# Patient Record
Sex: FEMALE | Race: WHITE | Employment: OTHER | URBAN - METROPOLITAN AREA
[De-identification: names, ages, dates, MRNs, and addresses within clinical notes are randomized per-mention and may not be internally consistent; named-entity substitution may affect disease eponyms.]

---

## 2017-03-10 ENCOUNTER — HOSPITAL ENCOUNTER (EMERGENCY)
Age: 82
Discharge: HOME | End: 2017-03-10
Attending: EMERGENCY MEDICINE | Admitting: EMERGENCY MEDICINE
Payer: MEDICARE

## 2017-03-10 VITALS
DIASTOLIC BLOOD PRESSURE: 77 MMHG | SYSTOLIC BLOOD PRESSURE: 165 MMHG | BODY MASS INDEX: 23.74 KG/M2 | HEART RATE: 85 BPM | RESPIRATION RATE: 16 BRPM | HEIGHT: 62 IN | TEMPERATURE: 97.9 F | OXYGEN SATURATION: 94 % | WEIGHT: 129 LBS

## 2017-03-10 DIAGNOSIS — N39.0 URINARY TRACT INFECTION WITHOUT HEMATURIA, SITE UNSPECIFIED: Primary | ICD-10-CM

## 2017-03-10 LAB
ANION GAP BLD CALC-SCNC: 15 MMOL/L (ref 5–15)
BASOPHILS # BLD AUTO: 0 K/UL (ref 0–0.1)
BASOPHILS # BLD: 0 % (ref 0–1)
BUN BLD-MCNC: 20 MG/DL (ref 9–20)
CA-I BLD-MCNC: 1.07 MMOL/L (ref 1.12–1.32)
CHLORIDE BLD-SCNC: 104 MMOL/L (ref 98–107)
CO2 BLD-SCNC: 28 MMOL/L (ref 21–32)
CREAT BLD-MCNC: 0.7 MG/DL (ref 0.6–1.3)
DIFFERENTIAL METHOD BLD: ABNORMAL
EOSINOPHIL # BLD: 0.4 K/UL (ref 0–0.4)
EOSINOPHIL NFR BLD: 6 % (ref 0–7)
ERYTHROCYTE [DISTWIDTH] IN BLOOD BY AUTOMATED COUNT: 12.9 % (ref 11.5–14.5)
GLUCOSE BLD-MCNC: 117 MG/DL (ref 65–105)
HCT VFR BLD AUTO: 39.4 % (ref 35–47)
HCT VFR BLD CALC: 40 % (ref 35–47)
HGB BLD-MCNC: 12.5 G/DL (ref 11.5–16)
HGB BLD-MCNC: 13.6 GM/DL (ref 11.5–16)
LYMPHOCYTES # BLD AUTO: 23 % (ref 12–49)
LYMPHOCYTES # BLD: 1.5 K/UL (ref 0.8–3.5)
MCH RBC QN AUTO: 32.7 PG (ref 26–34)
MCHC RBC AUTO-ENTMCNC: 31.7 G/DL (ref 30–36.5)
MCV RBC AUTO: 103.1 FL (ref 80–99)
MONOCYTES # BLD: 0.6 K/UL (ref 0–1)
MONOCYTES NFR BLD AUTO: 9 % (ref 5–13)
NEUTS SEG # BLD: 4 K/UL (ref 1.8–8)
NEUTS SEG NFR BLD AUTO: 62 % (ref 32–75)
PLATELET # BLD AUTO: 267 K/UL (ref 150–400)
POTASSIUM BLD-SCNC: 4.4 MMOL/L (ref 3.5–5.1)
RBC # BLD AUTO: 3.82 M/UL (ref 3.8–5.2)
SERVICE CMNT-IMP: ABNORMAL
SODIUM BLD-SCNC: 141 MMOL/L (ref 136–145)
WBC # BLD AUTO: 6.4 K/UL (ref 3.6–11)

## 2017-03-10 PROCEDURE — 36415 COLL VENOUS BLD VENIPUNCTURE: CPT | Performed by: PHYSICIAN ASSISTANT

## 2017-03-10 PROCEDURE — 85025 COMPLETE CBC W/AUTO DIFF WBC: CPT | Performed by: PHYSICIAN ASSISTANT

## 2017-03-10 PROCEDURE — 74011250637 HC RX REV CODE- 250/637: Performed by: PHYSICIAN ASSISTANT

## 2017-03-10 PROCEDURE — 80047 BASIC METABLC PNL IONIZED CA: CPT

## 2017-03-10 PROCEDURE — 99283 EMERGENCY DEPT VISIT LOW MDM: CPT

## 2017-03-10 RX ORDER — ALBUTEROL SULFATE 90 UG/1
AEROSOL, METERED RESPIRATORY (INHALATION)
COMMUNITY
End: 2017-03-28

## 2017-03-10 RX ORDER — CARBAMAZEPINE 200 MG/1
200 TABLET, EXTENDED RELEASE ORAL 2 TIMES DAILY
COMMUNITY
End: 2017-03-28

## 2017-03-10 RX ORDER — LEVOTHYROXINE SODIUM 112 UG/1
TABLET ORAL
COMMUNITY
End: 2017-03-28

## 2017-03-10 RX ORDER — GLUCOSAMINE SULFATE 1500 MG
POWDER IN PACKET (EA) ORAL DAILY
COMMUNITY
End: 2017-03-28

## 2017-03-10 RX ORDER — CEPHALEXIN 500 MG/1
500 CAPSULE ORAL 3 TIMES DAILY
Qty: 21 CAP | Refills: 0 | Status: SHIPPED | OUTPATIENT
Start: 2017-03-10 | End: 2017-03-17

## 2017-03-10 RX ORDER — CEPHALEXIN 250 MG/1
500 CAPSULE ORAL
Status: COMPLETED | OUTPATIENT
Start: 2017-03-10 | End: 2017-03-10

## 2017-03-10 RX ADMIN — CEPHALEXIN 500 MG: 250 CAPSULE ORAL at 19:50

## 2017-03-10 NOTE — ED TRIAGE NOTES
Patient ambulatory to ED treatment area with steady gait using personal walker for complaint of Alessia Ospina has a UTI again. She had one a few weeks ago and was on an antibiotic that ended on February 25th but she has one again. \" Family reports that she is confused. Her urine was tested on Tuesday and the results came back today. Patient's family reports that the physician who ordered the test is out of town and that the on call physician wanted to wait until the ordering physician came back on Monday. Family reports that they do not want to wait until Monday due to increased confusion. Family reports that she has a history of frequent UTI's.

## 2017-03-11 NOTE — ED NOTES
Patient requesting to use the restroom. Patient ambulatory to restroom with a shuffling gait using walker. Patient to obtain urine sample as ordered.

## 2017-03-11 NOTE — ED NOTES
The patient was discharged home by ANASTASIIA Jasso in stable condition. The patient is alert and oriented, in no respiratory distress and discharge vital signs obtained. The patient's diagnosis, condition and treatment were explained. The patient expressed understanding. One prescription given. No work/school note given. A discharge plan has been developed. A  was not involved in the process. Aftercare instructions were given. Pt ambulatory out of the ED.

## 2017-03-11 NOTE — ED PROVIDER NOTES
HPI Comments: 79 y/o F hx COPD, frequent uti presents with urinary frequency, patient daughters bring a copy of UA from pcp from 3/8 - >100k ecoli, noting having difficulty reaching pcp to initiate abx. No abd pain/fever/n/v/flank pain. Patient at baseline, without complaint aside from concern for untreated uti. No hx  surgery. Unclear last antibiotic for uti. Recently relocated from [facility], has yet to establish care with urology. Patient is a 80 y.o. female presenting with frequency. Urinary Frequency    Associated symptoms include frequency. Past Medical History:   Diagnosis Date    [facility]ch[facility] obstructive [facility]) ([facility] [address] [facility]     Hypothyroid     Seizure ([facility] [address]        Past Surgical History:   Procedure Laterality Date    HX HIP REPLACEMENT           History reviewed. No pertinent family history. Social History     Social History    Marital status:      Spouse name: N/A    Number of children: N/A    Years of education: N/A     Occupational History    Not on file. Social History Main Topics    Smoking status: Never Smoker    Smokeless tobacco: Not on file    Alcohol use No    Drug use: Not on file    Sexual activity: Not on file     Other Topics Concern    Not on file     Social History Narrative    No narrative on file         ALLERGIES: Pcn [penicillins] and Sulfa (sulfonamide antibiotics)    Review of Systems   Genitourinary: Positive for frequency. All other systems reviewed and are negative. Vitals:    03/10/17 1847   BP: 165/77   Pulse: 85   Resp: 16   Temp: 97.9 °F (36.6 °C)   SpO2: 94%   Weight: 58.5 kg (129 lb)   Height: 5' 2\" (1.575 m)            Physical Exam   Constitutional: She is oriented to person, place, and time. She appears well-developed and well-nourished. HENT:   Head: Normocephalic and atraumatic. Eyes: Conjunctivae and EOM are normal. Pupils are equal, round, and reactive to light.    Cardiovascular: Normal rate, regular rhythm and normal heart sounds. Pulmonary/Chest: Effort normal and breath sounds normal.   Abdominal: Soft. There is no tenderness. Musculoskeletal: Normal range of motion. Neurological: She is alert and oriented to person, place, and time. Skin: Skin is warm and dry. No erythema. Nursing note and vitals reviewed. MDM  Number of Diagnoses or Management Options  Urinary tract infection without hematuria, site unspecified:   Diagnosis management comments: Patient well appearing, aside from urinary complaints she is doing well. Attempted to obtain urine twice here however patient missed the collecting hat twice. Daughters bring a copy of UA from pcp including cx/sensitivity, scanned into chart. Will tx based on this.           ED Course       Procedures

## 2017-03-11 NOTE — DISCHARGE INSTRUCTIONS
We hope that we have addressed all of your medical concerns. The examination and treatment you received in the Emergency Department were for an emergent problem and were not intended as complete care. It is important that you follow up with your healthcare provider(s) for ongoing care. If your symptoms worsen or do not improve as expected, and you are unable to reach your usual health care provider(s), you should return to the Emergency Department. Today's healthcare is undergoing tremendous change, and patient satisfaction surveys are one of the many tools to assess the quality of medical care. You may receive a survey from the [employer] regarding your experience in the Emergency Department. I hope that your experience has been completely positive, particularly the medical care that I provided. As such, please participate in the survey; anything less than excellent does not meet my expectations or intentions. Formerly [facility], [facility] and [address] [place] [address] participate in nationally recognized quality of care measures. If your blood pressure is greater than 120/80, as reported below, we urge that you seek medical care to address the potential of high blood pressure, commonly known as hypertension. Hypertension can be hereditary or can be caused by certain medical conditions, pain, stress, or \"white coat syndrome. \"       Please make an appointment with your health care provider(s) for follow up of your Emergency Department visit. VITALS:   Patient Vitals for the past 8 hrs:   Temp Pulse Resp BP SpO2   03/10/17 1847 97.9 °F (36.6 °C) 85 16 165/77 94 %          Thank you for allowing us to provide you with medical care today. We realize that you have many choices for your emergency care needs. Please choose us in the future for any continued health care needs. Franny Blount  [address] [place] [address], [address] [place] [address]. Office: [phone]            Recent Results (from the past 24 hour(s))   CBC WITH AUTOMATED DIFF    Collection Time: 03/10/17  7:34 PM   Result Value Ref Range    WBC 6.4 3.6 - 11.0 K/uL    RBC 3.82 3.80 - 5.20 M/uL    HGB 12.5 11.5 - 16.0 g/dL    HCT 39.4 35.0 - 47.0 %    .1 (H) 80.0 - 99.0 FL    MCH 32.7 26.0 - 34.0 PG    MCHC 31.7 30.0 - 36.5 g/dL    RDW 12.9 11.5 - 14.5 %    PLATELET 104 364 - 695 K/uL    NEUTROPHILS 62 32 - 75 %    LYMPHOCYTES 23 12 - 49 %    MONOCYTES 9 5 - 13 %    EOSINOPHILS 6 0 - 7 %    BASOPHILS 0 0 - 1 %    ABS. NEUTROPHILS 4.0 1.8 - 8.0 K/UL    ABS. LYMPHOCYTES 1.5 0.8 - 3.5 K/UL    ABS. MONOCYTES 0.6 0.0 - 1.0 K/UL    ABS. EOSINOPHILS 0.4 0.0 - 0.4 K/UL    ABS. BASOPHILS 0.0 0.0 - 0.1 K/UL    DF AUTOMATED     POC CHEM8    Collection Time: 03/10/17  7:36 PM   Result Value Ref Range    Calcium, ionized (POC) 1.07 (L) 1.12 - 1.32 MMOL/L    Sodium (POC) 141 136 - 145 MMOL/L    Potassium (POC) 4.4 3.5 - 5.1 MMOL/L    Chloride (POC) 104 98 - 107 MMOL/L    CO2 (POC) 28 21 - 32 MMOL/L    Anion gap (POC) 15 5 - 15 mmol/L    Glucose (POC) 117 (H) 65 - 105 MG/DL    BUN (POC) 20 9 - 20 MG/DL    Creatinine (POC) 0.7 0.6 - 1.3 MG/DL    GFR-AA (POC) >60 >60 ml/min/1.73m2    GFR, non-AA (POC) >60 >60 ml/min/1.73m2    Hemoglobin (POC) 13.6 11.5 - 16.0 GM/DL    Hematocrit (POC) 40 35.0 - 47.0 %    Comment Notified RN or MD immediately by          No results found. Urinary Tract Infection in Women: Care Instructions  Your Care Instructions    A urinary tract infection, or UTI, is a general term for an infection anywhere between the kidneys and the urethra (where urine comes out). Most UTIs are bladder infections. They often cause pain or burning when you urinate. UTIs are caused by bacteria and can be cured with antibiotics. Be sure to complete your treatment so that the infection goes away. Follow-up care is a key part of your treatment and safety.  Be sure to make and go to all appointments, and call your doctor if you are having problems. It's also a good idea to know your test results and keep a list of the medicines you take. How can you care for yourself at home? · Take your antibiotics as directed. Do not stop taking them just because you feel better. You need to take the full course of antibiotics. · Drink extra water and other fluids for the next day or two. This may help wash out the bacteria that are causing the infection. (If you have kidney, heart, or liver disease and have to limit fluids, talk with your doctor before you increase your fluid intake.)  · Avoid drinks that are carbonated or have caffeine. They can irritate the bladder. · Urinate often. Try to empty your bladder each time. · To relieve pain, take a hot bath or lay a heating pad set on low over your lower belly or genital area. Never go to sleep with a heating pad in place. To prevent UTIs  · Drink plenty of water each day. This helps you urinate often, which clears bacteria from your system. (If you have kidney, heart, or liver disease and have to limit fluids, talk with your doctor before you increase your fluid intake.)  · Consider adding cranberry juice to your diet. · Urinate when you need to. · Urinate right after you have sex. · Change sanitary pads often. · Avoid douches, bubble baths, feminine hygiene sprays, and other feminine hygiene products that have deodorants. · After going to the bathroom, wipe from front to back. When should you call for help? Call your doctor now or seek immediate medical care if:  · Symptoms such as fever, chills, nausea, or vomiting get worse or appear for the first time. · You have new pain in your back just below your rib cage. This is called flank pain. · There is new blood or pus in your urine. · You have any problems with your antibiotic medicine.   Watch closely for changes in your health, and be sure to contact your doctor if:  · You are not getting better after taking an antibiotic for 2 days. · Your symptoms go away but then come back. Where can you learn more? Go to http://papa-juwan.info/. Enter F321 in the search box to learn more about \"Urinary Tract Infection in Women: Care Instructions. \"  Current as of: August 12, 2016  Content Version: 11.1  © 3831-6441 [employer]. Care instructions adapted under license by [employer] (which disclaims liability or warranty for this information). If you have questions about a medical condition or this instruction, always ask your healthcare professional. [place] [address] any warranty or liability for your use of this information.

## 2017-03-28 ENCOUNTER — HOSPITAL ENCOUNTER (INPATIENT)
Age: 82
LOS: 3 days | Discharge: NURSING, REHAB OR LONG-TERM CARE | DRG: 101 | End: 2017-03-31
Attending: EMERGENCY MEDICINE | Admitting: INTERNAL MEDICINE
Payer: MEDICARE

## 2017-03-28 ENCOUNTER — APPOINTMENT (OUTPATIENT)
Dept: CT IMAGING | Age: 82
DRG: 101 | End: 2017-03-28
Attending: EMERGENCY MEDICINE
Payer: MEDICARE

## 2017-03-28 DIAGNOSIS — R56.9 SEIZURES (HCC): Primary | ICD-10-CM

## 2017-03-28 PROBLEM — J44.9 COPD (CHRONIC OBSTRUCTIVE PULMONARY DISEASE) (HCC): Status: ACTIVE | Noted: 2017-03-28

## 2017-03-28 PROBLEM — E03.9 ACQUIRED HYPOTHYROIDISM: Status: ACTIVE | Noted: 2017-03-28

## 2017-03-28 LAB
ALBUMIN SERPL BCP-MCNC: 3.6 G/DL (ref 3.5–5)
ALBUMIN/GLOB SERPL: 1.1 {RATIO} (ref 1.1–2.2)
ALP SERPL-CCNC: 113 U/L (ref 45–117)
ALT SERPL-CCNC: 20 U/L (ref 12–78)
ANION GAP BLD CALC-SCNC: 19 MMOL/L (ref 5–15)
APPEARANCE UR: ABNORMAL
AST SERPL W P-5'-P-CCNC: 12 U/L (ref 15–37)
BACTERIA URNS QL MICRO: ABNORMAL /HPF
BASOPHILS # BLD AUTO: 0 K/UL (ref 0–0.1)
BASOPHILS # BLD: 0 % (ref 0–1)
BILIRUB SERPL-MCNC: 0.2 MG/DL (ref 0.2–1)
BILIRUB UR QL: NEGATIVE
BUN SERPL-MCNC: 14 MG/DL (ref 6–20)
BUN/CREAT SERPL: 12 (ref 12–20)
CALCIUM SERPL-MCNC: 8.9 MG/DL (ref 8.5–10.1)
CARBAMAZEPINE SERPL-MCNC: 5.1 UG/ML (ref 4–12)
CHLORIDE SERPL-SCNC: 106 MMOL/L (ref 97–108)
CK SERPL-CCNC: 30 U/L (ref 26–192)
CO2 SERPL-SCNC: 18 MMOL/L (ref 21–32)
COLOR UR: ABNORMAL
CREAT SERPL-MCNC: 1.15 MG/DL (ref 0.55–1.02)
EOSINOPHIL # BLD: 0.1 K/UL (ref 0–0.4)
EOSINOPHIL NFR BLD: 1 % (ref 0–7)
EPITH CASTS URNS QL MICRO: ABNORMAL /LPF
ERYTHROCYTE [DISTWIDTH] IN BLOOD BY AUTOMATED COUNT: 13.2 % (ref 11.5–14.5)
GLOBULIN SER CALC-MCNC: 3.2 G/DL (ref 2–4)
GLUCOSE SERPL-MCNC: 171 MG/DL (ref 65–100)
GLUCOSE UR STRIP.AUTO-MCNC: NEGATIVE MG/DL
HCT VFR BLD AUTO: 42.4 % (ref 35–47)
HGB BLD-MCNC: 13.3 G/DL (ref 11.5–16)
HGB UR QL STRIP: NEGATIVE
HYALINE CASTS URNS QL MICRO: ABNORMAL /LPF (ref 0–5)
KETONES UR QL STRIP.AUTO: 40 MG/DL
LEUKOCYTE ESTERASE UR QL STRIP.AUTO: ABNORMAL
LYMPHOCYTES # BLD AUTO: 16 % (ref 12–49)
LYMPHOCYTES # BLD: 1.1 K/UL (ref 0.8–3.5)
MAGNESIUM SERPL-MCNC: 2.2 MG/DL (ref 1.6–2.4)
MCH RBC QN AUTO: 32.6 PG (ref 26–34)
MCHC RBC AUTO-ENTMCNC: 31.4 G/DL (ref 30–36.5)
MCV RBC AUTO: 103.9 FL (ref 80–99)
MONOCYTES # BLD: 0.3 K/UL (ref 0–1)
MONOCYTES NFR BLD AUTO: 4 % (ref 5–13)
NEUTS SEG # BLD: 5.4 K/UL (ref 1.8–8)
NEUTS SEG NFR BLD AUTO: 79 % (ref 32–75)
NITRITE UR QL STRIP.AUTO: NEGATIVE
PH UR STRIP: 6 [PH] (ref 5–8)
PLATELET # BLD AUTO: 212 K/UL (ref 150–400)
POTASSIUM SERPL-SCNC: 3.7 MMOL/L (ref 3.5–5.1)
PROT SERPL-MCNC: 6.8 G/DL (ref 6.4–8.2)
PROT UR STRIP-MCNC: ABNORMAL MG/DL
RBC # BLD AUTO: 4.08 M/UL (ref 3.8–5.2)
RBC #/AREA URNS HPF: ABNORMAL /HPF (ref 0–5)
SODIUM SERPL-SCNC: 143 MMOL/L (ref 136–145)
SP GR UR REFRACTOMETRY: 1.02 (ref 1–1.03)
TROPONIN I SERPL-MCNC: <0.04 NG/ML
UROBILINOGEN UR QL STRIP.AUTO: 0.2 EU/DL (ref 0.2–1)
WBC # BLD AUTO: 6.9 K/UL (ref 3.6–11)
WBC URNS QL MICRO: ABNORMAL /HPF (ref 0–4)

## 2017-03-28 PROCEDURE — 70450 CT HEAD/BRAIN W/O DYE: CPT

## 2017-03-28 PROCEDURE — 96375 TX/PRO/DX INJ NEW DRUG ADDON: CPT

## 2017-03-28 PROCEDURE — 74011250636 HC RX REV CODE- 250/636: Performed by: INTERNAL MEDICINE

## 2017-03-28 PROCEDURE — 87077 CULTURE AEROBIC IDENTIFY: CPT | Performed by: INTERNAL MEDICINE

## 2017-03-28 PROCEDURE — 81001 URINALYSIS AUTO W/SCOPE: CPT | Performed by: EMERGENCY MEDICINE

## 2017-03-28 PROCEDURE — 65270000029 HC RM PRIVATE

## 2017-03-28 PROCEDURE — 93005 ELECTROCARDIOGRAM TRACING: CPT

## 2017-03-28 PROCEDURE — 82550 ASSAY OF CK (CPK): CPT | Performed by: EMERGENCY MEDICINE

## 2017-03-28 PROCEDURE — 85025 COMPLETE CBC W/AUTO DIFF WBC: CPT | Performed by: EMERGENCY MEDICINE

## 2017-03-28 PROCEDURE — 96361 HYDRATE IV INFUSION ADD-ON: CPT

## 2017-03-28 PROCEDURE — 80053 COMPREHEN METABOLIC PANEL: CPT | Performed by: EMERGENCY MEDICINE

## 2017-03-28 PROCEDURE — 87186 SC STD MICRODIL/AGAR DIL: CPT | Performed by: INTERNAL MEDICINE

## 2017-03-28 PROCEDURE — 36415 COLL VENOUS BLD VENIPUNCTURE: CPT | Performed by: EMERGENCY MEDICINE

## 2017-03-28 PROCEDURE — 99285 EMERGENCY DEPT VISIT HI MDM: CPT

## 2017-03-28 PROCEDURE — 80156 ASSAY CARBAMAZEPINE TOTAL: CPT | Performed by: EMERGENCY MEDICINE

## 2017-03-28 PROCEDURE — 83735 ASSAY OF MAGNESIUM: CPT | Performed by: EMERGENCY MEDICINE

## 2017-03-28 PROCEDURE — 74011000258 HC RX REV CODE- 258: Performed by: EMERGENCY MEDICINE

## 2017-03-28 PROCEDURE — 77030011943

## 2017-03-28 PROCEDURE — 84484 ASSAY OF TROPONIN QUANT: CPT | Performed by: EMERGENCY MEDICINE

## 2017-03-28 PROCEDURE — 74011250636 HC RX REV CODE- 250/636: Performed by: EMERGENCY MEDICINE

## 2017-03-28 PROCEDURE — 87086 URINE CULTURE/COLONY COUNT: CPT | Performed by: INTERNAL MEDICINE

## 2017-03-28 PROCEDURE — 96374 THER/PROPH/DIAG INJ IV PUSH: CPT

## 2017-03-28 PROCEDURE — 94762 N-INVAS EAR/PLS OXIMTRY CONT: CPT

## 2017-03-28 RX ORDER — LORAZEPAM 2 MG/ML
1 INJECTION INTRAMUSCULAR
Status: COMPLETED | OUTPATIENT
Start: 2017-03-28 | End: 2017-03-28

## 2017-03-28 RX ORDER — LEVOTHYROXINE SODIUM 100 UG/1
100 TABLET ORAL
COMMUNITY
End: 2018-01-03

## 2017-03-28 RX ORDER — CARBAMAZEPINE 200 MG/1
200 TABLET ORAL EVERY EVENING
COMMUNITY
End: 2017-03-31

## 2017-03-28 RX ORDER — ACETAMINOPHEN 500 MG/1
1 CAPSULE, LIQUID FILLED ORAL DAILY
COMMUNITY
End: 2018-01-03

## 2017-03-28 RX ORDER — LORAZEPAM 2 MG/ML
1 INJECTION INTRAMUSCULAR
Status: DISCONTINUED | OUTPATIENT
Start: 2017-03-28 | End: 2017-03-30

## 2017-03-28 RX ORDER — ACETAMINOPHEN 325 MG/1
650 TABLET ORAL
Status: DISCONTINUED | OUTPATIENT
Start: 2017-03-28 | End: 2017-03-31 | Stop reason: HOSPADM

## 2017-03-28 RX ORDER — ONDANSETRON 2 MG/ML
4 INJECTION INTRAMUSCULAR; INTRAVENOUS
Status: COMPLETED | OUTPATIENT
Start: 2017-03-28 | End: 2017-03-28

## 2017-03-28 RX ORDER — CARBAMAZEPINE 200 MG/1
400 TABLET ORAL DAILY
COMMUNITY
End: 2017-03-31

## 2017-03-28 RX ORDER — ALBUTEROL SULFATE 0.83 MG/ML
2.5 SOLUTION RESPIRATORY (INHALATION)
COMMUNITY
End: 2018-01-03

## 2017-03-28 RX ORDER — SODIUM CHLORIDE 0.9 % (FLUSH) 0.9 %
5-10 SYRINGE (ML) INJECTION AS NEEDED
Status: DISCONTINUED | OUTPATIENT
Start: 2017-03-28 | End: 2017-03-31 | Stop reason: HOSPADM

## 2017-03-28 RX ORDER — SODIUM CHLORIDE 0.9 % (FLUSH) 0.9 %
5-10 SYRINGE (ML) INJECTION EVERY 8 HOURS
Status: DISCONTINUED | OUTPATIENT
Start: 2017-03-28 | End: 2017-03-31 | Stop reason: HOSPADM

## 2017-03-28 RX ORDER — BUDESONIDE 0.5 MG/2ML
500 INHALANT ORAL 2 TIMES DAILY
COMMUNITY
End: 2018-01-03

## 2017-03-28 RX ORDER — ENOXAPARIN SODIUM 100 MG/ML
40 INJECTION SUBCUTANEOUS EVERY 24 HOURS
Status: DISCONTINUED | OUTPATIENT
Start: 2017-03-28 | End: 2017-03-31 | Stop reason: HOSPADM

## 2017-03-28 RX ADMIN — SODIUM CHLORIDE 1000 ML: 900 INJECTION, SOLUTION INTRAVENOUS at 21:05

## 2017-03-28 RX ADMIN — ONDANSETRON 4 MG: 2 INJECTION INTRAMUSCULAR; INTRAVENOUS at 18:54

## 2017-03-28 RX ADMIN — SODIUM CHLORIDE 1000 ML: 900 INJECTION, SOLUTION INTRAVENOUS at 19:27

## 2017-03-28 RX ADMIN — SODIUM CHLORIDE 1500 MG: 900 INJECTION, SOLUTION INTRAVENOUS at 19:27

## 2017-03-28 RX ADMIN — LORAZEPAM 1 MG: 2 INJECTION INTRAMUSCULAR; INTRAVENOUS at 18:53

## 2017-03-28 RX ADMIN — ENOXAPARIN SODIUM 40 MG: 40 INJECTION SUBCUTANEOUS at 21:05

## 2017-03-28 NOTE — ED PROVIDER NOTES
HPI Comments: 80 y.o. female with past medical history significant for hypothyroidism, seizures,dementia, COPD, and dementia who presents from home via EMS with chief complaint of seizure. Pt is in the ED with her daughters. The daughters report that the pt was seen at [place] a few weeks age where she was dx with a urinary infection. Today around noon, the daughter witnessed a seizure like episode around noon. Pt was staring off into space for about a minute and then came back to normal. Around 1600, pt was finishing her physical therapy when she had a shaking episode and was not responsive for about a minute. Pt had another seizure like  episode 1 hour PTA. Pt lives at an assisted living facility. Pt takes tegretol for her remote seizures. There are no other acute medical concerns at this time. Social hx: nonsmoker, no EtOH use  PCP: Lauren Scott MD    Full history, physical exam, and ROS unable to be obtained due to:  Acuity of condition     Note written by Braulio Flores, as dictated by Key Jules MD 7:00 PM      The history is provided by the EMS personnel, a caregiver and the patient. Past Medical History:   Diagnosis Date    [facility]) ([facility] [address]     Hypothyroid     Seizure ([facility] [address]        Past Surgical History:   Procedure Laterality Date    HX HIP REPLACEMENT           History reviewed. No pertinent family history. Social History     Social History    Marital status:      Spouse name: N/A    Number of children: N/A    Years of education: N/A     Occupational History    Not on file.      Social History Main Topics    Smoking status: Never Smoker    Smokeless tobacco: Not on file    Alcohol use No    Drug use: Not on file    Sexual activity: Not on file     Other Topics Concern    Not on file     Social History Narrative         ALLERGIES: Pcn [penicillins] and Sulfa (sulfonamide antibiotics)    Review of Systems   Unable to perform ROS: Acuity of condition       Vitals:    03/28/17 1835   BP: 183/80   Pulse: (!) 105   Resp: 29   Temp: 98 °F (36.7 °C)   SpO2: 95%   Weight: 58.5 kg (129 lb)   Height: 5' 2\" (1.575 m)            Physical Exam   Constitutional: She appears well-developed and well-nourished. Elderly and frail;     HENT:   Head: Normocephalic and atraumatic. Right Ear: External ear normal.   Left Ear: External ear normal.   Nose: Nose normal.   Mouth/Throat: Oropharynx is clear and moist.   Eyes: EOM are normal. Pupils are equal, round, and reactive to light. No scleral icterus. 4 mm pupils; Neck: Normal range of motion. Neck supple. No JVD present. No tracheal deviation present. No thyromegaly present. Cardiovascular: Normal rate, regular rhythm, normal heart sounds and intact distal pulses. Exam reveals no friction rub. No murmur heard. Pulmonary/Chest: Effort normal and breath sounds normal. No stridor. No respiratory distress. She has no wheezes. She has no rales. She exhibits no tenderness. Abdominal: Soft. Bowel sounds are normal. She exhibits no distension. There is no tenderness. There is no rebound and no guarding. Musculoskeletal: Normal range of motion. She exhibits no edema or tenderness. Lymphadenopathy:     She has no cervical adenopathy. Neurological: She is alert. She has normal reflexes. No cranial nerve deficit. Coordination normal.   Ospeaking in one or two word sentences; Pt has a hx of dementia;  5/5 hand  and plantarflexion strength;  Normal sensation in all extremities;     Skin: Skin is warm and dry. No rash noted. No erythema. Nursing note and vitals reviewed. Note written by Braulio Camacho, as dictated by Sybil Bernal MD 7:12 PM    MDM  Number of Diagnoses or Management Options  Diagnosis management comments: 68-year-old white female with history of seizures presents to the emergency department with a seizure. Patient has several seizures today. Patient had a witnessed seizure in front of me. We'll load her with Keppra. Tegretol level is pending. We'll check CT scan of the head. We'll also check urinalysis. We'll give IV fluids and reassess shortly. Patient's family agrees with this plan. Amount and/or Complexity of Data Reviewed  Clinical lab tests: ordered and reviewed  Tests in the radiology section of CPT®: ordered and reviewed  Tests in the medicine section of CPT®: ordered and reviewed  Discussion of test results with the performing providers: yes  Decide to obtain previous medical records or to obtain history from someone other than the patient: yes  Obtain history from someone other than the patient: yes  Review and summarize past medical records: yes  Discuss the patient with other providers: yes  Independent visualization of images, tracings, or specimens: yes    Risk of Complications, Morbidity, and/or Mortality  Presenting problems: high  Diagnostic procedures: high  Management options: high    Critical Care  Total time providing critical care: 30-74 minutes (Total critical care time spend exclusive of procedures:  40 min)    ED Course       Procedures    Pt was awake on my first interaction with her in the ED     6:50 PM  Witnessed a tonic clonic seizure of duration of a few minutes The seizure was stopped with 1 mg  Ativan. CONSULT NOTE:  7:03 PM Romelia Merchant MD spoke with Dr. Sarah Edmondson MD, Consult for [facility] neurology  . Discussed available diagnostic tests and clinical findings. He is in agreement with care plans as outlined. He recommends keppra 1.5g IV. If tegretol levels come back low, then we can also give tegretol. ED EKG interpretation:  Rhythm: sinus tachycardia; and regular . Rate (approx.): 107; Axis: normal; ST/T wave: no ST elevations and depressions; normal intervals; Note written by Braulio Rouse, as dictated by Romelia Merchant MD 7:29 PM    8:08 PM  Head CT showed no acute process.  Labs are within normal limits except for low CO2 levels consistent with seizures. IV fluids are running. Will admit for further tx of her seizures. CONSULT NOTE:  Blanca Gil MD spoke with Dr. Ady Molina MD, Consult for Hospitalist.  Discussed available diagnostic tests and clinical findings. She is in agreement with care plans as outlined.   She will admit the pt.       8:29 PM  Pt is resting in no distress w/ family now

## 2017-03-28 NOTE — ED NOTES
Seizure lasting 2 mins, Dr. Key Singer at bedside, 2L O2 applied, 1 mg ativan given. Pt now resting.

## 2017-03-28 NOTE — ED TRIAGE NOTES
3 seizures witnessed by family today. Hx of same, family reports meds for same. Focal seizure witnessed by 2 RNs during triage, head and arm movements, staring into space, not responding to verbal stimuli. Lasting over 4 minutes.

## 2017-03-29 PROBLEM — E03.9 ACQUIRED HYPOTHYROIDISM: Chronic | Status: ACTIVE | Noted: 2017-03-28

## 2017-03-29 PROBLEM — N39.0 UTI (URINARY TRACT INFECTION): Status: ACTIVE | Noted: 2017-03-29

## 2017-03-29 PROBLEM — J44.9 COPD (CHRONIC OBSTRUCTIVE PULMONARY DISEASE) (HCC): Chronic | Status: ACTIVE | Noted: 2017-03-28

## 2017-03-29 PROBLEM — F03.90 DEMENTIA (HCC): Status: ACTIVE | Noted: 2017-03-29

## 2017-03-29 PROBLEM — F03.90 DEMENTIA (HCC): Chronic | Status: ACTIVE | Noted: 2017-03-29

## 2017-03-29 LAB
ANION GAP BLD CALC-SCNC: 7 MMOL/L (ref 5–15)
ATRIAL RATE: 107 BPM
BUN SERPL-MCNC: 12 MG/DL (ref 6–20)
BUN/CREAT SERPL: 19 (ref 12–20)
CALCIUM SERPL-MCNC: 7.4 MG/DL (ref 8.5–10.1)
CALCULATED P AXIS, ECG09: 69 DEGREES
CALCULATED R AXIS, ECG10: 65 DEGREES
CALCULATED T AXIS, ECG11: 33 DEGREES
CHLORIDE SERPL-SCNC: 115 MMOL/L (ref 97–108)
CO2 SERPL-SCNC: 25 MMOL/L (ref 21–32)
CREAT SERPL-MCNC: 0.63 MG/DL (ref 0.55–1.02)
DIAGNOSIS, 93000: NORMAL
ERYTHROCYTE [DISTWIDTH] IN BLOOD BY AUTOMATED COUNT: 13.3 % (ref 11.5–14.5)
GLUCOSE SERPL-MCNC: 87 MG/DL (ref 65–100)
HCT VFR BLD AUTO: 35.9 % (ref 35–47)
HGB BLD-MCNC: 11.5 G/DL (ref 11.5–16)
MCH RBC QN AUTO: 32.8 PG (ref 26–34)
MCHC RBC AUTO-ENTMCNC: 32 G/DL (ref 30–36.5)
MCV RBC AUTO: 102.3 FL (ref 80–99)
P-R INTERVAL, ECG05: 182 MS
PLATELET # BLD AUTO: 181 K/UL (ref 150–400)
POTASSIUM SERPL-SCNC: 3.7 MMOL/L (ref 3.5–5.1)
Q-T INTERVAL, ECG07: 360 MS
QRS DURATION, ECG06: 92 MS
QTC CALCULATION (BEZET), ECG08: 480 MS
RBC # BLD AUTO: 3.51 M/UL (ref 3.8–5.2)
SODIUM SERPL-SCNC: 147 MMOL/L (ref 136–145)
VENTRICULAR RATE, ECG03: 107 BPM
WBC # BLD AUTO: 7.5 K/UL (ref 3.6–11)

## 2017-03-29 PROCEDURE — 85027 COMPLETE CBC AUTOMATED: CPT | Performed by: INTERNAL MEDICINE

## 2017-03-29 PROCEDURE — 36415 COLL VENOUS BLD VENIPUNCTURE: CPT | Performed by: INTERNAL MEDICINE

## 2017-03-29 PROCEDURE — 95816 EEG AWAKE AND DROWSY: CPT | Performed by: NURSE PRACTITIONER

## 2017-03-29 PROCEDURE — 74011000258 HC RX REV CODE- 258: Performed by: INTERNAL MEDICINE

## 2017-03-29 PROCEDURE — 65270000029 HC RM PRIVATE

## 2017-03-29 PROCEDURE — 74011250637 HC RX REV CODE- 250/637: Performed by: NURSE PRACTITIONER

## 2017-03-29 PROCEDURE — 74011250636 HC RX REV CODE- 250/636: Performed by: INTERNAL MEDICINE

## 2017-03-29 PROCEDURE — 80048 BASIC METABOLIC PNL TOTAL CA: CPT | Performed by: INTERNAL MEDICINE

## 2017-03-29 RX ORDER — CARBAMAZEPINE 100 MG/1
400 TABLET, CHEWABLE ORAL 2 TIMES DAILY
Status: DISCONTINUED | OUTPATIENT
Start: 2017-03-29 | End: 2017-03-31 | Stop reason: HOSPADM

## 2017-03-29 RX ORDER — ALBUTEROL SULFATE 0.83 MG/ML
2.5 SOLUTION RESPIRATORY (INHALATION)
Status: DISCONTINUED | OUTPATIENT
Start: 2017-03-29 | End: 2017-03-31

## 2017-03-29 RX ORDER — LEVOTHYROXINE SODIUM 100 UG/1
100 TABLET ORAL
Status: DISCONTINUED | OUTPATIENT
Start: 2017-03-30 | End: 2017-03-31 | Stop reason: HOSPADM

## 2017-03-29 RX ORDER — ALBUTEROL SULFATE 0.83 MG/ML
2.5 SOLUTION RESPIRATORY (INHALATION) 2 TIMES DAILY
Status: DISCONTINUED | OUTPATIENT
Start: 2017-03-29 | End: 2017-03-29

## 2017-03-29 RX ADMIN — Medication 10 ML: at 14:00

## 2017-03-29 RX ADMIN — LORAZEPAM 1 MG: 2 INJECTION, SOLUTION INTRAMUSCULAR; INTRAVENOUS at 22:20

## 2017-03-29 RX ADMIN — LORAZEPAM 1 MG: 2 INJECTION, SOLUTION INTRAMUSCULAR; INTRAVENOUS at 18:07

## 2017-03-29 RX ADMIN — CARBAMAZEPINE 400 MG: 100 TABLET, CHEWABLE ORAL at 22:15

## 2017-03-29 RX ADMIN — Medication 10 ML: at 22:18

## 2017-03-29 RX ADMIN — ENOXAPARIN SODIUM 40 MG: 40 INJECTION SUBCUTANEOUS at 22:17

## 2017-03-29 RX ADMIN — SODIUM CHLORIDE 500 MG: 900 INJECTION, SOLUTION INTRAVENOUS at 19:11

## 2017-03-29 RX ADMIN — CEFTRIAXONE SODIUM 1 G: 1 INJECTION, POWDER, FOR SOLUTION INTRAMUSCULAR; INTRAVENOUS at 16:08

## 2017-03-29 RX ADMIN — LORAZEPAM 1 MG: 2 INJECTION, SOLUTION INTRAMUSCULAR; INTRAVENOUS at 20:14

## 2017-03-29 RX ADMIN — LORAZEPAM 1 MG: 2 INJECTION, SOLUTION INTRAMUSCULAR; INTRAVENOUS at 16:06

## 2017-03-29 RX ADMIN — SODIUM CHLORIDE 500 MG: 900 INJECTION, SOLUTION INTRAVENOUS at 05:35

## 2017-03-29 RX ADMIN — Medication 10 ML: at 05:35

## 2017-03-29 RX ADMIN — CARBAMAZEPINE 400 MG: 100 TABLET, CHEWABLE ORAL at 16:21

## 2017-03-29 NOTE — PROGRESS NOTES
TRANSFER - IN REPORT:    Verbal report received from [facility] (name) on Irwin Edwards  being received from ED (unit) for routine progression of care      Report consisted of patients Situation, Background, Assessment and   Recommendations(SBAR). Information from the following report(s) SBAR, Kardex, ED Summary, Intake/Output, MAR and Accordion was reviewed with the receiving nurse. Opportunity for questions and clarification was provided. Assessment completed upon patients arrival to unit and care assumed. Patient refused skin assessment     Bedside and Verbal shift change report given to [employer] RN (oncoming nurse) by Chey France RN (offgoing nurse). Report included the following information SBAR, Kardex, Procedure Summary, Intake/Output, MAR and Recent Results.

## 2017-03-29 NOTE — PROGRESS NOTES
Occupational Therapy Note:  Orders acknowledged and chart reviewed. Patient was received standing in room, incontinent, with ER staff assisting patient;  Assistance offered and nursing staff attempting to locate primary RN. Patient is agitated and uncooperative at this time. Patient not appropriate for OT evaluation at this time. Will continue to follow.   Nicki Choudhury, OTR/L

## 2017-03-29 NOTE — PROGRESS NOTES
Physical Therapy:  Orders received and chart reviewed. Attempted to see patient, patient currently agitated attempting to leave. Patient received standing with urine on the floor, she was attempting to put her clothes on. She continues to be confused, stating her name is \"Brianna\". And perseverating on multiple things. Nursing present. Patient able to sit in chair in room, patient remained in care of nurse. We will continue to follow and re-attempt when patient is stable.   Thank you  Arvin Vazquez PT,DPT

## 2017-03-29 NOTE — PROGRESS NOTES
Zenon [address]  [address] [facility], [address] [place] [address]  [phone]      Medical Progress Note      NAME: Flash Zapata   :  1935  MRM:  [id number]    Date/Time: 3/29/2017  11:31 AM         Subjective:     Chief Complaint:  Seizure: yesterday, several, none since presentation, has history of seizures but none recently    ROS:  (bold if positive, if negative)                        Tolerating Diet          Objective:       Vitals:          Last 24hrs VS reviewed since prior progress note.  Most recent are:    Visit Vitals    /53 (BP 1 Location: Right arm, BP Patient Position: At rest)    Pulse 76    Temp 97.9 °F (36.6 °C)    Resp 23    Ht 5' 2\" (1.575 m)    Wt 58.5 kg (129 lb)    SpO2 98%    BMI 23.59 kg/m2     SpO2 Readings from Last 6 Encounters:   17 98%   03/10/17 94%            Intake/Output Summary (Last 24 hours) at 17 1137  Last data filed at 17 0612   Gross per 24 hour   Intake              100 ml   Output              400 ml   Net             -300 ml          Exam:     Physical Exam:    Gen:  Well-developed, thin, frail, elderly  HEENT:  Pink conjunctivae, PERRL, hearing intact to voice, moist mucous membranes  Neck:  Supple, without masses, thyroid non-tender  Resp:  No accessory muscle use, clear breath sounds without wheezes rales or rhonchi  Card:  No murmurs, normal S1, S2 without thrills, bruits or peripheral edema  Abd:  Soft, non-tender, non-distended, normoactive bowel sounds are present, no palpable organomegaly and no detectable hernias  Lymph:  No cervical or inguinal adenopathy  Musc:  No cyanosis or clubbing  Skin:  No rashes or ulcers, skin turgor is good  Neuro:  Cranial nerves are grossly intact, no focal motor weakness, follows commands appropriately  Psych:  Poor insight, oriented to person, but not place and time, alert       Telemetry reviewed:   normal sinus rhythm    Medications Reviewed: (see below)    Lab Data Reviewed: (see below)    ______________________________________________________________________    Medications:     Current Facility-Administered Medications   Medication Dose Route Frequency    carBAMazepine (TEGretol) chewable tablet 400 mg  400 mg Oral BID    albuterol (PROVENTIL VENTOLIN) nebulizer solution 2.5 mg  2.5 mg Nebulization BID    cefTRIAXone (ROCEPHIN) 1 g in 0.9% sodium chloride (MBP/ADV) 50 mL  1 g IntraVENous Q24H    sodium chloride (NS) flush 5-10 mL  5-10 mL IntraVENous Q8H    sodium chloride (NS) flush 5-10 mL  5-10 mL IntraVENous PRN    acetaminophen (TYLENOL) tablet 650 mg  650 mg Oral Q4H PRN    enoxaparin (LOVENOX) injection 40 mg  40 mg SubCUTAneous Q24H    LORazepam (ATIVAN) injection 1 mg  1 mg IntraVENous Q2H PRN    levETIRAcetam (KEPPRA) 500 mg in 0.9% sodium chloride IVPB  500 mg IntraVENous Q12H     Current Outpatient Prescriptions   Medication Sig    levothyroxine (SYNTHROID) 100 mcg tablet Take 100 mcg by mouth Daily (before breakfast).  acetaminophen (MAPAP) 500 mg cap Take 1 Tab by mouth daily as needed.  carBAMazepine (TEGRETOL) 200 mg tablet Take 400 mg by mouth daily.  carBAMazepine (TEGRETOL) 200 mg tablet Take 200 mg by mouth every evening.  albuterol (PROVENTIL VENTOLIN) 2.5 mg /3 mL (0.083 %) nebulizer solution 2.5 mg by Nebulization route two (2) times a day.  budesonide (PULMICORT) 0.5 mg/2 mL nbsp 500 mcg by Nebulization route two (2) times a day.             Lab Review:     Recent Labs      03/29/17   0256  03/28/17 1924   WBC  7.5  6.9   HGB  11.5  13.3   HCT  35.9  42.4   PLT  181  212     Recent Labs      03/29/17   0256  03/28/17 1924   NA  147*  143   K  3.7  3.7   CL  115*  106   CO2  25  18*   GLU  87  171*   BUN  12  14   CREA  0.63  1.15*   CA  7.4*  8.9   MG   --   2.2   ALB   --   3.6   TBILI   --   0.2   SGOT   --   12*   ALT   --   20     Lab Results   Component Value Date/Time    Glucose (POC) 117 03/10/2017 07:36 PM No results for input(s): PH, PCO2, PO2, HCO3, FIO2 in the last 72 hours. No results for input(s): INR in the last 72 hours.     No lab exists for component: INREXT, INREXT  No results found for: SDES  No results found for: CULT         Assessment:     Principal Problem:    Seizure ([address] (3/28/2017)    Active Problems:    COPD (chronic obstructive pulmonary disease) ([facility] (3/28/2017)      Acquired hypothyroidism (3/28/2017)      Dementia (3/29/2017)      UTI (urinary tract infection) (3/29/2017)           Plan:     Principal Problem:    Seizure ([address] (3/28/2017)   - awaiting Neurology evaluation   - continue Keppra and lorazepam PRN   - Tegretol level in low therapeutic range    Active Problems:    COPD (chronic obstructive pulmonary disease) ([facility]) (3/28/2017)   - stable, I reordered albuterol      Acquired hypothyroidism (3/28/2017)   - continue LT4      Dementia (3/29/2017)   - essentially at baseline mental status, just a little slower after lorazepam per her daughter      UTI   - UA on presentation positive   - urine culture ordered   - this could lower her seizure threshold as well   - start ceftriaxone      Total time spent with patient: 35 minutes                  Care Plan discussed with: Patient, Family and Nursing Staff    Discussed:  Code Status, Care Plan and D/C Planning    Prophylaxis:  Lovenox    Disposition:  Home w/Family and HH PT, OT, RN           ___________________________________________________    Attending Physician: Maryam Collins MD

## 2017-03-29 NOTE — PROGRESS NOTES
Bedside and Verbal shift change report given to Kavita Melo (oncoming nurse) by Alvin Purdy (offgoing nurse). Report included the following information SBAR, Kardex, ED Summary, Intake/Output, MAR, Accordion, Recent Results, Med Rec Status and Cardiac Rhythm NSR.     1440: Pt up out of bed, confused, has pulled out 1/2 IVs, all tele-leads. Patient is refusing all care and all medication. 1515: Per Dr. Meenu Hope order, need to obtain urine culture prior to administering ABX, have been unable to obtain sample. Spoke with Dr. Meenu Hope in reference to patient behavior, refusal to take medication, and abx. Urine culture needs to be obtained prior to abx, if we need to we can check with lab to see if they still have urine from last night and he is ok with us using that sample. Ok to give patient 1 mg of ativan now. 1500: Bedside and Verbal shift change report given to Ivan Mercer (oncoming nurse) by Kavita Melo (offgoing nurse).  Report included the following information SBAR, Kardex, ED Summary, Intake/Output, MAR, Accordion, Recent Results, Med Rec Status and Cardiac Rhythm NSr.

## 2017-03-29 NOTE — PROGRESS NOTES
Bedside shift change report given to Yuli Foreman (oncoming nurse) by Negin Javier RN (offgoing nurse). Report included the following information SBAR, Kardex, Intake/Output, MAR, Recent Results, Med Rec Status and Cardiac Rhythm SR/SB.

## 2017-03-29 NOTE — ED NOTES
Assumed care of patient. Introduced self as primary nurse. Stretcher in low locked position with call bell within reach. Pt updated on plan of care and wait times. Pt instructed to use call bell if assistance is needed. Family at bedside and seizure precautions in place.

## 2017-03-29 NOTE — PROGRESS NOTES
[facility]: MED RECONCILIATION    Comments/Recommendations:     Medication(s) ADDED to PTA list:  1. Albuterol nebs BID  2. Budesonide BID    Medication(s) REMOVED from PTA list:  1. Albuterol inh  2. Vit D 1000 units    Medication(s) ADJUSTED on PTA list:  1. Carbamazepine XR changed to Carbamazepine 400 mg Q AM and 200 mg PM      Information obtained from: Transfer paper from [address] [place] [address],[address] PMH/Disease States:   Past Medical History:   Diagnosis Date    COPD (chronic obstructive pulmonary disease) ([facility]     Hypothyroid     Seizure ([facility]        Chief Complaint for this Admission:   Chief Complaint   Patient presents with    Seizure         Allergies: Pcn [penicillins] and Sulfa (sulfonamide antibiotics)    Prior to Admission Medications:     Medication Documentation Review Audit       Reviewed by Martin Jenkins PHARMD (Pharmacist) on 03/28/17 at 2142         Medication Sig Documenting Provider Last Dose Status Taking?      acetaminophen (MAPAP) 500 mg cap Take 1 Tab by mouth daily as needed. Shayne Ryan MD  Active Yes    albuterol (PROVENTIL VENTOLIN) 2.5 mg /3 mL (0.083 %) nebulizer solution 2.5 mg by Nebulization route two (2) times a day. Historical Provider  Active Yes    budesonide (PULMICORT) 0.5 mg/2 mL nbsp 500 mcg by Nebulization route two (2) times a day. Historical Provider  Active Yes    carBAMazepine (TEGRETOL) 200 mg tablet Take 400 mg by mouth daily. Historical Provider  Active Yes    carBAMazepine (TEGRETOL) 200 mg tablet Take 200 mg by mouth every evening. Historical Provider  Active Yes    levothyroxine (SYNTHROID) 100 mcg tablet Take 100 mcg by mouth Daily (before breakfast).  Shayne Ryan MD  Active Yes                        JILL Villar   Contact: [phone]

## 2017-03-29 NOTE — CONSULTS
[facility]  [address] [place] [address]  [phone]     Inpatient Neurology Consult  [facility]    Name:   Andrea Quintana record #: [id number]  Admission Date: 3/28/2017  Consult Date:  03/29/17    Referring Provider: Dr. Valdo Lambert  Chief Complaint:  Breakthrough seizure  Source of Hx:  Chart, pt and daughter  _____________________________________________________________________    HISTORY OF PRESENT ILLNESS:   Nati Winston is a 80 y.o. female with PMH of COPD, dementia, seizure hx and hypothyroidism. The Neurology Service is asked to evaluate for seizure, after admission for breakthrough seizures. Pt sees Dr. Sydni Reyes, [facility] for dementia and seizure hx. Daughter states around 12pm yesterday she witnessed patient walking and stopped walking stared off into space for about a minute--- was not responding then she was able to respond. About an hour later, patient had a brief event with upper and lower extremities shaking lasting 2 minutes per staff at SNF and they called EMS but patient refused to go to ED. Daughter goes to [facility], [facility] and arrives to room when patient is having a 3rd seizure, her 2nd tonic clonic seizure duration 3-5 minutes and EMS was called and pt sent to [facility]. While in ED bed, patient has last and 3rd generalized seizure or 4th seizure of the day around 1900pm.     Daughter now reports patient fell in 12/16, hit R frontal head, never went to hospital.  Had 1 appt with neurologist and didn't feel concussion happened per daughter but patient's mentation continued to decline then second appt with neurology saw NP at neurologist--nothing was diagnosed per daughter and no testing ordered after patient fell/collapsed on floor day before.   Tried to have 3rd appt with neurologist but MD refused to see patient, daughter called day after and testing ordered---MRI brain with atrophy from dementia and tegretol level--WNL. She takes Tegretol 400mg in AM and 200mg QHS. Was started on Depakote at seizure onset about 20 years ago, but didn't tolerate then changed to Tegretol 1000mg daily. DX with bipolar disorder by [facility] 15yrs ago and Frontotemporal dementia around same timeframe. Past Medical History:   Diagnosis Date    [facility]chronic obstructive [facility] disease) ([facility]     Dementia 3/29/2017    Hypothyroid     Seizure [facility])      Past Surgical History:   Procedure Laterality Date    HX HIP REPLACEMENT       History reviewed. No pertinent family history. Social History     Social History    Marital status:      Spouse name: N/A    Number of children: N/A    Years of education: N/A     Occupational History    Not on file. Social History Main Topics    Smoking status: Never Smoker    Smokeless tobacco: Not on file    Alcohol use No    Drug use: Not on file    Sexual activity: Not on file     Other Topics Concern    Not on file     Social History Narrative       Objective  Allergies: Allergies   Allergen Reactions    Pcn [Penicillins] Other (comments)    Sulfa (Sulfonamide Antibiotics) Other (comments)     Outpatient Meds  No current facility-administered medications on file prior to encounter. No current outpatient prescriptions on file prior to encounter.        Inpatient Meds    Current Facility-Administered Medications:     sodium chloride (NS) flush 5-10 mL, 5-10 mL, IntraVENous, Q8H, Zabrina Brady MD, 10 mL at 03/29/17 0535    sodium chloride (NS) flush 5-10 mL, 5-10 mL, IntraVENous, PRN, Zabrina Brady MD    acetaminophen (TYLENOL) tablet 650 mg, 650 mg, Oral, Q4H PRN, Zabrina Brady MD    enoxaparin (LOVENOX) injection 40 mg, 40 mg, SubCUTAneous, Q24H, Zabrina Brady MD, 40 mg at 03/28/17 2104    LORazepam (ATIVAN) injection 1 mg, 1 mg, IntraVENous, Q2H PRN, Zabrina Brady MD    levETIRAcetam (KEPPRA) 500 mg in 0.9% sodium chloride IVPB, 500 mg, IntraVENous, Q12H, Clyde Castro MD, Last Rate: 400 mL/hr at 03/29/17 0535, 500 mg at 03/29/17 0535    Current Outpatient Prescriptions:     levothyroxine (SYNTHROID) 100 mcg tablet, Take 100 mcg by mouth Daily (before breakfast). , Disp: , Rfl:     acetaminophen (MAPAP) 500 mg cap, Take 1 Tab by mouth daily as needed. , Disp: , Rfl:     carBAMazepine (TEGRETOL) 200 mg tablet, Take 400 mg by mouth daily. , Disp: , Rfl:     carBAMazepine (TEGRETOL) 200 mg tablet, Take 200 mg by mouth every evening., Disp: , Rfl:     albuterol (PROVENTIL VENTOLIN) 2.5 mg /3 mL (0.083 %) nebulizer solution, 2.5 mg by Nebulization route two (2) times a day., Disp: , Rfl:     budesonide (PULMICORT) 0.5 mg/2 mL nbsp, 500 mcg by Nebulization route two (2) times a day., Disp: , Rfl:     PHYSICAL EXAM  Patient Vitals for the past 12 hrs:   Temp Pulse Resp BP SpO2   03/29/17 0842 - 76 23 141/53 98 %   03/29/17 0400 97.9 °F (36.6 °C) 61 18 134/54 100 %   03/29/17 0032 98.2 °F (36.8 °C) 62 14 108/50 100 %   03/28/17 2300 - 62 16 115/51 100 %   03/28/17 2252 98.1 °F (36.7 °C) 74 14 121/71 100 %   03/28/17 2130 - 65 16 110/67 100 %   03/28/17 2100 - 70 12 122/54 100 %      General: thin WF in NAD, providing minimal history    Psych: Affect is calm with mild anxiety, cooperative    Neck: supple, nontender,  No bruit   Heart: regular rhythm and rate    Lungs: clear BBS   Extremities: no LE edema   Skin: no rashes      Neurological Examination:    Mental Status:  Alert, oriented x 1 to self, poor insight    Commands:  following    Language:     Comprehension: reduced       Dysarthria: none      Speech: no aphasia     Cranial Nerves:            I: smell   Not tested    II: visual acuity    deferred    II: visual fields   Full to confrontation    II: pupils   Equal, round, reactive to light    II: optic disc   Not examined    III,VII:   no ptosis of either eyelid    III,IV,VI: extraocular muscles    Full EOM but difficulty focusing, no nystagmus, no intranuclear opthalmoplegia    V: mastication   symmetrical    V: facial sensation:    Equal V1, V2 and V3 bilaterally with LT    VII: facial muscle function     Symmetric, no facial droop    VIII: hearing   Equal bilaterally    IX: soft palate elevation    Uvula midline, elevates symetrically    XI: trapezius strength    5/5    XI: sternocleidomastoid strength   5/5    XI: neck flexion strength    5/5     XII: tongue    Protrudes midline, no fasciculations or atrophy      Strength/Motor   Drift:       None            Bulk:  appears symmetric            Tone:  normal      Deltoid Biceps Triceps Wrist Extension Finger Abduction   L 5 5 5 5 5   R 5 5 5 5 5      Hip Flexion Hip Extension Knee Flexion Knee Extension Ankle Dorsiflexion Ankle Plantarflexion   L 5 5 5 5 5 5   R 5 5 5 5 5 5      Reflexes:    BR Brachial Patellar Achilles Babinski Startle Glabellar   L 2/4 2/4 2/4 NT  downgoing NT NT   R 2/4 2/4 2/4 NT downgoing NT NT      Sensory: intact on proximal & distal extremity w/ LT, pressure, temp, and proprioception bilaterally   Tremors:  no resting tremors, no intention tremors   Gait: deferred     *DAVON:  Unable to assess due to reduced comprehension, agitation or reduced LOC.     Labs Reviewed  Recent Results (from the past 12 hour(s))   CBC W/O DIFF    Collection Time: 03/29/17  2:56 AM   Result Value Ref Range    WBC 7.5 3.6 - 11.0 K/uL    RBC 3.51 (L) 3.80 - 5.20 M/uL    HGB 11.5 11.5 - 16.0 g/dL    HCT 35.9 35.0 - 47.0 %    .3 (H) 80.0 - 99.0 FL    MCH 32.8 26.0 - 34.0 PG    MCHC 32.0 30.0 - 36.5 g/dL    RDW 13.3 11.5 - 14.5 %    PLATELET 288 431 - 912 K/uL   METABOLIC PANEL, BASIC    Collection Time: 03/29/17  2:56 AM   Result Value Ref Range    Sodium 147 (H) 136 - 145 mmol/L    Potassium 3.7 3.5 - 5.1 mmol/L    Chloride 115 (H) 97 - 108 mmol/L    CO2 25 21 - 32 mmol/L    Anion gap 7 5 - 15 mmol/L    Glucose 87 65 - 100 mg/dL    BUN 12 6 - 20 MG/DL    Creatinine 0.63 0.55 - 1.02 MG/DL BUN/Creatinine ratio 19 12 - 20      GFR est AA >60 >60 ml/min/1.73m2    GFR est non-AA >60 >60 ml/min/1.73m2    Calcium 7.4 (L) 8.5 - 10.1 MG/DL     Imaging  Reviewed:   CT Results (recent):    Results from Hospital Encounter encounter on 03/28/17   CT HEAD WO CONT   Narrative INDICATION:   Seizure, new, not acute, >17yo, hx of trauma; seizures today,  confusion, eval for intracranial process    EXAM:  HEAD CT WITHOUT CONTRAST    COMPARISON:  None    TECHNIQUE:  Routine noncontrast axial head CT was performed. Sagittal and  coronal reconstructions were generated. CT dose reduction was achieved through use of a standardized protocol tailored  for this examination and automatic exposure control for dose modulation. FINDINGS:    Ventricles:  Midline, no hydrocephalus. Intracranial Hemorrhage:  None. Brain Parenchyma/Brainstem:  Normal for age. Basal Cisterns:  Normal.   Paranasal Sinuses:  Visualized sinuses are clear. Additional Comments:  N/A. Impression IMPRESSION:    No acute process. MRI Results (recent):  No results found for this or any previous visit.    _____________________________________________________________________    Review of Systems:  Pt cannot provide ROS with AMS from dementia  _____________________________________________________________________  Impression  80 y.o. female with 4 seizures yesterday, 2 witnessed by daughter. Exam findings of confused, dementia female who cannot provide HPI and exam is non-focal.  Imaging reviewed:  CT head without acute findings. Notable results include Tegretol level WNL but lower end at 5.1, UA suggesting UTI and EKG with ST with LAE. Feel with breakthrough seizures, we know the patient has received her AED at her SNF but with appearance of UTI she has higher risk of breakthrough seizure. Refer to plan below. Assessment:  1.  breakthrough seizures   2.  Seizure hx--- unsure if pseudoseizure or epileptic seizure hx  3. Dementia:  Frontotemporal-- sees Dr. Ga Salcido  4. Bipolar depression hx-- dx at [facility]  5. UTI ? Per UA    Plan  · EEG today--will await results  · Increase Tegretol to 400mg BID from 400am and 200pm), level WNL but lower end at 5.1. Continue Keppra today IV, Rec to stop after IV doses today  ·   Continue great care by collaborating care team and nursing staff. ·  Testing results discussed with patient and daughter --- any questions were answered. · To follow-up with Dr. Ga Salcido after discharge  · Keep in hospital 24hrs after last seizure to ensure no recurrent seizure occurs    My collaborating care team physician may have further recommendations. On DVT Prophylaxis yes no   Continue lovenox while inpatient x      Care Plan discussed with:  Patient x   Family-- daughter x   RN    Care Manager    Consultant/Specialist:     Patient will be discussed with Dr. Gianna Hernandez  ______________________________________________________________  Hospital Problems  Date Reviewed: 3/29/2017          Codes Class Noted POA    Dementia ICD-10-CM: F03.90  ICD-9-CM: 294.20  3/29/2017 Yes        * (Principal)Seizure [facility]) ICD-10-CM: R56.9  ICD-9-CM: 780.39  3/28/2017 Unknown        COPD (chronic obstructive pulmonary disease) ([facility] ICD-10-CM: J44.9  ICD-9-CM: 568  3/28/2017 Unknown        Acquired hypothyroidism ICD-10-CM: E03.9  ICD-9-CM: 244.9  3/28/2017 Unknown              *Thank you for allowing [facility], to participate in the care of your patient.    ================================================    ADDENDUM--> Collaborating Care Team Physician:    I have reviewed the documentation provided by the nurse practitioner, Ms. Sukhdeep Richardson, and we have discussed her findings and the clinical impression. I have formulated with her the proposed management plans for this patient. Additionally,  I have personally evaluated the patient to verify the history and to confirm physical findings.  Below are my additional comments:        Pt with hx of epilepsy followed by Dr aG Salcido'  Events yesterday described as ictus  On Tegretol with low nml level  EEG reviewed and no epileptiform  Agree with increased dose of tegretol  dtrs report her worsening FT dementia  Was on tegretol 1g daily but 8 mos ago when moved to assisted living PCP dropped to 600  No sz until presentation  Recent MRI at outside hospital said to show increasing atrophy c/w CT  dtrs note more irritable of late and we discussed how infection such as UTI can do that    Visit Vitals    /83    Pulse 74    Temp 97.9 °F (36.6 °C)    Resp 22    Ht 5' 2\" (1.575 m)    Wt 58.5 kg (129 lb)    SpO2 96%    BMI 23.59 kg/m2       She is awake, and calm  No icterus  When I ask if she like it her she asks if I like it here  Refuses commands    On Keppra due to events   Would not keep on 2 agents  Increase tegretol as above  If sz free x 24 hours can be discharged to follow with Dr Ga Salcido'  If agitation persists Dr Ga Salcido' may have suggestions for additional med      Thanks    Will return ANNA Quiroz MD

## 2017-03-30 PROCEDURE — 74011000258 HC RX REV CODE- 258: Performed by: INTERNAL MEDICINE

## 2017-03-30 PROCEDURE — 97166 OT EVAL MOD COMPLEX 45 MIN: CPT | Performed by: OCCUPATIONAL THERAPIST

## 2017-03-30 PROCEDURE — 74011250636 HC RX REV CODE- 250/636: Performed by: INTERNAL MEDICINE

## 2017-03-30 PROCEDURE — 97530 THERAPEUTIC ACTIVITIES: CPT | Performed by: OCCUPATIONAL THERAPIST

## 2017-03-30 PROCEDURE — 74011250637 HC RX REV CODE- 250/637: Performed by: NURSE PRACTITIONER

## 2017-03-30 PROCEDURE — 74011250637 HC RX REV CODE- 250/637: Performed by: INTERNAL MEDICINE

## 2017-03-30 PROCEDURE — L3710 EO ELAS W/METAL JNTS PRE OTS: HCPCS

## 2017-03-30 PROCEDURE — 4A00X4Z MEASUREMENT OF CENTRAL NERVOUS ELECTRICAL ACTIVITY, EXTERNAL APPROACH: ICD-10-PCS | Performed by: PSYCHIATRY & NEUROLOGY

## 2017-03-30 PROCEDURE — 65270000029 HC RM PRIVATE

## 2017-03-30 RX ORDER — DEXTROSE MONOHYDRATE AND SODIUM CHLORIDE 5; .9 G/100ML; G/100ML
75 INJECTION, SOLUTION INTRAVENOUS CONTINUOUS
Status: DISCONTINUED | OUTPATIENT
Start: 2017-03-30 | End: 2017-03-31 | Stop reason: HOSPADM

## 2017-03-30 RX ORDER — HALOPERIDOL 5 MG/ML
2 INJECTION INTRAMUSCULAR
Status: DISCONTINUED | OUTPATIENT
Start: 2017-03-30 | End: 2017-03-31 | Stop reason: HOSPADM

## 2017-03-30 RX ORDER — LORAZEPAM 2 MG/ML
1 INJECTION INTRAMUSCULAR
Status: DISCONTINUED | OUTPATIENT
Start: 2017-03-30 | End: 2017-03-31 | Stop reason: HOSPADM

## 2017-03-30 RX ORDER — LORAZEPAM 2 MG/ML
1 INJECTION INTRAMUSCULAR
Status: DISCONTINUED | OUTPATIENT
Start: 2017-03-30 | End: 2017-03-30

## 2017-03-30 RX ADMIN — SODIUM CHLORIDE 500 MG: 900 INJECTION, SOLUTION INTRAVENOUS at 06:04

## 2017-03-30 RX ADMIN — LORAZEPAM 1 MG: 2 INJECTION, SOLUTION INTRAMUSCULAR; INTRAVENOUS at 14:18

## 2017-03-30 RX ADMIN — CARBAMAZEPINE 400 MG: 100 TABLET, CHEWABLE ORAL at 22:35

## 2017-03-30 RX ADMIN — Medication 10 ML: at 22:00

## 2017-03-30 RX ADMIN — LORAZEPAM 1 MG: 2 INJECTION, SOLUTION INTRAMUSCULAR; INTRAVENOUS at 08:14

## 2017-03-30 RX ADMIN — ENOXAPARIN SODIUM 40 MG: 40 INJECTION SUBCUTANEOUS at 22:35

## 2017-03-30 RX ADMIN — CARBAMAZEPINE 100 MG: 100 TABLET, CHEWABLE ORAL at 08:32

## 2017-03-30 RX ADMIN — Medication 10 ML: at 06:00

## 2017-03-30 RX ADMIN — LEVOTHYROXINE SODIUM 100 MCG: 100 TABLET ORAL at 08:32

## 2017-03-30 RX ADMIN — CEFTRIAXONE SODIUM 1 G: 1 INJECTION, POWDER, FOR SOLUTION INTRAMUSCULAR; INTRAVENOUS at 13:05

## 2017-03-30 RX ADMIN — DEXTROSE MONOHYDRATE AND SODIUM CHLORIDE 75 ML/HR: 5; .9 INJECTION, SOLUTION INTRAVENOUS at 18:44

## 2017-03-30 RX ADMIN — LORAZEPAM 1 MG: 2 INJECTION, SOLUTION INTRAMUSCULAR; INTRAVENOUS at 05:58

## 2017-03-30 RX ADMIN — Medication 10 ML: at 14:00

## 2017-03-30 NOTE — PROGRESS NOTES
Problem: Self Care Deficits Care Plan (Adult)  Goal: *Acute Goals and Plan of Care (Insert Text)  Occupational Therapy Goals  Initiated 3/30/2017  1. Patient will perform self-feeding with supervision/set-up within 7 day(s). 2. Patient will perform toilet transfers with contact guard assist within 7 day(s). 3. Patient will perform all aspects of toileting with minimal assistance/contact guard assist within 7 day(s). 4. Patient will participate in upper extremity therapeutic exercise/activities with supervision/set-up for 10 minutes within 7 day(s). OCCUPATIONAL THERAPY EVALUATION  Patient: Jessica Black (77 y.o. female)  Date: 3/30/2017  Primary Diagnosis: Seizure [facility])        Precautions:  Fall, Bed Alarm, Skin (behavior issues)      ASSESSMENT :  Based on the objective data described below, the patient presents with impaired cognition, attention to task and command following and is resistive to all ADLs and physical assist.  She was initially cooperative with OT, requesting to toilet and started amb towards bathroom using RW with min A and slow place, but then declined to go into bathroom, walked towards door and then declined any further activity. Pt was assisted to sit in chair, became verbally and physically aggressive and assisted back to bed for her safety and the safety of those around her. RN and MD notified. Recommend 24/7 hands on assist for safety at discharge and SNF/LTC. Pt's daughter states the pt is supposed to be admitted to [facility] then transition into their memory care center. Patient will benefit from skilled intervention to address the above impairments.   Patients rehabilitation potential is considered to be Guarded  Factors which may influence rehabilitation potential include:   [ ]             None noted  [X]             Mental ability/status  [ ]             Medical condition  [ ]             Home/family situation and support systems  [X]             Safety awareness  [ ]             Pain tolerance/management  [X]             Other: cooperation       PLAN :  Recommendations and Planned Interventions:  [X]               Self Care Training                  [X]        Therapeutic Activities  [X]               Functional Mobility Training    [X]        Cognitive Retraining  [X]               Therapeutic Exercises           [X]        Endurance Activities  [X]               Balance Training                   [X]        Neuromuscular Re-Education  [ ]               Visual/Perceptual Training     [X]   Home Safety Training  [X]               Patient Education                 [X]        Family Training/Education  [ ]               Other (comment):     Frequency/Duration: Patient will be followed by occupational therapy 2 times a week to address goals. Discharge Recommendations: Fermin [facility]  Further Equipment Recommendations for Discharge: TBD       SUBJECTIVE:   Patient stated Do not touch me. I don't need anyone's help!       OBJECTIVE DATA SUMMARY:   HISTORY:   Past Medical History:   Diagnosis Date    COPD [facility]ch[facility] obstructive [facility] disease[facility] [address]      Dementia 3/29/2017    Hypothyroid      Seizure (HCC)       Past Surgical History:   Procedure Laterality Date    HX HIP REPLACEMENT            Prior Level of Function/Home Situation: NICOLE, amb with RW and getting assist with dressing and bathing  Expanded or extensive additional review of patient history: history obtained from chart, pt's daughter and son-in-law   Home Situation  [address]  One/Two Story Residence: One story  Living Alone: No  Support Systems: Assisted living, Child(forrest), Family member(s)  Patient Expects to be Discharged to[de-identified] Skilled nursing facility ([address])  Current DME Used/Available at Home: Walker, rolling  Tub or Shower Type: Shower  [X]  Right hand dominant             [ ]  Left hand dominant     EXAMINATION OF PERFORMANCE DEFICITS:  Cognitive/Behavioral Status:  Neurologic State: Alert;Confused  Orientation Level: Oriented to person;Disoriented to place; Disoriented to situation;Disoriented to time  Cognition: Decreased attention/concentration;Decreased command following; Impaired decision making; Impulsive;Memory loss;Poor safety awareness  Perception: Appears intact  Perseveration: Perseverates during ADLS;Perseverates during conversation;Perseverates during mobility  Safety/Judgement: Decreased awareness of environment;Decreased awareness of need for assistance;Decreased awareness of need for safety;Decreased insight into deficits; Fall prevention     Hearing: Auditory  Auditory Impairment: None     Vision/Perceptual:    Acuity:  (unable to assess due to imp cognition)          Range of Motion:  AROM: Generally decreased, functional (pt moves all extremities against gravity)  PROM: Generally decreased, functional                       Strength:  Strength: Generally decreased, functional (pt moves all extremities against gravity)                 Coordination:  Coordination: Generally decreased, functional  Fine Motor Skills-Upper: Left Impaired;Right Impaired (due to imp cognition)    Gross Motor Skills-Upper: Left Impaired;Right Impaired (due to imp cognition)     Tone & Sensation:  Tone: Normal                          Balance:  Sitting: Intact  Standing: Impaired; With support (RW)  Standing - Static: Fair;Constant support  Standing - Dynamic : Fair     Functional Mobility and Transfers for ADLs:  Bed Mobility:  Rolling: Minimum assistance; Additional time;Assist x1  Supine to Sit: Moderate assistance; Additional time;Assist x1  Sit to Supine: Maximum assistance; Additional time;Assist x2  Scooting: Moderate assistance; Additional time;Assist x1     Transfers:  Sit to Stand: Moderate assistance; Additional time;Assist x1 (multiple attempts and pt resistive to assistance)  Stand to Sit: Moderate assistance; Additional time;Assist x1  Toilet Transfer : Moderate assistance; Additional time;Assist x1 (multiple attempts and pt resistive to assistance)     ADL Assessment:  Feeding: Total assistance (pt declined participation)     Oral Facial Hygiene/Grooming: Total assistance (pt declined participation)     Bathing: Total assistance (pt declined participation)     Upper Body Dressing: Total assistance (pt declined participation)     Lower Body Dressing: Total assistance (pt declined participation)     Toileting: Total assistance (pt declined participation)     Cognitive Retraining  Safety/Judgement: Decreased awareness of environment;Decreased awareness of need for assistance;Decreased awareness of need for safety;Decreased insight into deficits; Fall prevention     Functional Measure:  Barthel Index:      Bathin  Bladder: 5  Bowels: 5  Groomin  Dressin  Feedin  Mobility: 0  Stairs: 0  Toilet Use: 0  Transfer (Bed to Chair and Back): 5  Total: 15         Barthel and G-code impairment scale:  Percentage of impairment CH  0% CI  1-19% CJ  20-39% CK  40-59% CL  60-79% CM  80-99% CN  100%   Barthel Score 0-100 100 99-80 79-60 59-40 20-39 1-19    0   Barthel Score 0-20 20 17-19 13-16 9-12 5-8 1-4 0      The Barthel ADL Index: Guidelines  1. The index should be used as a record of what a patient does, not as a record of what a patient could do. 2. The main aim is to establish degree of independence from any help, physical or verbal, however minor and for whatever reason. 3. The need for supervision renders the patient not independent. 4. A patient's performance should be established using the best available evidence. Asking the patient, friends/relatives and nurses are the usual sources, but direct observation and common sense are also important. However direct testing is not needed. 5. Usually the patient's performance over the preceding 24-48 hours is important, but occasionally longer periods will be relevant.   6. Middle categories imply that the patient supplies over 50 per cent of the effort. 7. Use of aids to be independent is allowed. Shonda Dobbs., Barthel, D.W. (6792). Functional evaluation: the Barthel Index. [address] [facility] (14)2. RUBEN Correa Juana Both., [facility]., [facility], [address] [facility] (1999). Measuring the change indisability after inpatient rehabilitation; comparison of the responsiveness of the Barthel Index and Functional [place] Measure. Journal of Neurology, Neurosurgery, and Psychiatry, 66(4), 131-750. Mars Cristina, N.J.A, ALEJANDRO English, & Jojo Wilks M.A. (2004.) Assessment of post-stroke quality of life in cost-effectiveness studies: The usefulness of the Barthel Index and the EuroQoL-5D. Quality of Life Research, 13, 672-62         G codes: In compliance with CMSs Claims Based Outcome Reporting, the following G-code set was chosen for this patient based on their primary functional limitation being treated: The outcome measure chosen to determine the severity of the functional limitation was the Barthel Index with a score of 15/100 which was correlated with the impairment scale. · Self Care:               - CURRENT STATUS:    CM - 80%-99% impaired, limited or restricted               - GOAL STATUS:           CL - 60%-79% impaired, limited or restricted               - D/C STATUS:                       ---------------To be determined---------------      Occupational Therapy Evaluation Charge Determination   History Examination Decision-Making   MEDIUM Complexity : Expanded review of history including physical, cognitive and psychosocial  history  MEDIUM Complexity : 3-5 performance deficits relating to physical, cognitive , or psychosocial skils that result in activity limitations and / or participation restrictions HIGH Complexity : Patient presents with comorbidities that affect occupational performance.  Signifigant modification of tasks or assistance (eg, physical or verbal) with assessment (s) is necessary to enable patient to complete evaluation       Based on the above components, the patient evaluation is determined to be of the following complexity level: MEDIUM  Pain:  Pain Scale 1: Numeric (0 - 10)  Pain Intensity 1: 0              Activity Tolerance:   Poor  Please refer to the flowsheet for vital signs taken during this treatment. After treatment:   [ ] Patient left in no apparent distress sitting up in chair  [X] Patient left in no apparent distress in bed  [X] Call bell left within reach  [X] Nursing notified  [X] Caregiver present  [ ] Bed alarm activated      COMMUNICATION/EDUCATION:   The patients plan of care was discussed with: Physical Therapist, Registered Nurse and MD.  [X] Home safety education was provided and the patient/caregiver indicated understanding. [X] Patient/family have participated as able in goal setting and plan of care. [X] Patient/family agree to work toward stated goals and plan of care. [ ] Patient understands intent and goals of therapy, but is neutral about his/her participation. [ ] Patient is unable to participate in goal setting and plan of care. This patients plan of care is appropriate for delegation to [facility].      Thank you for this referral.  Ting Gonzales, OT  Time Calculation: 32 mins

## 2017-03-30 NOTE — PROGRESS NOTES
Interdisciplinary team rounds were held 3/30/2017 with the following team members:Nursing and Nutrition and the primary RN. Plan of care discussed. See clinical pathway and/or care plan for interventions and desired outcomes.     Discharge when placement is available

## 2017-03-30 NOTE — PROGRESS NOTES
[facility] [address]  [address] [facility], [address] [place] [address]  [phone]      Medical Progress Note      NAME: Han Mendes   :  1935  MRM:  [id number]    Date/Time: 3/30/2017  9:12 AM       Assessment and Plan:   1. Seizure ([facility] [address] (3/28/2017). Evaluated by neurology. EEG is without epileptiform. Tegretol dose increased. No event here      2.  UTI. Continue ceftriaxone and check UC.      3.  COPD (chronic obstructive pulmonary disease) ([facility] [address] (3/28/2017). Stable. Continue nebs as needed. 4.  Acquired hypothyroidism (3/28/2017). On synthroid      5. Dementia (3/29/2017). Continue supportive care. Discussed with daughter          Subjective:     Chief Complaint:  evaluated pt in follow up of seizure and AMS. Pt is unable to provide Hx              Objective:     Last 24hrs VS reviewed since prior progress note.  Most recent are:    Visit Vitals    /73 (BP 1 Location: Right arm, BP Patient Position: At rest;Supine)    Pulse 95    Temp 96.4 °F (35.8 °C)    Resp 20    Ht 5' 2\" (1.575 m)    Wt 58.5 kg (129 lb)    SpO2 95%    BMI 23.59 kg/m2     SpO2 Readings from Last 6 Encounters:   17 95%   03/10/17 94%        No intake or output data in the 24 hours ending 17 0912     Physical Exam:    Gen:  Well-developed, well-nourished, in no acute distress  HEENT:  Pink conjunctivae, PERRL, hearing intact to voice, moist mucous membranes  Neck:  Supple, without masses, thyroid non-tender  Resp:  No accessory muscle use, clear breath sounds without wheezes rales or rhonchi  Card:  No murmurs, normal S1, S2 without thrills, bruits or peripheral edema  Abd:  Soft, non-tender, non-distended, normoactive bowel sounds are present, no palpable organomegaly and no detectable hernias  Lymph:  No cervical or inguinal adenopathy  Musc:  No cyanosis or clubbing  Skin:  No rashes or ulcers, skin turgor is good  Neuro:  Poor insight   Psych:  poor insight __________________________________________________________________  Medications Reviewed: (see below)  Medications:     Current Facility-Administered Medications   Medication Dose Route Frequency    carBAMazepine (TEGretol) chewable tablet 400 mg  400 mg Oral BID    cefTRIAXone (ROCEPHIN) 1 g in 0.9% sodium chloride (MBP/ADV) 50 mL  1 g IntraVENous Q24H    levothyroxine (SYNTHROID) tablet 100 mcg  100 mcg Oral ACB    albuterol (PROVENTIL VENTOLIN) nebulizer solution 2.5 mg  2.5 mg Nebulization BID RT    sodium chloride (NS) flush 5-10 mL  5-10 mL IntraVENous Q8H    sodium chloride (NS) flush 5-10 mL  5-10 mL IntraVENous PRN    acetaminophen (TYLENOL) tablet 650 mg  650 mg Oral Q4H PRN    enoxaparin (LOVENOX) injection 40 mg  40 mg SubCUTAneous Q24H    LORazepam (ATIVAN) injection 1 mg  1 mg IntraVENous Q2H PRN        Lab Data Reviewed: (see below)  Lab Review:     Recent Labs      03/29/17   0256  03/28/17 1924   WBC  7.5  6.9   HGB  11.5  13.3   HCT  35.9  42.4   PLT  181  212     Recent Labs      03/29/17   0256  03/28/17 1924   NA  147*  143   K  3.7  3.7   CL  115*  106   CO2  25  18*   GLU  87  171*   BUN  12  14   CREA  0.63  1.15*   CA  7.4*  8.9   MG   --   2.2   ALB   --   3.6   TBILI   --   0.2   SGOT   --   12*   ALT   --   20     Lab Results   Component Value Date/Time    Glucose (POC) 117 03/10/2017 07:36 PM     No results for input(s): PH, PCO2, PO2, HCO3, FIO2 in the last 72 hours. No results for input(s): INR in the last 72 hours. No lab exists for component: INREXT  All Micro Results     Procedure Component Value Units Date/Time    CULTURE, URINE [[id number]] Collected:  03/28/17 1924    Order Status:  Completed Specimen:  Urine from Clean catch Updated:  03/29/17 2048          I have reviewed notes of prior 24hr. Other pertinent lab:       Total time spent with patient: [address] discussed with: Family, Nursing Staff and >50% of time spent in counseling and coordination of care    Discussed:  Care Plan    Prophylaxis:  Lovenox    Disposition:  SNF/LTC           ___________________________________________________    Attending Physician: Lena Johnson MD

## 2017-03-30 NOTE — PROGRESS NOTES
Bedside and Verbal shift change report given to [address] (oncoming nurse) by Claire Espinoza (offgoing nurse). Report included the following information SBAR, Kardex, MAR and Recent Results.

## 2017-03-30 NOTE — PROGRESS NOTES
Consult received and chart reviewed. Unfortunately patient is not appropriate for PT at this time due to confusion and agitation. Spoke at length with RN, OT and daughter and we will follow up tomorrow to attempt PT assessment.     Jewel Subramanian, PT

## 2017-03-30 NOTE — PROCEDURES
Zenon Guerin [facility]   [address] [facility], [address]   ROUTINE EEG REPORT       Name:  Meseret Reddy   MR#:  [id number]   :  1935   Account #:  [de-identified]    Date of Procedure:  2017   Date of Adm:  2017       REQUESTING PHYSICIAN: Neetu Reno NP     INDICATIONS: An EEG is requested in this 53-year-old woman with   epilepsy and several spells suspicious for seizure to evaluate for   epileptiform abnormalities. MEDICATIONS LISTED AS:   1. Tegretol. 2. Vitamin D.   3. Ventolin. FINDINGS: This tracing is obtained while the patient is noted to be   awake but confused. During this state, there are very brief intermittent runs of posteriorly   dominant and symmetrical low to medium amplitude 8 cycle/sec   activities, which attenuate with eye opening. Lower voltage, faster   frequency activities are seen symmetrically over the anterior head   regions. Hyperventilation is not performed secondary to her medical history. Intermittent photic stimulation little alters the tracing. Sleep is not attained. INTERPRETATION: This EEG recorded while the patient is awake and   confused is normal. No epileptiform abnormalities are seen.           Geovanna Jordan MD      SLE / MTU   D:  2017   19:49   T:  2017   08:56   Job #:  [id number]

## 2017-03-30 NOTE — PROGRESS NOTES
3/31/2017 3:08 Clinicals and AVS, discharge summary and MARS have been sent to [facility] . They have been notified of this pt's discharge for today. NONA spoke with Reymundo Ro and also sent MARs. [facility] is for 4:00PM through [facility]. NONA called pt's dtr and made her aware. MIGUEL ÁNGEL Gallardo     3/31/2017 2:55 PM  PM There was some concern voiced by the pt's dtr, that the memory unit would not be able to give this pt their evening meds and asked if the hospital could give the night meds. NONA spoke with pt's RN Namrata Valladares. Transport is for 4pm. There would not be an opportunity for this pt to get their seizure meds early. NONA contacted Reymundo Ro in admissions. NONA has also faxed the MARs. NONA inquired if the [facility] could be read and the medication pulled prior to the pt's arrival. Reymundo Ro placed the SW on a brief hold and returned to the phone confirming that the issue was solved and that they would be able to accept the pt and get them their meds. MIGUEL ÁNGEL Gallardo     3/31/2017 11:22 AM NONA made a follow up call to the Reymundo Ro in admissions. She states that this pt is unable to go to their skilled unit because tomorrow, they start with [facility]. By tomorrow, the pt will be inpatient at the facility and [facility] a facility. Also, the pt needs pt's [employer] is not likely to provide an [facility] as they are ending coverage tomorrow. Reymundo Ro reports that this pt's family has not brought a bed to the facility yet although they were scheduled to bring one today. Transport will be pushed back from 2:00PM to accommodate this pt getting a physical bed in place before they arrive at the facility. Per Reymundo Ro, pt's family is working on bringing the bed. MIGUEL ÁNGEL Gallardo     3/31/2017 10:48 AM NONA spoke with Reymundo Ro in admissions at [facility]. Plan has been for this pt to go to the SNF memory care unit.  OL called yesterday with a plan change that they were going to place this pt on their skilled unit. Discussed with Vasquez Davies in admissions today. Pt would then require therapy notes, and an [place]. Also, pt's current plan is terming today and a new plan will start tomorrow. Facility informed the pt's dtr, Army Steward. She has called the SW and asked why. SW explained that they the pt has not worked much with therapy and the insurance change will complicate this process. SW has directed her to speak with the facility. Pt's dtr really wants this pt to get skilled care placement prior to going to [facility] memory unit. This would require an [place]. Cj Darden, BSW     3/31/2017 8:33 AM NONA spoke with Md., Ildefonso Doherty. He states that this pt can discharge today and is waiting on culture results that are not expected back until this afternoon. Ambulance arranged for 2PM with [facility]. Pt's dtr Army Steward has been informed. Nursing staff can call report to [address] at [phone]. Jennifer Little     3/31/2017 8:08 AM Plan is for pt to go to [address] at discharge. Cj Darden, MIGUEL ÁNGEL     3/31/2017 7:57 AM Met with pt's dtr this AM, Mega, [phone]. Pt was previously living at [facility]. While there she used a rollator for mobility. Pt's dtr reports that while at [facility], that the pt would receive PT and OT onsite when the MD recommended it. She reports that most recently, the pt was working with OT at the [facility]. Pt was getting her scripts through [facility] the mail order delivery system. Pt's dtr states that she has an appointment for Monday with a neurologist at [facility]. She reports that she does not have a PCP however. SW to provide list of [employer] PCPs. Cj Darden, MIGUEL ÁNGEL     3/30/2017 6:12 PM Spoke briefly with pt's dtr. She confirms that she would like Our [address] [facility]. Orders received. Referral sent via [facility]. Cj Darden, BSW     3/30/2017 1:36 PM Call from [facility] admissions.  They state that they are accepting this pt for memory care.  They request a referral so that they can place the pt in their rehab unit first. Care management to follow up with the attending Md.   WHITNEY MedranoW

## 2017-03-31 VITALS
BODY MASS INDEX: 23.74 KG/M2 | HEART RATE: 89 BPM | HEIGHT: 62 IN | TEMPERATURE: 98.3 F | WEIGHT: 129 LBS | OXYGEN SATURATION: 96 % | DIASTOLIC BLOOD PRESSURE: 84 MMHG | RESPIRATION RATE: 16 BRPM | SYSTOLIC BLOOD PRESSURE: 130 MMHG

## 2017-03-31 PROCEDURE — 97530 THERAPEUTIC ACTIVITIES: CPT | Performed by: PHYSICAL THERAPIST

## 2017-03-31 PROCEDURE — 74011000258 HC RX REV CODE- 258: Performed by: INTERNAL MEDICINE

## 2017-03-31 PROCEDURE — 97163 PT EVAL HIGH COMPLEX 45 MIN: CPT | Performed by: PHYSICAL THERAPIST

## 2017-03-31 PROCEDURE — 97110 THERAPEUTIC EXERCISES: CPT | Performed by: PHYSICAL THERAPIST

## 2017-03-31 PROCEDURE — 74011250637 HC RX REV CODE- 250/637: Performed by: INTERNAL MEDICINE

## 2017-03-31 PROCEDURE — 74011250636 HC RX REV CODE- 250/636: Performed by: INTERNAL MEDICINE

## 2017-03-31 RX ORDER — ALBUTEROL SULFATE 0.83 MG/ML
2.5 SOLUTION RESPIRATORY (INHALATION)
Status: DISCONTINUED | OUTPATIENT
Start: 2017-03-31 | End: 2017-03-31 | Stop reason: HOSPADM

## 2017-03-31 RX ORDER — CARBAMAZEPINE 200 MG/1
400 TABLET ORAL 2 TIMES DAILY
Qty: 60 TAB | Refills: 0 | Status: SHIPPED
Start: 2017-03-31 | End: 2018-01-03

## 2017-03-31 RX ORDER — CEFUROXIME AXETIL 250 MG/1
250 TABLET ORAL 2 TIMES DAILY
Qty: 10 TAB | Refills: 0 | Status: SHIPPED
Start: 2017-03-31 | End: 2017-08-28

## 2017-03-31 RX ADMIN — HALOPERIDOL LACTATE 2 MG: 5 INJECTION, SOLUTION INTRAMUSCULAR at 09:05

## 2017-03-31 RX ADMIN — CEFTRIAXONE SODIUM 1 G: 1 INJECTION, POWDER, FOR SOLUTION INTRAMUSCULAR; INTRAVENOUS at 13:25

## 2017-03-31 RX ADMIN — HALOPERIDOL LACTATE 2 MG: 5 INJECTION, SOLUTION INTRAMUSCULAR at 01:03

## 2017-03-31 RX ADMIN — Medication 10 ML: at 14:00

## 2017-03-31 RX ADMIN — Medication 5 ML: at 06:00

## 2017-03-31 RX ADMIN — LEVOTHYROXINE SODIUM 100 MCG: 100 TABLET ORAL at 07:07

## 2017-03-31 RX ADMIN — LORAZEPAM 1 MG: 2 INJECTION, SOLUTION INTRAMUSCULAR; INTRAVENOUS at 15:32

## 2017-03-31 NOTE — PROGRESS NOTES
TRANSFER - OUT REPORT:    Verbal report given to [facility] RN(name) on Neymar Cole  being transferred to [address] [place] [address] (unit) for routine progression of care       Report consisted of patients Situation, Background, Assessment and   Recommendations(SBAR). Information from the following report(s) SBAR, Kardex, Intake/Output and MAR was reviewed with the receiving nurse. Lines:   Peripheral IV Left Antecubital (Active)   Site Assessment Clean, dry, & intact 3/31/2017  7:47 AM   Phlebitis Assessment 0 3/31/2017  7:47 AM   Infiltration Assessment 0 3/31/2017  7:47 AM   Dressing Status Clean, dry, & intact 3/31/2017  7:47 AM   Dressing Type Transparent 3/31/2017  7:47 AM   Hub Color/Line Status Pink; Infusing 3/31/2017  7:47 AM        Opportunity for questions and clarification was provided.       Patient transported with:   Patients medications from home

## 2017-03-31 NOTE — DISCHARGE INSTRUCTIONS
ACUTE DIAGNOSES:  Seizure [facility])    CHRONIC MEDICAL DIAGNOSES:  Problem List as of 3/31/2017  Date Reviewed: 3/31/2017          Codes Class Noted - Resolved    Dementia (Chronic) ICD-10-CM: F03.90  ICD-9-CM: 294.20  3/29/2017 - Present        UTI (urinary tract infection) ICD-10-CM: N39.0  ICD-9-CM: 599.0  3/29/2017 - Present        * (Principal)Seizure ([facility] ICD-10-CM: R56.9  ICD-9-CM: 780.39  3/28/2017 - Present        COPD (chronic obstructive pulmonary disease) ([facility] (Chronic) ICD-10-CM: J44.9  ICD-9-CM: 085  3/28/2017 - Present        Acquired hypothyroidism (Chronic) ICD-10-CM: E03.9  ICD-9-CM: 244.9  3/28/2017 - Present              DISCHARGE MEDICATIONS:          · It is important that you take the medication exactly as they are prescribed. · Keep your medication in the bottles provided by the pharmacist and keep a list of the medication names, dosages, and times to be taken in your wallet. · Do not take other medications without consulting your doctor. DIET:  Regular Diet    ACTIVITY: Activity as tolerated    ADDITIONAL INFORMATION: If you experience any of the following symptoms then please call your primary care physician or return to the emergency room if you cannot get hold of your doctor: Fever, chills, nausea, vomiting, diarrhea, change in mentation, falling, bleeding, shortness of breath. FOLLOW UP CARE:    Follow-up with neurology in {0-10:90975} { day/week/month:72839}      Information obtained by :  I understand that if any problems occur once I am at home I am to contact my physician. I understand and acknowledge receipt of the instructions indicated above.                                                                                                                                            Physician's or R.N.'s Signature                                                                  Date/Time Patient or Representative Signature                                                          Date/Time

## 2017-03-31 NOTE — PROGRESS NOTES
Bedside and Verbal shift change report given to Bonnie Lomax RN (oncoming nurse) by Prashant Duarte RN (offgoing nurse). Report included the following information SBAR, Kardex, Intake/Output, MAR and Recent Results.

## 2017-03-31 NOTE — PROGRESS NOTES
Bedside and Verbal shift change report given to Ashia (oncoming nurse) by [facility] (offgoing nurse). Report included the following information SBAR and Kardex.

## 2017-03-31 NOTE — PROGRESS NOTES
Bedside and Verbal shift change report given to [facility] [employer] (oncoming nurse) by Kareem Newell (offgoing nurse). Report included the following information SBAR, Kardex, Intake/Output, MAR and Recent Results.

## 2017-03-31 NOTE — PROGRESS NOTES
Zenon Bernsteinelsen [facility] [place] [facility]  [address] [facility], [address] [place] [address]  [phone]      Medical Progress Note      NAME: Jam Kan   :  1935  MRM:  [id number]    Date/Time: 3/31/2017  9:12 AM       Assessment and Plan:   1. Seizure ([facility] (3/28/2017). Evaluated by neurology. EEG is without epileptiform. Tegretol dose increased. No event here      2.  UTI. Continue ceftriaxone. UC GNR. 3.  COPD (chronic obstructive pulmonary disease) ([facility] (3/28/2017). Stable. Continue nebs as needed. 4.  Acquired hypothyroidism (3/28/2017). On synthroid      5. Dementia (3/29/2017). Continue supportive care. Subjective:     Chief Complaint:  evaluated pt in follow up of seizure and AMS. More awake today. Objective:     Last 24hrs VS reviewed since prior progress note.  Most recent are:    Visit Vitals    /85 (BP 1 Location: Right arm, BP Patient Position: At rest)    Pulse 82    Temp 98.4 °F (36.9 °C)    Resp 16    Ht 5' 2\" (1.575 m)    Wt 58.5 kg (129 lb)    SpO2 96%    BMI 23.59 kg/m2     SpO2 Readings from Last 6 Encounters:   17 96%   03/10/17 94%        No intake or output data in the 24 hours ending 17 1981     Physical Exam:    Gen:  Well-developed, well-nourished, in no acute distress  HEENT:  Pink conjunctivae, PERRL, hearing intact to voice, moist mucous membranes  Neck:  Supple, without masses, thyroid non-tender  Resp:  No accessory muscle use, clear breath sounds without wheezes rales or rhonchi  Card:  No murmurs, normal S1, S2 without thrills, bruits or peripheral edema  Abd:  Soft, non-tender, non-distended, normoactive bowel sounds are present, no palpable organomegaly and no detectable hernias  Lymph:  No cervical or inguinal adenopathy  Musc:  No cyanosis or clubbing  Skin:  No rashes or ulcers, skin turgor is good  Neuro:  Poor insight   Psych:  poor insight __________________________________________________________________  Medications Reviewed: (see below)  Medications:     Current Facility-Administered Medications   Medication Dose Route Frequency    albuterol (PROVENTIL VENTOLIN) nebulizer solution 2.5 mg  2.5 mg Nebulization Q8H PRN    LORazepam (ATIVAN) injection 1 mg  1 mg IntraVENous Q6H PRN    haloperidol lactate (HALDOL) injection 2 mg  2 mg IntraVENous Q6H PRN    dextrose 5% and 0.9% NaCl infusion  75 mL/hr IntraVENous CONTINUOUS    carBAMazepine (TEGretol) chewable tablet 400 mg  400 mg Oral BID    cefTRIAXone (ROCEPHIN) 1 g in 0.9% sodium chloride (MBP/ADV) 50 mL  1 g IntraVENous Q24H    levothyroxine (SYNTHROID) tablet 100 mcg  100 mcg Oral ACB    sodium chloride (NS) flush 5-10 mL  5-10 mL IntraVENous Q8H    sodium chloride (NS) flush 5-10 mL  5-10 mL IntraVENous PRN    acetaminophen (TYLENOL) tablet 650 mg  650 mg Oral Q4H PRN    enoxaparin (LOVENOX) injection 40 mg  40 mg SubCUTAneous Q24H        Lab Data Reviewed: (see below)  Lab Review:     Recent Labs      03/29/17   0256  03/28/17   1924   WBC  7.5  6.9   HGB  11.5  13.3   HCT  35.9  42.4   PLT  181  212     Recent Labs      03/29/17   0256  03/28/17   1924   NA  147*  143   K  3.7  3.7   CL  115*  106   CO2  25  18*   GLU  87  171*   BUN  12  14   CREA  0.63  1.15*   CA  7.4*  8.9   MG   --   2.2   ALB   --   3.6   TBILI   --   0.2   SGOT   --   12*   ALT   --   20     Lab Results   Component Value Date/Time    Glucose (POC) 117 03/10/2017 07:36 PM     No results for input(s): PH, PCO2, PO2, HCO3, FIO2 in the last 72 hours. No results for input(s): INR in the last 72 hours.     No lab exists for component: INREXT, INREXT  All Micro Results     Procedure Component Value Units Date/Time    CULTURE, URINE [[id number]]  (Abnormal) Collected:  03/28/17 1924    Order Status:  Completed Specimen:  Urine from Clean catch Updated:  03/30/17 0946     Special Requests: NO SPECIAL REQUESTS [place] Count --        >100,000  COLONIES/mL       Culture result: GRAM NEGATIVE RODS (A)         2ND GRAM NEGATIVE EVANGELIST (A)             I have reviewed notes of prior 24hr. Other pertinent lab:       Total time spent with patient: Fiona 61 discussed with: Family, Nursing Staff and >50% of time spent in counseling and coordination of care    Discussed:  Care Plan    Prophylaxis:  Lovenox    Disposition:  SNF/LTC           ___________________________________________________    Attending Physician: Tressa Motta MD

## 2017-03-31 NOTE — DISCHARGE SUMMARY
Hospitalist Discharge Summary     Patient ID:    Joseline Joya  [id number]  80 y.o.  1935    Admit date: 3/28/2017    Discharge date and time: 3/31/2017    Admission Diagnoses: Seizure [facility])    Chronic Diagnoses:    Problem List as of 3/31/2017  Date Reviewed: 3/31/2017          Codes Class Noted - Resolved    Dementia (Chronic) ICD-10-CM: F03.90  ICD-9-CM: 294.20  3/29/2017 - Present        UTI (urinary tract infection) ICD-10-CM: N39.0  ICD-9-CM: 599.0  3/29/2017 - Present        * (Principal)Seizure ([facility] ICD-10-CM: R56.9  ICD-9-CM: 780.39  3/28/2017 - Present        COPD (chronic obstructive pulmonary disease) ([facility] (Chronic) ICD-10-CM: J44.9  ICD-9-CM: 382  3/28/2017 - Present        Acquired hypothyroidism (Chronic) ICD-10-CM: E03.9  ICD-9-CM: 244.9  3/28/2017 - Present              Discharge Medications:   Current Discharge Medication List      CONTINUE these medications which have CHANGED    Details   carBAMazepine (TEGRETOL) 200 mg tablet Take 2 Tabs by mouth two (2) times a day. Qty: 60 Tab, Refills: 0         CONTINUE these medications which have NOT CHANGED    Details   levothyroxine (SYNTHROID) 100 mcg tablet Take 100 mcg by mouth Daily (before breakfast). acetaminophen (MAPAP) 500 mg cap Take 1 Tab by mouth daily as needed. albuterol (PROVENTIL VENTOLIN) 2.5 mg /3 mL (0.083 %) nebulizer solution 2.5 mg by Nebulization route two (2) times a day. budesonide (PULMICORT) 0.5 mg/2 mL nbsp 500 mcg by Nebulization route two (2) times a day. Follow up Care:    1. Shayne Ryan MD in 1-2 weeks  2. Diet:  Regular Diet    Disposition:  SNF. Advanced Directive:    Discharge Exam:  See today's note.     CONSULTATIONS: Neurology    Significant Diagnostic Studies:   Recent Labs      03/29/17   0256  03/28/17   1924   WBC  7.5  6.9   HGB  11.5  13.3   HCT  35.9  42.4   PLT  181  212     Recent Labs      03/29/17   0256  03/28/17   1924   NA  147*  143   K  3.7  3.7   CL  115* 106   CO2  25  18*   BUN  12  14   CREA  0.63  1. 15*   GLU  87  171*   CA  7.4*  8.9   MG   --   2.2     Recent Labs      03/28/17   1924   SGOT  12*   ALT  20   AP  113   TBILI  0.2   TP  6.8   ALB  3.6   GLOB  3.2     No results for input(s): INR, PTP, APTT in the last 72 hours. No lab exists for component: INREXT   No results for input(s): FE, TIBC, PSAT, FERR in the last 72 hours. No results for input(s): PH, PCO2, PO2 in the last 72 hours. No results for input(s): CPK, CKMB in the last 72 hours. No lab exists for component: TROPONINI  Lab Results   Component Value Date/Time    Glucose (POC) 117 03/10/2017 07:36 PM             HOSPITAL COURSE & TREATMENT RENDERED:   1. Seizure ([facility] (3/28/2017). Evaluated by neurology. EEG is without epileptiform. Tegretol dose increased. No event here       2. UTI. treated with ceftriaxone. Changed to ceftin.  GNR.       3. COPD (chronic obstructive pulmonary disease) ([facility] (3/28/2017). Stable. Continue nebs as needed.       4. Acquired hypothyroidism (3/28/2017). On synthroid       5. Dementia (3/29/2017). Continue supportive care.      Pt is discharged in stable condition           Signed:  Joyce Keane MD  3/31/2017  1:54 PM

## 2017-03-31 NOTE — PROGRESS NOTES
Bedside and Verbal shift change report given to Doreen Salas 69 (oncoming nurse) by Marisa Robison RN (offgoing nurse). Report included the following information SBAR, Kardex and MAR.

## 2017-03-31 NOTE — PROGRESS NOTES
Problem: Mobility Impaired (Adult and Pediatric)  Goal: *Acute Goals and Plan of Care (Insert Text)  Physical Therapy Goals  Initiated 3/31/2017  1. Patient will move from supine to sit and sit to supine in bed with supervision/set-up within 7 day(s). 2. Patient will transfer from bed to chair and chair to bed with supervision/set-up using the least restrictive device within 7 day(s). 3. Patient will perform sit to stand with supervision/set-up within 7 day(s). 4. Patient will ambulate with supervision/set-up for 15 feet with the least restrictive device within 7 day(s). PHYSICAL THERAPY EVALUATION  Patient: Adam Montero (77 y.o. female)  Date: 3/31/2017  Primary Diagnosis: Seizure [facility])        Precautions:   Fall, Bed Alarm, Skin (behavior issues)      ASSESSMENT :  Based on the objective data described below, the patient presents with behavioral issues which impact participation and accurate assessment. Refuses assistance, or close proximity of caregiver, yet makes unsafe decisions re mobility. She did, however, demo good problem solving strategies with management of furniture & center of mass to manage transitions. Movement is inordinately slow & unrealistic for general care giving, and normally demo no desire to initiate transitions indep viz happy to remain lying in bed. Ms Foster Mckenzie required varying levels of assist, from close guard to mod when unable to manage center of mass. She is high fall risk, tho generally would require only one assist (and time). She was interested in demo her ex routine to therapist, and willing to try others, however her unrealistic personal expectations & resulting frustration impacted successful participation in most therapist directed tasks. Clinician will be most successful allowing patient to direct care when appropriate. However, her frustration, anger & paranoia make clinician directed therapeutic intervention difficult.   A rehab challenge with guarded prognosis. Expect she will need 24/7 care. .     Patient will benefit from skilled intervention to address the above impairments. Patients rehabilitation potential is considered to be Guarded  Factors which may influence rehabilitation potential include:   [ ]         None noted  [X]         Mental ability/status  [ ]         Medical condition  [ ]         Home/family situation and support systems  [ ]         Safety awareness  [ ]         Pain tolerance/management  [ ]         Other:        PLAN :  Recommendations and Planned Interventions:  [X]           Bed Mobility Training             [X]    Neuromuscular Re-Education  [X]           Transfer Training                   [ ]    Orthotic/Prosthetic Training  [X]           Gait Training                         [ ]    Modalities  [X]           Therapeutic Exercises           [ ]    Edema Management/Control  [X]           Therapeutic Activities            [X]    Patient and Family Training/Education  [ ]           Other (comment):     Frequency/Duration: Patient will be followed by physical therapy  5 times a week to address goals. Discharge Recommendations: Skilled Nursing Facility  Further Equipment Recommendations for Discharge: tbd at ultimate disposition       SUBJECTIVE:   Patient stated Don't touch me.       OBJECTIVE DATA SUMMARY:   HISTORY:    Past Medical History:   Diagnosis Date    [facility]ronic obstructive pulmonary disease) ([facility]      Dementia 3/29/2017    Hypothyroid      Seizure ([facility]       Past Surgical History:   Procedure Laterality Date    HX HIP REPLACEMENT         Prior Level of Function/Home Situation: lived in [facility]   Personal factors and/or comorbidities impacting plan of care: dementia with behavioral issues     Home Situation  Home Environment: [address] Name: [facility]  One/Two Story Residence: One story  Living Alone: No  Support Systems: Assisted living, Child(forrest), Family member(s)  Patient Expects to be Discharged to[de-identified] Skilled nursing facility ([address])  Current DME Used/Available at Home: Lunette Leghorn, rolling  Tub or Shower Type: Shower     EXAMINATION/PRESENTATION/DECISION MAKING:   Critical Behavior:  Neurologic State: Alert, Confused  Orientation Level: Oriented to person, Disoriented to place, Disoriented to situation, Disoriented to time  Cognition: Decreased attention/concentration, Decreased command following, Impulsive, Impaired decision making, Memory loss, Poor safety awareness  Safety/Judgement: Decreased awareness of environment, Decreased awareness of need for assistance, Decreased awareness of need for safety, Decreased insight into deficits, Fall prevention  Hearing: Auditory  Auditory Impairment: None  Skin:  nt  Edema: none apparent  Range Of Motion:  AROM: Within functional limits (LEs; unable to assess UEs)                       Strength:    Strength: Generally decreased, functional                    Tone & Sensation:   Tone: Normal                              Coordination:  Coordination: Generally decreased, functional (slow processing)  Vision:      Functional Mobility:  Bed Mobility:  Rolling: Modified independent; Additional time  Supine to Sit: Additional time;Supervision (HOB elevated)     Scooting: Minimum assistance; Additional time  Transfers:  Sit to Stand: Minimum assistance; Additional time  Stand to Sit: Contact guard assistance; Additional time                       Balance:   Sitting: Intact  Standing: Impaired; With support  Standing - Static: Fair  Standing - Dynamic : Poor  Ambulation/Gait Training:  Distance (ft): 3 Feet (ft)  Assistive Device: Walker, rolling  Ambulation - Level of Assistance: Additional time; Moderate assistance;Minimal assistance (very close guard; varied)     Gait Description (WDL): Exceptions to WDL  Gait Abnormalities: Decreased step clearance;Shuffling gait (flexed posture; step to on L)              Speed/Jenn: Slow;Shuffled  Step Length: Right shortened;Left shortened (L shourter)                                Therapeutic Exercises:   Patient shanna various exercises she performed indep while in chair  She then demo 'the best exercise' - up pushing chair forward/ back  Seated push up   Axial ext     Functional Measure:     Elder Mobility Scale      4/20            EMS and G-code impairment scale:  Percentage of impairment CH  0% CI  1-19% CJ  20-39% CK  40-59% CL  60-79% CM  80-99% CN  100%   EMS Score 0-20 20 17-19 13-16 9-12 5-8 1-4 0      Scores under 10 - generally these patients are dependent in mobility maneuvers; require help with  basic ADL, such as transfers, toileting and dressing. Scores between 10 - 13 - generally these patients are borderline in terms of safe mobility and  independence in ADL i.e. they require some help with some mobility maneuvers. Scores over 14 - Generally these patients are able to perform mobility maneuvers alone and safely  and are independent in basic ADL. G codes: In compliance with CMSs Claims Based Outcome Reporting, the following G-code set was chosen for this patient based on their primary functional limitation being treated: The outcome measure chosen to determine the severity of the functional limitation was the elderly mobility scale with a score of 4/20 which was correlated with the impairment scale.       · Mobility - Walking and Moving Around:               - CURRENT STATUS:    CM - 80%-99% impaired, limited or restricted               - GOAL STATUS:           CL - 60%-79% impaired, limited or restricted               - D/C STATUS:                       ---------------To be determined---------------      Physical Therapy Evaluation Charge Determination   History Examination Presentation Decision-Making   MEDIUM  Complexity : 1-2 comorbidities / personal factors will impact the outcome/ POC  LOW Complexity : 1-2 Standardized tests and measures addressing body structure, function, activity limitation and / or participation in recreation  HIGH Complexity : Unstable and unpredictable characteristics  Other outcome measures elderly mobility scale  HIGH       Based on the above components, the patient evaluation is determined to be of the following complexity level: HIGH      Pain:  Pain Scale 1: Numeric (0 - 10)  Pain Intensity 1: 0              Activity Tolerance:   Self limits  Please refer to the flowsheet for vital signs taken during this treatment. After treatment:   [ ]         Patient left in no apparent distress sitting up in chair  [X]         Patient left in no apparent distress in bed  [X]         Call bell left within reach  [X]         Nursing notified  [X]         1:1 supr Caregiver present  [ ]         Bed alarm activated      COMMUNICATION/EDUCATION:   The patients plan of care was discussed with: Registered Nurse and sitter.  [X]         Fall prevention education was provided and the patient/caregiver indicated understanding. [ ]         Patient/family have participated as able in goal setting and plan of care. [ ]         Patient/family agree to work toward stated goals and plan of care. [ ]         Patient understands intent and goals of therapy, but is neutral about his/her participation. [X]         Patient is unable to participate in goal setting and plan of care.      Thank you for this referral.  John Burkett, PT   Time Calculation: 36 mins

## 2017-03-31 NOTE — INTERDISCIPLINARY ROUNDS
Interdisciplinary team rounds were held 3/31/2017 with the following team members:Care Management, Nursing, Nutrition, Pharmacy and Physical Therapy. Plan of care discussed. See clinical pathway and/or care plan for interventions and desired outcomes.     Anticipated date of discharge  3-

## 2017-04-01 LAB
BACTERIA SPEC CULT: ABNORMAL
BACTERIA SPEC CULT: ABNORMAL
CC UR VC: ABNORMAL
SERVICE CMNT-IMP: ABNORMAL

## 2017-04-04 NOTE — PROCEDURES
Zenon Guerin [facility]   [address] [facility], [address]   ROUTINE EEG REPORT       Name:  Marlon Brown   MR#:  [id number]   :  1935   Account #:  [de-identified]    Date of Procedure:  2017   Date of Adm:  2017       REQUESTING PHYSICIAN: Malgorzata Quiroz MD    INDICATIONS: An EEG is requested in this 79-year-old woman with   seizures to evaluate for epileptiform abnormalities. MEDICATIONS LISTED AS   1. Tegretol. 2. Synthroid. DESCRIPTION OF RECORDING: This tracing is obtained while the   patient is noted to be awake, yet confused. During this state, the   tracing is degraded in portion by patient generated movement and   muscle artifact. Through the discernible portions, the background   consists of diffuse mixed frequency alpha activities with at times some   superimposed beta. Hyperventilation not performed secondary to medical condition. Intermittent photic stimulation little alters the tracing. Normal sleep architecture is not seen.     INTERPRETATION: This EEG, degraded by patient-generated   movement and muscle artifact performed the awake state is essentially   normal.        MD ERICA Rahman / FRANCOIS   D:  2017   11:56   T:  2017   12:10   Job #:  [id number]

## 2017-08-28 ENCOUNTER — HOSPITAL ENCOUNTER (INPATIENT)
Age: 82
LOS: 1 days | Discharge: HOME HEALTH | DRG: 690 | End: 2017-08-30
Attending: EMERGENCY MEDICINE | Admitting: HOSPITALIST
Payer: MEDICARE

## 2017-08-28 ENCOUNTER — APPOINTMENT (OUTPATIENT)
Dept: CT IMAGING | Age: 82
DRG: 690 | End: 2017-08-28
Attending: EMERGENCY MEDICINE
Payer: MEDICARE

## 2017-08-28 DIAGNOSIS — N39.0 URINARY TRACT INFECTION WITHOUT HEMATURIA, SITE UNSPECIFIED: ICD-10-CM

## 2017-08-28 DIAGNOSIS — R56.9 SEIZURE (HCC): Primary | ICD-10-CM

## 2017-08-28 PROBLEM — N30.00 ACUTE CYSTITIS: Status: ACTIVE | Noted: 2017-08-28

## 2017-08-28 LAB
ALBUMIN SERPL-MCNC: 3.5 G/DL (ref 3.5–5)
ALBUMIN/GLOB SERPL: 1 {RATIO} (ref 1.1–2.2)
ALP SERPL-CCNC: 115 U/L (ref 45–117)
ALT SERPL-CCNC: 12 U/L (ref 12–78)
ANION GAP SERPL CALC-SCNC: 5 MMOL/L (ref 5–15)
APPEARANCE UR: ABNORMAL
AST SERPL-CCNC: 9 U/L (ref 15–37)
ATRIAL RATE: 86 BPM
BACTERIA URNS QL MICRO: ABNORMAL /HPF
BASOPHILS # BLD: 0 K/UL (ref 0–0.1)
BASOPHILS NFR BLD: 0 % (ref 0–1)
BILIRUB SERPL-MCNC: 0.3 MG/DL (ref 0.2–1)
BILIRUB UR QL: NEGATIVE
BUN SERPL-MCNC: 18 MG/DL (ref 6–20)
BUN/CREAT SERPL: 26 (ref 12–20)
CALCIUM SERPL-MCNC: 9.1 MG/DL (ref 8.5–10.1)
CALCULATED P AXIS, ECG09: 64 DEGREES
CALCULATED R AXIS, ECG10: 62 DEGREES
CALCULATED T AXIS, ECG11: 48 DEGREES
CAOX CRY URNS QL MICRO: ABNORMAL
CARBAMAZEPINE SERPL-MCNC: 4 UG/ML (ref 4–12)
CHLORIDE SERPL-SCNC: 108 MMOL/L (ref 97–108)
CO2 SERPL-SCNC: 30 MMOL/L (ref 21–32)
COLOR UR: ABNORMAL
CREAT SERPL-MCNC: 0.69 MG/DL (ref 0.55–1.02)
DIAGNOSIS, 93000: NORMAL
EOSINOPHIL # BLD: 0.2 K/UL (ref 0–0.4)
EOSINOPHIL NFR BLD: 2 % (ref 0–7)
EPITH CASTS URNS QL MICRO: ABNORMAL /LPF
ERYTHROCYTE [DISTWIDTH] IN BLOOD BY AUTOMATED COUNT: 12.5 % (ref 11.5–14.5)
GLOBULIN SER CALC-MCNC: 3.5 G/DL (ref 2–4)
GLUCOSE SERPL-MCNC: 109 MG/DL (ref 65–100)
GLUCOSE UR STRIP.AUTO-MCNC: NEGATIVE MG/DL
HCT VFR BLD AUTO: 42 % (ref 35–47)
HGB BLD-MCNC: 13.7 G/DL (ref 11.5–16)
HGB UR QL STRIP: NEGATIVE
KETONES UR QL STRIP.AUTO: NEGATIVE MG/DL
LEUKOCYTE ESTERASE UR QL STRIP.AUTO: ABNORMAL
LYMPHOCYTES # BLD: 1.1 K/UL (ref 0.8–3.5)
LYMPHOCYTES NFR BLD: 15 % (ref 12–49)
MAGNESIUM SERPL-MCNC: 2.2 MG/DL (ref 1.6–2.4)
MCH RBC QN AUTO: 33.7 PG (ref 26–34)
MCHC RBC AUTO-ENTMCNC: 32.6 G/DL (ref 30–36.5)
MCV RBC AUTO: 103.2 FL (ref 80–99)
MONOCYTES # BLD: 0.4 K/UL (ref 0–1)
MONOCYTES NFR BLD: 5 % (ref 5–13)
NEUTS SEG # BLD: 5.7 K/UL (ref 1.8–8)
NEUTS SEG NFR BLD: 78 % (ref 32–75)
NITRITE UR QL STRIP.AUTO: POSITIVE
P-R INTERVAL, ECG05: 198 MS
PH UR STRIP: 6.5 [PH] (ref 5–8)
PLATELET # BLD AUTO: 235 K/UL (ref 150–400)
POTASSIUM SERPL-SCNC: 3.7 MMOL/L (ref 3.5–5.1)
PROT SERPL-MCNC: 7 G/DL (ref 6.4–8.2)
PROT UR STRIP-MCNC: NEGATIVE MG/DL
Q-T INTERVAL, ECG07: 362 MS
QRS DURATION, ECG06: 86 MS
QTC CALCULATION (BEZET), ECG08: 433 MS
RBC # BLD AUTO: 4.07 M/UL (ref 3.8–5.2)
RBC #/AREA URNS HPF: ABNORMAL /HPF (ref 0–5)
SODIUM SERPL-SCNC: 143 MMOL/L (ref 136–145)
SP GR UR REFRACTOMETRY: 1.02 (ref 1–1.03)
UR CULT HOLD, URHOLD: NORMAL
UROBILINOGEN UR QL STRIP.AUTO: 0.2 EU/DL (ref 0.2–1)
VENTRICULAR RATE, ECG03: 86 BPM
WBC # BLD AUTO: 7.3 K/UL (ref 3.6–11)
WBC URNS QL MICRO: ABNORMAL /HPF (ref 0–4)

## 2017-08-28 PROCEDURE — 80156 ASSAY CARBAMAZEPINE TOTAL: CPT | Performed by: EMERGENCY MEDICINE

## 2017-08-28 PROCEDURE — 97116 GAIT TRAINING THERAPY: CPT

## 2017-08-28 PROCEDURE — 74011000250 HC RX REV CODE- 250: Performed by: FAMILY MEDICINE

## 2017-08-28 PROCEDURE — 94640 AIRWAY INHALATION TREATMENT: CPT

## 2017-08-28 PROCEDURE — 93005 ELECTROCARDIOGRAM TRACING: CPT

## 2017-08-28 PROCEDURE — 74011250637 HC RX REV CODE- 250/637: Performed by: EMERGENCY MEDICINE

## 2017-08-28 PROCEDURE — 87186 SC STD MICRODIL/AGAR DIL: CPT | Performed by: EMERGENCY MEDICINE

## 2017-08-28 PROCEDURE — 81001 URINALYSIS AUTO W/SCOPE: CPT | Performed by: EMERGENCY MEDICINE

## 2017-08-28 PROCEDURE — 94664 DEMO&/EVAL PT USE INHALER: CPT

## 2017-08-28 PROCEDURE — 74011250636 HC RX REV CODE- 250/636: Performed by: FAMILY MEDICINE

## 2017-08-28 PROCEDURE — 85025 COMPLETE CBC W/AUTO DIFF WBC: CPT | Performed by: EMERGENCY MEDICINE

## 2017-08-28 PROCEDURE — 36415 COLL VENOUS BLD VENIPUNCTURE: CPT | Performed by: EMERGENCY MEDICINE

## 2017-08-28 PROCEDURE — 74011000258 HC RX REV CODE- 258: Performed by: EMERGENCY MEDICINE

## 2017-08-28 PROCEDURE — 87086 URINE CULTURE/COLONY COUNT: CPT | Performed by: EMERGENCY MEDICINE

## 2017-08-28 PROCEDURE — 77030029684 HC NEB SM VOL KT MONA -A

## 2017-08-28 PROCEDURE — 74011250637 HC RX REV CODE- 250/637: Performed by: FAMILY MEDICINE

## 2017-08-28 PROCEDURE — 87077 CULTURE AEROBIC IDENTIFY: CPT | Performed by: EMERGENCY MEDICINE

## 2017-08-28 PROCEDURE — 97161 PT EVAL LOW COMPLEX 20 MIN: CPT

## 2017-08-28 PROCEDURE — 74011250636 HC RX REV CODE- 250/636: Performed by: EMERGENCY MEDICINE

## 2017-08-28 PROCEDURE — 83735 ASSAY OF MAGNESIUM: CPT | Performed by: FAMILY MEDICINE

## 2017-08-28 PROCEDURE — 96365 THER/PROPH/DIAG IV INF INIT: CPT

## 2017-08-28 PROCEDURE — 80053 COMPREHEN METABOLIC PANEL: CPT | Performed by: EMERGENCY MEDICINE

## 2017-08-28 PROCEDURE — 94761 N-INVAS EAR/PLS OXIMETRY MLT: CPT

## 2017-08-28 PROCEDURE — 99285 EMERGENCY DEPT VISIT HI MDM: CPT

## 2017-08-28 PROCEDURE — 99218 HC RM OBSERVATION: CPT

## 2017-08-28 PROCEDURE — 70450 CT HEAD/BRAIN W/O DYE: CPT

## 2017-08-28 RX ORDER — LORAZEPAM 2 MG/ML
INJECTION INTRAMUSCULAR
Status: DISPENSED
Start: 2017-08-28 | End: 2017-08-29

## 2017-08-28 RX ORDER — LEVOTHYROXINE SODIUM 100 UG/1
100 TABLET ORAL
Status: DISCONTINUED | OUTPATIENT
Start: 2017-08-29 | End: 2017-08-30 | Stop reason: HOSPADM

## 2017-08-28 RX ORDER — HEPARIN SODIUM 5000 [USP'U]/ML
5000 INJECTION, SOLUTION INTRAVENOUS; SUBCUTANEOUS EVERY 8 HOURS
Status: DISCONTINUED | OUTPATIENT
Start: 2017-08-28 | End: 2017-08-30 | Stop reason: HOSPADM

## 2017-08-28 RX ORDER — LORAZEPAM 2 MG/ML
1 INJECTION INTRAMUSCULAR
Status: ACTIVE | OUTPATIENT
Start: 2017-08-28 | End: 2017-08-30

## 2017-08-28 RX ORDER — TRAZODONE HYDROCHLORIDE 50 MG/1
25 TABLET ORAL
Status: DISCONTINUED | OUTPATIENT
Start: 2017-08-28 | End: 2017-08-30 | Stop reason: HOSPADM

## 2017-08-28 RX ORDER — SODIUM CHLORIDE 0.9 % (FLUSH) 0.9 %
5-10 SYRINGE (ML) INJECTION AS NEEDED
Status: DISCONTINUED | OUTPATIENT
Start: 2017-08-28 | End: 2017-08-30 | Stop reason: HOSPADM

## 2017-08-28 RX ORDER — TRAZODONE HYDROCHLORIDE 50 MG/1
25 TABLET ORAL
COMMUNITY
End: 2018-01-03

## 2017-08-28 RX ORDER — CARBAMAZEPINE 200 MG/1
400 TABLET ORAL 2 TIMES DAILY
Status: DISCONTINUED | OUTPATIENT
Start: 2017-08-28 | End: 2017-08-30 | Stop reason: HOSPADM

## 2017-08-28 RX ORDER — ALBUTEROL SULFATE 0.83 MG/ML
2.5 SOLUTION RESPIRATORY (INHALATION)
Status: DISCONTINUED | OUTPATIENT
Start: 2017-08-28 | End: 2017-08-30 | Stop reason: HOSPADM

## 2017-08-28 RX ORDER — ACETAMINOPHEN 325 MG/1
650 TABLET ORAL
Status: DISCONTINUED | OUTPATIENT
Start: 2017-08-28 | End: 2017-08-30 | Stop reason: HOSPADM

## 2017-08-28 RX ORDER — CARBAMAZEPINE 200 MG/1
400 TABLET ORAL
Status: COMPLETED | OUTPATIENT
Start: 2017-08-28 | End: 2017-08-28

## 2017-08-28 RX ORDER — LORAZEPAM 2 MG/ML
1 INJECTION INTRAMUSCULAR
Status: ACTIVE | OUTPATIENT
Start: 2017-08-28 | End: 2017-08-29

## 2017-08-28 RX ORDER — CEFUROXIME AXETIL 250 MG/1
250 TABLET ORAL 2 TIMES DAILY
Qty: 14 TAB | Refills: 0 | Status: SHIPPED | OUTPATIENT
Start: 2017-08-28 | End: 2017-09-04

## 2017-08-28 RX ORDER — LIDOCAINE HCL 4 %
2 CREAM (GRAM) TOPICAL DAILY
COMMUNITY
End: 2017-11-29

## 2017-08-28 RX ORDER — CETIRIZINE HCL 10 MG
10 TABLET ORAL DAILY
COMMUNITY
End: 2018-01-03

## 2017-08-28 RX ORDER — BUDESONIDE 0.5 MG/2ML
500 INHALANT ORAL
Status: DISCONTINUED | OUTPATIENT
Start: 2017-08-28 | End: 2017-08-30 | Stop reason: HOSPADM

## 2017-08-28 RX ORDER — ADHESIVE BANDAGE
30 BANDAGE TOPICAL DAILY PRN
Status: DISCONTINUED | OUTPATIENT
Start: 2017-08-28 | End: 2017-08-30 | Stop reason: HOSPADM

## 2017-08-28 RX ORDER — SODIUM CHLORIDE 0.9 % (FLUSH) 0.9 %
5-10 SYRINGE (ML) INJECTION EVERY 8 HOURS
Status: DISCONTINUED | OUTPATIENT
Start: 2017-08-28 | End: 2017-08-30 | Stop reason: HOSPADM

## 2017-08-28 RX ADMIN — Medication 10 ML: at 18:09

## 2017-08-28 RX ADMIN — BUDESONIDE 500 MCG: 0.5 INHALANT RESPIRATORY (INHALATION) at 19:46

## 2017-08-28 RX ADMIN — CARBAMAZEPINE 400 MG: 200 TABLET ORAL at 22:12

## 2017-08-28 RX ADMIN — HEPARIN SODIUM 5000 UNITS: 5000 INJECTION, SOLUTION INTRAVENOUS; SUBCUTANEOUS at 22:12

## 2017-08-28 RX ADMIN — Medication 10 ML: at 22:17

## 2017-08-28 RX ADMIN — CARBAMAZEPINE 400 MG: 200 TABLET ORAL at 16:03

## 2017-08-28 RX ADMIN — CEFTRIAXONE 1 G: 1 INJECTION, POWDER, FOR SOLUTION INTRAMUSCULAR; INTRAVENOUS at 12:33

## 2017-08-28 RX ADMIN — HEPARIN SODIUM 5000 UNITS: 5000 INJECTION, SOLUTION INTRAVENOUS; SUBCUTANEOUS at 16:05

## 2017-08-28 NOTE — PROGRESS NOTES
SSED/CM consult received and appreciated. EMR reviewed. History significant for hypothyroid, seizure, COPD, and dementia. Patient presents to the ED w/ chief complaint of seizure. Patient is a resident at [address] [facility]). Met w/patient and introduced to role of CM. Patient alert verbalized her name is   [address] and did not know where she was living. Ms. Faiza De La Rosa acknowledged emergency contacts were daughters Delon Lomeli and Becky Mera. CM noted picture frame in patient's room. This writer asked who was in picture - patient verbalized \"Kathie and  are in the picture and they are good friends. I miss them haven't seen in 2 years. \"    CM noted chocolate on patient's fingers and offered a paper towel.  verbalized needing to \"throw up. \" This writer provided emesis bag and alerted staff. Noted  began to cry when Jess Nikolai arrived. Informed CM daughter Becky Mera en route to [facility] from [place]. Care Management Interventions  PCP Verified by CM:  Yes  Mode of Transport at Discharge: [address]  [address] Time of Discharge: [address] (CM Consult): Discharge Planning  MyChart Signup: Yes  Physical Therapy Consult: Yes  Occupational Therapy Consult: No  Speech Therapy Consult: No  Current Support Network: [address] [place] [address] (memory care)  Confirm Follow Up Transport:  (TBD)  Plan discussed with Pt/Family/Caregiver: Yes  Discharge Location  Discharge Placement:  (TBD)

## 2017-08-28 NOTE — PROGRESS NOTES
Spiritual Care Assessment/Progress Notes    Christine Enamorado [id number]  [id number]    1935  80 y.o.  female    Patient Telephone Number: [phone] (home)   Samaritan Affiliation: Oriental orthodox   Language: English   Extended Emergency Contact Information  Primary Emergency Contact: Angie Spencer  Address: [address] [facility] [place], [address] [place] [address] Phone: [phone]  Relation: Daughter  Secondary Emergency Contact: [address] [place] [address] Phone: [phone]  Relation: Child   Patient Active Problem List    Diagnosis Date Noted    Acute cystitis 08/28/2017    Dementia 03/29/2017    UTI (urinary tract infection) 03/29/2017    Seizure ([facility] [address] 03/28/2017    COPD (chronic obstructive pulmonary disease) ([facility] 03/28/2017    Acquired hypothyroidism 03/28/2017        Date: 8/28/2017       Level of Samaritan/Spiritual Activity:  []         Involved in lu tradition/spiritual practice    []         Not involved in lu tradition/spiritual practice  []         Spiritually oriented    []         Claims no spiritual orientation    []         seeking spiritual identity  []         Feels alienated from Latter-day practice/tradition  []         Feels angry about Latter-day practice/tradition  []         Spirituality/Latter-day tradition  a resource for coping at this time.   [x]         Not able to assess    Services Provided Today:  []         crisis intervention    []         reading Scriptures  []         spiritual assessment    []         prayer  []         empathic listening/emotional support  []         rites and rituals (cite in comments)  []         life review     []         Latter-day support  []         theological development   []         advocacy  []         ethical dialog     []         blessing  []         bereavement support    [x]         support to family  []         anticipatory grief support   [x]         help with AMD  []         spiritual guidance    []         meditation      Spiritual Care Needs  []         Emotional Support  []         Spiritual/Baptist Care  []         Loss/Adjustment  []         Advocacy/Referral                /Ethics  []         No needs expressed at               this time  [x]         Other: (note in               comments)  [address]  [x]         Follow up visits with               pt/family  []         Provide materials  []         Schedule sacraments  []         Contact Community               Clergy  []         Follow up as needed  []         Other: (note in               comments)     Comments: Responded to inbox request to assist with completion of Advanced Medical Directive (AMD). Connected with patient's daughters in hallway outside of patient's room as patient was being cared for. Explained document and its purpose to daughters. Daughters indicated that they believe AMD would be beneficial and patient has previously expressed an interest in completing but believe tomorrow would be a better day as patient has had a very long and difficult afternoon. Chaplains will attempt to follow-up with patient tomorrow to complete AMD. Advised daughters of  availability as needed and desired for any additional spiritual needs as well. Chaplain Micah Epps M.Div.    Paging Service [phone])

## 2017-08-28 NOTE — IP AVS SNAPSHOT
[address] [facility] [address] [place] [address] 
[phone] Patient: Aloma Gilford MRN: [id number] :1935 Current Discharge Medication List  
  
START taking these medications Dose & Instructions Dispensing Information Comments Morning Noon Evening Bedtime  
 l.acidoph-B.lactis-B.longum 460 mg (7.5-6- 1.5 bill. cell) Cap cap Commonly known as:  [id number] Your last dose was: Your next dose is:    
   
   
 Dose:  1 Cap Take 1 Cap by mouth Daily (before breakfast). Quantity:  7 Cap Refills:  0 CONTINUE these medications which have CHANGED Dose & Instructions Dispensing Information Comments Morning Noon Evening Bedtime * cefUROXime 250 mg tablet Commonly known as:  CEFTIN What changed:  Another medication with the same name was added. Make sure you understand how and when to take each. Your last dose was: Your next dose is:    
   
   
 Dose:  250 mg Take 1 Tab by mouth two (2) times a day for 7 days. Quantity:  14 Tab Refills:  0  
     
   
   
   
  
 * cefUROXime 250 mg tablet Commonly known as:  CEFTIN What changed: You were already taking a medication with the same name, and this prescription was added. Make sure you understand how and when to take each. Your last dose was: Your next dose is:    
   
   
 Dose:  250 mg Take 1 Tab by mouth two (2) times a day. Quantity:  10 Tab Refills:  0  
     
   
   
   
  
 * Notice: This list has 2 medication(s) that are the same as other medications prescribed for you. Read the directions carefully, and ask your doctor or other care provider to review them with you. CONTINUE these medications which have NOT CHANGED Dose & Instructions Dispensing Information Comments Morning Noon Evening Bedtime  
 albuterol 2.5 mg /3 mL (0.083 %) nebulizer solution Commonly known as:  PROVENTIL VENTOLIN Your last dose was: Your next dose is:    
   
   
 Dose:  2.5 mg  
2.5 mg by Nebulization route two (2) times daily as needed. Refills:  0 AZO CRANBERRY PLUS VIT C 250-60 mg Cap Generic drug:  cranberry extract-vitamin C Your last dose was: Your next dose is:    
   
   
 Dose:  2 Tab Take 2 Tabs by mouth daily. Refills:  0  
     
   
   
   
  
 budesonide 0.5 mg/2 mL Nbsp Commonly known as:  PULMICORT Your last dose was: Your next dose is:    
   
   
 Dose:  500 mcg 500 mcg by Nebulization route two (2) times a day. Refills:  0  
     
   
   
   
  
 carBAMazepine 200 mg tablet Commonly known as:  TEGretol Your last dose was: Your next dose is:    
   
   
 Dose:  400 mg Take 2 Tabs by mouth two (2) times a day. Quantity:  60 Tab Refills:  0 Mapap 500 mg Cap Generic drug:  acetaminophen Your last dose was: Your next dose is:    
   
   
 Dose:  1 Tab Take 1 Tab by mouth daily. Refills:  0  
     
   
   
   
  
 SYNTHROID 100 mcg tablet Generic drug:  levothyroxine Your last dose was: Your next dose is:    
   
   
 Dose:  100 mcg Take 100 mcg by mouth Daily (before breakfast). Refills:  0  
     
   
   
   
  
 traZODone 50 mg tablet Commonly known as:  Mayi Deeds Your last dose was: Your next dose is:    
   
   
 Dose:  25 mg Take 25 mg by mouth nightly as needed for Sleep. May repeat another 25 mg if no sleep in an hour Refills:  0 ZyrTEC 10 mg tablet Generic drug:  cetirizine Your last dose was: Your next dose is:    
   
   
 Dose:  10 mg Take 10 mg by mouth daily. Refills:  0 Where to Get Your Medications Information on where to get these meds will be given to you by the nurse or doctor. ! Ask your nurse or doctor about these medications  
  cefUROXime 250 mg tablet  
 cefUROXime 250 mg tablet  
 l.acidoph-B.lactis-B.longum 460 mg (7.5-6- 1.5 bill. cell) Cap cap

## 2017-08-28 NOTE — ACP (ADVANCE CARE PLANNING)
Responded to inbox request to assist with completion of Advanced Medical Directive (AMD). Connected with patient's daughters in hallway outside of patient's room as patient was being cared for. Explained document and its purpose to daughters. Daughters indicated that they believe AMD would be beneficial and patient has previously expressed an interest in completing but believe tomorrow would be a better day as patient has had a very long and difficult afternoon. Chaplains will attempt to follow-up with patient tomorrow to complete AMD. Advised daughters of  availability as needed and desired for any additional spiritual needs as well. Chaplain Arturo Macedo M.Div.    Paging Service [phone])

## 2017-08-28 NOTE — ED NOTES
PT with seizure like activity, eye rolled to the right side, unreponsive, 02 84%,  . Md Jonathan Bailon at [facility] and new orders received.  Hospitalist paged

## 2017-08-28 NOTE — ROUTINE PROCESS
Primary Nurse Beti Franco, LEELA and Gemma Portillo, RN performed a dual skin assessment on this patient No impairment noted  George score is 22

## 2017-08-28 NOTE — SENIOR SERVICES NOTE
physical Therapy Emergency Department EVALUATION/DISCHARGE  Patient: Simeon Osborn (77 y.o. female)  Date: 8/28/2017  Primary Diagnosis: There are no admission diagnoses documented for this encounter. Precautions:     ASSESSMENT :  Chart reviewed. Patient cleared to be seen by nursing staff. Patient presents to the ED s/p a seizure and AMS. Pt oriented to self only, though responding to commands appropriately. Pt has dementia at baseline and mentation appears to have returned to baseline. No unilateral weakness. No numbness or tingling reported at this time. Biggest concern is poor safety awareness. Patient needing some assist in small spaces with hand placement and walker negotiation. Pt ambulating x 200 with SBA with rollator. Pt demonstrating quicken era with decreased step clearance. Minimal forward flexion noted. Pt is approaching functional baseline. No further PT needs at this time. Nursing notified. Call light within reach. Further acute physical therapy is not indicated at this time. PLAN :  Discharge Recommendations:  No further PT needs    []   Home with family  [x]   Skilled nursing facility  []   Admission to hospital with rehab likely needed  []   Inpatient rehab referral  []   Outpatient physical therapy referral  []   Other:    Further Equipment Recommendations for Discharge: none, has rollator  []   Rolling walker with 5\" wheels  []   Crutches   []   Tello Heidi   []   Wheelchair   []   Other:     COMMUNICATION/EDUCATION:   Communication/Collaboration:  [x]   Fall prevention education was provided and the patient/caregiver indicated understanding. [x]   Patient/family have participated as able and agree with findings and recommendations. []   Patient is unable to participate in plan of care at this time. Findings and recommendations were discussed with: MD physician and Registered Nurse and NP       SUBJECTIVE:   Patient stated I'm worried about my friend.     OBJECTIVE DATA SUMMARY:   HISTORY:    Past Medical History:   Diagnosis Date    [facility]ch[facility] obstructive pulmonary disease) ([facility] [address]     Dementia 3/29/2017    Hypothyroid     [facility])      Past Surgical History:   Procedure Laterality Date    HX HIP REPLACEMENT       Prior Level of Function/Home Situation: Pt lives in a memory care unit. NP spoke to facility. Patient ambulating with a rollator on the unit at a distantly supervised level. Patient ambulates to all meals and is able to get herself dressed in the AM.   Personal factors and/or comorbidities impacting plan of care:     Home Situation  Home Environment: [address] [place] [address] Name:  (memory care unit)  # Steps to Enter: 0  One/Two Story Residence: One story  Living Alone: No  Support Systems: Skilled nursing facility  Patient Expects to be Discharged to[de-identified] Skilled nursing facility  Current DME Used/Available at Home: antoni Alexis    EXAMINATION/PRESENTATION/DECISION MAKING:   Critical Behavior:  Neurologic State: Confused  Orientation Level: Oriented to person, Disoriented to time  Cognition: Memory loss, Follows commands     Hearing: Auditory  Auditory Impairment: None     Strength:    Strength: Within functional limits     Tone & Sensation:   Tone: Normal  Sensation: Intact      Coordination:  Coordination: Within functional limits  Functional Mobility:  Bed Mobility:  Supine to Sit: Modified independent  Sit to Supine: Modified independent     Transfers:  Sit to Stand: Supervision  Stand to Sit: Supervision        Bed to Chair: Supervision  Balance:   Sitting: Intact  Standing: Intact; With support  Ambulation/Gait Training:  Distance (ft): 200 Feet (ft)  Assistive Device: Gait belt;Walker, rollator  Ambulation - Level of Assistance: Stand-by asssistance  Gait Abnormalities: Decreased step clearance (forward flexion)    Special Tests:  10 Meter walk test:  (Specify if any supplemental oxygen is used, the type, pre, during and post sats.)    Self-Selected Or Fast-Velocity: Self Selected Velocity  Trial 1: 11.4 Seconds  Trial 2: 11.4 Seconds  Trial 3: 11.4 Seconds  Average: 11.4  Score: 0.87             Walking Speed (m/s)  Modifier Scale Age 52-63 Age 61-76 Age 66-77 Age 80-80    Male Female Male Female Male Female Male Female   CH   0% Impaired ? 1.39 ? 1.40 ? 1.36 ? 1.30 ? 1.33 ? 1.27 ? 1.21 ? 1.15   CI   1-19% Impaired 1.11-1.38 1.12-1.39 1.09-1.35 1.04-1.29 1.06-1.32 1.01-1.26 0.96-1.20 0.92-1.14   CJ   20-39% Impaired 0.83-1.10 0.84-1.11 0.82-1.08 0.78-1.03 0.80-1.05 0.76-1.00 0.72-0.95 0.69-0.91   CK   40-59% Impaired 0.56-0.82 0.57-0.83 0.54-0.81 0.52-0.77 0.53-0.79 0.51-0.75 0.48-0.71 0.46-0.68   CL   60-79% Impaired 0.28-0.55 0.28-0.56 0.27-0.53 0.26-0.51 0.27-0.52 0.25-0.50 0.24-0.49 0.23-0.45   CM   80-99% Impaired 0.01-0.28 < 0.01-0.28 < 0.01-0.27 < 0.01-0.26 0.01-0.27 0.01-0.24 0.01-0.23 0.01-0.22   CN   100% Impaired Cannot Perform   Minimal Detectable Change (MDC-90) = 0.1 m/s  Taylor HENRIQUEZ \"Comfortable and maximum walking speed of adults aged 20-79 years: reference values and determinants. \" Age and Agin Volume 26(1):15-9. Nelida Baeza. \"Age- and gender-related test performance in community-dwelling elderly people: Six-Minute Walk Test, Avitia Balance Scale, Timed Up & Go Test, and gait speeds. \" Physical Therapy: 2002 Volume 82(2):128-37. Myriam FLANAGAN, Martinez REN, Song Gibbs JD, Deny KAYE. \"Assessing stability and change of four performance measures: a longitudinal study evaluating outcome following total hip and knee arthroplasty. \" [facility] Musculoskeletal Disorders: 2005 Volume 6(3). Daryn Peterson, PhD; Jose Francisco Ascencio, PhD. Keya Cabrera Paper: \"Walking Speed: the Sixth Vital Sign\" Journal of Geriatric Physical Therapy: 2009 - Volume 32 - Issue 2 - p 2-5 .          In compliance with CMSs Claims Based Outcome Reporting, the following G-code set was chosen for this patient based on their primary functional limitation being treated: The outcome measure chosen to determine the severity of the functional limitation was the 10 MWT with a score of 0.87 m/s which was correlated with the impairment scale. ? Mobility - Walking and Moving Around:     - CURRENT STATUS: CJ - 20%-39% impaired, limited or restricted    - GOAL STATUS: CJ - 20%-39% impaired, limited or restricted    - D/C STATUS:  CJ - 20%-39% impaired, limited or restricted        Pain:Pain Scale 1: Numeric (0 - 10)  Pain Intensity 1: 0     Activity Tolerance:   WFL  Please refer to the flowsheet for vital signs taken during this treatment.   After treatment:   [x]         Patient left in no apparent distress sitting up in chair  []         Patient left in no apparent distress in bed  [x]         Call bell left within reach  [x]         Nursing notified  [x]         Caregiver present, sitting outside CT  []         Bed alarm activated        Thank you for this referral.  Jessica Sherman PT, DPT   Time Calculation: 20 mins

## 2017-08-28 NOTE — H&P
HPI and Plan:   See dictation    Orders Placed This Encounter   Procedures     CT Angiogram coronary artery     Follow-Up with Cardiac Advanced Practice Provider     Cardiac Event Monitor - Peds/Adult     Orders Placed This Encounter   Medications     citalopram (CELEXA) 10 MG tablet     Sig: Take 10 mg by mouth daily     Medications Discontinued During This Encounter   Medication Reason     sertraline (ZOLOFT) 50 MG tablet Alternate therapy         Encounter Diagnoses   Name Primary?     Paroxysmal supraventricular tachycardia (H)      Unstable angina (H) Yes       CURRENT MEDICATIONS:  Current Outpatient Prescriptions   Medication Sig Dispense Refill     citalopram (CELEXA) 10 MG tablet Take 10 mg by mouth daily       estrogens, conjugated, (PREMARIN) 0.3 MG tablet Take 0.3 mg by mouth daily       estradiol (ESTRACE VAGINAL) 0.1 MG/GM cream Place 2 g vaginally once a week       Probiotic Product (PROBIOTIC ADVANCED) CAPS Take by mouth daily       nitroglycerin (NITROSTAT) 0.4 MG sublingual tablet Place 0.4 mg under the tongue every 5 minutes as needed for chest pain For chest pain place 1 tablet under the tongue every 5 minutes for 3 doses. If symptoms persist 5 minutes after 1st dose call 911.       losartan (COZAAR) 25 MG tablet Take 25 mg by mouth daily       nitroglycerin (NITROSTAT) 0.4 MG sublingual tablet For chest pain place 1 tablet under the tongue every 5 minutes for 3 doses. If symptoms persist 5 minutes after 1st dose call 911. 25 tablet 3     diltiazem (CARDIZEM) 60 MG tablet Take 1 tablet (60 mg) by mouth as needed Take 1 tablet only as needed for increased palpitatons 10 tablet 2     pravastatin (PRAVACHOL) 40 MG tablet Take 1 tablet (40 mg) by mouth At Bedtime 90 tablet 3     diltiazem 240 MG 24 hr ER capsule Take 240 mg by mouth daily       Multiple Vitamin (DAILY MULTIVITAMIN PO) Take by mouth daily        OMEPRAZOLE PO Take 20 mg by mouth       levothyroxine (SYNTHROID) 75 MCG tablet Take 75  Sherry Jett MD  Please call  and page for questions. Call physician on-call through the  7pm-7am      History & Physical    Primary Care Provider: Shayne Ryan MD  Source of Information: Patient and he medical record. History of Presenting Illness:   Christine Enamorado is a 80 y.o. female who presents with seizure. She has the PMH of  COPD, hypothyroidism,  seizure who was sent here from [facility] after witness seizure. She had tonic clonic seizure while she was at the dining farias. After the work up at the ED here, patient was about to be discharged with treatment of UTI when she had another seizure almost for about 5 minutes as per ED physician. The patient denies any fever, chills, chest pain, cough, congestion, recent illness, palpitations, or dysuria. She denied  nausea, vomiting, HA, and dizziness. She said she would like to go home. Review of Systems:  Pertinent items are noted in the History of Present Illness. Past Medical History:   Diagnosis Date    COPD (chronic obstructive pulmonary disease) ([facility] [address]     Dementia 3/29/2017    Hypothyroid     Seizure [facility])       Past Surgical History:   Procedure Laterality Date    HX HIP REPLACEMENT       Prior to Admission medications    Medication Sig Start Date End Date Taking? Authorizing Provider   cefUROXime (CEFTIN) 250 mg tablet Take 1 Tab by mouth two (2) times a day for 7 days. 8/28/17 9/4/17 Yes Denver Maze, MD   cranberry extract-vitamin C (AZO CRANBERRY PLUS VIT C) 250-60 mg cap Take 2 Tabs by mouth daily. Yes Historical Provider   cetirizine (ZYRTEC) 10 mg tablet Take 10 mg by mouth daily. Yes Historical Provider   traZODone (DESYREL) 50 mg tablet Take 25 mg by mouth nightly as needed for Sleep. May repeat another 25 mg if no sleep in an hour   Yes Historical Provider   carBAMazepine (TEGRETOL) 200 mg tablet Take 2 Tabs by mouth two (2) times a day.  3/31/17  Yes Jensen Tinsley Jozef Meek MD   levothyroxine (SYNTHROID) 100 mcg tablet Take 100 mcg by mouth Daily (before breakfast). Yes Phys Other, MD   acetaminophen (MAPAP) 500 mg cap Take 1 Tab by mouth daily. Yes Phys Other, MD   albuterol (PROVENTIL VENTOLIN) 2.5 mg /3 mL (0.083 %) nebulizer solution 2.5 mg by Nebulization route two (2) times daily as needed. Yes Historical Provider   budesonide (PULMICORT) 0.5 mg/2 mL nbsp 500 mcg by Nebulization route two (2) times a day. Yes Historical Provider     Allergies   Allergen Reactions    Aricept [Donepezil] Unknown (comments)    Benadryl [Diphenhydramine Hcl] Unknown (comments)    Ciprofloxacin Unknown (comments)    Codeine Unknown (comments)    Macrobid [Nitrofurantoin Monohyd/M-Cryst] Unknown (comments)    Pcn [Penicillins] Other (comments)    Sulfa (Sulfonamide Antibiotics) Other (comments)      History reviewed. No pertinent family history. SOCIAL HISTORY:  Patient resides:  Independently    Assisted Living    SNF X   With family care       Smoking history:   None X   Former    Chronic      Alcohol history:   None X   Social    Chronic      Ambulates:   Independently X   w/cane    w/walker    w/wc    CODE STATUS:  DNR    Full X   Other      Objective:     Physical Exam:     Visit Vitals    /79 (BP 1 Location: Left arm, BP Patient Position: At rest)    Pulse 88    Temp 98.2 °F (36.8 °C)    Resp 22    Ht 5' 2\" (1.575 m)    Wt 58.5 kg (129 lb)    SpO2 92%    BMI 23.59 kg/m2      O2 Device: Room air    General:  Alert, cooperative, no distress, appears stated age. Head:  Normocephalic, without obvious abnormality, atraumatic. Eyes:  Conjunctivae/corneas clear. PERRL, EOMs intact. Neck: Supple, symmetrical, trachea midline, no adenopathy, thyroid: no enlargement/tenderness/nodules, no carotid bruit and no JVD. Back:   Symmetric, no curvature. ROM normal. No CVA tenderness. Lungs:   Clear to auscultation bilaterally.    Heart:  Regular rate and rhythm, mcg by mouth daily       Cholecalciferol (VITAMIN D3 PO) Take by mouth daily       aspirin 81 MG tablet Take 81 mg by mouth daily       LORAZEPAM PO Take 0.5 mg by mouth daily as needed        metFORMIN (GLUCOPHAGE) 500 MG tablet Take 500 mg by mouth 2 times daily (with meals)         ALLERGIES     Allergies   Allergen Reactions     Diovan [Valsartan] Other (See Comments) and GI Disturbance     Arthralgia     Hmg-Coa-R Inhibitors Other (See Comments)     Statin Medications give patient Myalgias---simvastatin     Sulfa Drugs Rash       PAST MEDICAL HISTORY:  Past Medical History:   Diagnosis Date     Degenerative disk disease     C 6/7     Diabetes mellitus (H)      Diabetic neuropathy (H)      Essential hypertension, benign      Generalized osteoarthrosis, unspecified site (aka ARTHRITIS)     osteoporosis and osteoarthritis     Hyperlipidemia      Hypothyroid      Mitral valve disorder     mild/mod     PAT (paroxysmal atrial tachycardia) (H)      Prinzmetal angina (H)     no cad 2000     PUD (peptic ulcer disease)      Rheumatic fever      Tricuspid regurgitation     mod     Venous thromboembolism of lower extremity     SUPERFICIAL thrombosis of RLE       PAST SURGICAL HISTORY:  Past Surgical History:   Procedure Laterality Date     APPENDECTOMY       C RAD RESEC TONSIL/PILLARS       CORONARY ANGIOGRAPHY ADULT ORDER  2002    normal Coronary arteries, Vasospasms     HYSTERECTOMY      total     rotator cuff surgery      right     TONSILLECTOMY         FAMILY HISTORY:  Family History   Problem Relation Age of Onset     Coronary Artery Disease Mother      Myocardial Infarction Mother      Heart Failure Father      Unknown/Adopted Maternal Grandmother      Unknown/Adopted Maternal Grandfather      Unknown/Adopted Paternal Grandmother      Unknown/Adopted Paternal Grandfather        SOCIAL HISTORY:  Social History     Social History     Marital status:      Spouse name: N/A     Number of children: N/A     Years of  "education: N/A     Social History Main Topics     Smoking status: Never Smoker     Smokeless tobacco: Never Used     Alcohol use Yes      Comment: occ. wine     Drug use: None     Sexual activity: Not Asked     Other Topics Concern     Caffeine Concern No     coffee: 3-4 cups a day     Sleep Concern No     Stress Concern No     Weight Concern No     Special Diet Yes     diabetic diet, healthy     Exercise Yes     walking x3 a week     Seat Belt Yes     Social History Narrative       Review of Systems:  Skin:  Negative     Eyes:  Positive for glasses  ENT:  Negative    Respiratory:  Negative    Cardiovascular:    Positive for;palpitations;chest pain;dizziness;lightheadedness;edema  Gastroenterology: Negative    Genitourinary:  Negative    Musculoskeletal:  Positive for joint pain  Neurologic:  Positive for headaches;numbness or tingling of feet  Psychiatric:  Negative    Heme/Lymph/Imm:  Positive for allergies  Endocrine:  Positive for thyroid disorder;diabetes    Physical Exam:  Vitals: /52  Pulse 76  Ht 1.562 m (5' 1.5\")  Wt 58 kg (127 lb 14.4 oz)  BMI 23.78 kg/m2    Constitutional:  cooperative, alert and oriented, well developed, well nourished, in no acute distress appears younger than stated age      Skin:  warm and dry to the touch, no apparent skin lesions or masses noted        Head:  normocephalic, no masses or lesions        Eyes:  pupils equal and round, conjunctivae and lids unremarkable, sclera white, no xanthalasma, EOMS intact, no nystagmus        ENT:  no pallor or cyanosis, dentition good        Neck:  carotid pulses are full and equal bilaterally, JVP normal, no carotid bruit, no thyromegaly        Chest:  normal breath sounds, clear to auscultation, normal A-P diameter, normal symmetry, normal respiratory excursion, no use of accessory muscles          Cardiac: regular rhythm, normal S1/S2, no S3 or S4, apical impulse not displaced, no murmurs, gallops or rubs             minimal syst " S1, S2 normal, no murmur, click, rub or gallop. Abdomen:   Soft, non-tender. Bowel sounds normal. No CVA tenderness. Extremities: Extremities normal, atraumatic, no cyanosis or edema. Pulses: 2+ and symmetric all extremities. Skin: Skin color, texture, turgor normal. No rashes or lesions   Neurologic: CNII-XII intact. EKG:  normal EKG, normal sinus rhythm. Data Review:     Recent Days:  Recent Labs      08/28/17   0926   WBC  7.3   HGB  13.7   HCT  42.0   PLT  235     Recent Labs      08/28/17   0926   NA  143   K  3.7   CL  108   CO2  30   GLU  109*   BUN  18   CREA  0.69   CA  9.1   ALB  3.5   SGOT  9*   ALT  12     No results for input(s): PH, PCO2, PO2, HCO3, FIO2 in the last 72 hours. 24 Hour Results:  Recent Results (from the past 24 hour(s))   CBC WITH AUTOMATED DIFF    Collection Time: 08/28/17  9:26 AM   Result Value Ref Range    WBC 7.3 3.6 - 11.0 K/uL    RBC 4.07 3.80 - 5.20 M/uL    HGB 13.7 11.5 - 16.0 g/dL    HCT 42.0 35.0 - 47.0 %    .2 (H) 80.0 - 99.0 FL    MCH 33.7 26.0 - 34.0 PG    MCHC 32.6 30.0 - 36.5 g/dL    RDW 12.5 11.5 - 14.5 %    PLATELET 367 095 - 488 K/uL    NEUTROPHILS 78 (H) 32 - 75 %    LYMPHOCYTES 15 12 - 49 %    MONOCYTES 5 5 - 13 %    EOSINOPHILS 2 0 - 7 %    BASOPHILS 0 0 - 1 %    ABS. NEUTROPHILS 5.7 1.8 - 8.0 K/UL    ABS. LYMPHOCYTES 1.1 0.8 - 3.5 K/UL    ABS. MONOCYTES 0.4 0.0 - 1.0 K/UL    ABS. EOSINOPHILS 0.2 0.0 - 0.4 K/UL    ABS.  BASOPHILS 0.0 0.0 - 0.1 K/UL   METABOLIC PANEL, COMPREHENSIVE    Collection Time: 08/28/17  9:26 AM   Result Value Ref Range    Sodium 143 136 - 145 mmol/L    Potassium 3.7 3.5 - 5.1 mmol/L    Chloride 108 97 - 108 mmol/L    CO2 30 21 - 32 mmol/L    Anion gap 5 5 - 15 mmol/L    Glucose 109 (H) 65 - 100 mg/dL    BUN 18 6 - 20 MG/DL    Creatinine 0.69 0.55 - 1.02 MG/DL    BUN/Creatinine ratio 26 (H) 12 - 20      GFR est AA >60 >60 ml/min/1.73m2    GFR est non-AA >60 >60 ml/min/1.73m2    Calcium 9.1 8.5 - 10.1 MG/DL Bilirubin, total 0.3 0.2 - 1.0 MG/DL    ALT (SGPT) 12 12 - 78 U/L    AST (SGOT) 9 (L) 15 - 37 U/L    Alk. phosphatase 115 45 - 117 U/L    Protein, total 7.0 6.4 - 8.2 g/dL    Albumin 3.5 3.5 - 5.0 g/dL    Globulin 3.5 2.0 - 4.0 g/dL    A-G Ratio 1.0 (L) 1.1 - 2.2     CARBAMAZEPINE    Collection Time: 08/28/17  9:26 AM   Result Value Ref Range    Carbamazepine 4.0 4.0 - 12.0 ug/mL   URINALYSIS W/MICROSCOPIC    Collection Time: 08/28/17  9:26 AM   Result Value Ref Range    Color YELLOW/STRAW      Appearance CLOUDY (A) CLEAR      Specific gravity 1.020 1.003 - 1.030      pH (UA) 6.5 5.0 - 8.0      Protein NEGATIVE  NEG mg/dL    Glucose NEGATIVE  NEG mg/dL    Ketone NEGATIVE  NEG mg/dL    Bilirubin NEGATIVE  NEG      Blood NEGATIVE  NEG      Urobilinogen 0.2 0.2 - 1.0 EU/dL    Nitrites POSITIVE (A) NEG      Leukocyte Esterase SMALL (A) NEG      WBC 5-10 0 - 4 /hpf    RBC 0-5 0 - 5 /hpf    Epithelial cells MODERATE (A) FEW /lpf    Bacteria 3+ (A) NEG /hpf    CA Oxalate crystals 1+ (A) NEG   URINE CULTURE HOLD SAMPLE    Collection Time: 08/28/17  9:26 AM   Result Value Ref Range    Urine culture hold URINE ON HOLD IN MICROBIOLOGY DEPT FOR 3 DAYS     EKG, 12 LEAD, INITIAL    Collection Time: 08/28/17  9:26 AM   Result Value Ref Range    Ventricular Rate 86 BPM    Atrial Rate 86 BPM    P-R Interval 194 ms    QRS Duration 80 ms    Q-T Interval 364 ms    QTC Calculation (Bezet) 435 ms    Calculated P Axis 65 degrees    Calculated R Axis 63 degrees    Calculated T Axis 55 degrees    Diagnosis       Normal sinus rhythm  Septal infarct , age undetermined  When compared with ECG of 28-MAR-2017 19:20,  premature atrial complexes are no longer present           Imaging:     Assessment and Plan:       Seizure:  Long standing history of seizure. Carbamazepine level is therapeutic. I will admit the patient for observation and get neurology input for recommendation for adjust of her seizure medication and further management.  Will check murmru at apex ( echo mild mr, mild+ tr)    Abdomen:  abdomen soft, non-tender, BS normoactive, no mass, no HSM, no bruits        Vascular: pulses full and equal, no bruits auscultated                                        Extremities and Back:  no deformities, clubbing, cyanosis, erythema observed              Neurological:  affect appropriate, oriented to time, person and place          Recent Lab Results:  LIPID RESULTS:  Lab Results   Component Value Date    CHOL 184 04/18/2017    HDL 75 04/18/2017    LDL 85 04/18/2017    TRIG 120 04/18/2017    CHOLHDLRATIO 3.1 01/12/2011       LIVER ENZYME RESULTS:  Lab Results   Component Value Date    AST 23 04/22/2016    ALT <5 (L) 04/18/2017       CBC RESULTS:  Lab Results   Component Value Date    WBC 7.0 03/31/2015    RBC 3.97 03/31/2015    HGB 12.1 03/31/2015    HCT 36.7 03/31/2015    MCV 92 03/31/2015    MCH 30.5 03/31/2015    MCHC 33 03/31/2015    RDW 13.2 03/31/2015     03/31/2015       BMP RESULTS:  Lab Results   Component Value Date     03/31/2015    POTASSIUM 4.6 03/31/2015    CHLORIDE 105 03/31/2015    CO2 24 01/12/2011    ANIONGAP 11 03/31/2015     (A) 03/31/2015    BUN 23 03/31/2015    CR 1.13 03/31/2015    GFRESTBLACK 56 03/31/2015    CHUN 9.9 03/31/2015        A1C RESULTS:  Lab Results   Component Value Date    A1C 6.9 (A) 04/22/2016       INR RESULTS:  No results found for: INR        CC  No referring provider defined for this encounter.   Mg, Ph and TSH. CT negative for acute pathology. UTI: Will continue Rocephin for now and follow culture. Patient is not symptomatic. May not need antibiotic for long period.        COPD: stable. Resume home inhalers     Hypothyroidism: resume synthroid, check TSH.      Dementia: supportive care.     Bipolar disorder: Continue tegretol.      See orders for other plans:   VTE prophylaxis: Heparin  Code status: Full  Discussed plan of care with Patient/Family and Nurse   Pre-admission lived at [facility]. Discharge planning: pending. Possibly back to NH tomorrow.            Signed By: Mila Mondragon MD     August 28, 2017

## 2017-08-28 NOTE — ED NOTES
PT with seizure like activity. PT shaking unable to verbalize. Spontaneously opens eyes when talking to PT. MD Ruiz at [facility].

## 2017-08-28 NOTE — ROUTINE PROCESS
TRANSFER - IN REPORT:    Verbal report received from [employer], RN(name) on Colt Loya  being received from ED(unit) for routine progression of care      Report consisted of patients Situation, Background, Assessment and   Recommendations(SBAR). Information from the following report(s) SBAR, Kardex, ED Summary, Intake/Output, MAR and Recent Results was reviewed with the receiving nurse. Opportunity for questions and clarification was provided. Assessment completed upon patients arrival to unit and care assumed.

## 2017-08-28 NOTE — ED NOTES
PT remains postictal at this time. Opens eyes, unable to verbalize or follow commands.  Hospitalist made aware

## 2017-08-28 NOTE — ED PROVIDER NOTES
HPI Comments: 80 y.o. female with past medical history significant for hypothyroid, seizure, COPD, and dementia who presents from nursing home via EMS with chief complaint of seizure. Pt had witnessed seizure-like activity (generalized tonic-clonic with unresponsiveness) while in the dining farias of her nursing home. History limited by pt's h/o dementia. Staff called EMS to transport the pt here. Pt has no current complaints and states that she no longer has a h/o seizures [place] more\". Pt denies being on any medications for seizure. Pt states that she take \"two red pills and a [place]\" at night, stating that a [place] is a breathing treatment. Pt denies having any pain and also denies nausea, vomiting, HA, and dizziness. She states that she wants to go home. There are no other acute medical concerns at this time. Social hx: Never smoker. No alcohol use. Full history, physical exam, and ROS unable to be obtained due to:  dementia. Note written by Braulio Cleary, as dictated by Lauri Simmons MD 9:25 AM          The history is provided by the patient. The history is limited by the condition of the patient (dementia). No  was used. Past Medical History:   Diagnosis Date    COPD (chronic obstructive pulmonary disease) ([facility] [address]     Dementia 3/29/2017    Hypothyroid     Seizure [facility])        Past Surgical History:   Procedure Laterality Date    HX HIP REPLACEMENT           No family history on file. Social History     Social History    Marital status:      Spouse name: N/A    Number of children: N/A    Years of education: N/A     Occupational History    Not on file.      Social History Main Topics    Smoking status: Never Smoker    Smokeless tobacco: Not on file    Alcohol use No    Drug use: Not on file    Sexual activity: Not on file     Other Topics Concern    Not on file     Social History Narrative         ALLERGIES: Pcn [penicillins] and Sulfa (sulfonamide antibiotics)    Review of Systems   Unable to perform ROS: Dementia       There were no vitals filed for this visit. Physical Exam   Constitutional: She appears well-developed and well-nourished. No distress. HENT:   Head: Normocephalic and atraumatic. Mouth/Throat: Oropharynx is clear and moist.   Eyes: Pupils are equal, round, and reactive to light. No scleral icterus. Neck: Normal range of motion. Neck supple. No thyromegaly present. Cardiovascular: Normal rate, regular rhythm, normal heart sounds and intact distal pulses. No murmur heard. Pulmonary/Chest: Effort normal and breath sounds normal. No respiratory distress. Abdominal: Soft. Bowel sounds are normal. She exhibits no distension. There is no tenderness. Musculoskeletal: Normal range of motion. She exhibits no edema. Neurological: She is alert. Non oriented - dementia   Skin: Skin is warm and dry. No rash noted. She is not diaphoretic. Nursing note and vitals reviewed. Note written by Braulio Mtz, as dictated by Kellen Mora MD 9:17 AM        MDM  Number of Diagnoses or Management Options  Seizure [facility]):   Urinary tract infection without hematuria, site unspecified:   Diagnosis management comments: A:  71yo F with dementia and seizure c/o who presents from nursing home due to seizure activity. Pt arrives awake and alert. VS stable. Demented with no specific complaints. Pt is on tegretol for seizures. P:  Labs  ecg  Head ct  ua  reassess    ED Course       Procedures     ED EKG interpretation:  Rhythm: normal sinus rhythm. Rate (approx.): 86. Axis: normal.  ST segment:  No concerning ST elevations or depressions. This EKG was interpreted by Kellen Mora MD,ED Provider. Labs and head CT unremarkable. UA consistent with UTI. Pt was to be discharged but noted to be having seizure-like activity.   Generalized tremors with head shaking and mouth movements but patient continued to track provider across the room. No responding verbally like she was before. Tachycardic. Seizure activity ceased after a few minutes and no additional medications given. Ceftriaxone ordered for UTI. CONSULT NOTE:  12:40 PM Merlin Frederick, MD spoke with Dr. Meeta Light, Consult for Hospitalist.  Discussed available diagnostic tests and clinical findings. He is in agreement with care plans as outlined. Dr. Meeta Light will see and admit the pt.

## 2017-08-28 NOTE — PROGRESS NOTES
Problem: Falls - Risk of  Goal: *Absence of Falls  Document Forrest Fall Risk and appropriate interventions in the flowsheet.    Outcome: Progressing Towards Goal  Fall Risk Interventions:              Medication Interventions: Evaluate medications/consider consulting pharmacy, Patient to call before getting OOB           History of Falls Interventions: Door open when patient unattended, Utilize gait belt for transfer/ambulation, Evaluate medications/consider consulting pharmacy

## 2017-08-28 NOTE — PROGRESS NOTES
Admission Medication Reconciliation:    Information obtained from: Patient's medication chart from [facility]    Significant PMH/Disease States:   Past Medical History:   Diagnosis Date    COPD (chronic obstructive pulmonary disease) ([place] [address]     Dementia 3/29/2017    Hypothyroid     Seizure [facility])        Chief Complaint for this Admission:  Seizure    Allergies:  Aricept [donepezil]; Benadryl [diphenhydramine hcl]; Ciprofloxacin; Codeine; Macrobid [nitrofurantoin monohyd/m-cryst]; Pcn [penicillins]; and Sulfa (sulfonamide antibiotics)    Prior to Admission Medications:   Prior to Admission Medications   Prescriptions Last Dose Informant Patient Reported? Taking?   acetaminophen (MAPAP) 500 mg cap 2017  Yes Yes   Sig: Take 1 Tab by mouth daily. albuterol (PROVENTIL VENTOLIN) 2.5 mg /3 mL (0.083 %) nebulizer solution 2017  Yes Yes   Si.5 mg by Nebulization route two (2) times daily as needed. budesonide (PULMICORT) 0.5 mg/2 mL nbsp 2017  Yes Yes   Si mcg by Nebulization route two (2) times a day. carBAMazepine (TEGRETOL) 200 mg tablet 2017  No Yes   Sig: Take 2 Tabs by mouth two (2) times a day. cetirizine (ZYRTEC) 10 mg tablet 2017  Yes Yes   Sig: Take 10 mg by mouth daily. cranberry extract-vitamin C (AZO CRANBERRY PLUS VIT C) 250-60 mg cap 2017  Yes Yes   Sig: Take 2 Tabs by mouth daily. levothyroxine (SYNTHROID) 100 mcg tablet 2017 at AM  Yes Yes   Sig: Take 100 mcg by mouth Daily (before breakfast). traZODone (DESYREL) 50 mg tablet   Yes Yes   Sig: Take 25 mg by mouth nightly as needed for Sleep. May repeat another 25 mg if no sleep in an hour      Facility-Administered Medications: None         Comments/Recommendations: Patient is unable to answer questions about her medications at this time. Medication list and last dose administered gathered from [facility]. Allergy list updated: added Benadryl, Aricept, Cipro, codeine, and Macrobid.   No reactions to these medications were able to be obtained from these allergies.     Added:  Cranberry extract  Zyrtec  Trazodone    Changed:  MAPAP every day  Albuterol is PRN    Matthew Campos)  PharmD Candidate [id number]

## 2017-08-28 NOTE — ROUTINE PROCESS
TRANSFER - OUT REPORT:    Verbal report given to CHRISTEN Stewart(name) on Johnnie Main  being transferred to [address] [place] [address] (unit) for routine progression of care       Report consisted of patients Situation, Background, Assessment and   Recommendations(SBAR). Information from the following report(s) SBAR, ED Summary and MAR was reviewed with the receiving nurse. Lines:   Peripheral IV 08/28/17 Left Antecubital (Active)   Site Assessment Clean, dry, & intact 8/28/2017  9:39 AM   Phlebitis Assessment 0 8/28/2017  9:39 AM   Infiltration Assessment 0 8/28/2017  9:39 AM   Dressing Status Clean, dry, & intact 8/28/2017  9:39 AM   Dressing Type Transparent 8/28/2017  9:39 AM        Opportunity for questions and clarification was provided.       Patient transported with:   O2 @ 2 liters

## 2017-08-28 NOTE — IP AVS SNAPSHOT
[address] [address] 
[phone] Patient: Uli Reyna MRN: [id number] :1935 You are allergic to the following Allergen Reactions Aricept (Donepezil) Unknown (comments) Benadryl (Diphenhydramine Hcl) Unknown (comments) Ciprofloxacin Unknown (comments) Codeine Unknown (comments) Macrobid (Nitrofurantoin Monohyd/M-Cryst) Unknown (comments) Pcn (Penicillins) Other (comments) Sulfa (Sulfonamide Antibiotics) Other (comments) Recent Documentation Height Weight BMI OB Status Smoking Status 1.575 m 55.2 kg 22.26 kg/m2 Postmenopausal Never Smoker Unresulted Labs Order Current Status CULTURE, URINE Preliminary result Emergency Contacts Name Discharge Info Relation Home Work Mobile Lynda Cerda [1] Daughter [21]   [phone] You Greene  Daughter [21] [phone] About your hospitalization You were admitted on:  2017 You last received care in the:  [facility] 6S NEURO-SCI TELE You were discharged on:  2017 Unit phone number:  [phone] Why you were hospitalized Your primary diagnosis was:  Acute Cystitis Your diagnoses also included:  Seizure (Hcc) Providers Seen During Your Hospitalizations Provider Role Specialty Primary office phone Evan Darden MD Attending Provider Emergency Medicine [phone] Ellyn Mcgrath MD Attending Provider Vero [phone] Garfield Haji MD Attending Provider Internal Medicine [phone] Your Primary Care Physician (PCP) Primary Care Physician Office Phone Office Fax OTHER, PHYS ** None ** ** None ** Follow-up Information Follow up With Details Comments Contact Info Your regular doctor Schedule an appointment as soon as possible for a visit in 3 days [facility] [place] EM[facility]GENCY DEP  If symptoms worsen [address] [place] [address] Jill [id number] [phone] Jacob Adams MD In 2 weeks neurology follow up Yang Rae  Current Discharge Medication List  
  
START taking these medications Dose & Instructions Dispensing Information Comments Morning Noon Evening Bedtime  
 l.acidoph-B.lactis-B.longum 460 mg (7.5-6- 1.5 bill. cell) Cap cap Commonly known as:  [id number] Your last dose was: Your next dose is:    
   
   
 Dose:  1 Cap Take 1 Cap by mouth Daily (before breakfast). Quantity:  7 Cap Refills:  0 CONTINUE these medications which have CHANGED Dose & Instructions Dispensing Information Comments Morning Noon Evening Bedtime * cefUROXime 250 mg tablet Commonly known as:  CEFTIN What changed:  Another medication with the same name was added. Make sure you understand how and when to take each. Your last dose was: Your next dose is:    
   
   
 Dose:  250 mg Take 1 Tab by mouth two (2) times a day for 7 days. Quantity:  14 Tab Refills:  0  
     
   
   
   
  
 * cefUROXime 250 mg tablet Commonly known as:  CEFTIN What changed: You were already taking a medication with the same name, and this prescription was added. Make sure you understand how and when to take each. Your last dose was: Your next dose is:    
   
   
 Dose:  250 mg Take 1 Tab by mouth two (2) times a day. Quantity:  10 Tab Refills:  0  
     
   
   
   
  
 * Notice: This list has 2 medication(s) that are the same as other medications prescribed for you. Read the directions carefully, and ask your doctor or other care provider to review them with you. CONTINUE these medications which have NOT CHANGED Dose & Instructions Dispensing Information Comments Morning Noon Evening Bedtime albuterol 2.5 mg /3 mL (0.083 %) nebulizer solution Commonly known as:  PROVENTIL VENTOLIN Your last dose was: Your next dose is:    
   
   
 Dose:  2.5 mg  
2.5 mg by Nebulization route two (2) times daily as needed. Refills:  0 AZO CRANBERRY PLUS VIT C 250-60 mg Cap Generic drug:  cranberry extract-vitamin C Your last dose was: Your next dose is:    
   
   
 Dose:  2 Tab Take 2 Tabs by mouth daily. Refills:  0  
     
   
   
   
  
 budesonide 0.5 mg/2 mL Nbsp Commonly known as:  PULMICORT Your last dose was: Your next dose is:    
   
   
 Dose:  500 mcg 500 mcg by Nebulization route two (2) times a day. Refills:  0  
     
   
   
   
  
 carBAMazepine 200 mg tablet Commonly known as:  TEGretol Your last dose was: Your next dose is:    
   
   
 Dose:  400 mg Take 2 Tabs by mouth two (2) times a day. Quantity:  60 Tab Refills:  0 Mapap 500 mg Cap Generic drug:  acetaminophen Your last dose was: Your next dose is:    
   
   
 Dose:  1 Tab Take 1 Tab by mouth daily. Refills:  0  
     
   
   
   
  
 SYNTHROID 100 mcg tablet Generic drug:  levothyroxine Your last dose was: Your next dose is:    
   
   
 Dose:  100 mcg Take 100 mcg by mouth Daily (before breakfast). Refills:  0  
     
   
   
   
  
 traZODone 50 mg tablet Commonly known as:  Mayi Homme Your last dose was: Your next dose is:    
   
   
 Dose:  25 mg Take 25 mg by mouth nightly as needed for Sleep. May repeat another 25 mg if no sleep in an hour Refills:  0 ZyrTEC 10 mg tablet Generic drug:  cetirizine Your last dose was: Your next dose is:    
   
   
 Dose:  10 mg Take 10 mg by mouth daily. Refills:  0 Where to Get Your Medications Information on where to get these meds will be given to you by the nurse or doctor. ! Ask your nurse or doctor about these medications  
  cefUROXime 250 mg tablet  
 cefUROXime 250 mg tablet  
 l.acidoph-B.lactis-B.longum 460 mg (7.5-6- 1.5 bill. cell) Cap cap Discharge Instructions Seizure: Care Instructions Your Care Instructions Seizures are caused by abnormal patterns of electrical signals in the brain. They are different for each person. Seizures can affect movement, speech, vision, or awareness. Some people have only slight shaking of a hand and do not pass out. Other people may pass out and have violent shaking of the whole body. Some people appear to stare into space. They are awake, but they can't respond normally. Later, they may not remember what happened. You may need tests to identify the type and cause of the seizures. A seizure may occur only once, or you may have them more than one time. Taking medicines as directed and following up with your doctor may help keep you from having more seizures. The doctor has checked you carefully, but problems can develop later. If you notice any problems or new symptoms, get medical treatment right away. Follow-up care is a key part of your treatment and safety. Be sure to make and go to all appointments, and call your doctor if you are having problems. It's also a good idea to know your test results and keep a list of the medicines you take. How can you care for yourself at home? · Be safe with medicines. Take your medicines exactly as prescribed. Call your doctor if you think you are having a problem with your medicine. · Do not do any activity that could be dangerous to you or others until your doctor says it is safe to do so. For example, do not drive a car, operate machinery, swim, or climb ladders.  
· Be sure that anyone treating you for any health problem knows that you have had a seizure and what medicines you are taking for it. · Identify and avoid things that may make you more likely to have a seizure. These may include lack of sleep, alcohol or drug use, stress, or not eating. · Make sure you go to your follow-up appointment. When should you call for help? Call 911 anytime you think you may need emergency care. For example, call if: 
· You have another seizure. · You have more than one seizure in 24 hours. · You have new symptoms, such as trouble walking, speaking, or thinking clearly. Call your doctor now or seek immediate medical care if: 
· You are not acting normally. Watch closely for changes in your health, and be sure to contact your doctor if you have any problems. Where can you learn more? Go to http://papa-juwan.info/. Enter E750 in the search box to learn more about \"Seizure: Care Instructions. \" Current as of: October 14, 2016 Content Version: 11.3 © 8610-2685 cWyze. Care instructions adapted under license by [employer] (which disclaims liability or warranty for this information). If you have questions about a medical condition or this instruction, always ask your healthcare professional. [address] [address] any warranty or liability for your use of this information. Urinary Tract Infection in Women: Care Instructions Your Care Instructions A urinary tract infection, or UTI, is a general term for an infection anywhere between the kidneys and the urethra (where urine comes out). Most UTIs are bladder infections. They often cause pain or burning when you urinate. UTIs are caused by bacteria and can be cured with antibiotics. Be sure to complete your treatment so that the infection goes away. Follow-up care is a key part of your treatment and safety.  Be sure to make and go to all appointments, and call your doctor if you are having problems. It's also a good idea to know your test results and keep a list of the medicines you take. How can you care for yourself at home? · Take your antibiotics as directed. Do not stop taking them just because you feel better. You need to take the full course of antibiotics. · Drink extra water and other fluids for the next day or two. This may help wash out the bacteria that are causing the infection. (If you have kidney, heart, or liver disease and have to limit fluids, talk with your doctor before you increase your fluid intake.) · Avoid drinks that are carbonated or have caffeine. They can irritate the bladder. · Urinate often. Try to empty your bladder each time. · To relieve pain, take a hot bath or lay a heating pad set on low over your lower belly or genital area. Never go to sleep with a heating pad in place. To prevent UTIs · Drink plenty of water each day. This helps you urinate often, which clears bacteria from your system. (If you have kidney, heart, or liver disease and have to limit fluids, talk with your doctor before you increase your fluid intake.) · Urinate when you need to. · Urinate right after you have sex. · Change sanitary pads often. · Avoid douches, bubble baths, feminine hygiene sprays, and other feminine hygiene products that have deodorants. · After going to the bathroom, wipe from front to back. When should you call for help? Call your doctor now or seek immediate medical care if: · Symptoms such as fever, chills, nausea, or vomiting get worse or appear for the first time. · You have new pain in your back just below your rib cage. This is called flank pain. · There is new blood or pus in your urine. · You have any problems with your antibiotic medicine. Watch closely for changes in your health, and be sure to contact your doctor if: 
· You are not getting better after taking an antibiotic for 2 days. · Your symptoms go away but then come back. Where can you learn more? Go to http://papa-juwan.info/. Enter X320 in the search box to learn more about \"Urinary Tract Infection in Women: Care Instructions. \" Current as of: November 28, 2016 Content Version: 11.3 © 6382-8394 Blued. Care instructions adapted under license by [employer] (which disclaims liability or warranty for this information). If you have questions about a medical condition or this instruction, always ask your healthcare professional. [address] [address] any warranty or liability for your use of this information. Discharge Instructions PATIENT ID: Kelly Flores MRN: [id number] YOB: 1935 DATE OF ADMISSION: 8/28/2017  9:11 AM   
DATE OF DISCHARGE: 8/30/2017 PRIMARY CARE PROVIDER: Shayne Ryan MD  
 
ATTENDING PHYSICIAN: Maralyn Prader, MD 
DISCHARGING PROVIDER: Maralyn Prader, MD   
To contact this individual call [phone] and ask the  to page. If unavailable ask to be transferred the Adult Hospitalist Department. DISCHARGE DIAGNOSES Breakthrough seizure UTI 
 
CONSULTATIONS: ED CONSULT TO SENIOR SERVICES PHYSICAL THERAPY 
ED CONSULT TO SENIOR SERVICES CASE MANAGEMENT 
IP CONSULT TO NEUROLOGY 
IP CONSULT TO HOSPITALIST 
 
PROCEDURES/SURGERIES: * No surgery found * PENDING TEST RESULTS:  
At the time of discharge the following test results are still pending: none FOLLOW UP APPOINTMENTS:  
Follow-up Information Follow up With Details Comments Contact Info Your regular doctor Schedule an appointment as soon as possible for a visit in 3 days [facility] EMERGENCY DEP  If symptoms worsen [address] [place] [id number] 
[phone] Shayne Ryan MD In 1 week  Patient can only remember the practice name and not the physician ADDITIONAL CARE RECOMMENDATIONS:  
Follow up with PMD in 1 week DIET: Cardiac Diet ACTIVITY: Activity as tolerated DISCHARGE MEDICATIONS: 
 See Medication Reconciliation Form · It is important that you take the medication exactly as they are prescribed. · Keep your medication in the bottles provided by the pharmacist and keep a list of the medication names, dosages, and times to be taken in your wallet. · Do not take other medications without consulting your doctor. NOTIFY YOUR PHYSICIAN FOR ANY OF THE FOLLOWING:  
Fever over 101 degrees for 24 hours. Chest pain, shortness of breath, fever, chills, nausea, vomiting, diarrhea, change in mentation, falling, weakness, bleeding. Severe pain or pain not relieved by medications. Or, any other signs or symptoms that you may have questions about. DISPOSITION: 
  Home With: 
 OT  PT  HH  RN  
  
x SNF/Inpatient Rehab/LTAC Independent/assisted living Hospice Other: CDMP Checked:  
Yes x PROBLEM LIST Updated: 
Yes x Signed:  
Natasha Moncada MD 
8/30/2017 
2:22 PM 
 
 
Discharge Orders Procedure Order Date Status Priority Quantity Spec Type Associated Dx REFERRAL TO PHYSICAL THERAPY 08/30/17 1725 Normal Routine 1 REFERRAL TO PHYSICAL THERAPY 08/30/17 1738 Normal Routine 1 [employer]) Announcement We are excited to announce that we are making your provider's discharge notes available to you in [employer]). You will see these notes when they are completed and signed by the physician that discharged you from your recent hospital stay. If you have any questions or concerns about any information you see in [employer]), please call the Health Information Department where you were seen or reach out to your Primary Care Provider for more information about your plan of care. Introducing [address]! Shaila Marroquin introduces [employer] (TM[employer]) patient portal. Now you can access parts of your medical record, email your doctor's office, and request medication refills online.    
 
1. In your internet browser, go to [email or web address] IT. [email or web address] 2. Click on the First Time User? Click Here link in the Sign In box. You will see the New Member Sign Up page. 3. Enter your deCarta Access Code exactly as it appears below. You will not need to use this code after youve completed the sign-up process. If you do not sign up before the expiration date, you must request a new code. · deCarta Access Code: [email or web address] Expires: 11/27/2017  2:20 PM 
 
4. Enter the last four digits of your Social Security Number (xxxx) and Date of Birth (mm/dd/yyyy) as indicated and click Submit. You will be taken to the next sign-up page. 5. Create a deCarta ID. This will be your deCarta login ID and cannot be changed, so think of one that is secure and easy to remember. 6. Create a deCarta password. You can change your password at any time. 7. Enter your Password Reset Question and Answer. This can be used at a later time if you forget your password. 8. Enter your e-mail address. You will receive e-mail notification when new information is available in [address] 9. Click Sign Up. You can now view and download portions of your medical record. 10. Click the Download Summary menu link to download a portable copy of your medical information. If you have questions, please visit the Frequently Asked Questions section of the deCarta website. Remember, deCarta is NOT to be used for urgent needs. For medical emergencies, dial 911. Now available from your iPhone and Android! General Information Please provide this summary of care documentation to your next provider. Patient Signature:  ____________________________________________________________ Date:  ____________________________________________________________  
  
Adkins Livings Provider Signature:  ____________________________________________________________ Date:  ____________________________________________________________

## 2017-08-28 NOTE — SENIOR SERVICES NOTE
80year old female states she's not sure why she is here at the ER. States that she lives alone and her sisters check on her often. States that we will need to transport her back home the same way we brought her here. EMS paperwork has that [facility] is from [facility] [address] [place] [address]. I spoke Jonna Duncan at the facility and she reports that the patient was standing at the sink but unable to follow simple commands, shaking all over with eyes fixed. Jonna Duncan reports that patient has been taking medications, eats well (actually orders what she wants to eat), walks with a rollator. Page reports that the patient is normally continent of bowel and bladder, dresses herself. Likes to pray and read. Pt can return to [address] if medical work up is negative.

## 2017-08-29 LAB
ALBUMIN SERPL-MCNC: 2.7 G/DL (ref 3.5–5)
ALBUMIN/GLOB SERPL: 0.9 {RATIO} (ref 1.1–2.2)
ALP SERPL-CCNC: 96 U/L (ref 45–117)
ALT SERPL-CCNC: 11 U/L (ref 12–78)
ANION GAP SERPL CALC-SCNC: 6 MMOL/L (ref 5–15)
AST SERPL-CCNC: 5 U/L (ref 15–37)
BASOPHILS # BLD: 0 K/UL (ref 0–0.1)
BASOPHILS NFR BLD: 0 % (ref 0–1)
BILIRUB SERPL-MCNC: 0.2 MG/DL (ref 0.2–1)
BUN SERPL-MCNC: 18 MG/DL (ref 6–20)
BUN/CREAT SERPL: 25 (ref 12–20)
CALCIUM SERPL-MCNC: 8.4 MG/DL (ref 8.5–10.1)
CHLORIDE SERPL-SCNC: 110 MMOL/L (ref 97–108)
CO2 SERPL-SCNC: 29 MMOL/L (ref 21–32)
CREAT SERPL-MCNC: 0.72 MG/DL (ref 0.55–1.02)
DIFFERENTIAL METHOD BLD: ABNORMAL
EOSINOPHIL # BLD: 0.2 K/UL (ref 0–0.4)
EOSINOPHIL NFR BLD: 3 % (ref 0–7)
ERYTHROCYTE [DISTWIDTH] IN BLOOD BY AUTOMATED COUNT: 13.1 % (ref 11.5–14.5)
GLOBULIN SER CALC-MCNC: 2.9 G/DL (ref 2–4)
GLUCOSE SERPL-MCNC: 83 MG/DL (ref 65–100)
HCT VFR BLD AUTO: 35.8 % (ref 35–47)
HGB BLD-MCNC: 11.5 G/DL (ref 11.5–16)
LYMPHOCYTES # BLD: 1.8 K/UL (ref 0.8–3.5)
LYMPHOCYTES NFR BLD: 26 % (ref 12–49)
MAGNESIUM SERPL-MCNC: 2.3 MG/DL (ref 1.6–2.4)
MCH RBC QN AUTO: 33.6 PG (ref 26–34)
MCHC RBC AUTO-ENTMCNC: 32.1 G/DL (ref 30–36.5)
MCV RBC AUTO: 104.7 FL (ref 80–99)
MONOCYTES # BLD: 0.6 K/UL (ref 0–1)
MONOCYTES NFR BLD: 9 % (ref 5–13)
NEUTS SEG # BLD: 4.5 K/UL (ref 1.8–8)
NEUTS SEG NFR BLD: 62 % (ref 32–75)
PHOSPHATE SERPL-MCNC: 3.3 MG/DL (ref 2.6–4.7)
PLATELET # BLD AUTO: 207 K/UL (ref 150–400)
POTASSIUM SERPL-SCNC: 3.6 MMOL/L (ref 3.5–5.1)
PROT SERPL-MCNC: 5.6 G/DL (ref 6.4–8.2)
RBC # BLD AUTO: 3.42 M/UL (ref 3.8–5.2)
RBC MORPH BLD: ABNORMAL
RBC MORPH BLD: ABNORMAL
SODIUM SERPL-SCNC: 145 MMOL/L (ref 136–145)
TSH SERPL DL<=0.05 MIU/L-ACNC: 1.36 UIU/ML (ref 0.36–3.74)
WBC # BLD AUTO: 7.1 K/UL (ref 3.6–11)
WBC MORPH BLD: ABNORMAL

## 2017-08-29 PROCEDURE — 83735 ASSAY OF MAGNESIUM: CPT | Performed by: FAMILY MEDICINE

## 2017-08-29 PROCEDURE — 85025 COMPLETE CBC W/AUTO DIFF WBC: CPT | Performed by: FAMILY MEDICINE

## 2017-08-29 PROCEDURE — 74011250636 HC RX REV CODE- 250/636: Performed by: FAMILY MEDICINE

## 2017-08-29 PROCEDURE — 74011000258 HC RX REV CODE- 258: Performed by: FAMILY MEDICINE

## 2017-08-29 PROCEDURE — 99218 HC RM OBSERVATION: CPT

## 2017-08-29 PROCEDURE — 80053 COMPREHEN METABOLIC PANEL: CPT | Performed by: FAMILY MEDICINE

## 2017-08-29 PROCEDURE — 36415 COLL VENOUS BLD VENIPUNCTURE: CPT | Performed by: FAMILY MEDICINE

## 2017-08-29 PROCEDURE — 74011250637 HC RX REV CODE- 250/637: Performed by: FAMILY MEDICINE

## 2017-08-29 PROCEDURE — 77010033678 HC OXYGEN DAILY

## 2017-08-29 PROCEDURE — 84100 ASSAY OF PHOSPHORUS: CPT | Performed by: FAMILY MEDICINE

## 2017-08-29 PROCEDURE — 94640 AIRWAY INHALATION TREATMENT: CPT

## 2017-08-29 PROCEDURE — 74011000250 HC RX REV CODE- 250: Performed by: FAMILY MEDICINE

## 2017-08-29 PROCEDURE — 94664 DEMO&/EVAL PT USE INHALER: CPT

## 2017-08-29 PROCEDURE — 84443 ASSAY THYROID STIM HORMONE: CPT | Performed by: FAMILY MEDICINE

## 2017-08-29 RX ADMIN — CARBAMAZEPINE 400 MG: 200 TABLET ORAL at 20:59

## 2017-08-29 RX ADMIN — HEPARIN SODIUM 5000 UNITS: 5000 INJECTION, SOLUTION INTRAVENOUS; SUBCUTANEOUS at 15:39

## 2017-08-29 RX ADMIN — HEPARIN SODIUM 5000 UNITS: 5000 INJECTION, SOLUTION INTRAVENOUS; SUBCUTANEOUS at 22:50

## 2017-08-29 RX ADMIN — Medication 10 ML: at 21:00

## 2017-08-29 RX ADMIN — CARBAMAZEPINE 400 MG: 200 TABLET ORAL at 09:30

## 2017-08-29 RX ADMIN — HEPARIN SODIUM 5000 UNITS: 5000 INJECTION, SOLUTION INTRAVENOUS; SUBCUTANEOUS at 07:17

## 2017-08-29 RX ADMIN — BUDESONIDE 500 MCG: 0.5 INHALANT RESPIRATORY (INHALATION) at 21:00

## 2017-08-29 RX ADMIN — LEVOTHYROXINE SODIUM 100 MCG: 100 TABLET ORAL at 07:17

## 2017-08-29 RX ADMIN — CEFTRIAXONE 1 G: 1 INJECTION, POWDER, FOR SOLUTION INTRAMUSCULAR; INTRAVENOUS at 13:22

## 2017-08-29 RX ADMIN — Medication 10 ML: at 07:17

## 2017-08-29 RX ADMIN — BUDESONIDE 500 MCG: 0.5 INHALANT RESPIRATORY (INHALATION) at 08:02

## 2017-08-29 RX ADMIN — Medication 10 ML: at 13:23

## 2017-08-29 NOTE — PROGRESS NOTES
Rounded on Taoism patients and provided Anointing of the Sick at request of patient    Fr. Terrance Yoder.

## 2017-08-29 NOTE — ACP (ADVANCE CARE PLANNING)
Follow up visit with Ms. Wallace Gama to assess for AMD consult. Met briefly with Ms. Wallace Gama who complained of not having socks. She announced that she was [employer]. Assured her of availability of a  visit later today. Reviewed Ms. Mares Kidney condition with Nurse Tiffany Ulloa who determined that Ms. Wallace Gama does not have the capacity to complete an AMD.  Will be unable to complete the document at this time.     Chaz Langley  [facility]  ([facility] Patient Care Specialist)   Paging Service [facility]([phone])

## 2017-08-29 NOTE — PROGRESS NOTES
Hospitalist Progress Note  Severiano Dole, MD  Office: [phone]  Cell: [phone]      Date of Service:  2017  NAME:  Charlene Lagunas  :  1935  MRN:  [id number]      Admission Summary:   80 y.o. female who presents with seizure. She has the PMH of  COPD, hypothyroidism,  seizure who was sent here from [facility] after witness seizure    Interval history / Subjective:     F/u Seizure     Assessment & Plan:     Seizure, breakthrough  -sec to ? UTI  -Appreciate Discussion with Neurology. The patient can be discharged from neurology standpoint. Will get EEG  -Will get Carbamazepine levels    UTI  -Urine culture shows GNRs  -On Ceftriaxone  -Awaiting final culture results    COPD  -stable    Hypothyroidism  -on Synthroid  -Stable    Regular diet    Code status: FULL CODE  DVT prophylaxis: scd  [facility]A: [facility]    Plan: Likely discharge tomorrow    Care Plan discussed with: Patient/Family  Disposition: SNF/LTC     Hospital Problems  Date Reviewed: 2017          Codes Class Noted POA    * (Principal)Acute cystitis ICD-10-CM: N30.00  ICD-9-CM: 595.0  2017 Yes        Seizure ([facility] [address] (Chronic) ICD-10-CM: R56.9  ICD-9-CM: 780.39  3/28/2017 Yes                Review of Systems:   A comprehensive review of systems was negative except for that written in the HPI. Vital Signs:    Last 24hrs VS reviewed since prior progress note. Most recent are:  Visit Vitals    /59    Pulse 74    Temp 98.9 °F (37.2 °C)    Resp 21    Ht 5' 2\" (1.575 m)    Wt 55.1 kg (121 lb 8 oz)    SpO2 96%    BMI 22.22 kg/m2       No intake or output data in the 24 hours ending 17 1424     Physical Examination:             Constitutional:  No acute distress, cooperative, pleasant    ENT:  Oral mucous moist, oropharynx benign. Neck supple,    Resp:  CTA bilaterally. No wheezing/rhonchi/rales.  No accessory muscle use   CV:  Regular rhythm, normal rate, no murmurs, gallops, rubs    GI:  Soft, non distended, non tender. normoactive bowel sounds, no hepatosplenomegaly     Musculoskeletal:  No edema, warm, 2+ pulses throughout    Neurologic:  Moves all extremities. AAOx3, CN II-XII reviewed     Skin:  Good turgor, no rashes or ulcers       Data Review:    Review and/or order of clinical lab test      Labs:     Recent Labs      08/29/17 0409 08/28/17   0926   WBC  7.1  7.3   HGB  11.5  13.7   HCT  35.8  42.0   PLT  207  235     Recent Labs      08/29/17   0409  08/28/17   0926   NA  145  143   K  3.6  3.7   CL  110*  108   CO2  29  30   BUN  18  18   CREA  0.72  0.69   GLU  83  109*   CA  8.4*  9.1   MG  2.3  2.2   PHOS  3.3   --      Recent Labs      08/29/17 0409 08/28/17   0926   SGOT  5*  9*   ALT  11*  12   AP  96  115   TBILI  0.2  0.3   TP  5.6*  7.0   ALB  2.7*  3.5   GLOB  2.9  3.5     No results for input(s): INR, PTP, APTT in the last 72 hours. No lab exists for component: INREXT   No results for input(s): FE, TIBC, PSAT, FERR in the last 72 hours. No results found for: FOL, RBCF   No results for input(s): PH, PCO2, PO2 in the last 72 hours. No results for input(s): CPK, CKNDX, TROIQ in the last 72 hours.     No lab exists for component: CPKMB  No results found for: CHOL, CHOLX, CHLST, CHOLV, HDL, LDL, LDLC, DLDLP, TGLX, TRIGL, TRIGP, CHHD, CHHDX  Lab Results   Component Value Date/Time    Glucose (POC) 117 03/10/2017 07:36 PM     Lab Results   Component Value Date/Time    Color YELLOW/STRAW 08/28/2017 09:26 AM    Appearance CLOUDY 08/28/2017 09:26 AM    Specific gravity 1.020 08/28/2017 09:26 AM    pH (UA) 6.5 08/28/2017 09:26 AM    Protein NEGATIVE  08/28/2017 09:26 AM    Glucose NEGATIVE  08/28/2017 09:26 AM    Ketone NEGATIVE  08/28/2017 09:26 AM    Bilirubin NEGATIVE  08/28/2017 09:26 AM    Urobilinogen 0.2 08/28/2017 09:26 AM    Nitrites POSITIVE 08/28/2017 09:26 AM    Leukocyte Esterase SMALL 08/28/2017 09:26 AM    Epithelial cells MODERATE 08/28/2017 09:26 AM    Bacteria 3+ 08/28/2017 09:26 AM    WBC 5-10 08/28/2017 09:26 AM    RBC 0-5 08/28/2017 09:26 AM         Medications Reviewed:     Current Facility-Administered Medications   Medication Dose Route Frequency    albuterol (PROVENTIL VENTOLIN) nebulizer solution 2.5 mg  2.5 mg Nebulization Q4H PRN    budesonide (PULMICORT) 500 mcg/2 ml nebulizer suspension  500 mcg Nebulization BID RT    carBAMazepine (TEGretol) tablet 400 mg  400 mg Oral BID    levothyroxine (SYNTHROID) tablet 100 mcg  100 mcg Oral ACB    traZODone (DESYREL) tablet 25 mg  25 mg Oral QHS PRN    sodium chloride (NS) flush 5-10 mL  5-10 mL IntraVENous Q8H    sodium chloride (NS) flush 5-10 mL  5-10 mL IntraVENous PRN    LORazepam (ATIVAN) injection 1 mg  1 mg IntraVENous Q1H PRN    acetaminophen (TYLENOL) tablet 650 mg  650 mg Oral Q4H PRN    magnesium hydroxide (MILK OF MAGNESIA) 400 mg/5 mL oral suspension 30 mL  30 mL Oral DAILY PRN    heparin (porcine) injection 5,000 Units  5,000 Units SubCUTAneous Q8H    cefTRIAXone (ROCEPHIN) 1 g in 0.9% sodium chloride (MBP/ADV) 50 mL  1 g IntraVENous Q24H     ______________________________________________________________________  EXPECTED LENGTH OF STAY: - - -  ACTUAL LENGTH OF STAY:          1                 Alivia Clifford MD

## 2017-08-29 NOTE — PROGRESS NOTES
Patient was more confused this morning. Patient's mental status has improved as the day has progressed. Patient is alert x3 instead of x2. Patient appears to now be alert to place.

## 2017-08-29 NOTE — PROGRESS NOTES
Spoke with patient's daughter Aidee Meyers at [phone] who consents to have patient's records sent back to [address] [place] [address] of [facility]. Referral has been made via allscripts. Mrs. Rodrigue Lutz plans to be in patient's room this afternoon. Provided contact information, name, and explained CM role. Helena Zaidi RN     Update:  2:10pm 8/29. Met with daughter Jaylin Foote who is prepared to transport patient back to [address] [place] [address] in family vehicle. At daughter's request:  Demographics to be updated as follows-    Evangelina Sai   [phone]    Aidee Meyers   [phone]    Both daughters remain supportive. Education provided on observation stay.     Helena Zaidi RN

## 2017-08-29 NOTE — PROGRESS NOTES
Follow up visit with Ms. Ella Kong to assess for AMD consult. Met briefly with Ms. Ella Kong who complained of not having socks. She announced that she was [address]. Assured her of availability of a  visit later today. Reviewed Ms. Yrn Colunga condition with Nurse Jose Flores who determined that Ms. Ella Kong does not have the capacity to complete an AMD.  Will be unable to complete the document at this time. [facility] Staff  ([address] Patient Care Specialist)   Paging Service [phone])

## 2017-08-29 NOTE — CONSULTS
NEUROLOGY CONSULT NOTE    Name Nette Elder Age 80 y.o. MRN [id number]  1935     Consulting Physician: Leny Manzano MD      Chief Complaint:  Seizure     Assessment:     · Principal Problem:  ·   Acute cystitis (2017)  ·   · Active Problems:  ·   Seizure ([address] (3/28/2017)  ·   ·   80year old RHF h/o seizure d/o on CBM, COPD, hypothyroidism, dementia presenting from [facility] with witnessed GTC seizure activity x 2 likely provoked by recent infectious processes/UTI. CT head without acute ischemia or hemorrhage. No focal deficits on examination today other than disorientation to date (baseline dementia). Recommendations:   Continue CBM 400mg BID for seizure ppx  Seizure precautions  Ok for discharge back to facility from Neuro perspective  Please call if further questions/concerns    Thank you very much for this referral. I appreciate the opportunity to participate in this patient's care. History of Present Illness: This is a 80 y.o.  right handed  female, we were asked to see for seizure. PMH notable for seizure d/o, COPD, hypothyroidism, dementia. Pt is admitted from [facility] with witnessed GTC seizure activity while dining at her facility. She had returned to her baseline in ED. Labs remarkable for UTI. She was in the process of being discharged but experienced another GTC seizure lasting 5 min per records. Pt does not recall events. She is presently back to her baseline. She has baseline dementia and is unable to give further details pertaining to her seizure disorder. AEDs include CBM 400mg BID. Denies FHx seizures, febrile seizures, significant head injury in the past.  Head CT on admission did not reveal acute ischemia or hemorrhage. CBM level 4.0.        Allergies   Allergen Reactions    Aricept [Donepezil] Unknown (comments)    Benadryl [Diphenhydramine Hcl] Unknown (comments)    Ciprofloxacin Unknown (comments)    Codeine Unknown (comments)    Macrobid [Nitrofurantoin Monohyd/M-Cryst] Unknown (comments)    Pcn [Penicillins] Other (comments)    Sulfa (Sulfonamide Antibiotics) Other (comments)        Prior to Admission medications    Medication Sig Start Date End Date Taking? Authorizing Provider   cefUROXime (CEFTIN) 250 mg tablet Take 1 Tab by mouth two (2) times a day for 7 days. 8/28/17 9/4/17 Yes Jairo Hector MD   cranberry extract-vitamin C (AZO CRANBERRY PLUS VIT C) 250-60 mg cap Take 2 Tabs by mouth daily. Yes Historical Provider   cetirizine (ZYRTEC) 10 mg tablet Take 10 mg by mouth daily. Yes Historical Provider   traZODone (DESYREL) 50 mg tablet Take 25 mg by mouth nightly as needed for Sleep. May repeat another 25 mg if no sleep in an hour   Yes Historical Provider   carBAMazepine (TEGRETOL) 200 mg tablet Take 2 Tabs by mouth two (2) times a day. 3/31/17  Yes Stephanie Cai MD   levothyroxine (SYNTHROID) 100 mcg tablet Take 100 mcg by mouth Daily (before breakfast). Yes Shayne Ryan MD   acetaminophen (MAPAP) 500 mg cap Take 1 Tab by mouth daily. Yes Shayne Ryan MD   albuterol (PROVENTIL VENTOLIN) 2.5 mg /3 mL (0.083 %) nebulizer solution 2.5 mg by Nebulization route two (2) times daily as needed. Yes Historical Provider   budesonide (PULMICORT) 0.5 mg/2 mL nbsp 500 mcg by Nebulization route two (2) times a day. Yes Historical Provider       Past Medical History:   Diagnosis Date    COPD (chronic obstructive [facility] disease) [facility]     Dementia 3/29/2017    Hypothyroid     Seizure ([facility]         Past Surgical History:   Procedure Laterality Date    HX HIP REPLACEMENT          Social History   Substance Use Topics    Smoking status: Never Smoker    Smokeless tobacco: Never Used    Alcohol use No        History reviewed. No pertinent family history. Review of Systems:   Comprehensive review of systems performed and negative except for as listed above.      Exam:     Visit Vitals    /59    Pulse 74  Temp 98.9 °F (37.2 °C)    Resp 21    Ht 5' 2\" (1.575 m)    Wt 55.1 kg (121 lb 8 oz)    SpO2 96%    BMI 22.22 kg/m2        General: Well developed, well nourished. Patient in no apparent distress   Head: Normocephalic, atraumatic, anicteric sclera   Lungs:  Clear to auscultation bilaterally, No wheezes or rubs   Cardiac: Regular rate and rhythm with no murmurs. Abd: Bowel sounds were audible. No tenderness on palpation   Ext: No pedal edema   Skin: No overt signs of rash     Neurological Exam:  Mental Status: Alert and oriented to person place, not date   Speech: Fluent no aphasia or dysarthria. Cranial Nerves:   Intact visual fields. Facial sensation is normal. Facial movement is symmetric. Palate is midline. Normal sternocleidomastoid strength. Tongue is midline. Hearing is intact bilaterally. Eyes: PERRL, EOM's full, no nystagmus, no ptosis. Motor:  Full and symmetric strength of upper and lower proximal and distal muscles. Normal bulk and tone. Reflexes:   Deep tendon reflexes 2+/4 and symmetrical.  Plantar response is downgoing b/l. Sensory:   Symmetrically intact  with no perceived deficits modalities involving small or large fibers. Gait:  Gait is deferred   Tremor:   No tremor noted. Cerebellar:  Finger to nose and heel over shin to knee was demonstrated competently. Neurovascular: No carotid bruits. No JVD       Imaging  CT Results (maximum last 3): Results from [place] encounter on 08/28/17   CT HEAD WO CONT   Narrative EXAM:  CT HEAD WITHOUT CONTRAST  INDICATION: Seizure. COMPARISON: 3/28/2017. CONTRAST: None. TECHNIQUE: Unenhanced CT of the head was performed using 5 mm images. Brain and  bone windows were generated. Sagittal and coronal reformations were generated. CT dose reduction was achieved through use of a standardized protocol tailored  for this examination and automatic exposure control for dose modulation.  CT dose  reduction was achieved through use of a standardized protocol tailored for this  examination and automatic exposure control for dose modulation. Adaptive  statistical iterative reconstruction (ASIR) was utilized for this examination. FINDINGS:  The ventricles and sulci are enlarged in a generalized fashion consistent with  volume loss. There is no significant white matter disease. There is no  intracranial hemorrhage. There is no extra-axial collection, mass, mass effect  or midline shift. The basilar cisterns are open. No acute infarct is  identified. The bone windows demonstrate no abnormalities. The visualized  portions of the paranasal sinuses and mastoid air cells are clear. Impression IMPRESSION: No acute intracranial abnormality. Age-related volume loss  unchanged. MRI Results (maximum last 3): No results found for this or any previous visit. Lab Review  Lab Results   Component Value Date/Time    WBC 7.1 08/29/2017 04:09 AM    HCT 35.8 08/29/2017 04:09 AM    HGB 11.5 08/29/2017 04:09 AM    PLATELET 408 36/79/8243 04:09 AM     Lab Results   Component Value Date/Time    Sodium 145 08/29/2017 04:09 AM    Potassium 3.6 08/29/2017 04:09 AM    Chloride 110 08/29/2017 04:09 AM    CO2 29 08/29/2017 04:09 AM    Glucose 83 08/29/2017 04:09 AM    BUN 18 08/29/2017 04:09 AM    Creatinine 0.72 08/29/2017 04:09 AM    Calcium 8.4 08/29/2017 04:09 AM     No components found for: TROPQUANT  No results found for: NICOLAS    Signed:  Vargas Jacob.  Liam Cope DO  8/29/2017  2:16 PM

## 2017-08-29 NOTE — PROGRESS NOTES
Bedside shift change report given to Rossy (oncoming nurse) by Anastasiia Adams (offgoing nurse). Report included the following information SBAR, Kardex, MAR, Accordion, Recent Results and Med Rec Status.

## 2017-08-30 VITALS
OXYGEN SATURATION: 96 % | HEART RATE: 74 BPM | BODY MASS INDEX: 22.39 KG/M2 | DIASTOLIC BLOOD PRESSURE: 65 MMHG | RESPIRATION RATE: 21 BRPM | WEIGHT: 121.69 LBS | HEIGHT: 62 IN | SYSTOLIC BLOOD PRESSURE: 140 MMHG | TEMPERATURE: 98.2 F

## 2017-08-30 LAB
CARBAMAZEPINE SERPL-MCNC: 13.4 UG/ML (ref 4–12)
CARBAMAZEPINE SERPL-MCNC: 13.5 UG/ML (ref 4–12)

## 2017-08-30 PROCEDURE — 65660000000 HC RM CCU STEPDOWN

## 2017-08-30 PROCEDURE — 74011000250 HC RX REV CODE- 250: Performed by: FAMILY MEDICINE

## 2017-08-30 PROCEDURE — 80156 ASSAY CARBAMAZEPINE TOTAL: CPT | Performed by: HOSPITALIST

## 2017-08-30 PROCEDURE — 74011250637 HC RX REV CODE- 250/637: Performed by: FAMILY MEDICINE

## 2017-08-30 PROCEDURE — 99218 HC RM OBSERVATION: CPT

## 2017-08-30 PROCEDURE — 74011000258 HC RX REV CODE- 258: Performed by: FAMILY MEDICINE

## 2017-08-30 PROCEDURE — 36415 COLL VENOUS BLD VENIPUNCTURE: CPT | Performed by: HOSPITALIST

## 2017-08-30 PROCEDURE — 94640 AIRWAY INHALATION TREATMENT: CPT

## 2017-08-30 PROCEDURE — 74011250636 HC RX REV CODE- 250/636: Performed by: FAMILY MEDICINE

## 2017-08-30 RX ORDER — CEFUROXIME AXETIL 250 MG/1
250 TABLET ORAL 2 TIMES DAILY
Qty: 10 TAB | Refills: 0 | Status: SHIPPED | OUTPATIENT
Start: 2017-08-30 | End: 2017-11-29

## 2017-08-30 RX ADMIN — HEPARIN SODIUM 5000 UNITS: 5000 INJECTION, SOLUTION INTRAVENOUS; SUBCUTANEOUS at 06:54

## 2017-08-30 RX ADMIN — LEVOTHYROXINE SODIUM 100 MCG: 100 TABLET ORAL at 06:54

## 2017-08-30 RX ADMIN — Medication 10 ML: at 06:55

## 2017-08-30 RX ADMIN — CEFTRIAXONE 1 G: 1 INJECTION, POWDER, FOR SOLUTION INTRAMUSCULAR; INTRAVENOUS at 12:59

## 2017-08-30 RX ADMIN — CARBAMAZEPINE 400 MG: 200 TABLET ORAL at 09:44

## 2017-08-30 RX ADMIN — BUDESONIDE 500 MCG: 0.5 INHALANT RESPIRATORY (INHALATION) at 07:44

## 2017-08-30 RX ADMIN — Medication 10 ML: at 13:00

## 2017-08-30 RX ADMIN — HEPARIN SODIUM 5000 UNITS: 5000 INJECTION, SOLUTION INTRAVENOUS; SUBCUTANEOUS at 16:00

## 2017-08-30 NOTE — PROGRESS NOTES
Hospitalist Progress Note  Amirah Bradford MD  Office: [phone]  Cell: [phone]      Date of Service:  2017  NAME:  Christine Enamorado  :  1935  MRN:  [id number]      Admission Summary:   80 y.o. female who presents with seizure. She has the PMH of  COPD, hypothyroidism,  seizure who was sent here from [facility] after witness seizure    Interval history / Subjective:     F/u Seizure     Assessment & Plan:     Seizure, breakthrough  -sec to ? UTI  -Appreciate Discussion with Neurology. The patient can be discharged from neurology standpoint. Will get EEG  -Carbamazepine level jumped from 4 () to 13.4 (). Neurology informed, repeat level later and if that is stable will discharge the patient    E coli UTI  -On Ceftriaxone  -Will switch the antibiotic to Ceftin at discharge    COPD  -stable    Hypothyroidism  -on Synthroid  -Stable    Regular diet    Code status: FULL CODE  DVT prophylaxis: scd  PTA: [facility]    Plan: Likely discharge today    Care Plan discussed with: Patient/Family  Disposition: SNF/LTC Changed the patient status from obs to inpatient on Dr FIERRO [facility] Problems  Date Reviewed: 2017          Codes Class Noted POA    * (Principal)Acute cystitis ICD-10-CM: N30.00  ICD-9-CM: 595.0  2017 Yes        Seizure ([address] (Chronic) ICD-10-CM: R56.9  ICD-9-CM: 780.39  3/28/2017 Yes                Review of Systems:   A comprehensive review of systems was negative except for that written in the HPI. Vital Signs:    Last 24hrs VS reviewed since prior progress note.  Most recent are:  Visit Vitals    /64 (BP 1 Location: Left arm, BP Patient Position: At rest)    Pulse 80    Temp 98.2 °F (36.8 °C)    Resp 21    Ht 5' 2\" (1.575 m)    Wt 55.2 kg (121 lb 11.1 oz)    SpO2 98%    BMI 22.26 kg/m2       No intake or output data in the 24 hours ending 17 5029     Physical Examination: Constitutional:  No acute distress, cooperative, pleasant    ENT:  Oral mucous moist, oropharynx benign. Neck supple,    Resp:  CTA bilaterally. No wheezing/rhonchi/rales. No accessory muscle use   CV:  Regular rhythm, normal rate, no murmurs, gallops, rubs    GI:  Soft, non distended, non tender. normoactive bowel sounds, no hepatosplenomegaly     Musculoskeletal:  No edema, warm, 2+ pulses throughout    Neurologic:  Moves all extremities. AAOx3, CN II-XII reviewed     Skin:  Good turgor, no rashes or ulcers       Data Review:    Review and/or order of clinical lab test      Labs:     Recent Labs      08/29/17 0409 08/28/17   0926   WBC  7.1  7.3   HGB  11.5  13.7   HCT  35.8  42.0   PLT  207  235     Recent Labs      08/29/17 0409 08/28/17   0926   NA  145  143   K  3.6  3.7   CL  110*  108   CO2  29  30   BUN  18  18   CREA  0.72  0.69   GLU  83  109*   CA  8.4*  9.1   MG  2.3  2.2   PHOS  3.3   --      Recent Labs      08/29/17 0409 08/28/17   0926   SGOT  5*  9*   ALT  11*  12   AP  96  115   TBILI  0.2  0.3   TP  5.6*  7.0   ALB  2.7*  3.5   GLOB  2.9  3.5     No results for input(s): INR, PTP, APTT in the last 72 hours. No lab exists for component: INREXT, INREXT   No results for input(s): FE, TIBC, PSAT, FERR in the last 72 hours. No results found for: FOL, RBCF   No results for input(s): PH, PCO2, PO2 in the last 72 hours. No results for input(s): CPK, CKNDX, TROIQ in the last 72 hours.     No lab exists for component: CPKMB  No results found for: CHOL, CHOLX, CHLST, CHOLV, HDL, LDL, LDLC, DLDLP, TGLX, TRIGL, TRIGP, CHHD, CHHDX  Lab Results   Component Value Date/Time    Glucose (POC) 117 03/10/2017 07:36 PM     Lab Results   Component Value Date/Time    Color YELLOW/STRAW 08/28/2017 09:26 AM    Appearance CLOUDY 08/28/2017 09:26 AM    Specific gravity 1.020 08/28/2017 09:26 AM    pH (UA) 6.5 08/28/2017 09:26 AM    Protein NEGATIVE  08/28/2017 09:26 AM    Glucose NEGATIVE 08/28/2017 09:26 AM    Ketone NEGATIVE  08/28/2017 09:26 AM    Bilirubin NEGATIVE  08/28/2017 09:26 AM    Urobilinogen 0.2 08/28/2017 09:26 AM    Nitrites POSITIVE 08/28/2017 09:26 AM    Leukocyte Esterase SMALL 08/28/2017 09:26 AM    Epithelial cells MODERATE 08/28/2017 09:26 AM    Bacteria 3+ 08/28/2017 09:26 AM    WBC 5-10 08/28/2017 09:26 AM    RBC 0-5 08/28/2017 09:26 AM         Medications Reviewed:     Current Facility-Administered Medications   Medication Dose Route Frequency    albuterol (PROVENTIL VENTOLIN) nebulizer solution 2.5 mg  2.5 mg Nebulization Q4H PRN    budesonide (PULMICORT) 500 mcg/2 ml nebulizer suspension  500 mcg Nebulization BID RT    carBAMazepine (TEGretol) tablet 400 mg  400 mg Oral BID    levothyroxine (SYNTHROID) tablet 100 mcg  100 mcg Oral ACB    traZODone (DESYREL) tablet 25 mg  25 mg Oral QHS PRN    sodium chloride (NS) flush 5-10 mL  5-10 mL IntraVENous Q8H    sodium chloride (NS) flush 5-10 mL  5-10 mL IntraVENous PRN    LORazepam (ATIVAN) injection 1 mg  1 mg IntraVENous Q1H PRN    acetaminophen (TYLENOL) tablet 650 mg  650 mg Oral Q4H PRN    magnesium hydroxide (MILK OF MAGNESIA) 400 mg/5 mL oral suspension 30 mL  30 mL Oral DAILY PRN    heparin (porcine) injection 5,000 Units  5,000 Units SubCUTAneous Q8H    cefTRIAXone (ROCEPHIN) 1 g in 0.9% sodium chloride (MBP/ADV) 50 mL  1 g IntraVENous Q24H     ______________________________________________________________________  EXPECTED LENGTH OF STAY: - - -  ACTUAL LENGTH OF STAY:          1                 Armaan Lofton MD

## 2017-08-30 NOTE — PROGRESS NOTES
Discharge today per hospitalist, no changes in seizure medications with neurology clearance. Daughter to transport patient back to [facility] ([facility]). 1800: I have reviewed discharge instructions with the patient and daughter. The patient and daughter verbalized understanding.

## 2017-08-30 NOTE — DISCHARGE SUMMARY
Discharge Summary       PATIENT ID: Charlene Lagunas  MRN: [id number]   YOB: 1935    DATE OF ADMISSION: 8/28/2017  9:11 AM    DATE OF DISCHARGE: 8/30/2017   PRIMARY CARE PROVIDER: Janet Adams MD     ATTENDING PHYSICIAN: Dr Severiano Dole  DISCHARGING PROVIDER: Severiano Dole, MD    To contact this individual call [phone] and ask the  to page. If unavailable ask to be transferred the Adult Hospitalist Department. CONSULTATIONS: ED CONSULT TO SENIOR SERVICES PHYSICAL THERAPY  ED CONSULT TO SENIOR SERVICES CASE MANAGEMENT  IP CONSULT TO NEUROLOGY  IP CONSULT TO HOSPITALIST    PROCEDURES/SURGERIES: * No surgery found *    ADMITTING [address] [place] [address] COURSE:   Seizure, breakthrough  -sec to ? UTI  -Appreciate Discussion with Neurology. The patient can be discharged from neurology standpoint. Will get EEG  -Carbamazepine level jumped from 4 (8/28) to 13.4 (8/30). Neurology informed, repeat level later and if that is stable will discharge the patient    E coli UTI  -On Ceftriaxone  -Will switch the antibiotic to Ceftin at discharge    COPD  -stable    Hypothyroidism  -on Synthroid  -Stable    Regular diet    Code status: FULL CODE  DVT prophylaxis: scd  [facility]A: [facility]    Plan: Likely discharge today        DISCHARGE DIAGNOSES / PLAN:      1. Breakthrough seizure  2.  UTI       PENDING TEST RESULTS:   At the time of discharge the following test results are still pending: none    FOLLOW UP APPOINTMENTS:    Follow-up Information     Follow up With Details Comments Contact Info    Your regular doctor Schedule an appointment as soon as possible for a visit in 3 days      [facility] EMERGENCY DEP  If symptoms worsen [address]  [phone]    Shayne Ryan MD In 1 week  Patient can only remember the practice name and not the physician             ADDITIONAL CARE RECOMMENDATIONS:   Follow up with PMD in 1 week    DIET: Cardiac Diet    ACTIVITY: Activity as tolerated      DISCHARGE MEDICATIONS:  Current Discharge Medication List      START taking these medications    Details   l.acidoph-B.lactis-B.longum (FLORAJEN3) 460 mg (7.5-6- 1.5 bill. cell) cap cap Take 1 Cap by mouth Daily (before breakfast). Qty: 7 Cap, Refills: 0         CONTINUE these medications which have CHANGED    Details   !! cefUROXime (CEFTIN) 250 mg tablet Take 1 Tab by mouth two (2) times a day. Qty: 10 Tab, Refills: 0      !! cefUROXime (CEFTIN) 250 mg tablet Take 1 Tab by mouth two (2) times a day for 7 days. Qty: 14 Tab, Refills: 0       !! - Potential duplicate medications found. Please discuss with provider. CONTINUE these medications which have NOT CHANGED    Details   cranberry extract-vitamin C (AZO CRANBERRY PLUS VIT C) 250-60 mg cap Take 2 Tabs by mouth daily. cetirizine (ZYRTEC) 10 mg tablet Take 10 mg by mouth daily. traZODone (DESYREL) 50 mg tablet Take 25 mg by mouth nightly as needed for Sleep. May repeat another 25 mg if no sleep in an hour      carBAMazepine (TEGRETOL) 200 mg tablet Take 2 Tabs by mouth two (2) times a day. Qty: 60 Tab, Refills: 0      levothyroxine (SYNTHROID) 100 mcg tablet Take 100 mcg by mouth Daily (before breakfast). acetaminophen (MAPAP) 500 mg cap Take 1 Tab by mouth daily. albuterol (PROVENTIL VENTOLIN) 2.5 mg /3 mL (0.083 %) nebulizer solution 2.5 mg by Nebulization route two (2) times daily as needed. budesonide (PULMICORT) 0.5 mg/2 mL nbsp 500 mcg by Nebulization route two (2) times a day. NOTIFY YOUR PHYSICIAN FOR ANY OF THE FOLLOWING:   Fever over 101 degrees for 24 hours. Chest pain, shortness of breath, fever, chills, nausea, vomiting, diarrhea, change in mentation, falling, weakness, bleeding. Severe pain or pain not relieved by medications. Or, any other signs or symptoms that you may have questions about.     DISPOSITION:    Home With:   OT  PT  HH  RN      x Long term SNF/Inpatient Rehab Independent/assisted living    Hospice    Other:       PATIENT CONDITION AT DISCHARGE:     Functional status    Poor     Deconditioned    x Independent      Cognition    x Lucid     Forgetful     Dementia      Catheters/lines (plus indication)    Almaraz     PICC     PEG    x None      Code status   x  Full code     DNR      PHYSICAL EXAMINATION AT DISCHARGE:  Please see progress note      CHRONIC MEDICAL DIAGNOSES:  Problem List as of 8/30/2017  Date Reviewed: 8/29/2017          Codes Class Noted - Resolved    * (Principal)Acute cystitis ICD-10-CM: N30.00  ICD-9-CM: 595.0  8/28/2017 - Present        Dementia (Chronic) ICD-10-CM: F03.90  ICD-9-CM: 294.20  3/29/2017 - Present        UTI (urinary tract infection) ICD-10-CM: N39.0  ICD-9-CM: 599.0  3/29/2017 - Present        Seizure ([facility] (Chronic) ICD-10-CM: R56.9  ICD-9-CM: 780.39  3/28/2017 - Present        COPD (chronic obstructive pulmonary disease) ([facility] [address] (Chronic) ICD-10-CM: J44.9  ICD-9-CM: 772  3/28/2017 - Present        Acquired hypothyroidism (Chronic) ICD-10-CM: E03.9  ICD-9-CM: 244.9  3/28/2017 - Present              Greater than 37 minutes were spent with the patient on counseling and coordination of care    Signed:   Severiano Dole, MD  8/30/2017  2:23 PM

## 2017-08-30 NOTE — PROGRESS NOTES
CM called by Elfego Perez RN on floor to state that patient needed HH/PT/OT services for discharge to [facility]. I called him back and at that time he said that the patients daughter has the services at the facility she just needed the doctor to write a scrip for it. He is going to get that for patient and daughter is going to take [patient back to facility.     Hao Mills RN CRM

## 2017-08-30 NOTE — PROGRESS NOTES
Bedside and Verbal shift change report given to Marshall (oncoming nurse) by Eladio Hernandez (offgoing nurse). Report included the following information SBAR, Kardex, MAR and Recent Results.

## 2017-08-30 NOTE — PROGRESS NOTES
Patient's daughter accidentally bumped the patient in the leg with the table in the patient's room. Patient has an open skin tear to her right shin. Wound cleaned and dressing applied.

## 2017-08-30 NOTE — PHYSICIAN ADVISORY
Letter of Status Determination:   Recommend hospitalization status upgraded from   OBSERVATION  to Inpatient  Status     Pt Name:  Colette Almaraz   MR#  [id number]   [facility]#   [id number]   Room and Hospital  664/01  @ [facility]   Hospitalization date  8/28/2017  9:11 AM   Current Attending Physician  Sulaiman Negrete MD   Principal diagnosis  Acute cystitis      Clinicals  80 y.o. y.o  female hospitalized with above diagnosis   The pt actually had had multiple seizures witnessed at her residential facility and in the ER. She is found to have UTI with GNR and directed therapy are in progress. Neurology consultation obtained and EEG was ordered      [facility]) criteria   Does  NOT apply    STATUS DETERMINATION  This patient is at above high risk of deterioration based on documented presenting clinical data, comorbid conditions, high risk of adverse events and current acute care course. Ms. Colette Almaraz now meets Inpatient Admission status criteria in accordance with CMS regulation Section 43 .3. Specifically, due to medical necessity the patient's stay now exceeds Two Midnights. It is our recommendation that this patient's hospitalization status should be upgraded from  OBSERVATION to INPATIENT status. The final decision of the patient's hospitalization status depends on the attending physician's judgment            Additional comments     Payor: Alessia Mariee / Plan: [address] / Product Type: Medicare /         Aure Helm MD MPH FACP     Physician [address]   [facility] Staff, [address] [place] [address]    Cell  [phone]              [phone]    .

## 2017-08-30 NOTE — DISCHARGE INSTRUCTIONS
Seizure: Care Instructions  Your Care Instructions    Seizures are caused by abnormal patterns of electrical signals in the brain. They are different for each person. Seizures can affect movement, speech, vision, or awareness. Some people have only slight shaking of a hand and do not pass out. Other people may pass out and have violent shaking of the whole body. Some people appear to stare into space. They are awake, but they can't respond normally. Later, they may not remember what happened. You may need tests to identify the type and cause of the seizures. A seizure may occur only once, or you may have them more than one time. Taking medicines as directed and following up with your doctor may help keep you from having more seizures. The doctor has checked you carefully, but problems can develop later. If you notice any problems or new symptoms, get medical treatment right away. Follow-up care is a key part of your treatment and safety. Be sure to make and go to all appointments, and call your doctor if you are having problems. It's also a good idea to know your test results and keep a list of the medicines you take. How can you care for yourself at home? · Be safe with medicines. Take your medicines exactly as prescribed. Call your doctor if you think you are having a problem with your medicine. · Do not do any activity that could be dangerous to you or others until your doctor says it is safe to do so. For example, do not drive a car, operate machinery, swim, or climb ladders. · Be sure that anyone treating you for any health problem knows that you have had a seizure and what medicines you are taking for it. · Identify and avoid things that may make you more likely to have a seizure. These may include lack of sleep, alcohol or drug use, stress, or not eating. · Make sure you go to your follow-up appointment. When should you call for help? Call 911 anytime you think you may need emergency care. For example, call if:  · You have another seizure. · You have more than one seizure in 24 hours. · You have new symptoms, such as trouble walking, speaking, or thinking clearly. Call your doctor now or seek immediate medical care if:  · You are not acting normally. Watch closely for changes in your health, and be sure to contact your doctor if you have any problems. Where can you learn more? Go to http://papa-juwan.info/. Enter V996 in the search box to learn more about \"Seizure: Care Instructions. \"  Current as of: October 14, 2016  Content Version: 11.3  © 5641-8593 [employer]. Care instructions adapted under license by [employer] (which disclaims liability or warranty for this information). If you have questions about a medical condition or this instruction, always ask your healthcare professional. [place] [address] any warranty or liability for your use of this information. Urinary Tract Infection in Women: Care Instructions  Your Care Instructions    A urinary tract infection, or UTI, is a general term for an infection anywhere between the kidneys and the urethra (where urine comes out). Most UTIs are bladder infections. They often cause pain or burning when you urinate. UTIs are caused by bacteria and can be cured with antibiotics. Be sure to complete your treatment so that the infection goes away. Follow-up care is a key part of your treatment and safety. Be sure to make and go to all appointments, and call your doctor if you are having problems. It's also a good idea to know your test results and keep a list of the medicines you take. How can you care for yourself at home? · Take your antibiotics as directed. Do not stop taking them just because you feel better. You need to take the full course of antibiotics. · Drink extra water and other fluids for the next day or two.  This may help wash out the bacteria that are causing the infection. (If you have kidney, heart, or liver disease and have to limit fluids, talk with your doctor before you increase your fluid intake.)  · Avoid drinks that are carbonated or have caffeine. They can irritate the bladder. · Urinate often. Try to empty your bladder each time. · To relieve pain, take a hot bath or lay a heating pad set on low over your lower belly or genital area. Never go to sleep with a heating pad in place. To prevent UTIs  · Drink plenty of water each day. This helps you urinate often, which clears bacteria from your system. (If you have kidney, heart, or liver disease and have to limit fluids, talk with your doctor before you increase your fluid intake.)  · Urinate when you need to. · Urinate right after you have sex. · Change sanitary pads often. · Avoid douches, bubble baths, feminine hygiene sprays, and other feminine hygiene products that have deodorants. · After going to the bathroom, wipe from front to back. When should you call for help? Call your doctor now or seek immediate medical care if:  · Symptoms such as fever, chills, nausea, or vomiting get worse or appear for the first time. · You have new pain in your back just below your rib cage. This is called flank pain. · There is new blood or pus in your urine. · You have any problems with your antibiotic medicine. Watch closely for changes in your health, and be sure to contact your doctor if:  · You are not getting better after taking an antibiotic for 2 days. · Your symptoms go away but then come back. Where can you learn more? Go to http://papa-juwan.info/. Enter A348 in the search box to learn more about \"Urinary Tract Infection in Women: Care Instructions. \"  Current as of: November 28, 2016  Content Version: 11.3  © 1520-9794 [employer]. Care instructions adapted under license by [employer] (which disclaims liability or warranty for this information).  If you have questions about a medical condition or this instruction, always ask your healthcare professional. [place] [address] any warranty or liability for your use of this information. Discharge Instructions       PATIENT ID: Patrice Montilla  MRN: [id number]   YOB: 1935    DATE OF ADMISSION: 8/28/2017  9:11 AM    DATE OF DISCHARGE: 8/30/2017    PRIMARY CARE PROVIDER: Nica Hart MD     ATTENDING PHYSICIAN: Johan Arreaga MD  DISCHARGING PROVIDER: Johan Arreaga MD    To contact this individual call [phone] and ask the  to page. If unavailable ask to be transferred the Adult Hospitalist Department. DISCHARGE DIAGNOSES   Breakthrough seizure  UTI    CONSULTATIONS: ED CONSULT TO SENIOR SERVICES PHYSICAL THERAPY  ED CONSULT TO SENIOR SERVICES CASE MANAGEMENT  IP CONSULT TO NEUROLOGY  IP CONSULT TO HOSPITALIST    PROCEDURES/SURGERIES: * No surgery found *    PENDING TEST RESULTS:   At the time of discharge the following test results are still pending: none    FOLLOW UP APPOINTMENTS:   Follow-up Information     Follow up With Details Comments Contact Info    Your regular doctor Schedule an appointment as soon as possible for a visit in 3 days      [facility] EMERGENCY DEP  If symptoms worsen [address]  [phone]    Shayne Ryan MD In 1 week  Patient can only remember the practice name and not the physician             ADDITIONAL CARE RECOMMENDATIONS:   Follow up with PMD in 1 week    DIET: Cardiac Diet    ACTIVITY: Activity as tolerated      DISCHARGE MEDICATIONS:   See Medication Reconciliation Form    · It is important that you take the medication exactly as they are prescribed. · Keep your medication in the bottles provided by the pharmacist and keep a list of the medication names, dosages, and times to be taken in your wallet. · Do not take other medications without consulting your doctor.        NOTIFY YOUR PHYSICIAN FOR ANY OF THE FOLLOWING: Fever over 101 degrees for 24 hours. Chest pain, shortness of breath, fever, chills, nausea, vomiting, diarrhea, change in mentation, falling, weakness, bleeding. Severe pain or pain not relieved by medications. Or, any other signs or symptoms that you may have questions about.       DISPOSITION:    Home With:   OT  PT  HH  RN      x SNF/Inpatient Rehab/LTAC    Independent/assisted living    Hospice    Other:     CDMP Checked:   Yes x     PROBLEM LIST Updated:  Yes x       Signed:   Garfield Haji MD  8/30/2017  2:22 PM

## 2017-11-29 ENCOUNTER — HOSPITAL ENCOUNTER (INPATIENT)
Age: 82
LOS: 2 days | Discharge: NURSING, REHAB OR LONG-TERM CARE | DRG: 100 | End: 2017-12-01
Attending: EMERGENCY MEDICINE | Admitting: FAMILY MEDICINE
Payer: MEDICARE

## 2017-11-29 DIAGNOSIS — R56.9 SEIZURE (HCC): Primary | ICD-10-CM

## 2017-11-29 LAB
ANION GAP SERPL CALC-SCNC: 5 MMOL/L (ref 5–15)
APPEARANCE UR: CLEAR
BASOPHILS # BLD: 0 K/UL (ref 0–0.1)
BASOPHILS NFR BLD: 0 % (ref 0–1)
BILIRUB UR QL: NEGATIVE
BUN SERPL-MCNC: 20 MG/DL (ref 6–20)
BUN/CREAT SERPL: 28 (ref 12–20)
CALCIUM SERPL-MCNC: 9.5 MG/DL (ref 8.5–10.1)
CARBAMAZEPINE SERPL-MCNC: 8.3 UG/ML (ref 4–12)
CHLORIDE SERPL-SCNC: 105 MMOL/L (ref 97–108)
CO2 SERPL-SCNC: 31 MMOL/L (ref 21–32)
COLOR UR: NORMAL
CREAT SERPL-MCNC: 0.72 MG/DL (ref 0.55–1.02)
EOSINOPHIL # BLD: 0.2 K/UL (ref 0–0.4)
EOSINOPHIL NFR BLD: 3 % (ref 0–7)
ERYTHROCYTE [DISTWIDTH] IN BLOOD BY AUTOMATED COUNT: 12.8 % (ref 11.5–14.5)
GLUCOSE SERPL-MCNC: 93 MG/DL (ref 65–100)
GLUCOSE UR STRIP.AUTO-MCNC: NEGATIVE MG/DL
HCT VFR BLD AUTO: 42.9 % (ref 35–47)
HGB BLD-MCNC: 13.8 G/DL (ref 11.5–16)
HGB UR QL STRIP: NEGATIVE
KETONES UR QL STRIP.AUTO: NEGATIVE MG/DL
LEUKOCYTE ESTERASE UR QL STRIP.AUTO: NEGATIVE
LYMPHOCYTES # BLD: 1.4 K/UL (ref 0.8–3.5)
LYMPHOCYTES NFR BLD: 19 % (ref 12–49)
MCH RBC QN AUTO: 33.3 PG (ref 26–34)
MCHC RBC AUTO-ENTMCNC: 32.2 G/DL (ref 30–36.5)
MCV RBC AUTO: 103.4 FL (ref 80–99)
MONOCYTES # BLD: 0.4 K/UL (ref 0–1)
MONOCYTES NFR BLD: 5 % (ref 5–13)
NEUTS SEG # BLD: 5.4 K/UL (ref 1.8–8)
NEUTS SEG NFR BLD: 73 % (ref 32–75)
NITRITE UR QL STRIP.AUTO: NEGATIVE
PH UR STRIP: 6.5 [PH] (ref 5–8)
PLATELET # BLD AUTO: 234 K/UL (ref 150–400)
POTASSIUM SERPL-SCNC: 4.2 MMOL/L (ref 3.5–5.1)
PROT UR STRIP-MCNC: NEGATIVE MG/DL
RBC # BLD AUTO: 4.15 M/UL (ref 3.8–5.2)
SODIUM SERPL-SCNC: 141 MMOL/L (ref 136–145)
SP GR UR REFRACTOMETRY: 1.02 (ref 1–1.03)
TSH SERPL DL<=0.05 MIU/L-ACNC: 1.94 UIU/ML (ref 0.36–3.74)
UR CULT HOLD, URHOLD: NORMAL
UROBILINOGEN UR QL STRIP.AUTO: 0.2 EU/DL (ref 0.2–1)
WBC # BLD AUTO: 7.3 K/UL (ref 3.6–11)

## 2017-11-29 PROCEDURE — 80156 ASSAY CARBAMAZEPINE TOTAL: CPT | Performed by: EMERGENCY MEDICINE

## 2017-11-29 PROCEDURE — 65660000000 HC RM CCU STEPDOWN

## 2017-11-29 PROCEDURE — 81003 URINALYSIS AUTO W/O SCOPE: CPT | Performed by: EMERGENCY MEDICINE

## 2017-11-29 PROCEDURE — 84443 ASSAY THYROID STIM HORMONE: CPT | Performed by: EMERGENCY MEDICINE

## 2017-11-29 PROCEDURE — 80048 BASIC METABOLIC PNL TOTAL CA: CPT | Performed by: EMERGENCY MEDICINE

## 2017-11-29 PROCEDURE — 77030005563 HC CATH URETH INT MMGH -A

## 2017-11-29 PROCEDURE — 51701 INSERT BLADDER CATHETER: CPT

## 2017-11-29 PROCEDURE — 74011250636 HC RX REV CODE- 250/636: Performed by: EMERGENCY MEDICINE

## 2017-11-29 PROCEDURE — 99284 EMERGENCY DEPT VISIT MOD MDM: CPT

## 2017-11-29 PROCEDURE — 85025 COMPLETE CBC W/AUTO DIFF WBC: CPT | Performed by: EMERGENCY MEDICINE

## 2017-11-29 PROCEDURE — 74011250636 HC RX REV CODE- 250/636: Performed by: FAMILY MEDICINE

## 2017-11-29 RX ORDER — LORAZEPAM 2 MG/ML
1 INJECTION INTRAMUSCULAR AS NEEDED
Status: DISCONTINUED | OUTPATIENT
Start: 2017-11-29 | End: 2017-12-01 | Stop reason: HOSPADM

## 2017-11-29 RX ORDER — SODIUM CHLORIDE 9 MG/ML
75 INJECTION, SOLUTION INTRAVENOUS CONTINUOUS
Status: DISCONTINUED | OUTPATIENT
Start: 2017-11-29 | End: 2017-12-01 | Stop reason: HOSPADM

## 2017-11-29 RX ORDER — LORAZEPAM 2 MG/ML
2 INJECTION INTRAMUSCULAR
Status: DISCONTINUED | OUTPATIENT
Start: 2017-11-29 | End: 2017-11-29

## 2017-11-29 RX ORDER — POTASSIUM &MAGNESIUM ASPARTATE 250-250 MG
1000 CAPSULE ORAL DAILY
COMMUNITY
End: 2018-01-03

## 2017-11-29 RX ADMIN — LORAZEPAM 1 MG: 2 INJECTION INTRAMUSCULAR; INTRAVENOUS at 18:13

## 2017-11-29 NOTE — IP AVS SNAPSHOT
[address] 
[phone] Patient: Marylu Christie MRN: [id number] :1935 About your hospitalization You were admitted on:  2017 You last received care in the:  [facility] 6S NEURO-SCI TELE You were discharged on:  2017 Why you were hospitalized Your primary diagnosis was:  Seizure (Hcc) Things You Need To Do (next 8 weeks) Follow up with David Prince MD in 1 week(s) MD will follow up with patient at [facility] Phone:  [phone] Where:  [address] [facility], [address]  Follow Up with King Yola DO at 10:30 AM  
Where: [address] [facility] at [facility] Discharge Orders None A check kt indicates which time of day the medication should be taken. My Medications TAKE these medications as instructed Instructions Each Dose to Equal  
 Morning Noon Evening Bedtime  
 albuterol 2.5 mg /3 mL (0.083 %) nebulizer solution Commonly known as:  PROVENTIL VENTOLIN  
   
 2.5 mg by Nebulization route two (2) times daily as needed. 2.5 mg  
    
   
   
   
  
 budesonide 0.5 mg/2 mL Nbsp Commonly known as:  PULMICORT  
   
 500 mcg by Nebulization route two (2) times a day. 0900 and 1700  
 500 mcg  
    
   
   
   
  
 carBAMazepine 200 mg tablet Commonly known as:  TEGretol Your last dose was:   09 Take 2 Tabs by mouth two (2) times a day. 400 mg  
    
  
   
   
  
   
  
 cranberry 500 mg capsule Take 1,000 mg by mouth daily. 1000 mg  
    
   
   
   
  
 levETIRAcetam 500 mg tablet Commonly known as:  KEPPRA Your last dose was:   09 Take 1 Tab by mouth two (2) times a day for 30 days. 500 mg Mapap 500 mg Cap Generic drug:  acetaminophen Take 1 Tab by mouth daily. 1 Tab SYNTHROID 100 mcg tablet Generic drug:  levothyroxine Your last dose was:  12/1 0700 Take 100 mcg by mouth Daily (before breakfast). 100 mcg  
    
   
   
   
  
 traZODone 50 mg tablet Commonly known as:  Aneudy Espositotist Take 25 mg by mouth nightly as needed for Sleep. May repeat another 25 mg if no sleep in an hour 25 mg  
    
   
   
   
  
 ZyrTEC 10 mg tablet Generic drug:  cetirizine Take 10 mg by mouth daily. 10 mg Where to Get Your Medications Information on where to get these meds will be given to you by the nurse or doctor. ! Ask your nurse or doctor about these medications  
  levETIRAcetam 500 mg tablet Discharge Instructions Discharge Instructions PATIENT ID: Claude Aho MRN: [id number] YOB: 1935 DATE OF ADMISSION: 11/29/2017  5:49 PM   
DATE OF DISCHARGE: 12/1/2017 PRIMARY CARE PROVIDER: Shayne Ryna MD  
 
ATTENDING PHYSICIAN: Westley Redman MD 
DISCHARGING PROVIDER: Westley Redman MD   
To contact this individual call [phone] and ask the  to page. If unavailable ask to be transferred the Adult Hospitalist Department. DISCHARGE DIAGNOSES Seizures CONSULTATIONS: IP CONSULT TO HOSPITALIST 
IP CONSULT TO NEUROLOGY PROCEDURES/SURGERIES: * No surgery found * PENDING TEST RESULTS:  
At the time of discharge the following test results are still pending: FOLLOW UP APPOINTMENTS:  
Follow-up Information Follow up With Details Comments Contact Info Kandace Libman, DO In 4 weeks  [address] [facility] [address] [facility] at [address] 
[phone] PT WILL NEED A PCP F/U IN 2 WEEKS   
  
  
 
ADDITIONAL CARE RECOMMENDATIONS:  
 
DIET: Regular Diet and Cardiac Diet ACTIVITY: Activity as tolerated WOUND CARE:  
 
EQUIPMENT needed:  
 
 
  
x SNF/Inpatient Rehab/LTAC Independent/assisted living Hospice Other: CDMP Checked:  
Yes x PROBLEM LIST Updated: 
Yes x Signed:  
Chela Pompa MD 
12/1/2017 
7:54 AM 
 
  
  
  
Introducing \[facility] [place] [facility]\"" & HEALTH SERVICES! [facility] introduces [employer]xia patient portal. Now you can access parts of your medical record, email your doctor's office, and request medication refills online. 1. In your internet browser, go to [email or web address] 2. Click on the First Time User? Click Here link in the Sign In box. You will see the New Member Sign Up page. 3. Enter your Lyxia Access Code exactly as it appears below. You will not need to use this code after youve completed the sign-up process. If you do not sign up before the expiration date, you must request a new code. · Lyxia Access Code: [email or web address] Expires: 3/1/2018  8:34 AM 
 
4. Enter the last four digits of your Social Security Number (xxxx) and Date of Birth (mm/dd/yyyy) as indicated and click Submit. You will be taken to the next sign-up page. 5. Create a Lyxia ID.  This will be your Lyxia login ID and cannot be changed, so think of one that is secure and easy to remember. 6. Create a [employer] password. You can change your password at any time. 7. Enter your Password Reset Question and Answer. This can be used at a later time if you forget your password. 8. Enter your e-mail address. You will receive e-mail notification when new information is available in [address] 9. Click Sign Up. You can now view and download portions of your medical record. 10. Click the Download Summary menu link to download a portable copy of your medical information. If you have questions, please visit the Frequently Asked Questions section of the [employer] website. Remember, [employer] is NOT to be used for urgent needs. For medical emergencies, dial 911. Now available from your iPhone and Android! Providers Seen During Your Hospitalization Provider Specialty Primary office phone Liz Chapman DO Emergency Medicine [phone] Kylah Stroud MD Family Practice [phone] Lencho Pope MD Internal Medicine [phone] Your Primary Care Physician (PCP) Primary Care Physician Office Phone Office Fax [facility] [phone] You are allergic to the following Allergen Reactions Aricept (Donepezil) Unknown (comments) Benadryl (Diphenhydramine Hcl) Unknown (comments) Ciprofloxacin Unknown (comments) Codeine Unknown (comments) Macrobid (Nitrofurantoin Monohyd/M-Cryst) Unknown (comments) Pcn (Penicillins) Other (comments) Sulfa (Sulfonamide Antibiotics) Other (comments) Recent Documentation Weight BMI OB Status Smoking Status 59.4 kg 23.96 kg/m2 Postmenopausal Never Smoker Emergency Contacts Name Discharge Info Relation Home Work [employer] [address] CAREGIVER [3] Daughter [21]   [phone] Rhiannon Reynolds DISCHARGE CAREGIVER [3] Daughter [21] [phone] Patient Belongings The following personal items are in your possession at time of discharge: 
  Dental Appliances: None  Visual Aid: None      Home Medications: None   Jewelry: Earrings  Clothing: Socks, Footwear, At bedside, Pants    Other Valuables: None Please provide this summary of care documentation to your next provider. Signatures-by signing, you are acknowledging that this After Visit Summary has been reviewed with you and you have received a copy. Patient Signature:  ____________________________________________________________ Date:  ____________________________________________________________  
  
Viviana Artist Provider Signature:  ____________________________________________________________ Date:  ____________________________________________________________

## 2017-11-29 NOTE — ED TRIAGE NOTES
TRIAGE NOTE: Pt arrives via EMS from [address] for seizure. Daughter was visiting and had what sounds like an absence seizure which patient is known to have. Patient has no complaints, doesn't appear postictal.  Daughter is concerned patient isn't receiving her meds at facility.

## 2017-11-29 NOTE — ED PROVIDER NOTES
HPI Comments: [de-identified] y.o. female with past medical history significant for Dementia, Seizures, and COPD who presents from [facility] via EMS with chief complaint of Evaluation for Seizure. Patient has a previous history of Seizures managed with daily medications. Patient's family member at bedside reports patient's last seizure was \"three months ago\" when the patient was admitted to [facility]. Pt's family member reports patient has had \"two seizure like episodes since arrival\". Per family member, pt's typical seizure episodes are described as \"staring off into space, with excessive blinking\". Pt's family member states patient \"will have several of these small episodes in a row\" before having \"full convulsions\". Pt has been given low dose of Ativan and Depakote in the past for break through seizures. Pt's family member reports previous seizures have occurred with UTIs. Pt's family member notes, \"about a month ago\" patient was moved to a different part of the nursing home with different nursing staff. Family reports, when patient was last admitted for seizures it was discovered that the patient was refusing her medication at the nursing home and nursing staff was  Not administering it. Pt's family denies recent changes in medications or recent illness. There are no other acute medical concerns at this time. Old Chart Review: Last admitted 8/28/17 to 8/30/17 for a breakthrough seizure. During admission, patientPatient was discharged on Tegretol      Note written by Braulio Gutierrez, as dictated by Harish Jacob, DO 6:25 PM      The history is provided by the patient, a relative and a parent. History limited by: Dementia. Past Medical History:   Diagnosis Date    COPD (chronic obstructive pulmonary disease) ([facility] [address]     Dementia 3/29/2017    Hypothyroid     Seizure [facility])        Past Surgical History:   Procedure Laterality Date    HX HIP REPLACEMENT           History reviewed.  No pertinent family history. Social History     Social History    Marital status:      Spouse name: N/A    Number of children: N/A    Years of education: N/A     Occupational History    Not on file. Social History Main Topics    Smoking status: Never Smoker    Smokeless tobacco: Never Used    Alcohol use No    Drug use: Not on file    Sexual activity: Not on file     Other Topics Concern    Not on file     Social History Narrative         ALLERGIES: Aricept [donepezil]; Benadryl [diphenhydramine hcl]; Ciprofloxacin; Codeine; Macrobid [nitrofurantoin monohyd/m-cryst]; Pcn [penicillins]; and Sulfa (sulfonamide antibiotics)    Review of Systems   Unable to perform ROS: Dementia       Vitals:    11/29/17 1757 11/29/17 1900   BP: 136/67 107/62   Pulse: 72    Resp: 18    Temp: 98.3 °F (36.8 °C)    SpO2: 96% 94%            Physical Exam   Constitutional: She appears well-developed and well-nourished. No distress. HENT:   Head: Normocephalic and atraumatic. Left Ear: External ear normal.   Nose: Nose normal.   Mouth/Throat: Oropharynx is clear and moist. No oropharyngeal exudate. Eyes: Conjunctivae and EOM are normal. Pupils are equal, round, and reactive to light. Right eye exhibits no discharge. Left eye exhibits no discharge. No scleral icterus. Neck: Normal range of motion. No tracheal deviation present. Cardiovascular: Normal rate, regular rhythm, normal heart sounds and intact distal pulses. Pulmonary/Chest: Effort normal and breath sounds normal. No stridor. No respiratory distress. She has no wheezes. She has no rales. Abdominal: Soft. Bowel sounds are normal. She exhibits no distension and no mass. There is no tenderness. There is no rebound and no guarding. Musculoskeletal: She exhibits no edema or tenderness. Lymphadenopathy:     She has no cervical adenopathy. Neurological: She is alert. Intermittently aware.  Seems to have repetitive blinking and starring episodes, during evaluation patient had two. Skin: Skin is warm and dry. She is not diaphoretic. Nursing note and vitals reviewed. Note written by Braulio Morel, as dictated by Kathy Batista DO 6:25 PM      [facility]  ED Course       Procedures      CONSULT NOTE:  8:35 PM Kathy Batista DO spoke with Dr. Toni Montejo for Hospitalist.  Discussed available diagnostic tests and clinical findings. He is in agreement with care plans as outlined. Dr. Fatuma Cardoza will see and admit          Was having persistent absence/complex partial seizures. Ativan given to prevent secondary generalization. Rested here. Will admit for neuro consult    Recent Results (from the past 12 hour(s))   CARBAMAZEPINE    Collection Time: 11/29/17  6:00 PM   Result Value Ref Range    Carbamazepine 8.3 4.0 - 12.0 ug/mL   CBC WITH AUTOMATED DIFF    Collection Time: 11/29/17  6:00 PM   Result Value Ref Range    WBC 7.3 3.6 - 11.0 K/uL    RBC 4.15 3.80 - 5.20 M/uL    HGB 13.8 11.5 - 16.0 g/dL    HCT 42.9 35.0 - 47.0 %    .4 (H) 80.0 - 99.0 FL    MCH 33.3 26.0 - 34.0 PG    MCHC 32.2 30.0 - 36.5 g/dL    RDW 12.8 11.5 - 14.5 %    PLATELET 659 530 - 951 K/uL    NEUTROPHILS 73 32 - 75 %    LYMPHOCYTES 19 12 - 49 %    MONOCYTES 5 5 - 13 %    EOSINOPHILS 3 0 - 7 %    BASOPHILS 0 0 - 1 %    ABS. NEUTROPHILS 5.4 1.8 - 8.0 K/UL    ABS. LYMPHOCYTES 1.4 0.8 - 3.5 K/UL    ABS. MONOCYTES 0.4 0.0 - 1.0 K/UL    ABS. EOSINOPHILS 0.2 0.0 - 0.4 K/UL    ABS.  BASOPHILS 0.0 0.0 - 0.1 K/UL   METABOLIC PANEL, BASIC    Collection Time: 11/29/17  6:00 PM   Result Value Ref Range    Sodium 141 136 - 145 mmol/L    Potassium 4.2 3.5 - 5.1 mmol/L    Chloride 105 97 - 108 mmol/L    CO2 31 21 - 32 mmol/L    Anion gap 5 5 - 15 mmol/L    Glucose 93 65 - 100 mg/dL    BUN 20 6 - 20 MG/DL    Creatinine 0.72 0.55 - 1.02 MG/DL    BUN/Creatinine ratio 28 (H) 12 - 20      GFR est AA >60 >60 ml/min/1.73m2    GFR est non-AA >60 >60 ml/min/1.73m2    Calcium 9.5 8.5 - 10.1 MG/DL   TSH 3RD GENERATION    Collection Time: 11/29/17  6:00 PM   Result Value Ref Range    TSH 1.94 0.36 - 3.74 uIU/mL   URINALYSIS W/ RFLX MICROSCOPIC    Collection Time: 11/29/17  6:33 PM   Result Value Ref Range    Color YELLOW/STRAW      Appearance CLEAR CLEAR      Specific gravity 1.019 1.003 - 1.030      pH (UA) 6.5 5.0 - 8.0      Protein NEGATIVE  NEG mg/dL    Glucose NEGATIVE  NEG mg/dL    Ketone NEGATIVE  NEG mg/dL    Bilirubin NEGATIVE  NEG      Blood NEGATIVE  NEG      Urobilinogen 0.2 0.2 - 1.0 EU/dL    Nitrites NEGATIVE  NEG      Leukocyte Esterase NEGATIVE  NEG     URINE CULTURE HOLD SAMPLE    Collection Time: 11/29/17  6:33 PM   Result Value Ref Range    Urine culture hold URINE ON HOLD IN MICROBIOLOGY DEPT FOR 3 DAYS

## 2017-11-30 ENCOUNTER — APPOINTMENT (OUTPATIENT)
Dept: CT IMAGING | Age: 82
DRG: 100 | End: 2017-11-30
Attending: FAMILY MEDICINE
Payer: MEDICARE

## 2017-11-30 LAB
ALBUMIN SERPL-MCNC: 3 G/DL (ref 3.5–5)
ALBUMIN/GLOB SERPL: 1.1 {RATIO} (ref 1.1–2.2)
ALP SERPL-CCNC: 92 U/L (ref 45–117)
ALT SERPL-CCNC: 14 U/L (ref 12–78)
ANION GAP SERPL CALC-SCNC: 3 MMOL/L (ref 5–15)
APTT PPP: 26.2 SEC (ref 22.1–32.5)
AST SERPL-CCNC: 5 U/L (ref 15–37)
ATRIAL RATE: 76 BPM
BASOPHILS # BLD: 0 K/UL (ref 0–0.1)
BASOPHILS NFR BLD: 0 % (ref 0–1)
BILIRUB SERPL-MCNC: 0.2 MG/DL (ref 0.2–1)
BLASTS NFR BLD MANUAL: 0 %
BUN SERPL-MCNC: 21 MG/DL (ref 6–20)
BUN/CREAT SERPL: 32 (ref 12–20)
CALCIUM SERPL-MCNC: 8.9 MG/DL (ref 8.5–10.1)
CALCULATED P AXIS, ECG09: 79 DEGREES
CALCULATED R AXIS, ECG10: 68 DEGREES
CALCULATED T AXIS, ECG11: 54 DEGREES
CHLORIDE SERPL-SCNC: 106 MMOL/L (ref 97–108)
CO2 SERPL-SCNC: 34 MMOL/L (ref 21–32)
CREAT SERPL-MCNC: 0.66 MG/DL (ref 0.55–1.02)
DIAGNOSIS, 93000: NORMAL
DIFFERENTIAL METHOD BLD: ABNORMAL
EOSINOPHIL # BLD: 0.4 K/UL (ref 0–0.4)
EOSINOPHIL NFR BLD: 6 % (ref 0–7)
ERYTHROCYTE [DISTWIDTH] IN BLOOD BY AUTOMATED COUNT: 12.9 % (ref 11.5–14.5)
GLOBULIN SER CALC-MCNC: 2.8 G/DL (ref 2–4)
GLUCOSE SERPL-MCNC: 85 MG/DL (ref 65–100)
HCT VFR BLD AUTO: 38.7 % (ref 35–47)
HGB BLD-MCNC: 12.3 G/DL (ref 11.5–16)
INR PPP: 1 (ref 0.9–1.1)
LYMPHOCYTES # BLD: 1.6 K/UL (ref 0.8–3.5)
LYMPHOCYTES NFR BLD: 22 % (ref 12–49)
MAGNESIUM SERPL-MCNC: 2.2 MG/DL (ref 1.6–2.4)
MANUAL DIFFERENTIAL PERFORMED BLD QL: ABNORMAL
MCH RBC QN AUTO: 33.1 PG (ref 26–34)
MCHC RBC AUTO-ENTMCNC: 31.8 G/DL (ref 30–36.5)
MCV RBC AUTO: 104 FL (ref 80–99)
METAMYELOCYTES NFR BLD MANUAL: 0 %
MONOCYTES # BLD: 0.2 K/UL (ref 0–1)
MONOCYTES NFR BLD: 3 % (ref 5–13)
MYELOCYTES NFR BLD MANUAL: 0 %
NEUTS BAND NFR BLD MANUAL: 0 % (ref 0–6)
NEUTS SEG # BLD: 4.9 K/UL (ref 1.8–8)
NEUTS SEG NFR BLD: 69 % (ref 32–75)
NRBC # BLD: 0 K/UL (ref 0–0.01)
NRBC BLD-RTO: 0 PER 100 WBC
OTHER CELLS NFR BLD MANUAL: 0 %
P-R INTERVAL, ECG05: 220 MS
PHOSPHATE SERPL-MCNC: 3.7 MG/DL (ref 2.6–4.7)
PLATELET # BLD AUTO: 213 K/UL (ref 150–400)
POTASSIUM SERPL-SCNC: 4.2 MMOL/L (ref 3.5–5.1)
PROMYELOCYTES NFR BLD MANUAL: 0 %
PROT SERPL-MCNC: 5.8 G/DL (ref 6.4–8.2)
PROTHROMBIN TIME: 10.1 SEC (ref 9–11.1)
Q-T INTERVAL, ECG07: 374 MS
QRS DURATION, ECG06: 82 MS
QTC CALCULATION (BEZET), ECG08: 420 MS
RBC # BLD AUTO: 3.72 M/UL (ref 3.8–5.2)
RBC MORPH BLD: ABNORMAL
SODIUM SERPL-SCNC: 143 MMOL/L (ref 136–145)
THERAPEUTIC RANGE,PTTT: NORMAL SECS (ref 58–77)
VENTRICULAR RATE, ECG03: 76 BPM
WBC # BLD AUTO: 7.1 K/UL (ref 3.6–11)

## 2017-11-30 PROCEDURE — 74011250636 HC RX REV CODE- 250/636: Performed by: FAMILY MEDICINE

## 2017-11-30 PROCEDURE — 36415 COLL VENOUS BLD VENIPUNCTURE: CPT | Performed by: FAMILY MEDICINE

## 2017-11-30 PROCEDURE — 80053 COMPREHEN METABOLIC PANEL: CPT | Performed by: FAMILY MEDICINE

## 2017-11-30 PROCEDURE — 85610 PROTHROMBIN TIME: CPT | Performed by: FAMILY MEDICINE

## 2017-11-30 PROCEDURE — 74011250637 HC RX REV CODE- 250/637: Performed by: HOSPITALIST

## 2017-11-30 PROCEDURE — 85730 THROMBOPLASTIN TIME PARTIAL: CPT | Performed by: FAMILY MEDICINE

## 2017-11-30 PROCEDURE — 70450 CT HEAD/BRAIN W/O DYE: CPT

## 2017-11-30 PROCEDURE — 65660000000 HC RM CCU STEPDOWN

## 2017-11-30 PROCEDURE — 95816 EEG AWAKE AND DROWSY: CPT | Performed by: FAMILY MEDICINE

## 2017-11-30 PROCEDURE — 93041 RHYTHM ECG TRACING: CPT

## 2017-11-30 PROCEDURE — 84100 ASSAY OF PHOSPHORUS: CPT | Performed by: FAMILY MEDICINE

## 2017-11-30 PROCEDURE — 85027 COMPLETE CBC AUTOMATED: CPT | Performed by: FAMILY MEDICINE

## 2017-11-30 PROCEDURE — 83735 ASSAY OF MAGNESIUM: CPT | Performed by: FAMILY MEDICINE

## 2017-11-30 RX ORDER — LEVETIRACETAM 500 MG/1
500 TABLET ORAL 2 TIMES DAILY
Status: DISCONTINUED | OUTPATIENT
Start: 2017-11-30 | End: 2017-12-01 | Stop reason: HOSPADM

## 2017-11-30 RX ORDER — LEVOTHYROXINE SODIUM 100 UG/1
100 TABLET ORAL
Status: DISCONTINUED | OUTPATIENT
Start: 2017-12-01 | End: 2017-12-01 | Stop reason: HOSPADM

## 2017-11-30 RX ORDER — CARBAMAZEPINE 200 MG/1
400 TABLET ORAL 2 TIMES DAILY
Status: DISCONTINUED | OUTPATIENT
Start: 2017-11-30 | End: 2017-12-01 | Stop reason: HOSPADM

## 2017-11-30 RX ORDER — CARBAMAZEPINE 200 MG/1
400 TABLET ORAL 2 TIMES DAILY
Status: DISCONTINUED | OUTPATIENT
Start: 2017-11-30 | End: 2017-11-30

## 2017-11-30 RX ORDER — SODIUM CHLORIDE 0.9 % (FLUSH) 0.9 %
5-10 SYRINGE (ML) INJECTION AS NEEDED
Status: DISCONTINUED | OUTPATIENT
Start: 2017-11-30 | End: 2017-12-01 | Stop reason: HOSPADM

## 2017-11-30 RX ORDER — SODIUM CHLORIDE 0.9 % (FLUSH) 0.9 %
5-10 SYRINGE (ML) INJECTION EVERY 8 HOURS
Status: DISCONTINUED | OUTPATIENT
Start: 2017-11-30 | End: 2017-12-01 | Stop reason: HOSPADM

## 2017-11-30 RX ADMIN — Medication 10 ML: at 21:13

## 2017-11-30 RX ADMIN — LEVETIRACETAM 500 MG: 500 TABLET ORAL at 11:57

## 2017-11-30 RX ADMIN — Medication 10 ML: at 06:12

## 2017-11-30 RX ADMIN — CARBAMAZEPINE 400 MG: 200 TABLET ORAL at 21:11

## 2017-11-30 RX ADMIN — SODIUM CHLORIDE 75 ML/HR: 900 INJECTION, SOLUTION INTRAVENOUS at 00:20

## 2017-11-30 RX ADMIN — Medication 10 ML: at 14:32

## 2017-11-30 RX ADMIN — LEVETIRACETAM 500 MG: 500 TABLET ORAL at 21:11

## 2017-11-30 NOTE — PROGRESS NOTES
LORETA reviewed chart and noted that this pt came in from the Memory care unit at [address] . LORETA spoke with pt's daughter, Eric Lockett ([phone]), to discuss. She does want pt to return to [address] [facility]. LORETA called Fernandoarthur Hodgkins) [phone], pt's nurse at [address]  to discuss pt. LORETA informed her that pt is likely to be discharged back there tomorrow. She is in agreement with accepting pt back and will be there at [address]  tomorrow. LORETA informed daughter of this conversation. She said that her niece and nephew will likely transport pt back to [facility] tomorrow.  Carlos Osborne

## 2017-11-30 NOTE — H&P
[address]   [place] [address]   HISTORY AND PHYSICAL       Name:  Sony Valencia   MR#:  [id number]   :  1935   Account #:  [de-identified]        Date of Adm:  2017       DATE OF ADMISSION: 2017. CHIEF COMPLAINT: Seizure. HISTORY OF PRESENT ILLNESS: An 82-year-old white female with   past medical history of COPD, dementia, hypothyroidism, seizure   disorder, general debility, presented to the emergency department via   EMS from [address] [place] [address] of [address] [place] [address] with reported chief   complaint of seizures. The patient is a poor historian. Majority of   history was obtained from review of ED and medical reports. According   to reports, the patient has a known history of having seizure disorder. She was last hospitalized from 2017 and 2017 with   breakthrough seizures, ecoli UTI, and COPD. She notably had been   seen by neurologists. There was concern that the patient may have not   been given her seizure medications at nursing home. Earlier today, the   patient was reported to have had a seizure. Limited details in regards   to same. The patient has no recollection of the event. The patient   reportedly has had multiple breakthrough seizures and at least two   episodes prior to arrival, reportedly  characterized by the patient   staring off in space with excessive blinking of eyes. 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 According to reports, the patient   typically would have several of the small seizure episodes  and then   have full convulsive seizures. She notably had been given a low dose   of Ativan and Depakote in the past for breakthrough seizures per the   ER reports. As the last hospitalization, the patient had been discharged   home with Tegretol 200 mg 2 tablets p.o. b.i.d. There have been no   reports of any changes in her medications.  No reports of dizziness,   lightheadedness, focal weakness, new-onset numbness, paresthesias,   slurred speech, facial droop, headache, neck pain, back pain, chest   pain, shortness of breath, cough, congestion,  fever, chills, abdominal   pain, nausea, vomiting, diarrhea, melena, dysuria, hematuria, calf pain,   swelling, edema, or other constitutional symptoms than those noted. On arrival to the emergency department, initial recorded vital signs   were blood pressure 136/57, heart rate 72, respiratory rate 18, O2   saturation 96% on room air. The hospitalist was then called to admit   the patient to the Medical Service. PAST MEDICAL HISTORY    1. COPD. 2. Dementia. 3. Seizures. 4. Hypothyroidism. 5. General debility. Uses a walker for assisted mobilization. PAST SURGICAL HISTORY: Hip replacement, unspecified. MEDICATIONS    1. Acetaminophen 500 mg 1 tablet p.o. daily. 2. Albuterol 2.5 mg/3 mL nebulized b.i.d. p.r.n.   3. Pulmicort 0.5 mg/2 mL, 500 mcg nebulized b.i.d.   4. Carbamazepine 200 mg 2 tablets p.o. b.i.d.   5. Zyrtec 10 mg p.o. daily. 6. Cranberry extract 500 mg 1 tablet p.o. daily. 7. Levothyroxine 100 mcg p.o. daily. 8. Trazodone 25 mg p.o. at bedtime p.r.n. ALLERGIES    1. ARICEPT. 2. BENADRYL. 3. CIPROFLOXACIN. 4. CODEINE. 5. MACROBID. 6. PENICILLIN. 7. SULFA DRUGS. SOCIAL HISTORY: Negative for smoking, alcohol, illicit drugs. FAMILY HISTORY: Stroke - mother. REVIEW OF SYSTEMS: All systems reviewed, pertinent positives   otherwise negative. PHYSICAL EXAMINATION   VITAL SIGNS: Temperature is 98.3 degrees Fahrenheit, blood   pressure 96/58, heart rate 73, respiratory rate of 14, O2 saturation   96% on room air. Reported weight 121 pounds (55 kg). GENERAL: Elderly female in no acute respiratory distress. PSYCHIATRIC: The patient was initially awake, alert, oriented x2 to   person and place only and then oriented x3 to year. NEUROLOGIC: GCS of 15 best response. Moves extremities x4 with   generalized weakness. Sensation is grossly intact without slurred   speech, facial droop, no pronator drift. HEENT: Normocephalic, atraumatic. PERRLA is intact. Sclerae are   anicteric. Conjunctive clear. Nares are patent. Oropharynx clear. Tongue is midline, nonedematous. NECK: Supple, no lymphadenopathy, JVD, carotid bruits, thyromegaly. LYMPH: Negative for cervical or supraclavicular adenopathy. RESPIRATORY: Lungs clear to auscultation bilaterally. CARDIOVASCULAR: Heart regular rate and rhythm, normal S1, S2,   without murmurs, rubs or gallops. GI: Abdomen soft, nontender, nondistended. Normoactive bowel   sounds. No guarding or rigidity. No auscultated abdominal bruits. No   pulsatile mass. BACK: No CVA tenderness. No step-off deformity. MUSCULOSKELETAL: No acute palpable bony deformity. Negative for   calf tenderness. VASCULAR: 2+ radial, 1+ dorsalis pedis pulses without cyanosis,   clubbing or edema. SKIN: Warm and dry. LABORATORY DATA: Reviewed as follows: Sodium 141, potassium   4.2, chloride 105, CO2 of 31, BUN of 20, creatinine 0.72, glucose of   93, anion gap 5, calcium 9.5, GFR greater than 60. WBC 7.3,   hemoglobin 13.8, hematocrit 42.9, platelets 663, neutrophils 73%. Urinalysis: Leukocyte esterase negative, nitrites negative, urobilinogen   0.2, bilirubin negative, blood negative, ketones negative, glucose   negative, protein negative, pH 6.5, specific gravity 1.019,   carbamazepine level 8.3. TSH 1.94. IMPRESSION AND PLAN    1. Breakthrough seizure. Admit the patient. 2. Altered mental status (AMS). Placed on neurovascular checks, fall precautions. Placed on   seizure precautions. Order EEG. Consult with neurologist in a.m. Monitor closely. May continue on Tegretol, for now may use Ativan   p.r.n. if any recurrence of seizures. 3. Hypotension. Order 0.9% normal saline 500 mL IV fluid bolus x1. Continue with IV fluid maintenance 0.9% normal saline IV at 125 mL   per hour.    4. Generalized weakness. Placed on fall precautions, need physical   and occupational therapy. 5. Dementia signed to mentioned noted in the chart records. Currently   not on any medicines for the same. 6. Hypothyroidism. Continue levothyroxine. 7. COPD, will use DuoNeb nebulizer treatments p.r.n.    8. DVT prophylaxis, SCD's bilateral lower extremities. 9. CODE STATUS: FULL CODE. MD Mahsa Santoro / Wayne Kamara   D:  11/30/2017   01:04   T:  11/30/2017   08:08   Job #:  [id number]

## 2017-11-30 NOTE — PROGRESS NOTES
Hospitalist Progress Note  Mikey Martines MD  Answering service: [phone] from in house phone        Date of Service:  2017  NAME:  Carl Muñiz  :  1935  MRN:  [id number]      Admission Summary: An 28-year-old white female with   past medical history of COPD, dementia, hypothyroidism, seizure   disorder, general debility, presented to the emergency department via   EMS from [address] [place] [address] of [address] [place] [address] with reported chief   complaint of seizures. Interval history / Subjective:     Doing OK awake, seen along with daughter      Assessment & Plan:     1. Breakthrough seizure. Admit the patient. EEG d/w neurology will add keppra and cont carbamazepine for bipolar disease    2. Hypotension. Resolved    3. Generalized weakness. Placed on fall precautions, need physical   and occupational therapy. 4. Dementia signed to mentioned noted in the chart records. Currently   not on any medicines for the same. 5. Hypothyroidism. Continue levothyroxine. 6. COPD, will use DuoNeb nebulizer treatments p.r.n. Code status: Full  DVT prophylaxis: SCD    Care Plan discussed with: Patient/Family and Nurse  Disposition: TBD     Hospital Problems  Date Reviewed: 2017          Codes Class Noted POA    * (Principal)Seizure ([address] (Chronic) ICD-10-CM: R56.9  ICD-9-CM: 780.39  3/28/2017 Unknown                Review of Systems:   Review of systems not obtained due to patient factors. Vital Signs:    Last 24hrs VS reviewed since prior progress note.  Most recent are:  Visit Vitals    /60 (BP 1 Location: Right arm)    Pulse 75    Temp 98.4 °F (36.9 °C)    Resp 18    Wt 56.8 kg (125 lb 4.8 oz)    SpO2 94%    BMI 22.92 kg/m2       No intake or output data in the 24 hours ending 17 1338     Physical Examination:             Constitutional:  No acute distress, cooperative, pleasant    ENT:  Oral mucous moist, oropharynx benign. Neck supple,    Resp:  CTA bilaterally. No wheezing/rhonchi/rales. No accessory muscle use   CV:  Regular rhythm, normal rate, no murmurs, gallops, rubs    GI:  Soft, non distended, non tender. normoactive bowel sounds, no hepatosplenomegaly     Musculoskeletal:  No edema, warm, 2+ pulses throughout    Neurologic:  Moves all extremities. AAOx3, CN II-XII reviewed     Psych:  Good insight, Not anxious nor agitated. Data Review:    I personally reviewed  Image and labs      Labs:     Recent Labs      11/30/17 0243 11/29/17   1800   WBC  7.1  7.3   HGB  12.3  13.8   HCT  38.7  42.9   PLT  213  234     Recent Labs      11/30/17 0243 11/29/17   1800   NA  143  141   K  4.2  4.2   CL  106  105   CO2  34*  31   BUN  21*  20   CREA  0.66  0.72   GLU  85  93   CA  8.9  9.5   MG  2.2   --    PHOS  3.7   --      Recent Labs      11/30/17 0243   SGOT  5*   ALT  14   AP  92   TBILI  0.2   TP  5.8*   ALB  3.0*   GLOB  2.8     Recent Labs      11/30/17 0243   INR  1.0   PTP  10.1   APTT  26.2      No results for input(s): FE, TIBC, PSAT, FERR in the last 72 hours. No results found for: FOL, RBCF   No results for input(s): PH, PCO2, PO2 in the last 72 hours. No results for input(s): CPK, CKNDX, TROIQ in the last 72 hours.     No lab exists for component: CPKMB  No results found for: CHOL, CHOLX, CHLST, CHOLV, HDL, LDL, LDLC, DLDLP, TGLX, TRIGL, TRIGP, CHHD, [facility]  Lab Results   Component Value Date/Time    Glucose (POC) 117 03/10/2017 07:36 PM     Lab Results   Component Value Date/Time    Color YELLOW/STRAW 11/29/2017 06:33 PM    Appearance CLEAR 11/29/2017 06:33 PM    Specific gravity 1.019 11/29/2017 06:33 PM    pH (UA) 6.5 11/29/2017 06:33 PM    Protein NEGATIVE  11/29/2017 06:33 PM    Glucose NEGATIVE  11/29/2017 06:33 PM    Ketone NEGATIVE  11/29/2017 06:33 PM    Bilirubin NEGATIVE  11/29/2017 06:33 PM    Urobilinogen 0.2 11/29/2017 06:33 PM    Nitrites NEGATIVE  11/29/2017 06:33 PM    Leukocyte Esterase NEGATIVE  11/29/2017 06:33 PM    Epithelial cells MODERATE 08/28/2017 09:26 AM    Bacteria 3+ 08/28/2017 09:26 AM    WBC 5-10 08/28/2017 09:26 AM    RBC 0-5 08/28/2017 09:26 AM         Medications Reviewed:     Current Facility-Administered Medications   Medication Dose Route Frequency    sodium chloride (NS) flush 5-10 mL  5-10 mL IntraVENous Q8H    sodium chloride (NS) flush 5-10 mL  5-10 mL IntraVENous PRN    [START ON 12/1/2017] levothyroxine (SYNTHROID) tablet 100 mcg  100 mcg Oral ACB    levETIRAcetam (KEPPRA) tablet 500 mg  500 mg Oral BID    carBAMazepine (TEGretol) tablet 400 mg  400 mg Oral BID    LORazepam (ATIVAN) injection 1 mg  1 mg IntraVENous PRN    0.9% sodium chloride infusion  75 mL/hr IntraVENous CONTINUOUS     ______________________________________________________________________  EXPECTED LENGTH OF STAY: 2d 14h  ACTUAL LENGTH OF STAY:          1                 Sharif De Dios MD

## 2017-11-30 NOTE — ED NOTES
Pt remains alert and oriented times 4. Speech clear, answering questions appropriately. PERRLA 2mm bilaterally. Respirations even and unlabored with symmetrical chest rise. MOSCOSO's. Remains on cardiac monitor, VSS.

## 2017-11-30 NOTE — ED NOTES
TRANSFER - OUT REPORT:    Verbal report given to [address] [place] [address] [address] on [employer]  being transferred to [facility](unit) for routine progression of care       Report consisted of patients Situation, Background, Assessment and   Recommendations(SBAR). Information from the following report(s) ED Summary was reviewed with the receiving nurse. Lines:   Peripheral IV 08/28/17 Left Antecubital (Active)        Opportunity for questions and clarification was provided. Patient transported with:   Monitor and RN.

## 2017-11-30 NOTE — ED NOTES
Care assumed of pt at this time. Pt currently alert and oriented times 4. Respirations even and unlabored with symmetrical chest rise. Family remains at bedside with pt. VSS.

## 2017-11-30 NOTE — PROGRESS NOTES
Bedside and Verbal shift change report given to Amira (oncoming nurse) by Adams Reyes (offgoing nurse). Report included the following information SBAR, Kardex, Intake/Output, MAR and Recent Results.    \

## 2017-11-30 NOTE — PROGRESS NOTES
Rounded on [employer] patients and provided Anointing of the Sick at request of patient    Fr. Sharon Carpenter

## 2017-11-30 NOTE — CONSULTS
NEUROLOGY CONSULT NOTE    Name Claude Aho Age [de-identified] y.o. MRN [id number]  1935     Consulting Physician: Westley Redman MD      Chief Complaint:  Seizure     Assessment:     · Principal Problem:  ·   Seizure ([address] (3/28/2017)  ·   ·   [de-identified]year old female with a h/o COPD, dementia, hypothyroidism, seizure d/o presenting with staring spells x 2 from her nursing facility concerning for absence seizures. CBM levels therapeutic. No notable metabolic derangements or infectious process identified this admission. CTH head without acute process and EEG c/w mild encephalopathy without epileptiform activity. Neurological examination is presently non-focal and she is at her baseline. Discussed transitioning to LEV from [address] which will hopefully reduce the frequency of her breakthrough seizures. Recommendations:   D/C CBM  Start LEV 500mg BID today  Seizure precautions  If no further seizure activity, ok for discharge tomorrow AM  Please arrange Neuro F/U in 4 weeks    Thank you very much for this referral. I appreciate the opportunity to participate in this patient's care. History of Present Illness: This is a [de-identified] y.o.   female, we were asked to see for breakthrough seizure activity. PMH notable for COPD, dementia, hypothyroidism, seizure d/o. Per her daughter, this is her 3rd admission this year due to breakthrough seizure activity. Last admission 17-17 with seizure activity in the setting of UTI and questionable AED administration at her facility. Patient does not recall events or having seizure activity yesterday. Per review of records, she had at least 2 episodes of staring off prior to admission with excessive eye blinking concerning for absence seizures. No noted GTC activity. She is maintained on CBM 400mg BID and levels are therapeutic this admission. No noted infectious process or recent illness. CT head did not reveal any acute process.   EEG also completed and with diffuse slowing c/w encephalopathy, no epileptiform activity. Allergies   Allergen Reactions    Aricept [Donepezil] Unknown (comments)    Benadryl [Diphenhydramine Hcl] Unknown (comments)    Ciprofloxacin Unknown (comments)    Codeine Unknown (comments)    Macrobid [Nitrofurantoin Monohyd/M-Cryst] Unknown (comments)    Pcn [Penicillins] Other (comments)    Sulfa (Sulfonamide Antibiotics) Other (comments)        Prior to Admission medications    Medication Sig Start Date End Date Taking? Authorizing Provider   cranberry 500 mg capsule Take 1,000 mg by mouth daily. Yes Historical Provider   cetirizine (ZYRTEC) 10 mg tablet Take 10 mg by mouth daily. Yes Historical Provider   traZODone (DESYREL) 50 mg tablet Take 25 mg by mouth nightly as needed for Sleep. May repeat another 25 mg if no sleep in an hour   Yes Historical Provider   carBAMazepine (TEGRETOL) 200 mg tablet Take 2 Tabs by mouth two (2) times a day. Patient taking differently: Take 400 mg by mouth two (2) times a day. 0900 and 2100 3/31/17  Yes Yaya Chan MD   levothyroxine (SYNTHROID) 100 mcg tablet Take 100 mcg by mouth Daily (before breakfast). Yes Shayne Ryan MD   acetaminophen (MAPAP) 500 mg cap Take 1 Tab by mouth daily. Yes Shayne Ryan MD   albuterol (PROVENTIL VENTOLIN) 2.5 mg /3 mL (0.083 %) nebulizer solution 2.5 mg by Nebulization route two (2) times daily as needed. Yes Historical Provider   budesonide (PULMICORT) 0.5 mg/2 mL nbsp 500 mcg by Nebulization route two (2) times a day.  0900 and 1700   Yes Historical Provider       Past Medical History:   Diagnosis Date    [facility]) [facility]     Dementia 3/29/2017    Hypothyroid     [facility])         Past Surgical History:   Procedure Laterality Date    HX HIP REPLACEMENT          Social History   Substance Use Topics    Smoking status: Never Smoker    Smokeless tobacco: Never Used    Alcohol use No        History reviewed. No pertinent family history. Review of Systems:   Comprehensive review of systems performed and negative except for as listed above. Exam:     Visit Vitals    /60 (BP 1 Location: Right arm)    Pulse 75    Temp 98.4 °F (36.9 °C)    Resp 18    Wt 56.8 kg (125 lb 4.8 oz)    SpO2 94%    BMI 22.92 kg/m2        General: Well developed, well nourished. Patient in no apparent distress   Head: Normocephalic, atraumatic, anicteric sclera   Lungs:  Clear to auscultation bilaterally, No wheezes or rubs   Cardiac: Regular rate and rhythm with no murmurs. Abd: Bowel sounds were audible. No tenderness on palpation   Ext: No pedal edema   Skin: No overt signs of rash     Neurological Exam:  Mental Status: Alert and oriented to person place, month and year   Speech: Fluent no aphasia or dysarthria. Cranial Nerves:   Intact visual fields. Facial sensation is normal. Facial movement is symmetric. Palate is midline. Normal sternocleidomastoid strength. Tongue is midline. Hearing is intact bilaterally. Eyes: PERRL, EOM's full, no nystagmus, no ptosis. Motor:  Full and symmetric strength of upper and lower proximal and distal muscles. Normal bulk and tone. Reflexes:   Deep tendon reflexes 2+/4 and symmetrical.  Plantar response is downgoing b/l. Sensory:   Symmetrically intact  with no perceived deficits modalities involving small or large fibers. Gait:  Gait is deferred due to EEG leads in place   Tremor:   No tremor noted. Cerebellar:  Finger to nose and heel over shin to knee was demonstrated competently. Neurovascular: No carotid bruits. Imaging  CT Results (maximum last 3): Results from [place] encounter on 11/29/17   CT HEAD WO CONT   Narrative EXAM:  CT HEAD WO CONT    INDICATION:   breakthrough seizures    COMPARISON: August 28, 2017. CONTRAST:  None. TECHNIQUE: Unenhanced CT of the head was performed using 5 mm images.  Brain and  bone windows were generated. CT dose reduction was achieved through use of a  standardized protocol tailored for this examination and automatic exposure  control for dose modulation. FINDINGS:  The ventricles and sulci are stable in size, shape and configuration and  midline. There is no significant white matter disease. There is no intracranial  hemorrhage, extra-axial collection, mass, mass effect or midline shift. The  basilar cisterns are open. No acute infarct is identified. The bone windows  demonstrate no abnormalities. The visualized portions of the paranasal sinuses  and mastoid air cells are clear. Impression IMPRESSION: No acute intracranial process. No change from the prior study. MRI Results (maximum last 3): No results found for this or any previous visit. Lab Review  Lab Results   Component Value Date/Time    WBC 7.1 11/30/2017 02:43 AM    HCT 38.7 11/30/2017 02:43 AM    HGB 12.3 11/30/2017 02:43 AM    PLATELET 711 06/39/2224 02:43 AM     Lab Results   Component Value Date/Time    Sodium 143 11/30/2017 02:43 AM    Potassium 4.2 11/30/2017 02:43 AM    Chloride 106 11/30/2017 02:43 AM    CO2 34 11/30/2017 02:43 AM    Glucose 85 11/30/2017 02:43 AM    BUN 21 11/30/2017 02:43 AM    Creatinine 0.66 11/30/2017 02:43 AM    Calcium 8.9 11/30/2017 02:43 AM     No components found for: TROPQUANT  No results found for: NICOLAS    Signed:  Deja Yeh.  Randa Lancaster DO  11/30/2017  11:59 AM

## 2017-11-30 NOTE — PROGRESS NOTES
Admission Medication Reconciliation:    Information obtained from:  [facility] from [address]    Comments/Recommendations: Updated PTA meds/reviewed patient's allergies. 1)  Last doses per [facility]       Significant PMH/Disease States:   Past Medical History:   Diagnosis Date    COPD (chronic obstructive pulmonary disease) ([address]     Dementia 3/29/2017    Hypothyroid     Seizure [facility])        Chief Complaint for this Admission:    Chief Complaint   Patient presents with    Seizure       Allergies:  Aricept [donepezil]; Benadryl [diphenhydramine hcl]; Ciprofloxacin; Codeine; Macrobid [nitrofurantoin monohyd/m-cryst]; Pcn [penicillins]; and Sulfa (sulfonamide antibiotics)    Prior to Admission Medications:   Prior to Admission Medications   Prescriptions Last Dose Informant Patient Reported? Taking?   acetaminophen (MAPAP) 500 mg cap 2017 at 0900  Yes Yes   Sig: Take 1 Tab by mouth daily. albuterol (PROVENTIL VENTOLIN) 2.5 mg /3 mL (0.083 %) nebulizer solution   Yes Yes   Si.5 mg by Nebulization route two (2) times daily as needed. budesonide (PULMICORT) 0.5 mg/2 mL nbsp 2017 at 1700  Yes Yes   Si mcg by Nebulization route two (2) times a day. 0900 and 1700   carBAMazepine (TEGRETOL) 200 mg tablet 2017 at 0900  No Yes   Sig: Take 2 Tabs by mouth two (2) times a day. Patient taking differently: Take 400 mg by mouth two (2) times a day. 0900 and 2100   cetirizine (ZYRTEC) 10 mg tablet 2017 at 0900  Yes Yes   Sig: Take 10 mg by mouth daily. cranberry 500 mg capsule 2017 at 0900  Yes Yes   Sig: Take 1,000 mg by mouth daily. levothyroxine (SYNTHROID) 100 mcg tablet 2017 at 0600  Yes Yes   Sig: Take 100 mcg by mouth Daily (before breakfast). traZODone (DESYREL) 50 mg tablet   Yes Yes   Sig: Take 25 mg by mouth nightly as needed for Sleep.  May repeat another 25 mg if no sleep in an hour      Facility-Administered Medications: None

## 2017-11-30 NOTE — INTERDISCIPLINARY ROUNDS
IDR/SLIDR Summary          Patient: Anthony Gibbs MRN: [id number]    Age: [de-identified] y.o. YOB: 1935 Room[facility]Bed: [facility]   Admit Diagnosis: Seizure ([facility]  Principal Diagnosis: Seizure ([facility]   Goals: EEG; consults  Readmission: NO  Quality Measure: Not applicable  VTE Prophylaxis: Mechanical  Influenza Vaccine screening completed? YES  Pneumococcal Vaccine screening completed? NO  Mobility needs: Yes   Nutrition plan:Yes  Consults: P. T and O.T. Financial concerns:No  Escalated to CM? NO  RRAT Score: 23   Interventions:H2H  Testing due for pt today?  YES  LOS: 1 days Expected length of stay 3-4 days  Discharge plan: tbd   PCP: Shayne Ryan MD  Transportation needs: Yes    Days before discharge: ready for discharge  Discharge disposition: to SNF     Signed:     Andrew Talavera RN  11/30/2017  7:26 AM

## 2017-11-30 NOTE — PROCEDURES
ELECTROENCEPHALOGRAM REPORT    HISTORY: Patient is a 53-year-old female who is being evaluated for seizure activity. DESCRIPTION: This is an 18-channel EEG performed on an awake  patient. The dominant posterior background rhythm consists of  medium-voltage rhythms in the 6 Hz frequency range out of the posterior  head region. Diffuse slowing is noted throughout the majority of the recording. Photic stimulation does not elicit a significant driving response. Hyperventilation was not performed. ELECTROENCEPHALOGRAM SUMMARY: Mildly abnormal EEG due to mild diffuse slowing. CLINICAL INTERPRETATION: This EEG is suggestive of some mild generalized  encephalopathic process, nonspecific in type. This may be related to  underlying structural brain injury and/or toxic/metabolic abnormality. No  clearly lateralizing or epileptiform features were seen. Please correlate  Clinically.     Kandace Libman, DO  11/30/17

## 2017-11-30 NOTE — PROGRESS NOTES
0020: Pt arrived to unit via stretcher; A/O x4; VSS  Pt states that she knows she is here for seizures, denies falling at [address]   Pt describes here seizures as \"starring into space for 5-15 sec\"; she is aware of her seizure episodes while they are occurring, denies ability to speak during seizure activity.  Pt denies any post-ictal state that she can recall     Primary Nurse Ghazal Winston RN and Lucy Peng RN performed a dual skin assessment on this patient No impairment noted  George score is 19

## 2017-12-01 VITALS
OXYGEN SATURATION: 96 % | SYSTOLIC BLOOD PRESSURE: 138 MMHG | TEMPERATURE: 97.9 F | HEART RATE: 76 BPM | DIASTOLIC BLOOD PRESSURE: 76 MMHG | BODY MASS INDEX: 23.96 KG/M2 | RESPIRATION RATE: 12 BRPM | WEIGHT: 131 LBS

## 2017-12-01 PROCEDURE — 74011250637 HC RX REV CODE- 250/637: Performed by: HOSPITALIST

## 2017-12-01 RX ORDER — LEVETIRACETAM 500 MG/1
500 TABLET ORAL 2 TIMES DAILY
Qty: 60 TAB | Refills: 0 | Status: SHIPPED | OUTPATIENT
Start: 2017-12-01 | End: 2017-12-31

## 2017-12-01 RX ADMIN — CARBAMAZEPINE 400 MG: 200 TABLET ORAL at 09:21

## 2017-12-01 RX ADMIN — LEVETIRACETAM 500 MG: 500 TABLET ORAL at 09:21

## 2017-12-01 RX ADMIN — LEVOTHYROXINE SODIUM 100 MCG: 100 TABLET ORAL at 06:58

## 2017-12-01 NOTE — PROGRESS NOTES
Problem: Falls - Risk of  Goal: *Absence of Falls  Document Forrest Fall Risk and appropriate interventions in the flowsheet.    Outcome: Progressing Towards Goal  Fall Risk Interventions:  Mobility Interventions: PT Consult for assist device competence, Bed/chair exit alarm, Patient to call before getting OOB, PT Consult for mobility concerns, Communicate number of staff needed for ambulation/transfer, Utilize walker, cane, or other assitive device         Medication Interventions: Bed/chair exit alarm, Evaluate medications/consider consulting pharmacy, Patient to call before getting OOB, Teach patient to arise slowly    Elimination Interventions: Call light in reach, Bed/chair exit alarm, Patient to call for help with toileting needs, Toilet paper/wipes in reach, Toileting schedule/hourly rounds

## 2017-12-01 NOTE — PROGRESS NOTES
This pt is being discharged back to [address] [facility]) today. CM spoke with pt's daughter, Sandip Jimenes to inform her and she is in agreement with d/c. Suresh spoke with Venkatesh Abbott ([phone]) charge nurse for Memory care to inform her of d/c and she can accept pt today. CM faxed d/c paperwork to her. Pt's family will transport pt back to the facility at 11am today. An envelope containing pt's kardex, MARs and AVS will be faxed to Memory care unit at [facility]. Also, pt's nurse to call report.  MIGUEL ÁNGEL Gonzalez,ACM-SW

## 2017-12-01 NOTE — DISCHARGE INSTRUCTIONS
Discharge Instructions       PATIENT ID: Elmo Patel  MRN: [id number]   YOB: 1935    DATE OF ADMISSION: 11/29/2017  5:49 PM    DATE OF DISCHARGE: 12/1/2017    PRIMARY CARE PROVIDER: Vallerie Phalen, MD     ATTENDING PHYSICIAN: Lb Vasquez MD  DISCHARGING PROVIDER: Lb Vasquez MD    To contact this individual call [phone] and ask the  to page. If unavailable ask to be transferred the Adult Hospitalist Department. DISCHARGE DIAGNOSES Seizures    CONSULTATIONS: IP CONSULT TO HOSPITALIST  IP CONSULT TO NEUROLOGY    PROCEDURES/SURGERIES: * No surgery found *    PENDING TEST RESULTS:   At the time of discharge the following test results are still pending:     FOLLOW UP APPOINTMENTS:   Follow-up Information     Follow up With Details Comments [address] [facility],  In 4 weeks  [facility] at [address] [place] [address].  [phone]        PT WILL NEED A PCP F/U IN 2 WEEKS            ADDITIONAL CARE RECOMMENDATIONS:     DIET: Regular Diet and Cardiac Diet    ACTIVITY: Activity as tolerated    WOUND CARE:     EQUIPMENT needed:       DISCHARGE MEDICATIONS:   See Medication Reconciliation Form    · It is important that you take the medication exactly as they are prescribed. · Keep your medication in the bottles provided by the pharmacist and keep a list of the medication names, dosages, and times to be taken in your wallet. · Do not take other medications without consulting your doctor. NOTIFY YOUR PHYSICIAN FOR ANY OF THE FOLLOWING:   Fever over 101 degrees for 24 hours. Chest pain, shortness of breath, fever, chills, nausea, vomiting, diarrhea, change in mentation, falling, weakness, bleeding. Severe pain or pain not relieved by medications. Or, any other signs or symptoms that you may have questions about.       DISPOSITION:    Home With:   OT  PT  HH  RN      x SNF/Inpatient Rehab/LTAC Independent/assisted living    Hospice    Other:     CDMP Checked:   Yes x     PROBLEM LIST Updated:  Yes x       Signed:   Sharif De Dios MD  12/1/2017  7:54 AM

## 2017-12-01 NOTE — PROGRESS NOTES
Bedside RN performed patient education and medication education. Discharge concerns initiated and discussed with patient, including clarification on \"who\" assists the patient at their home and instructions for when the home going patient should call their provider after discharge. Opportunity for questions and clarification was provided. Patient receptive to education: YES  Patient stated: knows she is taking keppra    Barriers to Education: dementia  Diagnosis Education given:  YES    Length of stay: 2  Expected Day of Discharge: 12/01/17    Ask if they have \"Help at Home\" & add to white board?   YES    Education Day #: 2      Medication Education Given:  YES  M in the box Medication name: keppra      Pt aware of [facility] survey: Returning to our [address] [facility]

## 2017-12-01 NOTE — ROUTINE PROCESS
TRANSFER - OUT REPORT:    Verbal report given to LEELA Ni (name) on Luis Males  being transferred to [address] [place] [address] (unit) for routine progression of care       Report consisted of patients Situation, Background, Assessment and   Recommendations(SBAR). Information from the following report(s) SBAR, Kardex, [facility] and Recent Results was reviewed with the receiving nurse. Lines:       Opportunity for questions and clarification was provided. Patient transported with:   Personal belongings. Transport with family.

## 2017-12-01 NOTE — PROGRESS NOTES
Bedside shift change report given to Betito (oncoming nurse) by Charlee Arnold (offgoing nurse). Report included the following information SBAR, Kardex, Intake/Output, MAR, Accordion, Recent Results and Med Rec Status.

## 2017-12-04 NOTE — DISCHARGE SUMMARY
Discharge Summary       PATIENT ID: Marylu Christie  MRN: [id number]   YOB: 1935    DATE OF ADMISSION: 11/29/2017  5:49 PM    DATE OF DISCHARGE: 11/30/17   PRIMARY CARE PROVIDER: David Prince MD     ATTENDING PHYSICIAN: Navdeep Monzon  DISCHARGING PROVIDER: Stacie Kamara MD    To contact this individual call [phone] and ask the  to page. If unavailable ask to be transferred the Adult Hospitalist Department. CONSULTATIONS: IP CONSULT TO NEUROLOGY    PROCEDURES/SURGERIES: * No surgery found *    ADMITTING DIAGNOSES & HOSPITAL COURSE:    An 51-year-old white female with   past medical history of COPD, dementia, hypothyroidism, seizure   disorder, general debility, presented to the emergency department via   EMS from [address] [place] [address] of [address] [place] [address] with reported chief   complaint of seizures.      Assessment & Plan:      1. Breakthrough seizure. Admit the patient. EEG d/w neurology will add keppra and cont carbamazepine for bipolar disease     2. Hypotension. Resolved     3. Generalized weakness. Placed on fall precautions, need physical   and occupational therapy.       4. Dementia signed to mentioned noted in the chart records. Currently   not on any medicines for the same.       5. Hypothyroidism. Continue levothyroxine.       6. COPD, will use DuoNeb nebulizer treatments p.r.n.           PENDING TEST RESULTS:   At the time of discharge the following test results are still pending:     FOLLOW UP APPOINTMENTS:    Follow-up Information     Follow up With Details Comments [address] [place] [address] On 1/4/2018 1030 a.m. for post hospital follow up (no other appointment available until January 16) [facility]      David Prince MD In 1 week MD will follow up with patient at [facility] of [address] [place] [address] [address] [phone] ADDITIONAL CARE RECOMMENDATIONS:     DIET: Regular Diet    ACTIVITY: Activity as tolerated    WOUND CARE:     EQUIPMENT needed:       DISCHARGE MEDICATIONS:  Discharge Medication List as of 12/1/2017 10:30 AM      START taking these medications    Details   levETIRAcetam (KEPPRA) 500 mg tablet Take 1 Tab by mouth two (2) times a day for 30 days. , Print, Disp-60 Tab, R-0         CONTINUE these medications which have NOT CHANGED    Details   cranberry 500 mg capsule Take 1,000 mg by mouth daily. , Historical Med      cetirizine (ZYRTEC) 10 mg tablet Take 10 mg by mouth daily. , Historical Med      traZODone (DESYREL) 50 mg tablet Take 25 mg by mouth nightly as needed for Sleep. May repeat another 25 mg if no sleep in an hour, Historical Med      carBAMazepine (TEGRETOL) 200 mg tablet Take 2 Tabs by mouth two (2) times a day., No Print, Disp-60 Tab, R-0      levothyroxine (SYNTHROID) 100 mcg tablet Take 100 mcg by mouth Daily (before breakfast). , Historical Med      acetaminophen (MAPAP) 500 mg cap Take 1 Tab by mouth daily. , Historical Med      albuterol (PROVENTIL VENTOLIN) 2.5 mg /3 mL (0.083 %) nebulizer solution 2.5 mg by Nebulization route two (2) times daily as needed., Historical Med      budesonide (PULMICORT) 0.5 mg/2 mL nbsp 500 mcg by Nebulization route two (2) times a day. 0900 and 1700, Historical Med               NOTIFY YOUR PHYSICIAN FOR ANY OF THE FOLLOWING:   Fever over 101 degrees for 24 hours. Chest pain, shortness of breath, fever, chills, nausea, vomiting, diarrhea, change in mentation, falling, weakness, bleeding. Severe pain or pain not relieved by medications. Or, any other signs or symptoms that you may have questions about.     DISPOSITION:    Home With:   OT  PT  HH  RN      x Long term SNF/Inpatient Rehab    Independent/assisted living    Hospice    Other:       PATIENT CONDITION AT DISCHARGE:     Functional status    Poor    x Deconditioned     Independent      Cognition     Lucid Forgetful    x Dementia      Catheters/lines (plus indication)    Almaraz     PICC     PEG    x None      Code status    x Full code     DNR      PHYSICAL EXAMINATION AT DISCHARGE:   Refer to Progress Note      CHRONIC MEDICAL DIAGNOSES:  Problem List as of 12/1/2017  Date Reviewed: 11/29/2017          Codes Class Noted - Resolved    Acute cystitis ICD-10-CM: N30.00  ICD-9-CM: 595.0  8/28/2017 - Present        Dementia (Chronic) ICD-10-CM: F03.90  ICD-9-CM: 294.20  3/29/2017 - Present        UTI (urinary tract infection) ICD-10-CM: N39.0  ICD-9-CM: 599.0  3/29/2017 - Present        * (Principal)Seizure ([facility] [address] (Chronic) ICD-10-CM: R56.9  ICD-9-CM: 780.39  3/28/2017 - Present        COPD (chronic obstructive pulmonary disease) ([facility] [address] (Chronic) ICD-10-CM: J44.9  ICD-9-CM: 530  3/28/2017 - Present        Acquired hypothyroidism (Chronic) ICD-10-CM: E03.9  ICD-9-CM: 244.9  3/28/2017 - Present              Greater than 30  minutes were spent with the patient on counseling and coordination of care    Signed:   Darwin Preciado MD  12/4/2017  10:47 AM

## 2018-01-03 ENCOUNTER — APPOINTMENT (OUTPATIENT)
Dept: GENERAL RADIOLOGY | Age: 83
End: 2018-01-03
Attending: EMERGENCY MEDICINE
Payer: MEDICARE

## 2018-01-03 ENCOUNTER — APPOINTMENT (OUTPATIENT)
Dept: CT IMAGING | Age: 83
End: 2018-01-03
Attending: EMERGENCY MEDICINE
Payer: MEDICARE

## 2018-01-03 ENCOUNTER — HOSPITAL ENCOUNTER (EMERGENCY)
Age: 83
Discharge: HOME | End: 2018-01-03
Attending: EMERGENCY MEDICINE
Payer: MEDICARE

## 2018-01-03 VITALS
RESPIRATION RATE: 16 BRPM | HEART RATE: 75 BPM | BODY MASS INDEX: 23.74 KG/M2 | HEIGHT: 62 IN | OXYGEN SATURATION: 97 % | TEMPERATURE: 98.6 F | SYSTOLIC BLOOD PRESSURE: 109 MMHG | DIASTOLIC BLOOD PRESSURE: 56 MMHG | WEIGHT: 129 LBS
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
## 2018-01-03 PROCEDURE — 93005 ELECTROCARDIOGRAM TRACING: CPT

## 2018-01-03 PROCEDURE — 90715 TDAP VACCINE 7 YRS/> IM: CPT | Performed by: EMERGENCY MEDICINE

## 2018-01-03 PROCEDURE — 74176 CT ABD & PELVIS W/O CONTRAST: CPT

## 2018-01-03 PROCEDURE — 74011250636 HC RX REV CODE- 250/636: Performed by: EMERGENCY MEDICINE

## 2018-01-03 PROCEDURE — 71250 CT THORAX DX C-: CPT

## 2018-01-03 PROCEDURE — 70450 CT HEAD/BRAIN W/O DYE: CPT

## 2018-01-03 PROCEDURE — 72125 CT NECK SPINE W/O DYE: CPT

## 2018-01-03 PROCEDURE — 90471 IMMUNIZATION ADMIN: CPT

## 2018-01-03 PROCEDURE — 73090 X-RAY EXAM OF FOREARM: CPT

## 2018-01-03 PROCEDURE — 99285 EMERGENCY DEPT VISIT HI MDM: CPT

## 2018-01-03 PROCEDURE — 74011250637 HC RX REV CODE- 250/637: Performed by: EMERGENCY MEDICINE

## 2018-01-03 RX ORDER — LEVOTHYROXINE SODIUM 100 UG/1
100 TABLET ORAL
COMMUNITY

## 2018-01-03 RX ORDER — LAMOTRIGINE 25 MG/1
25 TABLET ORAL EVERY EVENING
COMMUNITY
Start: 2018-01-03 | End: 2018-01-09

## 2018-01-03 RX ORDER — LAMOTRIGINE 25 MG/1
50 TABLET ORAL
COMMUNITY
Start: 2018-01-03 | End: 2018-01-16

## 2018-01-03 RX ORDER — CETIRIZINE HCL 10 MG
10 TABLET ORAL DAILY
COMMUNITY
End: 2021-04-04

## 2018-01-03 RX ORDER — TRAZODONE HYDROCHLORIDE 50 MG/1
25 TABLET ORAL
COMMUNITY
End: 2021-04-04

## 2018-01-03 RX ORDER — IPRATROPIUM BROMIDE AND ALBUTEROL SULFATE 2.5; .5 MG/3ML; MG/3ML
3 SOLUTION RESPIRATORY (INHALATION) 3 TIMES DAILY
Status: ON HOLD | COMMUNITY
End: 2021-07-29

## 2018-01-03 RX ORDER — CARBAMAZEPINE 400 MG/1
400 TABLET, EXTENDED RELEASE ORAL 2 TIMES DAILY
COMMUNITY
End: 2021-04-16

## 2018-01-03 RX ORDER — CARBAMAZEPINE 200 MG/1
400 TABLET, EXTENDED RELEASE ORAL
Status: COMPLETED | OUTPATIENT
Start: 2018-01-03 | End: 2018-01-03

## 2018-01-03 RX ORDER — LAMOTRIGINE 100 MG/1
50 TABLET ORAL
Status: COMPLETED | OUTPATIENT
Start: 2018-01-03 | End: 2018-01-03

## 2018-01-03 RX ORDER — BUDESONIDE 0.5 MG/2ML
500 INHALANT ORAL 2 TIMES DAILY
COMMUNITY
End: 2021-04-04

## 2018-01-03 RX ORDER — POTASSIUM &MAGNESIUM ASPARTATE 250-250 MG
1000 CAPSULE ORAL DAILY
COMMUNITY
End: 2021-04-04

## 2018-01-03 RX ORDER — ACETAMINOPHEN 500 MG/1
1 CAPSULE, LIQUID FILLED ORAL
COMMUNITY
End: 2021-04-13

## 2018-01-03 RX ORDER — ACETAMINOPHEN 500 MG/1
1 CAPSULE, LIQUID FILLED ORAL DAILY
Status: ON HOLD | COMMUNITY
End: 2021-07-29

## 2018-01-03 RX ADMIN — TETANUS TOXOID, REDUCED DIPHTHERIA TOXOID AND ACELLULAR PERTUSSIS VACCINE, ADSORBED 0.5 ML: 5; 2.5; 8; 8; 2.5 SUSPENSION INTRAMUSCULAR at 08:58

## 2018-01-03 RX ADMIN — LAMOTRIGINE 50 MG: 100 TABLET ORAL at 10:11

## 2018-01-03 RX ADMIN — CARBAMAZEPINE 400 MG: 200 TABLET, EXTENDED RELEASE ORAL at 10:11

## 2018-01-03 NOTE — ED PROVIDER NOTES
HPI Comments: [de-identified] y.o. female with past medical history significant for dementia, COPD who presents from nursing facillity with chief complaint of fall. Told staff she was putting something away in a drawer and lost her balance. Complains of neck and back pain. Also complains of bilateral hip pain and left forearm pain. Denies LOC. History limited secondary to dementia. There are no other acute medical concerns at this time. Social hx: lives at [address] [place] [address] in a memory care unit    PCP: Walter Howard MD        Patient is a [de-identified] y.o. female presenting with fall, neck pain, and back pain. Fall     Neck Pain      Back Pain           Past Medical History:   Diagnosis Date    COPD (chronic obstructive pulmonary disease) ([facility] [address]     Dementia 3/29/2017    Hypothyroid     Seizure [facility])        Past Surgical History:   Procedure Laterality Date    HX HIP REPLACEMENT           No family history on file. Social History     Social History    Marital status:      Spouse name: N/A    Number of children: N/A    Years of education: N/A     Occupational History    Not on file. Social History Main Topics    Smoking status: Never Smoker    Smokeless tobacco: Never Used    Alcohol use No    Drug use: Not on file    Sexual activity: Not on file     Other Topics Concern    Not on file     Social History Narrative         ALLERGIES: Aricept [donepezil]; Benadryl [diphenhydramine hcl]; Ciprofloxacin; Codeine; Macrobid [nitrofurantoin monohyd/m-cryst]; Pcn [penicillins]; and Sulfa (sulfonamide antibiotics)    Review of Systems   Unable to perform ROS: Dementia   Musculoskeletal: Positive for back pain and neck pain. There were no vitals filed for this visit. Physical Exam   Constitutional: She appears well-nourished. Elderly, frail   HENT:   Head: Normocephalic and atraumatic.    Mouth/Throat: Oropharynx is clear and moist.   Eyes: Conjunctivae are normal. Pupils are equal, round, and reactive to light. No scleral icterus. Neck: Neck supple. No tracheal deviation present. Cardiovascular: Normal rate, regular rhythm, normal heart sounds and intact distal pulses. Pulmonary/Chest: Effort normal and breath sounds normal. No respiratory distress. Abdominal: Soft. She exhibits no distension. There is no tenderness. There is no rebound and no guarding. Genitourinary:   Genitourinary Comments: deferred   Musculoskeletal: She exhibits tenderness (bilateral hips, no spinal tenderness). She exhibits no edema. Neurological: She is alert. Motor and sensation intact throughout   Skin: Skin is warm and dry. Skin tear left forearm   Psychiatric: She has a normal mood and affect. Nursing note and vitals reviewed. MDM  Number of Diagnoses or Management Options    ED Course       Procedures        MDM:  [de-identified] y.o. female with past medical history significant for dementia presents with pain after a fall  Differential diagnosis includes ICH, cervical fracture, extremity fracture  GLF, stated to be mechanical         EKG Per My Interpretation:  Rhythm: normal sinus rhythm;  Rate (approx.): 75; Axis: normal; QRS interval: normal ; ST/T wave: normal; Other findings: infarct - q waves in septal leads. LABORATORY TESTS:  Labs Reviewed - No data to display    IMAGING RESULTS:  Ct Head Wo Cont    Result Date: 1/3/2018  EXAM:  CT HEAD WO CONT INDICATION:   Status post fall with acute headache and neck pain COMPARISON: 11/30/2017. CONTRAST:  None. TECHNIQUE: Unenhanced CT of the head was performed using 5 mm images. Brain and bone windows were generated. CT dose reduction was achieved through use of a standardized protocol tailored for this examination and automatic exposure control for dose modulation. FINDINGS: Generalized atrophy is noted. Mild small vessel ischemic changes are stable in the periventricular white matter.  There is no intracranial hemorrhage, extra-axial collection, mass, mass effect or midline shift. The basilar cisterns are open. No acute infarct is identified. The bone windows demonstrate no abnormalities. The visualized portions of the paranasal sinuses and mastoid air cells are clear. Vascular calcification is noted. IMPRESSION: No acute process or change compared to the prior exam.     Ct Chest Wo Cont    Result Date: 1/3/2018  INDICATION: Status post fall with back and shoulder pain COMPARISON: None CONTRAST: None. TECHNIQUE:  5 mm axial images were obtained through the chest. Coronal and sagittal reconstructions were generated. CT dose reduction was achieved through use of a standardized protocol tailored for this examination and automatic exposure control for dose modulation. The absence of intravenous contrast reduces the sensitivity for evaluation of the mediastinum and upper abdominal organs. FINDINGS: THYROID: No nodule. MEDIASTINUM: Shotty mediastinal lymph nodes are noted. YAEL: No mass or lymphadenopathy. THORACIC AORTA: No aneurysm. MAIN PULMONARY ARTERY: Normal in caliber. TRACHEA/BRONCHI: Patent. ESOPHAGUS: No wall thickening or dilatation. HEART: Normal in size. PLEURA: No effusion or pneumothorax. LUNGS: Emphysematous changes are noted. Micronodules are noted in the right upper and right lower lobes. Bronchiectasis is seen in the right middle lobe and lingula. There is a 5 mm nodule in the lingula. No acute abnormality is identified. LIVER: No mass or biliary dilatation. GALLBLADDER: Gallstones are noted. SPLEEN: No mass. PANCREAS: No mass or ductal dilatation. ADRENALS: Unremarkable. KIDNEYS/URETERS: Punctate nonobstructing renal stones are noted bilaterally. STOMACH: Unremarkable. SMALL BOWEL: No dilatation or wall thickening. COLON: No dilatation or wall thickening. APPENDIX: Unremarkable. PERITONEUM: No ascites or pneumoperitoneum. RETROPERITONEUM: No lymphadenopathy or aortic aneurysm.  REPRODUCTIVE ORGANS: There is no pelvic mass or lymphadenopathy. URINARY BLADDER: No mass or calculus. BONES: The patient is status post right total hip replacement. The bones are osteopenic. Degenerative changes are seen throughout the thoracic and lumbar spine. Chronic compression fractures of T5 and T6 are noted. ADDITIONAL COMMENTS: N/A     IMPRESSION: 1. Emphysematous changes with bronchiectasis and micronodules as described above. 2. Cholelithiasis. 3. Punctate nonobstructing renal stones. 4. Chronic compression fractures of T5 and T6. 5. No acute abnormality in the chest, abdomen, or pelvis. Ct Spine Cerv Wo Cont    Result Date: 1/3/2018  EXAM:  CT CERVICAL SPINE WITHOUT CONTRAST INDICATION:   Status post fall with acute neck pain. COMPARISON: None. CONTRAST:  None. TECHNIQUE: Multislice helical CT of the cervical spine was performed without intravenous contrast administration. Sagittal and coronal reconstructions were generated. CT dose reduction was achieved through use of a standardized protocol tailored for this examination and automatic exposure control for dose modulation. FINDINGS: The alignment is within normal limits. There is no fracture or subluxation. The odontoid process is intact. The craniocervical junction is within normal limits. The prevertebral soft tissues are within normal limits. Spondylitic changes are noted at C3-4, C4-5, C5-6, and C6-7 with posterior osteophyte/disc bulge complex is largest at C5-C6. Bilateral neural foraminal narrowing is noted at these levels secondary to uncovertebral osteophyte formation. IMPRESSION: Multilevel spondylosis without acute abnormality. Ct Abd Pelv Wo Cont    Result Date: 1/3/2018  INDICATION: Status post fall with back and shoulder pain COMPARISON: None CONTRAST: None. TECHNIQUE:  5 mm axial images were obtained through the chest. Coronal and sagittal reconstructions were generated.   CT dose reduction was achieved through use of a standardized protocol tailored for this examination and automatic exposure control for dose modulation. The absence of intravenous contrast reduces the sensitivity for evaluation of the mediastinum and upper abdominal organs. FINDINGS: THYROID: No nodule. MEDIASTINUM: Shotty mediastinal lymph nodes are noted. YAEL: No mass or lymphadenopathy. THORACIC AORTA: No aneurysm. MAIN PULMONARY ARTERY: Normal in caliber. TRACHEA/BRONCHI: Patent. ESOPHAGUS: No wall thickening or dilatation. HEART: Normal in size. PLEURA: No effusion or pneumothorax. LUNGS: Emphysematous changes are noted. Micronodules are noted in the right upper and right lower lobes. Bronchiectasis is seen in the right middle lobe and lingula. There is a 5 mm nodule in the lingula. No acute abnormality is identified. LIVER: No mass or biliary dilatation. GALLBLADDER: Gallstones are noted. SPLEEN: No mass. PANCREAS: No mass or ductal dilatation. ADRENALS: Unremarkable. KIDNEYS/URETERS: Punctate nonobstructing renal stones are noted bilaterally. STOMACH: Unremarkable. SMALL BOWEL: No dilatation or wall thickening. COLON: No dilatation or wall thickening. APPENDIX: Unremarkable. PERITONEUM: No ascites or pneumoperitoneum. RETROPERITONEUM: No lymphadenopathy or aortic aneurysm. REPRODUCTIVE ORGANS: There is no pelvic mass or lymphadenopathy. URINARY BLADDER: No mass or calculus. BONES: The patient is status post right total hip replacement. The bones are osteopenic. Degenerative changes are seen throughout the thoracic and lumbar spine. Chronic compression fractures of T5 and T6 are noted. ADDITIONAL COMMENTS: N/A     IMPRESSION: 1. Emphysematous changes with bronchiectasis and micronodules as described above. 2. Cholelithiasis. 3. Punctate nonobstructing renal stones. 4. Chronic compression fractures of T5 and T6. 5. No acute abnormality in the chest, abdomen, or pelvis. MEDICATIONS GIVEN:  Medications   diph,Pertuss(AC),Tet Vac-PF (BOOSTRIX) suspension 0.5 mL (not administered)       IMPRESSION:  1.  Fall, initial encounter        PLAN:  -   -   Follow-up Information     Follow up With Details Comments [address] MD Brian Go in 2 days  [address] [id number] [phone]      [facility] EMERGENCY DEP  If symptoms worsen [address]  [phone]          Disposition:  home, see patient instructions for treatment and plan    Condition:  stable    Total critical care time spent exclusive of procedures:  0 minutes    Grady Smith MD

## 2018-01-03 NOTE — ED NOTES
[facility] here to transport patient back to [facility]. Discharge instructions given to transporters.

## 2018-01-03 NOTE — ED TRIAGE NOTES
I got up to get something out of the dresser and fell over. C/O neck and shoulder pain. Pt. Resides at [address].

## 2018-01-03 NOTE — DISCHARGE INSTRUCTIONS

## 2018-01-03 NOTE — ED NOTES
TRANSFER - OUT REPORT:    Verbal report given to LEELA Ni (name) on Jem Espinoza  being transferred to [address] [place] [address] (unit) for routine progression of care       Report consisted of patients Situation, Background, Assessment and   Recommendations(SBAR). Information from the following report(s) SBAR, Kardex, ED Summary, Intake/Output, MAR and Recent Results was reviewed with the receiving nurse. Lines:       Opportunity for questions and clarification was provided.

## 2018-01-04 LAB
ATRIAL RATE: 75 BPM
CALCULATED P AXIS, ECG09: 66 DEGREES
CALCULATED R AXIS, ECG10: 77 DEGREES
CALCULATED T AXIS, ECG11: 65 DEGREES
DIAGNOSIS, 93000: NORMAL
P-R INTERVAL, ECG05: 206 MS
Q-T INTERVAL, ECG07: 370 MS
QRS DURATION, ECG06: 86 MS
QTC CALCULATION (BEZET), ECG08: 413 MS
VENTRICULAR RATE, ECG03: 75 BPM

## 2018-01-04 NOTE — CALL BACK NOTE
[facility] Emergency Department Follow Up Call Record    Discharged to : Home/Family Home/Home Health/Skilled Facility/Rehab/Assisted Living/Other  [facility]____  1) Did you receive your discharge instructions? YES       2) Do you understand them? Yes    This writer spoke with nursing at [address] and they report they have received the Discharge Instructions. 3) Are you able to follow them? Yes          If NO, what can I clarify for you? 4) Do you understand your diagnosis? Yes         5) Do you know which symptoms should prompt you to call the doctor? Yes     6) Were you able to fill and  any medications that were prescribed? Not applicable     7) You were prescribed _n/a__________for ____________________. Common side effects of this medication are____________________. This is not a complete list so please review the forms given from the pharmacy for a complete list.      8) Are there any questions about your medications? Not applicable            Have you scheduled any recommended doctors appointments (specialty, PCP) Nursing aware of follow up needs. If NO, what barriers are you encountering (transportation/lost contact info/cost/  didnt think necessary/no PCP  9) If discharged with Home Health, has the agency contacted you to schedule visit? N/A  10) Is there anyone available to help you at home (meals, errands, transportation    monitoring) (adult children, neighbors, private duty companions) Yes    11) Are you on a special diet? No         If YES, do you understand the requirements for this diet? Education provided? 12) If presented with cough, bronchitis, COPD, asthma, is it ok to ask that the   respiratory disease management educator call you? Not applicable      13)  A) If presented with fall, were you issued an assistive device in the ED    Are you using? Yes. This patient uses assistive device to ambulate at facility.   B) If given RX for device, have you obtained? Not applicable       If NO, barriers? C) Therapist recommended:NO   Are you able to implement the suggestions? Not applicable        If NO, barriers to implementation? Dementia, patient lives in [address] [place] [address]. D) Are you having any difficulties with mobility inside your home?     (steps, bed, tub)Yes  Hx of falls   If YES, ask if the [facility] PT can contact patient and good time and number?  14)  At the end of your discharge instructions, there is information about accessing [facility], have you had a chance to review those? Not applicable         Do you have any questions about signing up for this service? We encourage our patients to be active participants in their healthcare and this site is one of the ways to do that. It will allow you to access parts of your medical record, email your doctors office, schedule appointments, and request medications refills . 15) Are there any other questions that I can answer for you regarding    your Emergency department visit?  NO             Estimated Call Time:___12:34 PM  ________________ Date/Time:_______________

## 2018-06-26 ENCOUNTER — HOSPITAL ENCOUNTER (EMERGENCY)
Age: 83
Discharge: HOME | End: 2018-06-26
Attending: EMERGENCY MEDICINE
Payer: MEDICARE

## 2018-06-26 VITALS
BODY MASS INDEX: 20.78 KG/M2 | DIASTOLIC BLOOD PRESSURE: 69 MMHG | RESPIRATION RATE: 18 BRPM | TEMPERATURE: 98.8 F | WEIGHT: 129.31 LBS | HEIGHT: 66 IN | SYSTOLIC BLOOD PRESSURE: 142 MMHG | HEART RATE: 75 BPM | OXYGEN SATURATION: 96 %

## 2018-06-26 DIAGNOSIS — R56.9 SEIZURE (HCC): Primary | ICD-10-CM

## 2018-06-26 DIAGNOSIS — N30.00 ACUTE CYSTITIS WITHOUT HEMATURIA: ICD-10-CM

## 2018-06-26 LAB
ALBUMIN SERPL-MCNC: 3.5 G/DL (ref 3.5–5)
ALBUMIN/GLOB SERPL: 1.1 {RATIO} (ref 1.1–2.2)
ALP SERPL-CCNC: 125 U/L (ref 45–117)
ALT SERPL-CCNC: 14 U/L (ref 12–78)
ANION GAP SERPL CALC-SCNC: 7 MMOL/L (ref 5–15)
APPEARANCE UR: CLEAR
AST SERPL-CCNC: 8 U/L (ref 15–37)
BACTERIA URNS QL MICRO: ABNORMAL /HPF
BASOPHILS # BLD: 0 K/UL (ref 0–0.1)
BASOPHILS NFR BLD: 1 % (ref 0–1)
BILIRUB SERPL-MCNC: 0.2 MG/DL (ref 0.2–1)
BILIRUB UR QL: NEGATIVE
BUN SERPL-MCNC: 17 MG/DL (ref 6–20)
BUN/CREAT SERPL: 21 (ref 12–20)
CALCIUM SERPL-MCNC: 8.9 MG/DL (ref 8.5–10.1)
CARBAMAZEPINE SERPL-MCNC: 7.8 UG/ML (ref 4–12)
CHLORIDE SERPL-SCNC: 106 MMOL/L (ref 97–108)
CK SERPL-CCNC: 26 U/L (ref 26–192)
CO2 SERPL-SCNC: 30 MMOL/L (ref 21–32)
COLOR UR: ABNORMAL
CREAT SERPL-MCNC: 0.8 MG/DL (ref 0.55–1.02)
DIFFERENTIAL METHOD BLD: ABNORMAL
EOSINOPHIL # BLD: 0.1 K/UL (ref 0–0.4)
EOSINOPHIL NFR BLD: 2 % (ref 0–7)
EPITH CASTS URNS QL MICRO: ABNORMAL /LPF
ERYTHROCYTE [DISTWIDTH] IN BLOOD BY AUTOMATED COUNT: 12.7 % (ref 11.5–14.5)
GLOBULIN SER CALC-MCNC: 3.1 G/DL (ref 2–4)
GLUCOSE SERPL-MCNC: 134 MG/DL (ref 65–100)
GLUCOSE UR STRIP.AUTO-MCNC: NEGATIVE MG/DL
HCT VFR BLD AUTO: 39.8 % (ref 35–47)
HGB BLD-MCNC: 12.6 G/DL (ref 11.5–16)
HGB UR QL STRIP: ABNORMAL
HYALINE CASTS URNS QL MICRO: ABNORMAL /LPF (ref 0–5)
IMM GRANULOCYTES # BLD: 0 K/UL (ref 0–0.04)
IMM GRANULOCYTES NFR BLD AUTO: 0 % (ref 0–0.5)
KETONES UR QL STRIP.AUTO: NEGATIVE MG/DL
LEUKOCYTE ESTERASE UR QL STRIP.AUTO: ABNORMAL
LYMPHOCYTES # BLD: 0.9 K/UL (ref 0.8–3.5)
LYMPHOCYTES NFR BLD: 14 % (ref 12–49)
MAGNESIUM SERPL-MCNC: 2.2 MG/DL (ref 1.6–2.4)
MCH RBC QN AUTO: 33.4 PG (ref 26–34)
MCHC RBC AUTO-ENTMCNC: 31.7 G/DL (ref 30–36.5)
MCV RBC AUTO: 105.6 FL (ref 80–99)
MONOCYTES # BLD: 0.5 K/UL (ref 0–1)
MONOCYTES NFR BLD: 7 % (ref 5–13)
NEUTS SEG # BLD: 4.7 K/UL (ref 1.8–8)
NEUTS SEG NFR BLD: 76 % (ref 32–75)
NITRITE UR QL STRIP.AUTO: NEGATIVE
NRBC # BLD: 0 K/UL (ref 0–0.01)
NRBC BLD-RTO: 0 PER 100 WBC
PH UR STRIP: 7 [PH] (ref 5–8)
PLATELET # BLD AUTO: 194 K/UL (ref 150–400)
PMV BLD AUTO: 9.7 FL (ref 8.9–12.9)
POTASSIUM SERPL-SCNC: 3.9 MMOL/L (ref 3.5–5.1)
PROT SERPL-MCNC: 6.6 G/DL (ref 6.4–8.2)
PROT UR STRIP-MCNC: NEGATIVE MG/DL
RBC # BLD AUTO: 3.77 M/UL (ref 3.8–5.2)
RBC #/AREA URNS HPF: ABNORMAL /HPF (ref 0–5)
SODIUM SERPL-SCNC: 143 MMOL/L (ref 136–145)
SP GR UR REFRACTOMETRY: 1.01 (ref 1–1.03)
UA: UC IF INDICATED,UAUC: ABNORMAL
UROBILINOGEN UR QL STRIP.AUTO: 0.2 EU/DL (ref 0.2–1)
WBC # BLD AUTO: 6.2 K/UL (ref 3.6–11)
WBC URNS QL MICRO: ABNORMAL /HPF (ref 0–4)

## 2018-06-26 PROCEDURE — 99285 EMERGENCY DEPT VISIT HI MDM: CPT

## 2018-06-26 PROCEDURE — 80156 ASSAY CARBAMAZEPINE TOTAL: CPT | Performed by: EMERGENCY MEDICINE

## 2018-06-26 PROCEDURE — 74011250637 HC RX REV CODE- 250/637: Performed by: EMERGENCY MEDICINE

## 2018-06-26 PROCEDURE — 87086 URINE CULTURE/COLONY COUNT: CPT | Performed by: EMERGENCY MEDICINE

## 2018-06-26 PROCEDURE — 85025 COMPLETE CBC W/AUTO DIFF WBC: CPT | Performed by: EMERGENCY MEDICINE

## 2018-06-26 PROCEDURE — 87186 SC STD MICRODIL/AGAR DIL: CPT | Performed by: EMERGENCY MEDICINE

## 2018-06-26 PROCEDURE — 93005 ELECTROCARDIOGRAM TRACING: CPT

## 2018-06-26 PROCEDURE — 77030005563 HC CATH URETH INT MMGH -A

## 2018-06-26 PROCEDURE — 81001 URINALYSIS AUTO W/SCOPE: CPT | Performed by: EMERGENCY MEDICINE

## 2018-06-26 PROCEDURE — 87077 CULTURE AEROBIC IDENTIFY: CPT | Performed by: EMERGENCY MEDICINE

## 2018-06-26 PROCEDURE — 80175 DRUG SCREEN QUAN LAMOTRIGINE: CPT | Performed by: EMERGENCY MEDICINE

## 2018-06-26 PROCEDURE — 36415 COLL VENOUS BLD VENIPUNCTURE: CPT | Performed by: EMERGENCY MEDICINE

## 2018-06-26 PROCEDURE — 83735 ASSAY OF MAGNESIUM: CPT | Performed by: EMERGENCY MEDICINE

## 2018-06-26 PROCEDURE — 82550 ASSAY OF CK (CPK): CPT | Performed by: EMERGENCY MEDICINE

## 2018-06-26 PROCEDURE — 51701 INSERT BLADDER CATHETER: CPT

## 2018-06-26 PROCEDURE — 80053 COMPREHEN METABOLIC PANEL: CPT | Performed by: EMERGENCY MEDICINE

## 2018-06-26 RX ORDER — CEPHALEXIN 500 MG/1
500 CAPSULE ORAL
Status: COMPLETED | OUTPATIENT
Start: 2018-06-26 | End: 2018-06-26

## 2018-06-26 RX ORDER — CEPHALEXIN 500 MG/1
500 CAPSULE ORAL 3 TIMES DAILY
Qty: 15 CAP | Refills: 0 | Status: SHIPPED | OUTPATIENT
Start: 2018-06-26 | End: 2018-07-01

## 2018-06-26 RX ADMIN — CEPHALEXIN 500 MG: 500 CAPSULE ORAL at 22:51

## 2018-06-27 LAB
ATRIAL RATE: 81 BPM
CALCULATED P AXIS, ECG09: 65 DEGREES
CALCULATED R AXIS, ECG10: 60 DEGREES
CALCULATED T AXIS, ECG11: 49 DEGREES
DIAGNOSIS, 93000: NORMAL
P-R INTERVAL, ECG05: 206 MS
Q-T INTERVAL, ECG07: 380 MS
QRS DURATION, ECG06: 94 MS
QTC CALCULATION (BEZET), ECG08: 441 MS
VENTRICULAR RATE, ECG03: 81 BPM

## 2018-06-27 NOTE — ED NOTES
EMS here to  patient. Paperwork given to [employer] VSS, respirations even and unlabored, no seizure activity noted. Pt A&Ox2 upon discharge. Brief remains dry.

## 2018-06-27 NOTE — ED NOTES
Patient resting comfortably in stretcher, VSS, Pt at baseline with no seizure activity witnessed. Pt provided with extra blanket.

## 2018-06-27 NOTE — ED PROVIDER NOTES
HPI Comments: [de-identified] y.o. female with past medical history significant for hypothyroidism, seizures, COPD, dementia, and hip replacement who presents from [facility] via EMS with chief complaint of seizure. As per nursing staff, the pt had 2 witnessed seizure like activities at the nursing home today. When nursing staff spoke with the daughter on the phone, the pt's typical seizures look like she is staring into space and becomes mildly tremulous. The pt has significant seizures when she has a UTI or her anti-seizure medication levels become too low. Upon arrival to the ED, the pt was alert and oriented x2 and mildly tremulous . As per nursing staff, the pt was at her baseline mental status upon arrival to the ED. There are no other acute medical concerns at this time. Social hx: nonsmoker, no EtOH use  PCP: Randall Dawkins MD    Full history, physical exam, and ROS unable to be obtained due to:  dementia. Note written by Braulio Moore, as dictated by Rose Canas MD 9:41 PM      The history is provided by the patient. No  was used. Past Medical History:   Diagnosis Date    [facility]     Dementia 3/29/2017    Hypothyroid     Seizure ([facility]     Seizure disorder (HCC)        Past Surgical History:   Procedure Laterality Date    HX HIP REPLACEMENT           History reviewed. No pertinent family history. Social History     Social History    Marital status:      Spouse name: N/A    Number of children: N/A    Years of education: N/A     Occupational History    Not on file. Social History Main Topics    Smoking status: Never Smoker    Smokeless tobacco: Never Used    Alcohol use No    Drug use: No    Sexual activity: Not on file     Other Topics Concern    Not on file     Social History Narrative         ALLERGIES: Aricept [donepezil]; Benadryl [diphenhydramine hcl];  Ciprofloxacin; Codeine; Macrobid [nitrofurantoin monohyd/m-cryst]; Pcn [penicillins]; and Sulfa (sulfonamide antibiotics)    Review of Systems   Unable to perform ROS: Dementia       Vitals:    06/26/18 2030   BP: 132/64   Pulse: 82   Resp: 20   Temp: 98 °F (36.7 °C)   SpO2: 95%   Weight: 58.7 kg (129 lb 5 oz)   Height: 5' 6\" (1.676 m)            Physical Exam   Constitutional: She appears well-developed and well-nourished. Thinning hair   HENT:   Head: Normocephalic and atraumatic. Mouth/Throat: Oropharynx is clear and moist.   Eyes: EOM are normal. Pupils are equal, round, and reactive to light. Neck: Normal range of motion. Neck supple. Cardiovascular: Normal rate, regular rhythm, normal heart sounds and intact distal pulses. Exam reveals no gallop and no friction rub. No murmur heard. Pulmonary/Chest: Effort normal. No respiratory distress. She has no wheezes. She has no rales. Abdominal: Soft. There is no tenderness. There is no rebound. Musculoskeletal: Normal range of motion. She exhibits no tenderness. Neurological: She is alert. No cranial nerve deficit. Mild LUE weakness;  Oriented to nursing home;  Not oriented to year, month, or president;     Skin: No erythema. Psychiatric: She has a normal mood and affect. Her behavior is normal.   Nursing note and vitals reviewed. Note written by Braulio Holder, as dictated by Jovanny Smith MD 9:42 PM      [facility]      ED Course       Procedures      ED EKG interpretation:  Rhythm: normal sinus rhythm; and regular . Rate (approx.): 80; Axis: normal; P wave: prolonged; QRS interval: normal ; ST/T wave: normal; in  Lead: ; Other findings: . This EKG was interpreted by Jovanny Smith MD,ED Provider.  11:25 PM

## 2018-06-27 NOTE — ED NOTES
Report called to [facility] and given to [id number] AM AT [facility]. Report given using SBAR format. Opportunity for questions and clarifications were provided.   Pt's daughter, Radha Shipman, notified that pt is returning to facility per pt approval.

## 2018-06-27 NOTE — ED TRIAGE NOTES
Patient arrives via EMS from [address] with seizure like activity. Per EMS patient had 2 episodes of seizure like activity at the nursing home lasting approx. 1-5 minutes. Pt arrives to ER A&Ox2 which is baseline and with bilateral arm tremors which is new.

## 2018-06-27 NOTE — ED NOTES
Contacted [facility] for stretcher transport. Reference number [id number].   Will call back with ETA

## 2018-06-28 LAB
BACTERIA SPEC CULT: ABNORMAL
BACTERIA SPEC CULT: ABNORMAL
CC UR VC: ABNORMAL
LAMOTRIGINE SERPL-MCNC: 3.4 UG/ML (ref 2–20)
SERVICE CMNT-IMP: ABNORMAL

## 2018-08-13 ENCOUNTER — HOSPITAL ENCOUNTER (EMERGENCY)
Age: 83
Discharge: OTHER INSTITUTION | End: 2018-08-13
Attending: STUDENT IN AN ORGANIZED HEALTH CARE EDUCATION/TRAINING PROGRAM
Payer: MEDICARE

## 2018-08-13 ENCOUNTER — APPOINTMENT (OUTPATIENT)
Dept: CT IMAGING | Age: 83
End: 2018-08-13
Attending: STUDENT IN AN ORGANIZED HEALTH CARE EDUCATION/TRAINING PROGRAM
Payer: MEDICARE

## 2018-08-13 VITALS
BODY MASS INDEX: 23.29 KG/M2 | HEART RATE: 70 BPM | OXYGEN SATURATION: 96 % | WEIGHT: 126.54 LBS | DIASTOLIC BLOOD PRESSURE: 75 MMHG | HEIGHT: 62 IN | TEMPERATURE: 97.9 F | RESPIRATION RATE: 18 BRPM | SYSTOLIC BLOOD PRESSURE: 151 MMHG

## 2018-08-13 DIAGNOSIS — G40.909 SEIZURE DISORDER (HCC): Primary | ICD-10-CM

## 2018-08-13 LAB
ALBUMIN SERPL-MCNC: 3.7 G/DL (ref 3.5–5)
ALBUMIN/GLOB SERPL: 1.2 {RATIO} (ref 1.1–2.2)
ALP SERPL-CCNC: 116 U/L (ref 45–117)
ALT SERPL-CCNC: 18 U/L (ref 12–78)
ANION GAP SERPL CALC-SCNC: 4 MMOL/L (ref 5–15)
APPEARANCE UR: ABNORMAL
AST SERPL-CCNC: 16 U/L (ref 15–37)
ATRIAL RATE: 75 BPM
BACTERIA URNS QL MICRO: NEGATIVE /HPF
BASOPHILS # BLD: 0.1 K/UL (ref 0–0.1)
BASOPHILS NFR BLD: 1 % (ref 0–1)
BILIRUB SERPL-MCNC: 0.2 MG/DL (ref 0.2–1)
BILIRUB UR QL: NEGATIVE
BUN SERPL-MCNC: 21 MG/DL (ref 6–20)
BUN/CREAT SERPL: 24 (ref 12–20)
CALCIUM SERPL-MCNC: 9.2 MG/DL (ref 8.5–10.1)
CALCULATED P AXIS, ECG09: 67 DEGREES
CALCULATED R AXIS, ECG10: 63 DEGREES
CALCULATED T AXIS, ECG11: 52 DEGREES
CARBAMAZEPINE SERPL-MCNC: 9 UG/ML (ref 4–12)
CHLORIDE SERPL-SCNC: 107 MMOL/L (ref 97–108)
CO2 SERPL-SCNC: 31 MMOL/L (ref 21–32)
COLOR UR: ABNORMAL
CREAT SERPL-MCNC: 0.86 MG/DL (ref 0.55–1.02)
DIAGNOSIS, 93000: NORMAL
DIFFERENTIAL METHOD BLD: ABNORMAL
EOSINOPHIL # BLD: 0.2 K/UL (ref 0–0.4)
EOSINOPHIL NFR BLD: 3 % (ref 0–7)
EPITH CASTS URNS QL MICRO: ABNORMAL /LPF
ERYTHROCYTE [DISTWIDTH] IN BLOOD BY AUTOMATED COUNT: 12.5 % (ref 11.5–14.5)
GLOBULIN SER CALC-MCNC: 3.2 G/DL (ref 2–4)
GLUCOSE SERPL-MCNC: 107 MG/DL (ref 65–100)
GLUCOSE UR STRIP.AUTO-MCNC: NEGATIVE MG/DL
HCT VFR BLD AUTO: 41.7 % (ref 35–47)
HGB BLD-MCNC: 13.4 G/DL (ref 11.5–16)
HGB UR QL STRIP: NEGATIVE
HYALINE CASTS URNS QL MICRO: ABNORMAL /LPF (ref 0–5)
IMM GRANULOCYTES # BLD: 0 K/UL (ref 0–0.04)
IMM GRANULOCYTES NFR BLD AUTO: 0 % (ref 0–0.5)
KETONES UR QL STRIP.AUTO: NEGATIVE MG/DL
LEUKOCYTE ESTERASE UR QL STRIP.AUTO: ABNORMAL
LYMPHOCYTES # BLD: 0.8 K/UL (ref 0.8–3.5)
LYMPHOCYTES NFR BLD: 12 % (ref 12–49)
MCH RBC QN AUTO: 34.2 PG (ref 26–34)
MCHC RBC AUTO-ENTMCNC: 32.1 G/DL (ref 30–36.5)
MCV RBC AUTO: 106.4 FL (ref 80–99)
MONOCYTES # BLD: 0.4 K/UL (ref 0–1)
MONOCYTES NFR BLD: 6 % (ref 5–13)
NEUTS SEG # BLD: 4.8 K/UL (ref 1.8–8)
NEUTS SEG NFR BLD: 78 % (ref 32–75)
NITRITE UR QL STRIP.AUTO: NEGATIVE
NRBC # BLD: 0 K/UL (ref 0–0.01)
NRBC BLD-RTO: 0 PER 100 WBC
P-R INTERVAL, ECG05: 204 MS
PH UR STRIP: 7 [PH] (ref 5–8)
PLATELET # BLD AUTO: 230 K/UL (ref 150–400)
PMV BLD AUTO: 10 FL (ref 8.9–12.9)
POTASSIUM SERPL-SCNC: 4.3 MMOL/L (ref 3.5–5.1)
PROT SERPL-MCNC: 6.9 G/DL (ref 6.4–8.2)
PROT UR STRIP-MCNC: NEGATIVE MG/DL
Q-T INTERVAL, ECG07: 380 MS
QRS DURATION, ECG06: 78 MS
QTC CALCULATION (BEZET), ECG08: 424 MS
RBC # BLD AUTO: 3.92 M/UL (ref 3.8–5.2)
RBC #/AREA URNS HPF: ABNORMAL /HPF (ref 0–5)
RBC MORPH BLD: ABNORMAL
SODIUM SERPL-SCNC: 142 MMOL/L (ref 136–145)
SP GR UR REFRACTOMETRY: 1.02 (ref 1–1.03)
UR CULT HOLD, URHOLD: NORMAL
UROBILINOGEN UR QL STRIP.AUTO: 0.2 EU/DL (ref 0.2–1)
VENTRICULAR RATE, ECG03: 75 BPM
WBC # BLD AUTO: 6.3 K/UL (ref 3.6–11)
WBC URNS QL MICRO: ABNORMAL /HPF (ref 0–4)

## 2018-08-13 PROCEDURE — 93005 ELECTROCARDIOGRAM TRACING: CPT

## 2018-08-13 PROCEDURE — 36415 COLL VENOUS BLD VENIPUNCTURE: CPT | Performed by: STUDENT IN AN ORGANIZED HEALTH CARE EDUCATION/TRAINING PROGRAM

## 2018-08-13 PROCEDURE — 80053 COMPREHEN METABOLIC PANEL: CPT | Performed by: STUDENT IN AN ORGANIZED HEALTH CARE EDUCATION/TRAINING PROGRAM

## 2018-08-13 PROCEDURE — 70450 CT HEAD/BRAIN W/O DYE: CPT

## 2018-08-13 PROCEDURE — 77030011943

## 2018-08-13 PROCEDURE — 80156 ASSAY CARBAMAZEPINE TOTAL: CPT | Performed by: STUDENT IN AN ORGANIZED HEALTH CARE EDUCATION/TRAINING PROGRAM

## 2018-08-13 PROCEDURE — 81001 URINALYSIS AUTO W/SCOPE: CPT | Performed by: STUDENT IN AN ORGANIZED HEALTH CARE EDUCATION/TRAINING PROGRAM

## 2018-08-13 PROCEDURE — 80175 DRUG SCREEN QUAN LAMOTRIGINE: CPT | Performed by: STUDENT IN AN ORGANIZED HEALTH CARE EDUCATION/TRAINING PROGRAM

## 2018-08-13 PROCEDURE — 99285 EMERGENCY DEPT VISIT HI MDM: CPT

## 2018-08-13 PROCEDURE — 85025 COMPLETE CBC W/AUTO DIFF WBC: CPT | Performed by: STUDENT IN AN ORGANIZED HEALTH CARE EDUCATION/TRAINING PROGRAM

## 2018-08-13 NOTE — DISCHARGE INSTRUCTIONS
Epilepsy: Care Instructions  Your Care Instructions    Epilepsy is a common condition that causes repeated seizures. The seizures are caused by bursts of electrical activity in the brain that aren't normal. Seizures may cause problems with muscle control, movement, speech, vision, or awareness. They can be scary. Epilepsy affects each person differently. Some people have only a few seizures. Others get them more often. If you know what triggers a seizure, you may be able to avoid having one. You can take medicines to control and reduce seizures. You and your doctor will need to find the right combination, schedule, and dose of medicine. This may take time and careful changes. Seizures may get worse and happen more often over time. Follow-up care is a key part of your treatment and safety. Be sure to make and go to all appointments, and call your doctor if you are having problems. It's also a good idea to know your test results and keep a list of the medicines you take. How can you care for yourself at home? · Be safe with medicines. Take your medicines exactly as prescribed. Call your doctor if you think you are having a problem with your medicine. · Make a treatment plan with your doctor. Be sure to follow your plan. · Try to identify and avoid things that may make you more likely to have a seizure. These may include:  ¨ Not getting enough sleep. ¨ Using drugs or alcohol. ¨ Being emotionally stressed. ¨ Skipping meals. · Keep a record of any seizures you have. Note the date, time of day, and any details about the seizure that you can remember. Your doctor can use this information to plan or adjust your medicine or other treatment. · Be sure that any doctor treating you for another condition knows that you have epilepsy. Each doctor should know what medicines you are taking, if any. · Wear a medical ID bracelet. You can buy this at most [employer].  If you have a seizure that leaves you unconscious or unable to speak for yourself, this bracelet will let those who are treating you know that you have epilepsy. · Talk to your doctor about whether it is safe for you to do certain activities, such as drive or swim. When should you call for help? Call 911 anytime you think you may need emergency care. For example, call if:    · A seizure does not stop as it normally does.     · You have new symptoms such as:  ¨ Numbness, tingling, or weakness on one side of your body or face. ¨ Vision changes. ¨ Trouble speaking or thinking clearly.    Call your doctor now or seek immediate medical care if:    · You have a fever.     · You have a severe headache.    Watch closely for changes in your health, and be sure to contact your doctor if:    · The normal pattern or features of your seizures change. Where can you learn more? Go to http://papa-juwan.info/. Uzair Coleman in the search box to learn more about \"Epilepsy: Care Instructions. \"  Current as of: October 9, 2017  Content Version: 11.7  © 8168-3063 [employer]. Care instructions adapted under license by [employer] (which disclaims liability or warranty for this information). If you have questions about a medical condition or this instruction, always ask your healthcare professional. [address] any warranty or liability for your use of this information.

## 2018-08-13 NOTE — ED NOTES
[facility] given discharge instructions. No questions or concerns at this time. Patients VSS and in no acute distress. Patient wheeled out on stretcher at discharge.

## 2018-08-13 NOTE — ED PROVIDER NOTES
HPI Comments: [de-identified] y.o. female with past medical history significant for Hypothyroid, Seizure, COPD, and Dementia who presents via EMS from [address] [address] with chief complaint of evaluation after seizure. Patient states prior to arrival having a witnessed seizure while she was watching TV, with no LOC. Patient is unsure of how long the seizure lasted. Upon examination at [facility] ED, patient states no current pain or discomfort and notes she is feeling \"normal.\" Patient reports taking Carbamazepine and Lamictal daily for seizure management. Patient notes having a neurologist but is unsure as to who it is. Patient denies recent change in medications or doses. Pt denies headache, urinary incontinence during seizure, fever, chills, cough, congestion, shortness of breath, chest pain, abdominal pain, nausea, vomiting, diarrhea, difficulty with urination or dysuria. There are no other acute medical concerns at this time. PCP: Kaleb James MD    Note written by Braulio Jackson, as dictated by Lazarus Smart, MD 4:12 PM       The history is provided by the patient. Past Medical History:   Diagnosis Date    [facility] (chronic [facility] disease) ([facility] [address] [facility]     Dementia 3/29/2017    Hypothyroid     Seizure ([facility] [address] [facility]     Seizure disorder (HCC)        Past Surgical History:   Procedure Laterality Date    HX HIP REPLACEMENT           History reviewed. No pertinent family history. Social History     Social History    Marital status:      Spouse name: N/A    Number of children: N/A    Years of education: N/A     Occupational History    Not on file. Social History Main Topics    Smoking status: Never Smoker    Smokeless tobacco: Never Used    Alcohol use No    Drug use: No    Sexual activity: Not on file     Other Topics Concern    Not on file     Social History Narrative         ALLERGIES: Aricept [donepezil]; Benadryl [diphenhydramine hcl];  Ciprofloxacin; Codeine; Macrobid [nitrofurantoin monohyd/m-cryst]; Pcn [penicillins]; and Sulfa (sulfonamide antibiotics)    Review of Systems   Constitutional: Negative for chills and fever. HENT: Negative for congestion and sore throat. Respiratory: Negative for cough and shortness of breath. Cardiovascular: Negative for chest pain. Gastrointestinal: Negative for abdominal pain, diarrhea, nausea and vomiting. Genitourinary: Negative for difficulty urinating and dysuria. Musculoskeletal: Negative for back pain. Skin: Negative for rash. Neurological: Positive for seizures. Negative for syncope and headaches. Psychiatric/Behavioral: Negative for confusion. All other systems reviewed and are negative. Vitals:    08/13/18 1530   BP: 146/79   Pulse: 73   Resp: 17   Temp: 98.5 °F (36.9 °C)   SpO2: 94%   Weight: 57.4 kg (126 lb 8.7 oz)   Height: 5' 2\" (1.575 m)            Physical Exam   Constitutional: She appears well-developed. No distress. HENT:   Head: Normocephalic and atraumatic. Eyes: Conjunctivae and EOM are normal. Pupils are equal, round, and reactive to light. Neck: Normal range of motion. Neck supple. Cardiovascular: Normal rate, regular rhythm and normal heart sounds. No murmur heard. Pulmonary/Chest: Effort normal and breath sounds normal. No respiratory distress. Abdominal: Soft. Bowel sounds are normal. She exhibits no distension. There is no tenderness. There is no rebound. Musculoskeletal: Normal range of motion. She exhibits no edema. Neurological: She is alert. No cranial nerve deficit. She exhibits normal muscle tone. Coordination normal.   Skin: Skin is warm and dry. No rash noted. Psychiatric: She has a normal mood and affect. Her behavior is normal.   Mild disorganization of speech with repeative statements   Nursing note and vitals reviewed.   Note written by Braulio Martinez, as dictated by Reji Francis MD 4:12 PM      [facility]      ED Course       Procedures  ED EKG interpretation:  Rhythm: normal sinus rhythm; and regular . Rate (approx.): 75 bpm; Axis: normal; ST/T wave: Septal Q waves age undetermined. No STEMI.   Note written by Braulio Castillo, as dictated by Arnol Haque MD 3:33 PM

## 2018-08-13 NOTE — ED TRIAGE NOTES
Patient arrives via EMS from [facility] for witnessed seizure. Patient in the dementia unit. Arrives alert, oriented to person and place. Denies injury. Patient has no complaints, however POA requested evaluation.

## 2018-08-15 LAB — LAMOTRIGINE SERPL-MCNC: 4.6 UG/ML (ref 2–20)

## 2019-04-20 ENCOUNTER — HOSPITAL ENCOUNTER (EMERGENCY)
Age: 84
Discharge: HOME | End: 2019-04-20
Attending: EMERGENCY MEDICINE
Payer: MEDICARE

## 2019-04-20 VITALS
SYSTOLIC BLOOD PRESSURE: 146 MMHG | TEMPERATURE: 98.4 F | DIASTOLIC BLOOD PRESSURE: 69 MMHG | RESPIRATION RATE: 21 BRPM | HEART RATE: 78 BPM | OXYGEN SATURATION: 96 %

## 2019-04-20 DIAGNOSIS — G40.909 SEIZURE DISORDER (HCC): Primary | ICD-10-CM

## 2019-04-20 LAB
ALBUMIN SERPL-MCNC: 3.2 G/DL (ref 3.5–5)
ALBUMIN/GLOB SERPL: 0.9 {RATIO} (ref 1.1–2.2)
ALP SERPL-CCNC: 129 U/L (ref 45–117)
ALT SERPL-CCNC: 19 U/L (ref 12–78)
ANION GAP SERPL CALC-SCNC: 5 MMOL/L (ref 5–15)
APPEARANCE UR: CLEAR
AST SERPL-CCNC: 16 U/L (ref 15–37)
BACTERIA URNS QL MICRO: NEGATIVE /HPF
BASOPHILS # BLD: 0 K/UL (ref 0–0.1)
BASOPHILS NFR BLD: 1 % (ref 0–1)
BILIRUB SERPL-MCNC: 0.2 MG/DL (ref 0.2–1)
BILIRUB UR QL: NEGATIVE
BUN SERPL-MCNC: 18 MG/DL (ref 6–20)
BUN/CREAT SERPL: 22 (ref 12–20)
CALCIUM SERPL-MCNC: 9.3 MG/DL (ref 8.5–10.1)
CARBAMAZEPINE SERPL-MCNC: 9.8 UG/ML (ref 4–12)
CHLORIDE SERPL-SCNC: 108 MMOL/L (ref 97–108)
CO2 SERPL-SCNC: 30 MMOL/L (ref 21–32)
COLOR UR: NORMAL
COMMENT, HOLDF: NORMAL
CREAT SERPL-MCNC: 0.82 MG/DL (ref 0.55–1.02)
DIFFERENTIAL METHOD BLD: ABNORMAL
EOSINOPHIL # BLD: 0.2 K/UL (ref 0–0.4)
EOSINOPHIL NFR BLD: 4 % (ref 0–7)
EPITH CASTS URNS QL MICRO: NORMAL /LPF
ERYTHROCYTE [DISTWIDTH] IN BLOOD BY AUTOMATED COUNT: 12.6 % (ref 11.5–14.5)
GLOBULIN SER CALC-MCNC: 3.5 G/DL (ref 2–4)
GLUCOSE SERPL-MCNC: 116 MG/DL (ref 65–100)
GLUCOSE UR STRIP.AUTO-MCNC: NEGATIVE MG/DL
HCT VFR BLD AUTO: 43 % (ref 35–47)
HGB BLD-MCNC: 13.3 G/DL (ref 11.5–16)
HGB UR QL STRIP: NEGATIVE
HYALINE CASTS URNS QL MICRO: NORMAL /LPF (ref 0–5)
IMM GRANULOCYTES # BLD AUTO: 0 K/UL (ref 0–0.04)
IMM GRANULOCYTES NFR BLD AUTO: 0 % (ref 0–0.5)
KETONES UR QL STRIP.AUTO: NEGATIVE MG/DL
LEUKOCYTE ESTERASE UR QL STRIP.AUTO: NEGATIVE
LYMPHOCYTES # BLD: 1.1 K/UL (ref 0.8–3.5)
LYMPHOCYTES NFR BLD: 19 % (ref 12–49)
MCH RBC QN AUTO: 32.9 PG (ref 26–34)
MCHC RBC AUTO-ENTMCNC: 30.9 G/DL (ref 30–36.5)
MCV RBC AUTO: 106.4 FL (ref 80–99)
MONOCYTES # BLD: 0.5 K/UL (ref 0–1)
MONOCYTES NFR BLD: 8 % (ref 5–13)
NEUTS SEG # BLD: 4.2 K/UL (ref 1.8–8)
NEUTS SEG NFR BLD: 68 % (ref 32–75)
NITRITE UR QL STRIP.AUTO: NEGATIVE
NRBC # BLD: 0 K/UL (ref 0–0.01)
NRBC BLD-RTO: 0 PER 100 WBC
PH UR STRIP: 6 [PH] (ref 5–8)
PLATELET # BLD AUTO: 214 K/UL (ref 150–400)
PMV BLD AUTO: 9.9 FL (ref 8.9–12.9)
POTASSIUM SERPL-SCNC: 3.8 MMOL/L (ref 3.5–5.1)
PROT SERPL-MCNC: 6.7 G/DL (ref 6.4–8.2)
PROT UR STRIP-MCNC: NEGATIVE MG/DL
RBC # BLD AUTO: 4.04 M/UL (ref 3.8–5.2)
RBC #/AREA URNS HPF: NORMAL /HPF (ref 0–5)
SAMPLES BEING HELD,HOLD: NORMAL
SODIUM SERPL-SCNC: 143 MMOL/L (ref 136–145)
SP GR UR REFRACTOMETRY: 1.02 (ref 1–1.03)
UR CULT HOLD, URHOLD: NORMAL
UROBILINOGEN UR QL STRIP.AUTO: 0.2 EU/DL (ref 0.2–1)
WBC # BLD AUTO: 6.1 K/UL (ref 3.6–11)
WBC URNS QL MICRO: NORMAL /HPF (ref 0–4)

## 2019-04-20 PROCEDURE — 81001 URINALYSIS AUTO W/SCOPE: CPT

## 2019-04-20 PROCEDURE — 99285 EMERGENCY DEPT VISIT HI MDM: CPT

## 2019-04-20 PROCEDURE — 51701 INSERT BLADDER CATHETER: CPT

## 2019-04-20 PROCEDURE — 99284 EMERGENCY DEPT VISIT MOD MDM: CPT

## 2019-04-20 PROCEDURE — 80053 COMPREHEN METABOLIC PANEL: CPT

## 2019-04-20 PROCEDURE — 80156 ASSAY CARBAMAZEPINE TOTAL: CPT

## 2019-04-20 PROCEDURE — 85025 COMPLETE CBC W/AUTO DIFF WBC: CPT

## 2019-04-20 PROCEDURE — 93005 ELECTROCARDIOGRAM TRACING: CPT

## 2019-04-20 PROCEDURE — 77030011943

## 2019-04-20 NOTE — ED TRIAGE NOTES
Daughter reports pt had witnessed seizure 1 hour ago lasting approximately 7 min. Daughter also report pt has not cholo sleeping well due to tooth ache that she was seen at the dentist for yesterday.

## 2019-04-20 NOTE — ED NOTES
1555: Verbal shift change report given to Sharon Frost RN (oncoming nurse) by Radha Ryan RN (offgoing nurse). Report included the following information SBAR, Kardex, ED Summary, [facility] and Recent Results.

## 2019-04-20 NOTE — ED NOTES
5:15 PM  Change of shift. Care of patient taken over from Dr Rebekah Flaherty; H&P reviewed, bedside handoff complete. Awaiting labs and urine for evaluation of recurrent seizure episode. Planned discharge +/- ABx for UTI unless clinical worsening occurs. 6:42 PM  No UTI, labs reassuring. No further seizure episodes. Plan to follow up with PCP as needed and return precautions discussed for worsening or new concerning symptoms.

## 2019-04-20 NOTE — ED PROVIDER NOTES
82 y.o. female with past medical history significant for hypothyroid, seizure, COPD, and dementia who presents from [address] [place] [address] with chief complaint of seizure. Pt complains of a witnessed seizure about 1 hour ago that last 7 minutes. Per daughter, pt could not respond, stared off, and started shaking. Per daughter, this episode is similar to previous seizures. Pt is seeing a neurologist for this and states her medications have controlled the seizures. Family states pt previously has had UTI's at time of seizures. Pt notes she was scheduled to have an appointment with neurology yesterday for a 6 month check up, but did not go due to tooth pain. Pt states she was seen by a dentist yesterday and prescribed an antibiotic and pain medication, which she has taken 2 doses of. Pt states her last seizure was in 08/2018 and states her last admission was 12/01/2017. Pt denies fever, chills, nausea, vomiting, chest pain, or SOB. There are no other acute medical concerns at this time. Social hx: Never Smoker. Denies EtOH Use. PCP: Charlette Baumgarten, MD    Note written by Ruby Malik, as dictated by Michi So MD 3:20 PM      The history is provided by the patient and a relative. Past Medical History:   Diagnosis Date    [facility]) ([facility] [address]     Dementia 3/29/2017    Hypothyroid     Seizure ([facility] [address] [facility]     Seizure disorder (HCC)        Past Surgical History:   Procedure Laterality Date    HX HIP REPLACEMENT           History reviewed. No pertinent family history.     Social History     Socioeconomic History    Marital status:      Spouse name: Not on file    Number of children: Not on file    Years of education: Not on file    Highest education level: Not on file   Occupational History    Not on file   Social Needs    Financial resource strain: Not on file    Food insecurity:     Worry: Not on file     Inability: Not on file   [employer] needs: Medical: Not on file     Non-medical: Not on file   Tobacco Use    Smoking status: Never Smoker    Smokeless tobacco: Never Used   Substance and Sexual Activity    Alcohol use: No    Drug use: No    Sexual activity: Not on file   Lifestyle    Physical activity:     Days per week: Not on file     Minutes per session: Not on file    Stress: Not on file   Relationships    Social connections:     Talks on phone: Not on file     Gets together: Not on file     Attends Jehovah's witness service: Not on file     Active member of club or organization: Not on file     Attends meetings of clubs or organizations: Not on file     Relationship status: Not on file    Intimate partner violence:     Fear of current or ex partner: Not on file     Emotionally abused: Not on file     Physically abused: Not on file     Forced sexual activity: Not on file   Other Topics Concern    Not on file   Social History Narrative    Not on file         ALLERGIES: Aricept [donepezil]; Benadryl [diphenhydramine hcl]; Ciprofloxacin; Codeine; Macrobid [nitrofurantoin monohyd/m-cryst]; Pcn [penicillins]; and Sulfa (sulfonamide antibiotics)    Review of Systems   Constitutional: Negative for chills and fever. HENT: Negative for rhinorrhea and sore throat. Respiratory: Negative for cough and shortness of breath. Cardiovascular: Negative for chest pain. Gastrointestinal: Negative for abdominal pain, diarrhea, nausea and vomiting. Genitourinary: Negative for dysuria and urgency. Musculoskeletal: Negative for arthralgias and back pain. Skin: Negative for rash. Neurological: Positive for seizures. Negative for dizziness, weakness and light-headedness. All other systems reviewed and are negative. Vitals:    04/20/19 1508   Pulse: 71   SpO2: 100%            Physical Exam   Vital signs reviewed. Nursing notes reviewed.     Const:  No acute distress, well developed, well nourished  Head:  Atraumatic, normocephalic  Eyes: PERRL, conjunctiva normal, no scleral icterus  Neck:  Supple, trachea midline  Cardiovascular:  RRR, no murmurs, no gallops, no rubs  Resp:  No resp distress, no increased work of breathing, no wheezes, no rhonchi, no rales,  Abd:  Soft, non-tender, non-distended, no rebound, no guarding, no CVA tenderness  :  Deferred  MSK:  Bilateral pedal edema, normal ROM  Neuro:  Alert and oriented x3, no cranial nerve defect  Skin:  Warm, dry, intact  Psych: normal mood and affect, behavior is normal, judgement and thought content is normal    Note written by Shaila Saleem, as dictated by Yokasta Anne MD 3:28 PM    MDM  Number of Diagnoses or Management Options  Seizure disorder [facility]):      Amount and/or Complexity of Data Reviewed  Clinical lab tests: ordered and reviewed  Tests in the radiology section of CPT®: ordered and reviewed  Review and summarize past medical records: yes    Patient Progress  Patient progress: stable         Pt. Presents to the ER after a seizure. Pt. Has a history of seizures. Pt. Is at her baseline in the ER. Labs pending. UA pending. Pt. Signed out to Dr. Volodymyr Negro, who will assume care of the patient.         Procedures

## 2019-04-22 LAB
ATRIAL RATE: 81 BPM
CALCULATED P AXIS, ECG09: 65 DEGREES
CALCULATED R AXIS, ECG10: 63 DEGREES
CALCULATED T AXIS, ECG11: 52 DEGREES
DIAGNOSIS, 93000: NORMAL
P-R INTERVAL, ECG05: 200 MS
Q-T INTERVAL, ECG07: 360 MS
QRS DURATION, ECG06: 82 MS
QTC CALCULATION (BEZET), ECG08: 418 MS
VENTRICULAR RATE, ECG03: 81 BPM

## 2019-09-28 ENCOUNTER — HOSPITAL ENCOUNTER (EMERGENCY)
Age: 84
Discharge: NURSING, REHAB OR LONG-TERM CARE | End: 2019-09-28
Attending: EMERGENCY MEDICINE | Admitting: EMERGENCY MEDICINE
Payer: MEDICARE

## 2019-09-28 VITALS
SYSTOLIC BLOOD PRESSURE: 125 MMHG | DIASTOLIC BLOOD PRESSURE: 64 MMHG | HEART RATE: 66 BPM | TEMPERATURE: 98.9 F | RESPIRATION RATE: 17 BRPM | OXYGEN SATURATION: 96 %

## 2019-09-28 DIAGNOSIS — R56.9 SEIZURE (HCC): Chronic | ICD-10-CM

## 2019-09-28 DIAGNOSIS — N30.00 ACUTE CYSTITIS WITHOUT HEMATURIA: Primary | ICD-10-CM

## 2019-09-28 LAB
ANION GAP SERPL CALC-SCNC: 4 MMOL/L (ref 5–15)
APPEARANCE UR: ABNORMAL
ATRIAL RATE: 74 BPM
BACTERIA URNS QL MICRO: NEGATIVE /HPF
BASOPHILS # BLD: 0 K/UL (ref 0–0.1)
BASOPHILS NFR BLD: 0 % (ref 0–1)
BILIRUB UR QL: NEGATIVE
BUN SERPL-MCNC: 15 MG/DL (ref 6–20)
BUN/CREAT SERPL: 22 (ref 12–20)
CALCIUM SERPL-MCNC: 9 MG/DL (ref 8.5–10.1)
CALCULATED P AXIS, ECG09: 41 DEGREES
CALCULATED R AXIS, ECG10: 55 DEGREES
CALCULATED T AXIS, ECG11: 42 DEGREES
CARBAMAZEPINE SERPL-MCNC: 8.4 UG/ML (ref 4–12)
CHLORIDE SERPL-SCNC: 110 MMOL/L (ref 97–108)
CO2 SERPL-SCNC: 30 MMOL/L (ref 21–32)
COLOR UR: ABNORMAL
COMMENT, HOLDF: NORMAL
CREAT SERPL-MCNC: 0.67 MG/DL (ref 0.55–1.02)
DIAGNOSIS, 93000: NORMAL
DIFFERENTIAL METHOD BLD: ABNORMAL
EOSINOPHIL # BLD: 0.2 K/UL (ref 0–0.4)
EOSINOPHIL NFR BLD: 3 % (ref 0–7)
EPITH CASTS URNS QL MICRO: ABNORMAL /LPF
ERYTHROCYTE [DISTWIDTH] IN BLOOD BY AUTOMATED COUNT: 12.8 % (ref 11.5–14.5)
GLUCOSE SERPL-MCNC: 111 MG/DL (ref 65–100)
GLUCOSE UR STRIP.AUTO-MCNC: NEGATIVE MG/DL
HCT VFR BLD AUTO: 38.7 % (ref 35–47)
HGB BLD-MCNC: 12.3 G/DL (ref 11.5–16)
HGB UR QL STRIP: NEGATIVE
HYALINE CASTS URNS QL MICRO: ABNORMAL /LPF (ref 0–5)
IMM GRANULOCYTES # BLD AUTO: 0 K/UL
IMM GRANULOCYTES NFR BLD AUTO: 0 %
KETONES UR QL STRIP.AUTO: NEGATIVE MG/DL
LEUKOCYTE ESTERASE UR QL STRIP.AUTO: ABNORMAL
LYMPHOCYTES # BLD: 0.7 K/UL (ref 0.8–3.5)
LYMPHOCYTES NFR BLD: 12 % (ref 12–49)
MCH RBC QN AUTO: 33.1 PG (ref 26–34)
MCHC RBC AUTO-ENTMCNC: 31.8 G/DL (ref 30–36.5)
MCV RBC AUTO: 104 FL (ref 80–99)
MONOCYTES # BLD: 0.2 K/UL (ref 0–1)
MONOCYTES NFR BLD: 3 % (ref 5–13)
NEUTS SEG # BLD: 4.7 K/UL (ref 1.8–8)
NEUTS SEG NFR BLD: 82 % (ref 32–75)
NITRITE UR QL STRIP.AUTO: NEGATIVE
NRBC # BLD: 0 K/UL (ref 0–0.01)
NRBC BLD-RTO: 0 PER 100 WBC
P-R INTERVAL, ECG05: 184 MS
PH UR STRIP: 7 [PH] (ref 5–8)
PLATELET # BLD AUTO: 217 K/UL (ref 150–400)
PMV BLD AUTO: 10.3 FL (ref 8.9–12.9)
POTASSIUM SERPL-SCNC: 4.1 MMOL/L (ref 3.5–5.1)
PROT UR STRIP-MCNC: NEGATIVE MG/DL
Q-T INTERVAL, ECG07: 374 MS
QRS DURATION, ECG06: 80 MS
QTC CALCULATION (BEZET), ECG08: 415 MS
RBC # BLD AUTO: 3.72 M/UL (ref 3.8–5.2)
RBC #/AREA URNS HPF: ABNORMAL /HPF (ref 0–5)
RBC MORPH BLD: ABNORMAL
SAMPLES BEING HELD,HOLD: NORMAL
SODIUM SERPL-SCNC: 144 MMOL/L (ref 136–145)
SP GR UR REFRACTOMETRY: 1.01 (ref 1–1.03)
UR CULT HOLD, URHOLD: NORMAL
UROBILINOGEN UR QL STRIP.AUTO: 0.2 EU/DL (ref 0.2–1)
VENTRICULAR RATE, ECG03: 74 BPM
WBC # BLD AUTO: 5.8 K/UL (ref 3.6–11)
WBC URNS QL MICRO: ABNORMAL /HPF (ref 0–4)

## 2019-09-28 PROCEDURE — 80156 ASSAY CARBAMAZEPINE TOTAL: CPT

## 2019-09-28 PROCEDURE — 74011250637 HC RX REV CODE- 250/637: Performed by: EMERGENCY MEDICINE

## 2019-09-28 PROCEDURE — 99285 EMERGENCY DEPT VISIT HI MDM: CPT

## 2019-09-28 PROCEDURE — 87077 CULTURE AEROBIC IDENTIFY: CPT

## 2019-09-28 PROCEDURE — 74011250636 HC RX REV CODE- 250/636: Performed by: EMERGENCY MEDICINE

## 2019-09-28 PROCEDURE — 87086 URINE CULTURE/COLONY COUNT: CPT

## 2019-09-28 PROCEDURE — 85025 COMPLETE CBC W/AUTO DIFF WBC: CPT

## 2019-09-28 PROCEDURE — 87186 SC STD MICRODIL/AGAR DIL: CPT

## 2019-09-28 PROCEDURE — 93005 ELECTROCARDIOGRAM TRACING: CPT

## 2019-09-28 PROCEDURE — 81001 URINALYSIS AUTO W/SCOPE: CPT

## 2019-09-28 PROCEDURE — 36415 COLL VENOUS BLD VENIPUNCTURE: CPT

## 2019-09-28 PROCEDURE — 80048 BASIC METABOLIC PNL TOTAL CA: CPT

## 2019-09-28 PROCEDURE — 77030011943

## 2019-09-28 RX ORDER — CEPHALEXIN 500 MG/1
500 CAPSULE ORAL
Status: COMPLETED | OUTPATIENT
Start: 2019-09-28 | End: 2019-09-28

## 2019-09-28 RX ORDER — CEPHALEXIN 500 MG/1
500 CAPSULE ORAL 2 TIMES DAILY
Qty: 10 CAP | Refills: 0 | Status: SHIPPED | OUTPATIENT
Start: 2019-09-28 | End: 2019-10-03

## 2019-09-28 RX ADMIN — SODIUM CHLORIDE 500 ML: 900 INJECTION, SOLUTION INTRAVENOUS at 14:45

## 2019-09-28 RX ADMIN — CEPHALEXIN 500 MG: 500 CAPSULE ORAL at 15:20

## 2019-09-28 NOTE — ED TRIAGE NOTES
Pt from memory unit in [address] with witnessed \"8 minute seizure\" per squad where she has slight twitching and \"stares off into space\" per patient. Pt a&ox2, on several seizure meds. Pt not post-ictal which is apparently norm. In no visible distress.

## 2019-09-28 NOTE — ED NOTES
Authorization number for Guy [id number]. Confirmation number for [facility] [id number]. ETA within the hour. Report called to [address], given to Evalee Opitz. Anjali Funes spoke with Dr. Gisselle Delgado regarding witnessed absent seizure. Reviewed duration, characteristics and treatment of seizure. Also reviewed BID dosing of PO Keflex script to treat UTI being sent with the pt. Copy of discharge instructions sent with [facility].

## 2019-09-28 NOTE — ED PROVIDER NOTES
82 y.o. female with past medical history significant for seizures, hypothyroidism, COPD, dementia who presents via EMS with chief complaint of seizure. Pt had a seizure just PTA. Pt denies hurting herself or biting her tongue. Pt was awake before the seizure and knew that it was coming on. Denies urinary incontinence. Denies recent illness or urinary sx. Pt reports that she is compliant with her seizure meds. There are no other acute medical concerns at this time. Social hx: denies tobacco use, +EtOH consumption     PCP: Maggie Ardon MD    Note written by Braulio Guthrie, as dictated by Dianelys Schneider MD 2:23 PM      The history is provided by the patient. No  was used. Past Medical History:   Diagnosis Date    [facility]     Dementia 3/29/2017    Hypothyroid     Seizure ([facility]     Seizure disorder (HCC)        Past Surgical History:   Procedure Laterality Date    HX HIP REPLACEMENT           No family history on file.     Social History     Socioeconomic History    Marital status:      Spouse name: Not on file    Number of children: Not on file    Years of education: Not on file    Highest education level: Not on file   Occupational History    Not on file   Social Needs    Financial resource strain: Not on file    Food insecurity:     Worry: Not on file     Inability: Not on file    Transportation needs:     Medical: Not on file     Non-medical: Not on file   Tobacco Use    Smoking status: Never Smoker    Smokeless tobacco: Never Used   Substance and Sexual Activity    Alcohol use: Yes     Frequency: Never    Drug use: No    Sexual activity: Not on file   Lifestyle    Physical activity:     Days per week: Not on file     Minutes per session: Not on file    Stress: Not on file   Relationships    Social connections:     Talks on phone: Not on file     Gets together: Not on file     Attends Faith service: Not on file     Active member of club or organization: Not on file     Attends meetings of clubs or organizations: Not on file     Relationship status: Not on file    Intimate partner violence:     Fear of current or ex partner: Not on file     Emotionally abused: Not on file     Physically abused: Not on file     Forced sexual activity: Not on file   Other Topics Concern    Not on file   Social History Narrative    Not on file         ALLERGIES: Aricept [donepezil]; Benadryl [diphenhydramine hcl]; Ciprofloxacin; Codeine; Macrobid [nitrofurantoin monohyd/m-cryst]; Pcn [penicillins]; and Sulfa (sulfonamide antibiotics)    Review of Systems    Vitals:    09/28/19 1530 09/28/19 1545 09/28/19 1600 09/28/19 1700   BP: 136/72 138/78 164/80 125/64   Pulse: 76 75 99 66   Resp: 16 20 23 17   Temp:    98.9 °F (37.2 °C)   SpO2: 98% 99% 98% 96%            Physical Exam   Constitutional: She is oriented to person, place, and time. She appears well-developed and well-nourished. HENT:   Head: Normocephalic and atraumatic. Eyes: Pupils are equal, round, and reactive to light. Neck: Normal range of motion. Neck supple. Cardiovascular: Normal rate and regular rhythm. Pulmonary/Chest: Effort normal and breath sounds normal.   Abdominal: Soft. She exhibits no distension. There is no tenderness. Neurological: She is alert and oriented to person, place, and time. Skin: Skin is warm and dry. Capillary refill takes less than 2 seconds. Psychiatric: She has a normal mood and affect. Her behavior is normal.   Nursing note and vitals reviewed. MDM  Number of Diagnoses or Management Options  Acute cystitis without hematuria:   Seizure [facility]):   Diagnosis management comments: Pt w hx of seizure presents with increased seizure activity. At therapeutic Tegretol levels, returning to baseline after partial seizures without post-ictal period, and otherwise without complaints.   Seizures likely increased in the setting of UTI. Started on Keflex. Procedures  ED EKG interpretation:  Rhythm: normal sinus rhythm; and regular . Rate (approx.): 74; Axis: normal; ST/T wave: normal.  Note written by Braulio Tejeda, as dictated by Shanita Montejo MD 2:33 PM    The patient's results have been reviewed with them and/or available family. Patient and/or family verbally conveyed their understanding and agreement of the patient's signs, symptoms, diagnosis, treatment and prognosis and additionally agree to follow up as recommended in the discharge instructions or to return to the Emergency Room should their condition change prior to their follow-up appointment. The patient/family verbally agrees with the care-plan and verbally conveys that all of their questions have been answered. The discharge instructions have also been provided to the patient and/or family with some educational information regarding the patient's diagnosis as well a list of reasons why the patient would want to return to the ER prior to their follow-up appointment, should their condition change.

## 2019-09-30 LAB
BACTERIA SPEC CULT: ABNORMAL
CC UR VC: ABNORMAL
SERVICE CMNT-IMP: ABNORMAL

## 2019-10-02 NOTE — ED NOTES
Daughter called for culture result to be faxed to patients assisted living facility.  Printed result and bhargavi bowling faxed report

## 2019-10-02 NOTE — PROGRESS NOTES
Spoke with our [facility] nursing staff. Results of urine faxed to them for pcp review.   Confirmation of results being received obtained

## 2019-10-25 ENCOUNTER — APPOINTMENT (OUTPATIENT)
Dept: CT IMAGING | Age: 84
End: 2019-10-25
Attending: EMERGENCY MEDICINE
Payer: MEDICARE

## 2019-10-25 ENCOUNTER — HOSPITAL ENCOUNTER (OUTPATIENT)
Age: 84
Setting detail: OBSERVATION
Discharge: NURSING, REHAB OR LONG-TERM CARE | End: 2019-10-27
Attending: STUDENT IN AN ORGANIZED HEALTH CARE EDUCATION/TRAINING PROGRAM | Admitting: HOSPITALIST
Payer: MEDICARE

## 2019-10-25 ENCOUNTER — APPOINTMENT (OUTPATIENT)
Dept: GENERAL RADIOLOGY | Age: 84
End: 2019-10-25
Attending: STUDENT IN AN ORGANIZED HEALTH CARE EDUCATION/TRAINING PROGRAM
Payer: MEDICARE

## 2019-10-25 DIAGNOSIS — R06.02 SOB (SHORTNESS OF BREATH): ICD-10-CM

## 2019-10-25 DIAGNOSIS — R09.02 HYPOXIA: Primary | ICD-10-CM

## 2019-10-25 LAB
ALBUMIN SERPL-MCNC: 3.4 G/DL (ref 3.5–5)
ALBUMIN/GLOB SERPL: 1 {RATIO} (ref 1.1–2.2)
ALP SERPL-CCNC: 129 U/L (ref 45–117)
ALT SERPL-CCNC: 18 U/L (ref 12–78)
ANION GAP SERPL CALC-SCNC: 13 MMOL/L (ref 5–15)
APPEARANCE UR: CLEAR
AST SERPL-CCNC: 21 U/L (ref 15–37)
BACTERIA URNS QL MICRO: NEGATIVE /HPF
BASOPHILS # BLD: 0 K/UL (ref 0–0.1)
BASOPHILS NFR BLD: 0 % (ref 0–1)
BILIRUB SERPL-MCNC: 0.3 MG/DL (ref 0.2–1)
BILIRUB UR QL: NEGATIVE
BNP SERPL-MCNC: 98 PG/ML (ref 0–450)
BUN SERPL-MCNC: 21 MG/DL (ref 6–20)
BUN/CREAT SERPL: 30 (ref 12–20)
CALCIUM SERPL-MCNC: 9 MG/DL (ref 8.5–10.1)
CHLORIDE SERPL-SCNC: 102 MMOL/L (ref 97–108)
CO2 SERPL-SCNC: 27 MMOL/L (ref 21–32)
COLOR UR: ABNORMAL
COMMENT, HOLDF: NORMAL
CREAT SERPL-MCNC: 0.69 MG/DL (ref 0.55–1.02)
DIFFERENTIAL METHOD BLD: ABNORMAL
EOSINOPHIL # BLD: 0.4 K/UL (ref 0–0.4)
EOSINOPHIL NFR BLD: 6 % (ref 0–7)
EPITH CASTS URNS QL MICRO: ABNORMAL /LPF
ERYTHROCYTE [DISTWIDTH] IN BLOOD BY AUTOMATED COUNT: 13 % (ref 11.5–14.5)
GLOBULIN SER CALC-MCNC: 3.5 G/DL (ref 2–4)
GLUCOSE SERPL-MCNC: 95 MG/DL (ref 65–100)
GLUCOSE UR STRIP.AUTO-MCNC: NEGATIVE MG/DL
HCT VFR BLD AUTO: 41.2 % (ref 35–47)
HGB BLD-MCNC: 13.1 G/DL (ref 11.5–16)
HGB UR QL STRIP: NEGATIVE
IMM GRANULOCYTES # BLD AUTO: 0 K/UL
IMM GRANULOCYTES NFR BLD AUTO: 0 %
KETONES UR QL STRIP.AUTO: NEGATIVE MG/DL
LEUKOCYTE ESTERASE UR QL STRIP.AUTO: ABNORMAL
LYMPHOCYTES # BLD: 1.6 K/UL (ref 0.8–3.5)
LYMPHOCYTES NFR BLD: 23 % (ref 12–49)
MCH RBC QN AUTO: 32.8 PG (ref 26–34)
MCHC RBC AUTO-ENTMCNC: 31.8 G/DL (ref 30–36.5)
MCV RBC AUTO: 103.3 FL (ref 80–99)
MONOCYTES # BLD: 0.5 K/UL (ref 0–1)
MONOCYTES NFR BLD: 8 % (ref 5–13)
NEUTS BAND NFR BLD MANUAL: 1 % (ref 0–6)
NEUTS SEG # BLD: 4.3 K/UL (ref 1.8–8)
NEUTS SEG NFR BLD: 62 % (ref 32–75)
NITRITE UR QL STRIP.AUTO: NEGATIVE
NRBC # BLD: 0 K/UL (ref 0–0.01)
NRBC BLD-RTO: 0 PER 100 WBC
PH UR STRIP: 7.5 [PH] (ref 5–8)
PLATELET # BLD AUTO: 227 K/UL (ref 150–400)
PMV BLD AUTO: 9.5 FL (ref 8.9–12.9)
POTASSIUM SERPL-SCNC: 5 MMOL/L (ref 3.5–5.1)
PROT SERPL-MCNC: 6.9 G/DL (ref 6.4–8.2)
PROT UR STRIP-MCNC: NEGATIVE MG/DL
RBC # BLD AUTO: 3.99 M/UL (ref 3.8–5.2)
RBC #/AREA URNS HPF: ABNORMAL /HPF (ref 0–5)
RBC MORPH BLD: ABNORMAL
SAMPLES BEING HELD,HOLD: NORMAL
SODIUM SERPL-SCNC: 142 MMOL/L (ref 136–145)
SP GR UR REFRACTOMETRY: 1.02 (ref 1–1.03)
TROPONIN I SERPL-MCNC: <0.05 NG/ML
UR CULT HOLD, URHOLD: NORMAL
UROBILINOGEN UR QL STRIP.AUTO: 0.2 EU/DL (ref 0.2–1)
WBC # BLD AUTO: 6.8 K/UL (ref 3.6–11)
WBC URNS QL MICRO: ABNORMAL /HPF (ref 0–4)
YEAST BUDDING URNS QL: PRESENT

## 2019-10-25 PROCEDURE — 85025 COMPLETE CBC W/AUTO DIFF WBC: CPT

## 2019-10-25 PROCEDURE — 74011636320 HC RX REV CODE- 636/320: Performed by: STUDENT IN AN ORGANIZED HEALTH CARE EDUCATION/TRAINING PROGRAM

## 2019-10-25 PROCEDURE — 94640 AIRWAY INHALATION TREATMENT: CPT

## 2019-10-25 PROCEDURE — 36415 COLL VENOUS BLD VENIPUNCTURE: CPT

## 2019-10-25 PROCEDURE — 74011000258 HC RX REV CODE- 258: Performed by: STUDENT IN AN ORGANIZED HEALTH CARE EDUCATION/TRAINING PROGRAM

## 2019-10-25 PROCEDURE — 74011636637 HC RX REV CODE- 636/637: Performed by: EMERGENCY MEDICINE

## 2019-10-25 PROCEDURE — 99218 HC RM OBSERVATION: CPT

## 2019-10-25 PROCEDURE — 80053 COMPREHEN METABOLIC PANEL: CPT

## 2019-10-25 PROCEDURE — 81001 URINALYSIS AUTO W/SCOPE: CPT

## 2019-10-25 PROCEDURE — 71045 X-RAY EXAM CHEST 1 VIEW: CPT

## 2019-10-25 PROCEDURE — 65270000029 HC RM PRIVATE

## 2019-10-25 PROCEDURE — 71275 CT ANGIOGRAPHY CHEST: CPT

## 2019-10-25 PROCEDURE — 93005 ELECTROCARDIOGRAM TRACING: CPT

## 2019-10-25 PROCEDURE — 84484 ASSAY OF TROPONIN QUANT: CPT

## 2019-10-25 PROCEDURE — 74011000250 HC RX REV CODE- 250: Performed by: STUDENT IN AN ORGANIZED HEALTH CARE EDUCATION/TRAINING PROGRAM

## 2019-10-25 PROCEDURE — 83880 ASSAY OF NATRIURETIC PEPTIDE: CPT

## 2019-10-25 PROCEDURE — 99285 EMERGENCY DEPT VISIT HI MDM: CPT

## 2019-10-25 RX ORDER — SODIUM CHLORIDE 0.9 % (FLUSH) 0.9 %
10 SYRINGE (ML) INJECTION
Status: COMPLETED | OUTPATIENT
Start: 2019-10-25 | End: 2019-10-25

## 2019-10-25 RX ORDER — CEFUROXIME AXETIL 500 MG/1
250 TABLET ORAL 2 TIMES DAILY
COMMUNITY
End: 2019-10-27

## 2019-10-25 RX ORDER — PREDNISONE 20 MG/1
60 TABLET ORAL
Status: COMPLETED | OUTPATIENT
Start: 2019-10-25 | End: 2019-10-25

## 2019-10-25 RX ORDER — LAMOTRIGINE 25 MG/1
50 TABLET ORAL 2 TIMES DAILY
COMMUNITY
End: 2021-04-07

## 2019-10-25 RX ORDER — LORATADINE 10 MG/1
10 TABLET ORAL
COMMUNITY
End: 2021-04-04

## 2019-10-25 RX ORDER — IPRATROPIUM BROMIDE AND ALBUTEROL SULFATE 2.5; .5 MG/3ML; MG/3ML
3 SOLUTION RESPIRATORY (INHALATION)
Status: DISCONTINUED | OUTPATIENT
Start: 2019-10-26 | End: 2019-10-26

## 2019-10-25 RX ADMIN — ALBUTEROL SULFATE 1 DOSE: 2.5 SOLUTION RESPIRATORY (INHALATION) at 19:00

## 2019-10-25 RX ADMIN — PREDNISONE 60 MG: 20 TABLET ORAL at 19:29

## 2019-10-25 RX ADMIN — SODIUM CHLORIDE 50 ML: 900 INJECTION, SOLUTION INTRAVENOUS at 21:14

## 2019-10-25 RX ADMIN — Medication 10 ML: at 21:14

## 2019-10-25 RX ADMIN — IOPAMIDOL 75 ML: 755 INJECTION, SOLUTION INTRAVENOUS at 21:14

## 2019-10-25 NOTE — ED NOTES
Pt's daughter, Bryce Randolph, called requesting patient information. Daughter is listed on the patient's paperwork as her POA. Ms. Theron López verified pertinent information regarding the patient, and the plan of care was given to the daughter. States she will call back for further update.

## 2019-10-25 NOTE — ED NOTES
Pt is in bed. Family is at the bed side. Pt reports no pain and SOB at this time. Pt completed breathing treatment. Lung sound clear bilateral. MD is at the bed side. No assistance needed at this time.

## 2019-10-25 NOTE — ED PROVIDER NOTES
Patient is a 26-year-old female with a past medical history of a seizure disorder on Tegretol, documented COPD in the chart the patient denies this, dementia who presents to the [facility] emergency center by EMS from our [facility] with a chief complaint of \"I cannot breathe. \" Per EMS report, symptoms noticed by staff approximately 5 hours ago while patient was at rest.  Patient says this started spontaneously, denies any known triggers. She denies associated fever, cough, chest pain, abdominal pain, pleurisy. Has noticed increased leg swelling over the past several days which is new for her. EMS gave an albuterol neb which the patient said improved her symptoms. Patient denies history of heart disease, blood clots, recent falls. Shortness of Breath   Associated symptoms include leg swelling. Pertinent negatives include no fever, no headaches, no cough, no chest pain, no vomiting, no abdominal pain and no rash. Past Medical History:   Diagnosis Date    [facility]chronic obstructive [facility] disease) ([facility]     Dementia ([facility] 3/29/2017    Hypothyroid     Seizure ([facility]     Seizure disorder ([facility]        Past Surgical History:   Procedure Laterality Date    HX HIP REPLACEMENT           History reviewed. No pertinent family history.     Social History     Socioeconomic History    Marital status:      Spouse name: Not on file    Number of children: Not on file    Years of education: Not on file    Highest education level: Not on file   Occupational History    Not on file   Social Needs    Financial resource strain: Not on file    Food insecurity:     Worry: Not on file     Inability: Not on file    Transportation needs:     Medical: Not on file     Non-medical: Not on file   Tobacco Use    Smoking status: Never Smoker    Smokeless tobacco: Never Used   Substance and Sexual Activity    Alcohol use: Yes     Frequency: Never    Drug use: No    Sexual activity: Not on file   Lifestyle  Physical activity:     Days per week: Not on file     Minutes per session: Not on file    Stress: Not on file   Relationships    Social connections:     Talks on phone: Not on file     Gets together: Not on file     Attends Spiritism service: Not on file     Active member of club or organization: Not on file     Attends meetings of clubs or organizations: Not on file     Relationship status: Not on file    Intimate partner violence:     Fear of current or ex partner: Not on file     Emotionally abused: Not on file     Physically abused: Not on file     Forced sexual activity: Not on file   Other Topics Concern    Not on file   Social History Narrative    Not on file         ALLERGIES: Aricept [donepezil]; Benadryl [diphenhydramine hcl]; Ciprofloxacin; Codeine; Macrobid [nitrofurantoin monohyd/m-cryst]; Pcn [penicillins]; and Sulfa (sulfonamide antibiotics)    Review of Systems   Constitutional: Negative for chills and fever. HENT: Negative for congestion. Respiratory: Positive for shortness of breath. Negative for cough. Cardiovascular: Positive for leg swelling. Negative for chest pain. Gastrointestinal: Negative for abdominal pain and vomiting. Musculoskeletal: Negative for back pain. Skin: Negative for rash. Neurological: Negative for syncope and headaches. All other systems reviewed and are negative. Vitals:    10/25/19 1833   BP: 131/90   Pulse: 80   Resp: 18   Temp: 97.9 °F (36.6 °C)   SpO2: 97%   Weight: 62.2 kg (137 lb 2 oz)   Height: 5' 2\" (1.575 m)            Physical Exam   Constitutional: She is oriented to person, place, and time. She appears well-developed. Non-toxic appearance. No distress. HENT:   Head: Normocephalic and atraumatic. Eyes: Conjunctivae and EOM are normal.   Neck: Normal range of motion. Neck supple. Cardiovascular: Normal rate, regular rhythm and normal heart sounds. Pulmonary/Chest: Effort normal. No respiratory distress.  She has decreased breath sounds in the right lower field and the left lower field. Speaking in full sentences   Abdominal: Soft. There is no tenderness. There is no guarding. Musculoskeletal: Normal range of motion. Right lower leg: She exhibits edema (1+ pitting). She exhibits no tenderness. Left lower leg: She exhibits edema (1+ pitting). She exhibits no tenderness. Neurological: She is alert and oriented to person, place, and time. She exhibits normal muscle tone. Skin: Skin is warm and dry. MDM       Procedures      EKG interpretation: 18:32 PM  Rhythm: normal sinus rhythm; and regular . Rate (approx.): 75; Axis: normal; Intervals: normal ; ST/T wave: normal, no STEMI; q wave in septal leads age undetermined; EKG documented and interpreted by Yolanda Wilks MD, ED MD.    Assessment and plan: This is a 61-year-old female who presents with few hours of dyspnea. Wide differential at this point including new onset CHF, pneumonia, COPD exacerbation, ACS, pleural effusion. Will obtain chest x-ray, labs. Given improvement in symptoms with EMS will try a DuoNeb here. Disposition depends on results and clinical course.

## 2019-10-25 NOTE — ED TRIAGE NOTES
Triage note: Pt coming from [facility] where staff report to EMS the patient is SOB. Pt states she began feeling SOB about 5 hours ago. EMS states staff gave the patient one duo neb PTA. Pt states she felt better after receiving a duo neb. Auditory wheezing noted. Per EMS pt has hx of dementia. Pt alert to self and place.

## 2019-10-26 ENCOUNTER — APPOINTMENT (OUTPATIENT)
Dept: VASCULAR SURGERY | Age: 84
End: 2019-10-26
Attending: INTERNAL MEDICINE
Payer: MEDICARE

## 2019-10-26 PROBLEM — J44.1 COPD WITH ACUTE EXACERBATION (HCC): Status: ACTIVE | Noted: 2019-10-26

## 2019-10-26 LAB
ALBUMIN SERPL-MCNC: 3.3 G/DL (ref 3.5–5)
ALBUMIN/GLOB SERPL: 1 {RATIO} (ref 1.1–2.2)
ALP SERPL-CCNC: 121 U/L (ref 45–117)
ALT SERPL-CCNC: 16 U/L (ref 12–78)
ANION GAP SERPL CALC-SCNC: 9 MMOL/L (ref 5–15)
APPEARANCE UR: ABNORMAL
AST SERPL-CCNC: 8 U/L (ref 15–37)
ATRIAL RATE: 75 BPM
ATRIAL RATE: 84 BPM
BACTERIA URNS QL MICRO: ABNORMAL /HPF
BASOPHILS # BLD: 0 K/UL (ref 0–0.1)
BASOPHILS NFR BLD: 0 % (ref 0–1)
BILIRUB SERPL-MCNC: 0.2 MG/DL (ref 0.2–1)
BILIRUB UR QL: NEGATIVE
BNP SERPL-MCNC: 111 PG/ML
BUN SERPL-MCNC: 15 MG/DL (ref 6–20)
BUN/CREAT SERPL: 19 (ref 12–20)
CALCIUM SERPL-MCNC: 9 MG/DL (ref 8.5–10.1)
CALCULATED P AXIS, ECG09: 19 DEGREES
CALCULATED P AXIS, ECG09: 70 DEGREES
CALCULATED R AXIS, ECG10: 51 DEGREES
CALCULATED R AXIS, ECG10: 63 DEGREES
CALCULATED T AXIS, ECG11: 44 DEGREES
CALCULATED T AXIS, ECG11: 49 DEGREES
CARBAMAZEPINE SERPL-MCNC: 10 UG/ML (ref 4–12)
CHLORIDE SERPL-SCNC: 106 MMOL/L (ref 97–108)
CHOLEST SERPL-MCNC: 234 MG/DL
CO2 SERPL-SCNC: 24 MMOL/L (ref 21–32)
COLOR UR: ABNORMAL
COMMENT, HOLDF: NORMAL
CREAT SERPL-MCNC: 0.8 MG/DL (ref 0.55–1.02)
DIAGNOSIS, 93000: NORMAL
DIAGNOSIS, 93000: NORMAL
DIFFERENTIAL METHOD BLD: ABNORMAL
EOSINOPHIL # BLD: 0 K/UL (ref 0–0.4)
EOSINOPHIL NFR BLD: 0 % (ref 0–7)
EPITH CASTS URNS QL MICRO: ABNORMAL /LPF
ERYTHROCYTE [DISTWIDTH] IN BLOOD BY AUTOMATED COUNT: 12.9 % (ref 11.5–14.5)
GLOBULIN SER CALC-MCNC: 3.3 G/DL (ref 2–4)
GLUCOSE SERPL-MCNC: 163 MG/DL (ref 65–100)
GLUCOSE UR STRIP.AUTO-MCNC: NEGATIVE MG/DL
HCT VFR BLD AUTO: 40.5 % (ref 35–47)
HDLC SERPL-MCNC: 101 MG/DL
HDLC SERPL: 2.3 {RATIO} (ref 0–5)
HGB BLD-MCNC: 13 G/DL (ref 11.5–16)
HGB UR QL STRIP: NEGATIVE
IMM GRANULOCYTES # BLD AUTO: 0 K/UL (ref 0–0.04)
IMM GRANULOCYTES NFR BLD AUTO: 0 % (ref 0–0.5)
KETONES UR QL STRIP.AUTO: NEGATIVE MG/DL
LDLC SERPL CALC-MCNC: 127.4 MG/DL (ref 0–100)
LEUKOCYTE ESTERASE UR QL STRIP.AUTO: ABNORMAL
LIPID PROFILE,FLP: ABNORMAL
LYMPHOCYTES # BLD: 0.3 K/UL (ref 0.8–3.5)
LYMPHOCYTES NFR BLD: 6 % (ref 12–49)
MAGNESIUM SERPL-MCNC: 2 MG/DL (ref 1.6–2.4)
MCH RBC QN AUTO: 33.2 PG (ref 26–34)
MCHC RBC AUTO-ENTMCNC: 32.1 G/DL (ref 30–36.5)
MCV RBC AUTO: 103.3 FL (ref 80–99)
MONOCYTES # BLD: 0.1 K/UL (ref 0–1)
MONOCYTES NFR BLD: 2 % (ref 5–13)
NEUTS SEG # BLD: 5.3 K/UL (ref 1.8–8)
NEUTS SEG NFR BLD: 92 % (ref 32–75)
NITRITE UR QL STRIP.AUTO: NEGATIVE
NRBC # BLD: 0 K/UL (ref 0–0.01)
NRBC BLD-RTO: 0 PER 100 WBC
P-R INTERVAL, ECG05: 174 MS
P-R INTERVAL, ECG05: 224 MS
PH UR STRIP: 7.5 [PH] (ref 5–8)
PHOSPHATE SERPL-MCNC: 3.2 MG/DL (ref 2.6–4.7)
PLATELET # BLD AUTO: 221 K/UL (ref 150–400)
PMV BLD AUTO: 9.9 FL (ref 8.9–12.9)
POTASSIUM SERPL-SCNC: 4.3 MMOL/L (ref 3.5–5.1)
PROT SERPL-MCNC: 6.6 G/DL (ref 6.4–8.2)
PROT UR STRIP-MCNC: NEGATIVE MG/DL
Q-T INTERVAL, ECG07: 374 MS
Q-T INTERVAL, ECG07: 376 MS
QRS DURATION, ECG06: 78 MS
QRS DURATION, ECG06: 96 MS
QTC CALCULATION (BEZET), ECG08: 419 MS
QTC CALCULATION (BEZET), ECG08: 441 MS
RBC # BLD AUTO: 3.92 M/UL (ref 3.8–5.2)
RBC #/AREA URNS HPF: ABNORMAL /HPF (ref 0–5)
RBC MORPH BLD: ABNORMAL
SAMPLES BEING HELD,HOLD: NORMAL
SODIUM SERPL-SCNC: 139 MMOL/L (ref 136–145)
SP GR UR REFRACTOMETRY: 1.01 (ref 1–1.03)
TRIGL SERPL-MCNC: 28 MG/DL (ref ?–150)
TROPONIN I SERPL-MCNC: <0.05 NG/ML
UR CULT HOLD, URHOLD: NORMAL
UROBILINOGEN UR QL STRIP.AUTO: 0.2 EU/DL (ref 0.2–1)
VENTRICULAR RATE, ECG03: 75 BPM
VENTRICULAR RATE, ECG03: 84 BPM
VLDLC SERPL CALC-MCNC: 5.6 MG/DL
WBC # BLD AUTO: 5.7 K/UL (ref 3.6–11)
WBC URNS QL MICRO: ABNORMAL /HPF (ref 0–4)

## 2019-10-26 PROCEDURE — 84100 ASSAY OF PHOSPHORUS: CPT

## 2019-10-26 PROCEDURE — 77010033678 HC OXYGEN DAILY

## 2019-10-26 PROCEDURE — 80156 ASSAY CARBAMAZEPINE TOTAL: CPT

## 2019-10-26 PROCEDURE — 99218 HC RM OBSERVATION: CPT

## 2019-10-26 PROCEDURE — 83735 ASSAY OF MAGNESIUM: CPT

## 2019-10-26 PROCEDURE — 83880 ASSAY OF NATRIURETIC PEPTIDE: CPT

## 2019-10-26 PROCEDURE — 80053 COMPREHEN METABOLIC PANEL: CPT

## 2019-10-26 PROCEDURE — 77030027138 HC INCENT SPIROMETER -A

## 2019-10-26 PROCEDURE — 93005 ELECTROCARDIOGRAM TRACING: CPT

## 2019-10-26 PROCEDURE — 94664 DEMO&/EVAL PT USE INHALER: CPT

## 2019-10-26 PROCEDURE — 81001 URINALYSIS AUTO W/SCOPE: CPT

## 2019-10-26 PROCEDURE — 85025 COMPLETE CBC W/AUTO DIFF WBC: CPT

## 2019-10-26 PROCEDURE — 96372 THER/PROPH/DIAG INJ SC/IM: CPT

## 2019-10-26 PROCEDURE — 96374 THER/PROPH/DIAG INJ IV PUSH: CPT

## 2019-10-26 PROCEDURE — 36415 COLL VENOUS BLD VENIPUNCTURE: CPT

## 2019-10-26 PROCEDURE — 94640 AIRWAY INHALATION TREATMENT: CPT

## 2019-10-26 PROCEDURE — 65270000029 HC RM PRIVATE

## 2019-10-26 PROCEDURE — 74011000250 HC RX REV CODE- 250: Performed by: INTERNAL MEDICINE

## 2019-10-26 PROCEDURE — 74011000250 HC RX REV CODE- 250: Performed by: HOSPITALIST

## 2019-10-26 PROCEDURE — 77030029684 HC NEB SM VOL KT MONA -A

## 2019-10-26 PROCEDURE — 93970 EXTREMITY STUDY: CPT

## 2019-10-26 PROCEDURE — 74011250636 HC RX REV CODE- 250/636: Performed by: INTERNAL MEDICINE

## 2019-10-26 PROCEDURE — 74011636637 HC RX REV CODE- 636/637: Performed by: INTERNAL MEDICINE

## 2019-10-26 PROCEDURE — 74011250637 HC RX REV CODE- 250/637: Performed by: INTERNAL MEDICINE

## 2019-10-26 PROCEDURE — 84484 ASSAY OF TROPONIN QUANT: CPT

## 2019-10-26 PROCEDURE — 80061 LIPID PANEL: CPT

## 2019-10-26 RX ORDER — LORATADINE 10 MG/1
10 TABLET ORAL
Status: DISCONTINUED | OUTPATIENT
Start: 2019-10-26 | End: 2019-10-28 | Stop reason: HOSPADM

## 2019-10-26 RX ORDER — BUDESONIDE 0.5 MG/2ML
500 INHALANT ORAL
Status: DISCONTINUED | OUTPATIENT
Start: 2019-10-26 | End: 2019-10-28 | Stop reason: HOSPADM

## 2019-10-26 RX ORDER — TRAZODONE HYDROCHLORIDE 50 MG/1
25 TABLET ORAL
Status: DISCONTINUED | OUTPATIENT
Start: 2019-10-26 | End: 2019-10-26

## 2019-10-26 RX ORDER — HEPARIN SODIUM 5000 [USP'U]/ML
5000 INJECTION, SOLUTION INTRAVENOUS; SUBCUTANEOUS EVERY 8 HOURS
Status: DISCONTINUED | OUTPATIENT
Start: 2019-10-26 | End: 2019-10-28 | Stop reason: HOSPADM

## 2019-10-26 RX ORDER — SODIUM CHLORIDE 0.9 % (FLUSH) 0.9 %
5-40 SYRINGE (ML) INJECTION EVERY 8 HOURS
Status: DISCONTINUED | OUTPATIENT
Start: 2019-10-26 | End: 2019-10-28 | Stop reason: HOSPADM

## 2019-10-26 RX ORDER — CARBAMAZEPINE 200 MG/1
400 TABLET, EXTENDED RELEASE ORAL 2 TIMES DAILY
Status: DISCONTINUED | OUTPATIENT
Start: 2019-10-26 | End: 2019-10-28 | Stop reason: HOSPADM

## 2019-10-26 RX ORDER — LEVOTHYROXINE SODIUM 100 UG/1
100 TABLET ORAL
Status: DISCONTINUED | OUTPATIENT
Start: 2019-10-26 | End: 2019-10-28 | Stop reason: HOSPADM

## 2019-10-26 RX ORDER — CETIRIZINE HCL 10 MG
10 TABLET ORAL DAILY
Status: DISCONTINUED | OUTPATIENT
Start: 2019-10-26 | End: 2019-10-28 | Stop reason: HOSPADM

## 2019-10-26 RX ORDER — IPRATROPIUM BROMIDE AND ALBUTEROL SULFATE 2.5; .5 MG/3ML; MG/3ML
3 SOLUTION RESPIRATORY (INHALATION)
Status: DISCONTINUED | OUTPATIENT
Start: 2019-10-26 | End: 2019-10-28 | Stop reason: HOSPADM

## 2019-10-26 RX ORDER — ACETAMINOPHEN 325 MG/1
650 TABLET ORAL
Status: DISCONTINUED | OUTPATIENT
Start: 2019-10-26 | End: 2019-10-28 | Stop reason: HOSPADM

## 2019-10-26 RX ORDER — BISACODYL 5 MG
5 TABLET, DELAYED RELEASE (ENTERIC COATED) ORAL DAILY PRN
Status: DISCONTINUED | OUTPATIENT
Start: 2019-10-26 | End: 2019-10-28 | Stop reason: HOSPADM

## 2019-10-26 RX ORDER — PREDNISONE 10 MG/1
40 TABLET ORAL
Status: DISCONTINUED | OUTPATIENT
Start: 2019-10-26 | End: 2019-10-28 | Stop reason: HOSPADM

## 2019-10-26 RX ORDER — FLUTICASONE PROPIONATE 50 MCG
2 SPRAY, SUSPENSION (ML) NASAL DAILY
Status: DISCONTINUED | OUTPATIENT
Start: 2019-10-26 | End: 2019-10-28 | Stop reason: HOSPADM

## 2019-10-26 RX ORDER — SODIUM CHLORIDE 0.9 % (FLUSH) 0.9 %
5-40 SYRINGE (ML) INJECTION AS NEEDED
Status: DISCONTINUED | OUTPATIENT
Start: 2019-10-26 | End: 2019-10-28 | Stop reason: HOSPADM

## 2019-10-26 RX ORDER — LAMOTRIGINE 25 MG/1
50 TABLET ORAL 2 TIMES DAILY WITH MEALS
Status: DISCONTINUED | OUTPATIENT
Start: 2019-10-26 | End: 2019-10-28 | Stop reason: HOSPADM

## 2019-10-26 RX ORDER — ONDANSETRON 2 MG/ML
4 INJECTION INTRAMUSCULAR; INTRAVENOUS
Status: DISCONTINUED | OUTPATIENT
Start: 2019-10-26 | End: 2019-10-28 | Stop reason: HOSPADM

## 2019-10-26 RX ORDER — UREA 10 %
1 LOTION (ML) TOPICAL
Status: DISCONTINUED | OUTPATIENT
Start: 2019-10-26 | End: 2019-10-28 | Stop reason: HOSPADM

## 2019-10-26 RX ADMIN — HEPARIN SODIUM 5000 UNITS: 5000 INJECTION INTRAVENOUS; SUBCUTANEOUS at 21:04

## 2019-10-26 RX ADMIN — BUDESONIDE 500 MCG: 0.5 INHALANT RESPIRATORY (INHALATION) at 08:24

## 2019-10-26 RX ADMIN — HEPARIN SODIUM 5000 UNITS: 5000 INJECTION INTRAVENOUS; SUBCUTANEOUS at 03:50

## 2019-10-26 RX ADMIN — CARBAMAZEPINE 400 MG: 200 TABLET, EXTENDED RELEASE ORAL at 17:42

## 2019-10-26 RX ADMIN — ACETAMINOPHEN 650 MG: 325 TABLET, FILM COATED ORAL at 21:28

## 2019-10-26 RX ADMIN — CARBAMAZEPINE 400 MG: 200 TABLET, EXTENDED RELEASE ORAL at 11:28

## 2019-10-26 RX ADMIN — AZITHROMYCIN MONOHYDRATE 500 MG: 500 INJECTION, POWDER, LYOPHILIZED, FOR SOLUTION INTRAVENOUS at 03:40

## 2019-10-26 RX ADMIN — FLUTICASONE PROPIONATE 2 SPRAY: 50 SPRAY, METERED NASAL at 17:43

## 2019-10-26 RX ADMIN — Medication 10 ML: at 21:29

## 2019-10-26 RX ADMIN — IPRATROPIUM BROMIDE AND ALBUTEROL SULFATE 3 ML: .5; 3 SOLUTION RESPIRATORY (INHALATION) at 00:19

## 2019-10-26 RX ADMIN — LAMOTRIGINE 50 MG: 25 TABLET ORAL at 11:27

## 2019-10-26 RX ADMIN — BUDESONIDE 500 MCG: 0.5 INHALANT RESPIRATORY (INHALATION) at 21:16

## 2019-10-26 RX ADMIN — LEVOTHYROXINE SODIUM 100 MCG: 100 TABLET ORAL at 07:05

## 2019-10-26 RX ADMIN — HEPARIN SODIUM 5000 UNITS: 5000 INJECTION INTRAVENOUS; SUBCUTANEOUS at 11:27

## 2019-10-26 RX ADMIN — LAMOTRIGINE 50 MG: 25 TABLET ORAL at 17:26

## 2019-10-26 RX ADMIN — PREDNISONE 40 MG: 10 TABLET ORAL at 11:27

## 2019-10-26 RX ADMIN — Medication 10 ML: at 17:42

## 2019-10-26 RX ADMIN — CETIRIZINE HYDROCHLORIDE 10 MG: 10 TABLET, FILM COATED ORAL at 11:27

## 2019-10-26 NOTE — ED NOTES
Ambulated pt to the nursing station, SO2 level dropped to 87 on room air, increased work of breathing noticed. Pt reports feeling SOB. Pt is back to her room, in bed, family is at the bed side. MD notified.

## 2019-10-26 NOTE — PROGRESS NOTES
Primary Nurse Yeimy French RN and Vero RN performed a dual skin assessment on this patient No impairment noted  George score is 21    Bruise right hip  Right heel red/blanchable.  Heel off loaded

## 2019-10-26 NOTE — PROGRESS NOTES
Spiritual Care Partner Volunteer visited patient in [address] [place] [address] on 10/26/19. Documented by:   Chaplain Lincoln MDiv, MACE  [phone])

## 2019-10-26 NOTE — PROGRESS NOTES
Hospitalist Progress Note  Rita Stallworth MD  Answering service: [phone] from in house phone        Date of Service:  10/26/2019  NAME:  Sandra Cantrell  :  1935  MRN:  [id number]      Admission Summary: This is an 79-year-old woman with a past medical history significant for COPD, seizure disorder, hypothyroidism, dementia, was in her usual state of health until the day of her presentation at the emergency room when the patient developed shortness of breath at the assisted living facility where the patient resides. The patient developed the shortness of breath 5 hours before coming to the emergency room. It was sudden in onset and occurred at rest.    Interval history / Subjective:   I worked out too much on bike so I got SOB  I am feeling better now on RA     Assessment & Plan:     1. Chronic obstructive pulmonary disease with acute exacerbation. -  on duoneb / steroid and abx   - CTA of the chest is negative for pulmonary embolism  - troponin and BNP neg as well    2. Seizure disorder. We will resume preadmission medication. The patient will also be placed on seizure precaution. We will check Tegretol level. \    3. Hypothyroidism. We will continue with Synthroid. We will check TSH level. 4.  Dementia. We will continue supportive therapy. 5.  Bilateral leg swelling. We will check ultrasound of the lower extremity for DVT.      Code status: Full  DVT prophylaxis: Naren Chang discussed with: Patient/Family and Nurse  Disposition: TBD     Hospital Problems  Date Reviewed: 10/26/2019          Codes Class Noted POA    * (Principal) COPD with acute exacerbation ([facility] ICD-10-CM: J44.1  ICD-9-CM: 491.21  10/26/2019 Yes        Hypoxia ICD-10-CM: R09.02  ICD-9-CM: 799.02  10/25/2019 Unknown        COPD (chronic obstructive pulmonary disease) ([facility] (Chronic) ICD-10-CM: J44.9  ICD-9-CM: 496  3/28/2017 Unknown Review of Systems:   A comprehensive review of systems was negative. Cta Chest W Or W Wo Cont    Result Date: 10/25/2019  IMPRESSION: 1. No Pulmonary Embolus. 2. No Acute Findings. 3. Chronic lung disease. Xr Chest Port    Result Date: 10/25/2019  IMPRESSION: No Acute Disease. Vital Signs:    Last 24hrs VS reviewed since prior progress note. Most recent are:  Visit Vitals  /68 (BP 1 Location: Right arm, BP Patient Position: At rest)   Pulse 96   Temp 97.7 °F (36.5 °C)   Resp 16   Ht 5' 2\" (1.575 m)   Wt 62.2 kg (137 lb 2 oz)   SpO2 96%   BMI 25.08 kg/m²         Intake/Output Summary (Last 24 hours) at 10/26/2019 0386  Last data filed at 10/26/2019 1121  Gross per 24 hour   Intake 50 ml   Output 1300 ml   Net -1250 ml        Physical Examination:             Constitutional:  No acute distress, cooperative, pleasant    ENT:  Oral mucous moist, oropharynx benign. Resp:  CTA bilaterally. No wheezing/rhonchi/rales. No accessory muscle use   CV:  Regular rhythm, normal rate, no murmurs, gallops, rubs    GI:  Soft, non distended, non tender. normoactive bowel sounds, no hepatosplenomegaly     Musculoskeletal:  No edema, warm, 2+ pulses throughout    Neurologic:  Moves all extremities. AAOx3, CN II-XII reviewed     Psych:  Good insight, Not anxious nor agitated. Data Review:    I personally reviewed  Image and labs      Labs:     Recent Labs     10/26/19  0358 10/25/19  1840   WBC 5.7 6.8   HGB 13.0 13.1   HCT 40.5 41.2    227     Recent Labs     10/26/19  0358 10/25/19  1840    142   K 4.3 5.0    102   CO2 24 27   BUN 15 21*   CREA 0.80 0.69   * 95   CA 9.0 9.0   MG 2.0  --    PHOS 3.2  --      Recent Labs     10/26/19  0358 10/25/19  1840   SGOT 8* 21   ALT 16 18   * 129*   TBILI 0.2 0.3   TP 6.6 6.9   ALB 3.3* 3.4*   GLOB 3.3 3.5     No results for input(s): INR, PTP, APTT, INREXT in the last 72 hours.    No results for input(s): FE, TIBC, PSAT, FERR in the last 72 hours. No results found for: FOL, RBCF   No results for input(s): PH, PCO2, PO2 in the last 72 hours.   Recent Labs     10/26/19  0358 10/25/19  1840   TROIQ <0.05 <0.05     Lab Results   Component Value Date/Time    Cholesterol, total 234 (H) 10/26/2019 03:58 AM    HDL Cholesterol 101 10/26/2019 03:58 AM    LDL, calculated 127.4 (H) 10/26/2019 03:58 AM    Triglyceride 28 10/26/2019 03:58 AM    CHOL/HDL Ratio 2.3 10/26/2019 03:58 AM     Lab Results   Component Value Date/Time    Glucose (POC) 117 (H) 03/10/2017 07:36 PM     Lab Results   Component Value Date/Time    Color YELLOW/STRAW 10/25/2019 06:40 PM    Appearance CLEAR 10/25/2019 06:40 PM    Specific gravity 1.024 10/25/2019 06:40 PM    pH (UA) 7.5 10/25/2019 06:40 PM    Protein NEGATIVE  10/25/2019 06:40 PM    Glucose NEGATIVE  10/25/2019 06:40 PM    Ketone NEGATIVE  10/25/2019 06:40 PM    Bilirubin NEGATIVE  10/25/2019 06:40 PM    Urobilinogen 0.2 10/25/2019 06:40 PM    Nitrites NEGATIVE  10/25/2019 06:40 PM    Leukocyte Esterase TRACE (A) 10/25/2019 06:40 PM    Epithelial cells FEW 10/25/2019 06:40 PM    Bacteria NEGATIVE  10/25/2019 06:40 PM    WBC 0-4 10/25/2019 06:40 PM    RBC 0-5 10/25/2019 06:40 PM         Medications Reviewed:     Current Facility-Administered Medications   Medication Dose Route Frequency    albuterol-ipratropium (DUO-NEB) 2.5 MG-0.5 MG/3 ML  3 mL Nebulization Q4H PRN    budesonide (PULMICORT) 500 mcg/2 ml nebulizer suspension  500 mcg Nebulization BID RT    carBAMazepine XR (TEGretol XR) tablet 400 mg  400 mg Oral BID    cetirizine (ZYRTEC) tablet 10 mg  10 mg Oral DAILY    lamoTRIgine (LaMICtal) tablet 50 mg  50 mg Oral BID WITH MEALS    levothyroxine (SYNTHROID) tablet 100 mcg  100 mcg Oral ACB    loratadine (CLARITIN) tablet 10 mg  10 mg Oral DAILY PRN    sodium chloride (NS) flush 5-40 mL  5-40 mL IntraVENous Q8H    sodium chloride (NS) flush 5-40 mL  5-40 mL IntraVENous PRN    acetaminophen (TYLENOL) tablet 650 mg  650 mg Oral Q4H PRN    ondansetron (ZOFRAN) injection 4 mg  4 mg IntraVENous Q4H PRN    bisacodyl (DULCOLAX) tablet 5 mg  5 mg Oral DAILY PRN    heparin (porcine) injection 5,000 Units  5,000 Units SubCUTAneous Q8H    predniSONE (DELTASONE) tablet 40 mg  40 mg Oral DAILY WITH BREAKFAST    azithromycin (ZITHROMAX) 500 mg in 0.9% sodium chloride (MBP/ADV) 250 mL  500 mg IntraVENous Q24H    melatonin tablet 1 mg  1 mg Oral QHS PRN    fluticasone propionate (FLONASE) 50 mcg/actuation nasal spray 2 Spray  2 Spray Both Nostrils DAILY     ______________________________________________________________________  EXPECTED LENGTH OF STAY: - - -  ACTUAL LENGTH OF STAY:          1                 Rolf Fenton MD

## 2019-10-26 NOTE — PROGRESS NOTES
Bedside shift change report given to Twaana RN (oncoming nurse) by FRANCIA Hinkle RN (offgoing nurse). Report included the following information SBAR, Kardex and Recent Results. Paged Dr. Lore Agustin to let him know patient was here from [facility]  Dr. Lore Agustin said that he would come up to see the patient.

## 2019-10-26 NOTE — ED NOTES
7 PM  Change of shift. Care of patient taken over from Dr. Rebekah Felipe; H&P reviewed, bedside handoff complete. Awaiting re-evaluation. The patient continues to feel short of breath after neb. No wheezing on exam. She was ambulated in the ED. She appeared short of breath after very brief ambulation and O2 sats decreased to 89%. Family reports she does not use oxygen at home. Hospitalist Emily for Admission  9:30 PM - Dr. Svetlana Goss    ED Room Number: SER08/08  Patient Name and age:  Anthony Page 82 y.o.  female  Working Diagnosis:   1. Hypoxia    2. SOB (shortness of breath)      Readmission: no  Isolation Requirements:  no  Recommended Level of Care:  telemetry  Code Status:  Do Not Resuscitate  Department:[facility] - [phone]  Other: Hx COPD. No wheezing but SOB and hypoxic with any activity. CTA chest negative. No Echo on file. May need home O2.

## 2019-10-26 NOTE — PROGRESS NOTES
TRANSFER - IN REPORT:    Verbal report received from LEELA Hackett(name) on Sandra Cantrell  being received from [place] [facility](unit) for routine progression of care      Report consisted of patients Situation, Background, Assessment and   Recommendations(SBAR). Information from the following report(s) SBAR, Kardex and Recent Results was reviewed with the receiving nurse. Opportunity for questions and clarification was provided. Assessment completed upon patients arrival to unit and care assumed.

## 2019-10-26 NOTE — PROGRESS NOTES
.Bedside and Verbal shift change report given to Tawana (oncoming nurse) by Stewart Walsh (offgoing nurse). Report included the following information SBAR.

## 2019-10-26 NOTE — ED NOTES
ESPERANZA present in department. Given EMTALA paperwork and facesheet. Patient transferred to transport stretcher without difficulty. No acute distress at time of departure.

## 2019-10-26 NOTE — PROGRESS NOTES
Spoke with MD, advised EKG has been done and uploaded in system, at the top it reads \"sinus rhythm with 1st degree AV block

## 2019-10-26 NOTE — ROUTINE PROCESS
TRANSFER - OUT REPORT:    Verbal report given to Randi Jennings RN(name) on Barney Walters  being transferred to [facility](unit) for routine progression of care       Report consisted of patients Situation, Background, Assessment and   Recommendations(SBAR). Information from the following report(s) SBAR, ED Summary, Intake/Output, MAR and Recent Results was reviewed with the receiving nurse. Lines:   Peripheral IV 10/25/19 Right Antecubital (Active)   Site Assessment Clean, dry, & intact 10/25/2019  8:48 PM   Phlebitis Assessment 0 10/25/2019  8:48 PM   Infiltration Assessment 0 10/25/2019  8:48 PM   Dressing Status Clean, dry, & intact 10/25/2019  8:48 PM   Dressing Type Transparent 10/25/2019  8:48 PM   Hub Color/Line Status Pink;Flushed;Patent 10/25/2019  8:48 PM   Action Taken Blood drawn 10/25/2019  8:48 PM       Peripheral IV 10/25/19 Left Hand (Active)   Site Assessment Clean, dry, & intact 10/25/2019  6:30 PM   Phlebitis Assessment 0 10/25/2019  6:30 PM   Infiltration Assessment 0 10/25/2019  6:30 PM   Dressing Status Clean, dry, & intact 10/25/2019  6:30 PM   Dressing Type Transparent 10/25/2019  6:30 PM   Hub Color/Line Status Pink;Flushed;Patent 10/25/2019  6:30 PM   Action Taken Blood drawn 10/25/2019  6:30 PM        Opportunity for questions and clarification was provided. UPDATE: Pt assessment unchanged, continues to experience SOB with exertion, remains on room air with appropriate oxygen saturations, VSS, no complaints at this time, waiting on transport to inpatient facility.     Visit Vitals  /68 (BP 1 Location: Right arm, BP Patient Position: At rest)   Pulse 90   Temp 98.4 °F (36.9 °C)   Resp 25   Ht 5' 2\" (1.575 m)   Wt 62.2 kg (137 lb 2 oz)   SpO2 93%   BMI 25.08 kg/m²

## 2019-10-26 NOTE — H&P
[address]  HISTORY AND PHYSICAL    Name:  Marisa Foster  MR#:  [id number]  :  1935  ACCOUNT #:  [de-identified]  ADMIT DATE:  10/25/2019      Admission order was placed by the triage hospitalist on 10/25/2019 while the patient was still at the [facility] Emergency Room at [facility], but the patient did not arrive at [facility] until 10/26/2019. The patient was seen, evaluated and admitted by me on 10/26/2019. PRIMARY CARE PHYSICIAN:  Peewee Calvert MD    SOURCE OF INFORMATION:  The patient who is not a good historian because of dementia. CHIEF COMPLAINT:  Shortness of breath. HISTORY OF PRESENT ILLNESS:  This is an 74-year-old woman with a past medical history significant for COPD, seizure disorder, hypothyroidism, dementia, was in her usual state of health until the day of her presentation at the emergency room when the patient developed shortness of breath at the assisted living facility where the patient resides. The patient developed the shortness of breath 5 hours before coming to the emergency room. It was sudden in onset and occurred at rest.  No associated fever, rigors, or chills. No associated chest pain. The patient has history of COPD, not on oxygen at the facility. She was taken to [facility] Emergency room at [facility]. When the patient arrived at the emergency room, CTA of the chest was performed. This was negative for pulmonary embolism, but the patient kept saying that \"I can't breathe, I can't breathe. \"  She was subsequently referred to the hospitalist service for evaluation for admission. She was last admitted to this hospital from 2017 to 2017. The patient was admitted and treated for breakthrough seizure. The patient has no history of congestive heart failure. PAST MEDICAL HISTORY:  1. COPD. 2.  Seizure disorder. 3.  Hypothyroidism. 4.  Dementia.     ALLERGIES:  THE PATIENT IS ALLERGIC TO ARICEPT, BENADRYL, CIPRO, CODEINE, MACROBID, PENICILLIN, SULFA. CURRENT MEDICATIONS:  1. Tylenol 500 mg every 6 hours as needed for pain. 2.  DuoNeb nebulizer twice daily. 3.  Pulmicort 500 mcg nebulizer twice daily. 4.  Tegretol  mg twice daily. 5.  Zyrtec 10 mg daily. 6.  Flovent 50 mcg inhalation daily. 7.  Lamictal 25 mg daily. 8.  Synthroid 100 mcg daily. 9.  Claritin 10 mg daily. 10.  Trazodone 25 mg daily at bedtime as needed for sleep. FAMILY HISTORY:  This was reviewed. Mother had hypertension. PAST SURGICAL HISTORY:  This is significant for hip replacement. SOCIAL HISTORY:  No history of alcohol or tobacco abuse. REVIEW OF SYSTEMS:  HEAD, EYES, EARS, NOSE AND THROAT:  No headache, no dizziness, no blurring of vision, no photophobia. RESPIRATORY SYSTEM:  This is positive for shortness of breath. No cough, no hemoptysis. CARDIOVASCULAR SYSTEM:  No chest pain, no orthopnea, no palpitation. GASTROINTESTINAL SYSTEM:  No nausea or vomiting. No diarrhea. No constipation. GENITOURINARY SYSTEM:  No dysuria, no urgency and no frequency. All other systems are reviewed and they are negative. PHYSICAL EXAMINATION:  GENERAL APPEARANCE:  The patient appeared ill in moderate distress. VITAL SIGNS:  On arrival at the emergency room, temperature 97.9, pulse 80, respiratory rate 18, blood pressure 131/90, oxygen saturation 97% on room air. HEAD:  Normocephalic, atraumatic. Eyes:  Normal eye movements. No redness, no drainage, no discharge. EARS:  Normal external ears with no evidence of drainage. NOSE:  No deformity, no drainage. MOUTH AND THROAT:  No visible oral lesion. NECK:  Neck is supple. No JVD, no thyromegaly. CHEST:  Diffuse expiratory wheezing. No crackles. HEART:  Normal S1 and S2, regular. No clinically appreciable murmur. ABDOMEN:  Soft and nontender. Normal bowel sounds. CNS:  Alert, oriented to person and place.   No gross focal neurological deficit. EXTREMITIES:  Edema 2+. Pulses 2+ bilaterally. MUSCULOSKELETAL SYSTEM:  No evidence of joint deformity or swelling. SKIN:  No active skin lesions seen in the exposed parts of the body. PSYCHIATRY:  Normal mood and affect. LYMPHATIC SYSTEM:  No cervical lymphadenopathy. DIAGNOSTIC DATA:  EKG shows normal sinus rhythm and a nonspecific ST and T-wave abnormalities. Chest x-ray, no acute disease. CTA of the chest, no pulmonary embolus, no acute findings. Chronic lung disease. LABORATORY DATA:  Pro-BNP 98. Chemistry:  Sodium 142, potassium 5.0, chloride 102, CO2 27, glucose 95, BUN 21, creatinine 0.69, calcium 9.0, total bilirubin 0.3, ALT 18, AST 21, alkaline phosphatase 129, total protein at 6.9, albumin level 3.4, globulin at 3.5. Cardiac profile, troponin less than 0.05. Hematology:  WBC 6.8, hemoglobin 13.1, hematocrit 41.2, platelets 089. Urinalysis, this is significant for negative nitrites, negative leukocyte esterase, negative bacteria. ASSESSMENT:  1. Chronic obstructive pulmonary disease with acute exacerbation. 2.  Seizure disorder. 3.  Hypothyroidism. 4.  Dementia. 5.  Bilateral leg swelling. PLAN:  1. Chronic obstructive pulmonary disease with acute exacerbation. We will admit the patient for further evaluation and treatment. We will place the patient on DuoNeb as needed. The patient will be started on a short course of prednisone 40 mg daily for 5 days. We will also place the patient on Zithromax for any underlying chest infection. We will monitor the patient closely. We will obtain serial cardiac markers to rule out acute myocardial infarction as a possible cause of shortness of breath. CTA of the chest is negative for pulmonary embolism. BNP level is within normal limit. 2.  Seizure disorder. We will resume preadmission medication. The patient will also be placed on seizure precaution. We will check Tegretol level. 3.  Hypothyroidism.   We will continue with Synthroid. We will check TSH level. 4.  Dementia. We will continue supportive therapy. 5.  Bilateral leg swelling. We will check ultrasound of the lower extremity for DVT. OTHER ISSUES:  Code status, the patient is a full code. We will place the patient on heparin for DVT prophylaxis. FUNCTIONAL STATUS PRIOR TO ADMISSION:  The patient is ambulatory with a walker.       Abdirizak Lancaster MD      [id number]  D:  10/26/2019 6:14  T:  10/26/2019 6:56  JOB #:  [id number]  CC:  Aniya Lund MD

## 2019-10-27 VITALS
SYSTOLIC BLOOD PRESSURE: 124 MMHG | BODY MASS INDEX: 25.23 KG/M2 | HEIGHT: 62 IN | TEMPERATURE: 97.6 F | HEART RATE: 75 BPM | OXYGEN SATURATION: 95 % | WEIGHT: 137.13 LBS | DIASTOLIC BLOOD PRESSURE: 64 MMHG | RESPIRATION RATE: 14 BRPM

## 2019-10-27 PROBLEM — R09.02 HYPOXIA: Status: RESOLVED | Noted: 2019-10-25 | Resolved: 2019-10-27

## 2019-10-27 PROBLEM — J44.1 COPD WITH ACUTE EXACERBATION (HCC): Status: RESOLVED | Noted: 2019-10-26 | Resolved: 2019-10-27

## 2019-10-27 PROBLEM — J44.9 COPD (CHRONIC OBSTRUCTIVE PULMONARY DISEASE) (HCC): Chronic | Status: RESOLVED | Noted: 2017-03-28 | Resolved: 2019-10-27

## 2019-10-27 LAB
BACTERIA SPEC CULT: NORMAL
BACTERIA SPEC CULT: NORMAL
SERVICE CMNT-IMP: NORMAL

## 2019-10-27 PROCEDURE — 96376 TX/PRO/DX INJ SAME DRUG ADON: CPT

## 2019-10-27 PROCEDURE — 96372 THER/PROPH/DIAG INJ SC/IM: CPT

## 2019-10-27 PROCEDURE — 74011250637 HC RX REV CODE- 250/637: Performed by: INTERNAL MEDICINE

## 2019-10-27 PROCEDURE — 99218 HC RM OBSERVATION: CPT

## 2019-10-27 PROCEDURE — 74011250636 HC RX REV CODE- 250/636: Performed by: INTERNAL MEDICINE

## 2019-10-27 PROCEDURE — 94640 AIRWAY INHALATION TREATMENT: CPT

## 2019-10-27 PROCEDURE — 77010033678 HC OXYGEN DAILY

## 2019-10-27 PROCEDURE — 74011636637 HC RX REV CODE- 636/637: Performed by: INTERNAL MEDICINE

## 2019-10-27 PROCEDURE — 94760 N-INVAS EAR/PLS OXIMETRY 1: CPT

## 2019-10-27 PROCEDURE — 74011000250 HC RX REV CODE- 250: Performed by: INTERNAL MEDICINE

## 2019-10-27 RX ORDER — PREDNISONE 20 MG/1
40 TABLET ORAL
Qty: 6 TAB | Refills: 0 | Status: SHIPPED
Start: 2019-10-28 | End: 2019-10-31

## 2019-10-27 RX ORDER — AZITHROMYCIN 250 MG/1
250 TABLET, FILM COATED ORAL DAILY
Qty: 4 TAB | Refills: 0 | Status: SHIPPED
Start: 2019-10-27 | End: 2021-04-04

## 2019-10-27 RX ADMIN — PREDNISONE 40 MG: 10 TABLET ORAL at 07:55

## 2019-10-27 RX ADMIN — CARBAMAZEPINE 400 MG: 200 TABLET, EXTENDED RELEASE ORAL at 10:28

## 2019-10-27 RX ADMIN — HEPARIN SODIUM 5000 UNITS: 5000 INJECTION INTRAVENOUS; SUBCUTANEOUS at 17:15

## 2019-10-27 RX ADMIN — LEVOTHYROXINE SODIUM 100 MCG: 100 TABLET ORAL at 07:54

## 2019-10-27 RX ADMIN — CETIRIZINE HYDROCHLORIDE 10 MG: 10 TABLET, FILM COATED ORAL at 10:27

## 2019-10-27 RX ADMIN — HEPARIN SODIUM 5000 UNITS: 5000 INJECTION INTRAVENOUS; SUBCUTANEOUS at 05:20

## 2019-10-27 RX ADMIN — LAMOTRIGINE 50 MG: 25 TABLET ORAL at 17:14

## 2019-10-27 RX ADMIN — FLUTICASONE PROPIONATE 2 SPRAY: 50 SPRAY, METERED NASAL at 10:28

## 2019-10-27 RX ADMIN — AZITHROMYCIN MONOHYDRATE 500 MG: 500 INJECTION, POWDER, LYOPHILIZED, FOR SOLUTION INTRAVENOUS at 02:14

## 2019-10-27 RX ADMIN — CARBAMAZEPINE 400 MG: 200 TABLET, EXTENDED RELEASE ORAL at 17:19

## 2019-10-27 RX ADMIN — Medication 10 ML: at 08:29

## 2019-10-27 RX ADMIN — Medication 10 ML: at 17:14

## 2019-10-27 RX ADMIN — BUDESONIDE 500 MCG: 0.5 INHALANT RESPIRATORY (INHALATION) at 09:26

## 2019-10-27 RX ADMIN — LAMOTRIGINE 50 MG: 25 TABLET ORAL at 07:54

## 2019-10-27 NOTE — PROGRESS NOTES
Patient was able to ambulate around the unit with oxygen saturations of 92-93% on Room Air  Patient was able to tolerate ambulation

## 2019-10-27 NOTE — DISCHARGE SUMMARY
Discharge Summary       PATIENT ID: Tyrel Li  MRN: [id number]   YOB: 1935    DATE OF ADMISSION: 10/25/2019  6:24 PM    DATE OF DISCHARGE: 10/27/19   PRIMARY CARE PROVIDER: Jose Sargent MD     ATTENDING PHYSICIAN: Olivia Goff  DISCHARGING PROVIDER: Amirah Powell MD    To contact this individual call [phone] and ask the  to page. If unavailable ask to be transferred the Adult Hospitalist Department. CONSULTATIONS: None    PROCEDURES/SURGERIES: * No surgery found *    ADMITTING DIAGNOSES & HOSPITAL COURSE:   This is an 65-year-old woman with a past medical history significant for COPD, seizure disorder, hypothyroidism, dementia, was in her usual state of health until the day of her presentation at the emergency room when the patient developed shortness of breath at the assisted living facility where the patient resides. The patient developed the shortness of breath 5 hours before coming to the emergency room.  It was sudden in onset and occurred at rest.      Assessment & Plan:      1.  Chronic obstructive pulmonary disease with acute exacerbation. -  azithromicin x 4 days 250 mg/ nebs PRN and prednisone 40 mg x 5 days   - CTA of the chest is negative for pulmonary embolism  - troponin and BNP neg as well     2.  Seizure disorder.  We will resume preadmission medication. =     3.  Hypothyroidism.  We will continue with Synthroid.  We will check TSH level. 4. Silvia Brand will continue supportive therapy. 5.  Bilateral leg swelling.  We will check ultrasound of the lower extremity for DVT.                 DISCHARGE DIAGNOSES / PLAN:      1. D/c back to [facility]     ADDITIONAL CARE RECOMMENDATIONS:        PENDING TEST RESULTS:   At the time of discharge the following test results are still pending:     FOLLOW UP APPOINTMENTS:    Follow-up Information     Follow up With Specialties Details Why Contact Info    Jose Sargent MD Internal Medicine, Wound Care [address]   [phone]               DIET: Regular Diet  ACTIVITY: Activity as tolerated    WOUND CARE:     EQUIPMENT needed:       DISCHARGE MEDICATIONS:  Current Discharge Medication List      START taking these medications    Details   predniSONE (DELTASONE) 20 mg tablet Take 40 mg by mouth daily (with breakfast) for 3 days. Qty: 6 Tab, Refills: 0      azithromycin (ZITHROMAX) 250 mg tablet Take 1 Tab by mouth daily. Qty: 4 Tab, Refills: 0         CONTINUE these medications which have NOT CHANGED    Details   fluticasone propionate (FLOVENT DISKUS) 50 mcg/actuation inhaler Take  by inhalation. lamoTRIgine (LAMICTAL) 25 mg tablet Take 25 mg by mouth daily. loratadine (CLARITIN) 10 mg tablet Take 10 mg by mouth.      budesonide (PULMICORT) 0.5 mg/2 mL nbsp 500 mcg by Nebulization route two (2) times a day. Indications: MAINTENANCE THERAPY FOR ASTHMA      carBAMazepine XR (TEGRETOL XR) 400 mg SR tablet Take 400 mg by mouth two (2) times a day. Indications: TONIC-CLONIC EPILEPSY      cranberry 500 mg capsule Take 1,000 mg by mouth daily. levothyroxine (SYNTHROID) 100 mcg tablet Take 100 mcg by mouth Daily (before breakfast). Indications: hypothyroidism      !! acetaminophen (MAPAP) 500 mg cap Take 1 Cap by mouth daily. Indications: Arthritic Pain      cetirizine (ZYRTEC) 10 mg tablet Take 10 mg by mouth daily. albuterol-ipratropium (DUO-NEB) 2.5 mg-0.5 mg/3 ml nebu 3 mL by Nebulization route two (2) times daily as needed. !! acetaminophen (MAPAP) 500 mg cap Take 1 Cap by mouth every six (6) hours as needed for Pain. traZODone (DESYREL) 50 mg tablet Take 25 mg by mouth nightly as needed for Sleep. !! - Potential duplicate medications found. Please discuss with provider.       STOP taking these medications       cefUROXime (CEFTIN) 500 mg tablet Comments:   Reason for Stopping:                 NOTIFY YOUR PHYSICIAN FOR ANY OF THE FOLLOWING:   Fever over 101 degrees for 24 hours. Chest pain, shortness of breath, fever, chills, nausea, vomiting, diarrhea, change in mentation, falling, weakness, bleeding. Severe pain or pain not relieved by medications. Or, any other signs or symptoms that you may have questions about. DISPOSITION:  x  Home With:   OT  PT  HH  RN       Long term SNF/Inpatient Rehab    Independent/assisted living    Hospice    Other:       PATIENT CONDITION AT DISCHARGE:     Functional status    Poor    x Deconditioned     Independent      Cognition     Lucid    x Forgetful    x Dementia      Catheters/lines (plus indication)    Almaraz     PICC     PEG    x None      Code status    x Full code     DNR      PHYSICAL EXAMINATION AT DISCHARGE:  General:          Alert, cooperative, no distress, appears stated age. HEENT:           Atraumatic, anicteric sclerae, pink conjunctivae                          No oral ulcers, mucosa moist, throat clear, dentition fair  Neck:               Supple, symmetrical  Lungs:             Clear to auscultation bilaterally. No Wheezing or Rhonchi. No rales. Chest wall:      No tenderness  No Accessory muscle use. Heart:              Regular  rhythm,  No  murmur   No edema  Abdomen:        Soft, non-tender. Not distended. Bowel sounds normal  Extremities:     No cyanosis. No clubbing,                            Skin turgor normal, Capillary refill normal  Skin:                Not pale. Not Jaundiced  No rashes   Psych:             Not anxious or agitated.   Neurologic:      Alert, moves all extremities, answers questions appropriately and responds to commands       CHRONIC MEDICAL DIAGNOSES:  Problem List as of 10/27/2019 Date Reviewed: 10/26/2019          Codes Class Noted - Resolved    Acute cystitis ICD-10-CM: N30.00  ICD-9-CM: 595.0  8/28/2017 - Present        Dementia ([facility] (Chronic) ICD-10-CM: F03.90  ICD-9-CM: 294.20  3/29/2017 - Present        UTI (urinary tract infection) ICD-10-CM: N39.0  ICD-9-CM: 599.0 3/29/2017 - Present        Seizure ([facility] (Chronic) ICD-10-CM: R56.9  ICD-9-CM: 780.39  3/28/2017 - Present        Acquired hypothyroidism (Chronic) ICD-10-CM: E03.9  ICD-9-CM: 244.9  3/28/2017 - Present        * (Principal) RESOLVED: COPD with acute exacerbation ([facility] ICD-10-CM: J44.1  ICD-9-CM: 491.21  10/26/2019 - 10/27/2019        RESOLVED: Hypoxia ICD-10-CM: R09.02  ICD-9-CM: 799.02  10/25/2019 - 10/27/2019        RESOLVED: COPD (chronic obstructive pulmonary disease) ([facility] (Chronic) ICD-10-CM: J44.9  ICD-9-CM: 496  3/28/2017 - 10/27/2019              Greater than 30  minutes were spent with the patient on counseling and coordination of care    Signed:   Darcy Alejandro MD  10/27/2019  11:22 AM

## 2019-10-27 NOTE — PROGRESS NOTES
Transition of Care Plan to SNF/Rehab    SNF/Rehab Transition:  Patient has been accepted to [address] [place] [address] and meets criteria for admission. Patient will transported by [facility] and expected to leave at 6:00 pm.    Communication to Patient/Family:  Met with patient and Israel Byersdave and they are agreeable to the transition plan. Communication to SNF/Rehab:  Bedside RN, Shaheed Dockery, has been notified to update the transition plan to the facility and call report (phone number [phone]). Discharge information has been updated on the AVS.       SNF/Rehab Transition:  PCP/Specialist: Ellis Calero    Reviewed and confirmed with facility, Charge RN they can manage the patient care needs for the following:     Bess Castro with (X) only those applicable:    Medication:  []  Medications will be available at the facility  []  IV Antibiotics   []  Controlled Substance - hard copy to be sent with patient   []  Weekly Labs   Documents:  [x] Hard RX  [x] MAR  [x] Kardex  [x] AVS  []Transfer Summary  [x]Discharge   Equipment:  []  CPAP/BiPAP  []  Wound Vacuum  []  Almaraz or Urinary Device  []  PICC/Central Line  []  Nebulizer  []  Ventilator   Treatment:  []Isolation (for MRSA, VRE, etc.)  []Surgical Drain Management  []Tracheostomy Care  []Dressing Changes  []Dialysis with transportation and chair time. []PEG Care  [x]Oxygen  []Daily Weights for Heart Failure   Dietary:  []Any diet limitations  []Tube Feedings   []Total Parenteral Management (TPN)   Eligible for Medicaid Long Term Services and Supports  Yes:  [] Eligible for medical assistance or will become eligible within 180 days and UAI completed. [] Provider/Patient and/or support system has requested screening. [] UAI copy provided to patient or responsible party. [] UAI unavailable at discharge will send once processed to SNF provider.   [] UAI unavailable at discharged mailed to patient  No:   [] Private pay and is not financially eligible for Medicaid within the next 180 days. [] Reside out-of-state.   [] A residents of a state owned/operated facility that is licensed  by [facility] or [facility]  [] Enrollment in [facility] services  [] [address]  [] Patient /Family declines to have screening completed or provide financial information for screening     Financial Resources:  Medicaid    [] Initiated and application pending   [x] Full coverage     Advanced Care Plan:  []Surrogate Decision Maker of Care  [x]POA  []Communicated Code Status Full\")    Other:  Oxygen as needed     BILLIE Blandon/DARLENE  Care Management  3:14 PM

## 2019-10-27 NOTE — PROGRESS NOTES
Bedside and Verbal shift change report given to Madie (oncoming nurse) by Kandice Pan (offgoing nurse). Report included the following information SBAR, Kardex, ED Summary, Procedure Summary, Intake/Output, MAR and Recent Results.

## 2020-07-27 NOTE — DISCHARGE INSTRUCTIONS
Seizure: Care Instructions  Your Care Instructions    Seizures are caused by abnormal patterns of electrical signals in the brain. They are different for each person. Seizures can affect movement, speech, vision, or awareness. Some people have only slight shaking of a hand and do not pass out. Other people may pass out and have violent shaking of the whole body. Some people appear to stare into space. They are awake, but they can't respond normally. Later, they may not remember what happened. You may need tests to identify the type and cause of the seizures. A seizure may occur only once, or you may have them more than one time. Taking medicines as directed and following up with your doctor may help keep you from having more seizures. The doctor has checked you carefully, but problems can develop later. If you notice any problems or new symptoms, get medical treatment right away. Follow-up care is a key part of your treatment and safety. Be sure to make and go to all appointments, and call your doctor if you are having problems. It's also a good idea to know your test results and keep a list of the medicines you take. How can you care for yourself at home? · Be safe with medicines. Take your medicines exactly as prescribed. Call your doctor if you think you are having a problem with your medicine. · Do not do any activity that could be dangerous to you or others until your doctor says it is safe to do so. For example, do not drive a car, operate machinery, swim, or climb ladders. · Be sure that anyone treating you for any health problem knows that you have had a seizure and what medicines you are taking for it. · Identify and avoid things that may make you more likely to have a seizure. These may include lack of sleep, alcohol or drug use, stress, or not eating. · Make sure you go to your follow-up appointment. When should you call for help? Call 911 anytime you think you may need emergency care. For example, call if:  ? · You have another seizure. ? · You have more than one seizure in 24 hours. ? · You have new symptoms, such as trouble walking, speaking, or thinking clearly. ?Call your doctor now or seek immediate medical care if:  ? · You are not acting normally. ? Watch closely for changes in your health, and be sure to contact your doctor if you have any problems. Where can you learn more? Go to http://papa-juwan.info/. Enter Q188 in the search box to learn more about \"Seizure: Care Instructions. \"  Current as of: October 14, 2016  Content Version: 11.4  © 4215-3820 [employer]. Care instructions adapted under license by [employer] (which disclaims liability or warranty for this information). If you have questions about a medical condition or this instruction, always ask your healthcare professional. [place] [address] any warranty or liability for your use of this information. Urinary Tract Infection in Women: Care Instructions  Your Care Instructions    A urinary tract infection, or UTI, is a general term for an infection anywhere between the kidneys and the urethra (where urine comes out). Most UTIs are bladder infections. They often cause pain or burning when you urinate. UTIs are caused by bacteria and can be cured with antibiotics. Be sure to complete your treatment so that the infection goes away. Follow-up care is a key part of your treatment and safety. Be sure to make and go to all appointments, and call your doctor if you are having problems. It's also a good idea to know your test results and keep a list of the medicines you take. How can you care for yourself at home? · Take your antibiotics as directed. Do not stop taking them just because you feel better. You need to take the full course of antibiotics. · Drink extra water and other fluids for the next day or two.  This may help wash out the bacteria that are causing the infection. (If you have kidney, heart, or liver disease and have to limit fluids, talk with your doctor before you increase your fluid intake.)  · Avoid drinks that are carbonated or have caffeine. They can irritate the bladder. · Urinate often. Try to empty your bladder each time. · To relieve pain, take a hot bath or lay a heating pad set on low over your lower belly or genital area. Never go to sleep with a heating pad in place. To prevent UTIs  · Drink plenty of water each day. This helps you urinate often, which clears bacteria from your system. (If you have kidney, heart, or liver disease and have to limit fluids, talk with your doctor before you increase your fluid intake.)  · Urinate when you need to. · Urinate right after you have sex. · Change sanitary pads often. · Avoid douches, bubble baths, feminine hygiene sprays, and other feminine hygiene products that have deodorants. · After going to the bathroom, wipe from front to back. When should you call for help? Call your doctor now or seek immediate medical care if:  ? · Symptoms such as fever, chills, nausea, or vomiting get worse or appear for the first time. ? · You have new pain in your back just below your rib cage. This is called flank pain. ? · There is new blood or pus in your urine. ? · You have any problems with your antibiotic medicine. ? Watch closely for changes in your health, and be sure to contact your doctor if:  ? · You are not getting better after taking an antibiotic for 2 days. ? · Your symptoms go away but then come back. Where can you learn more? Go to http://papa-juwan.info/. Enter N589 in the search box to learn more about \"Urinary Tract Infection in Women: Care Instructions. \"  Current as of: May 12, 2017  Content Version: 11.4  © 9929-2082 [employer].  Care instructions adapted under license by [employer] (which disclaims liability or warranty for this information). If you have questions about a medical condition or this instruction, always ask your healthcare professional. [place] [address] any warranty or liability for your use of this information. No

## 2021-04-04 ENCOUNTER — APPOINTMENT (OUTPATIENT)
Dept: GENERAL RADIOLOGY | Age: 86
DRG: 101 | End: 2021-04-04
Attending: INTERNAL MEDICINE
Payer: MEDICARE

## 2021-04-04 ENCOUNTER — HOSPITAL ENCOUNTER (INPATIENT)
Age: 86
LOS: 3 days | Discharge: NURSING, REHAB OR LONG-TERM CARE | DRG: 101 | End: 2021-04-07
Attending: EMERGENCY MEDICINE | Admitting: INTERNAL MEDICINE
Payer: MEDICARE

## 2021-04-04 ENCOUNTER — APPOINTMENT (OUTPATIENT)
Dept: CT IMAGING | Age: 86
DRG: 101 | End: 2021-04-04
Attending: EMERGENCY MEDICINE
Payer: MEDICARE

## 2021-04-04 DIAGNOSIS — F03.90 DEMENTIA WITHOUT BEHAVIORAL DISTURBANCE, UNSPECIFIED DEMENTIA TYPE: Chronic | ICD-10-CM

## 2021-04-04 DIAGNOSIS — R56.9 SEIZURES (HCC): Primary | ICD-10-CM

## 2021-04-04 DIAGNOSIS — I44.0 FIRST DEGREE AV BLOCK: ICD-10-CM

## 2021-04-04 DIAGNOSIS — R56.9 SEIZURE (HCC): Chronic | ICD-10-CM

## 2021-04-04 DIAGNOSIS — R41.0 CONFUSION: ICD-10-CM

## 2021-04-04 LAB
ALBUMIN SERPL-MCNC: 3.1 G/DL (ref 3.5–5)
ALBUMIN/GLOB SERPL: 0.8 {RATIO} (ref 1.1–2.2)
ALP SERPL-CCNC: 138 U/L (ref 45–117)
ALT SERPL-CCNC: 13 U/L (ref 12–78)
ANION GAP SERPL CALC-SCNC: 4 MMOL/L (ref 5–15)
APPEARANCE UR: CLEAR
AST SERPL-CCNC: 22 U/L (ref 15–37)
BACTERIA URNS QL MICRO: NEGATIVE /HPF
BASOPHILS # BLD: 0 K/UL (ref 0–0.1)
BASOPHILS NFR BLD: 1 % (ref 0–1)
BILIRUB SERPL-MCNC: 0.4 MG/DL (ref 0.2–1)
BILIRUB UR QL: NEGATIVE
BUN SERPL-MCNC: 25 MG/DL (ref 6–20)
BUN/CREAT SERPL: 34 (ref 12–20)
CALCIUM SERPL-MCNC: 8.3 MG/DL (ref 8.5–10.1)
CARBAMAZEPINE SERPL-MCNC: 9.6 UG/ML (ref 4–12)
CHLORIDE SERPL-SCNC: 107 MMOL/L (ref 97–108)
CO2 SERPL-SCNC: 28 MMOL/L (ref 21–32)
COLOR UR: NORMAL
COMMENT, HOLDF: NORMAL
CREAT SERPL-MCNC: 0.73 MG/DL (ref 0.55–1.02)
DIFFERENTIAL METHOD BLD: ABNORMAL
EOSINOPHIL # BLD: 0.1 K/UL (ref 0–0.4)
EOSINOPHIL NFR BLD: 1 % (ref 0–7)
EPITH CASTS URNS QL MICRO: NORMAL /LPF
ERYTHROCYTE [DISTWIDTH] IN BLOOD BY AUTOMATED COUNT: 13.2 % (ref 11.5–14.5)
GLOBULIN SER CALC-MCNC: 3.8 G/DL (ref 2–4)
GLUCOSE SERPL-MCNC: 96 MG/DL (ref 65–100)
GLUCOSE UR STRIP.AUTO-MCNC: NEGATIVE MG/DL
HCT VFR BLD AUTO: 38.8 % (ref 35–47)
HGB BLD-MCNC: 12.3 G/DL (ref 11.5–16)
HGB UR QL STRIP: NEGATIVE
HYALINE CASTS URNS QL MICRO: NORMAL /LPF (ref 0–5)
IMM GRANULOCYTES # BLD AUTO: 0 K/UL (ref 0–0.04)
IMM GRANULOCYTES NFR BLD AUTO: 0 % (ref 0–0.5)
KETONES UR QL STRIP.AUTO: NEGATIVE MG/DL
LEUKOCYTE ESTERASE UR QL STRIP.AUTO: NEGATIVE
LYMPHOCYTES # BLD: 0.8 K/UL (ref 0.8–3.5)
LYMPHOCYTES NFR BLD: 10 % (ref 12–49)
MCH RBC QN AUTO: 32.7 PG (ref 26–34)
MCHC RBC AUTO-ENTMCNC: 31.7 G/DL (ref 30–36.5)
MCV RBC AUTO: 103.2 FL (ref 80–99)
MONOCYTES # BLD: 0.8 K/UL (ref 0–1)
MONOCYTES NFR BLD: 10 % (ref 5–13)
NEUTS SEG # BLD: 6.1 K/UL (ref 1.8–8)
NEUTS SEG NFR BLD: 78 % (ref 32–75)
NITRITE UR QL STRIP.AUTO: NEGATIVE
NRBC # BLD: 0 K/UL (ref 0–0.01)
NRBC BLD-RTO: 0 PER 100 WBC
PH UR STRIP: 5.5 [PH] (ref 5–8)
PLATELET # BLD AUTO: 228 K/UL (ref 150–400)
PMV BLD AUTO: 9.6 FL (ref 8.9–12.9)
POTASSIUM SERPL-SCNC: 4.9 MMOL/L (ref 3.5–5.1)
PROT SERPL-MCNC: 6.9 G/DL (ref 6.4–8.2)
PROT UR STRIP-MCNC: NEGATIVE MG/DL
RBC # BLD AUTO: 3.76 M/UL (ref 3.8–5.2)
RBC #/AREA URNS HPF: NORMAL /HPF (ref 0–5)
SAMPLES BEING HELD,HOLD: NORMAL
SARS-COV-2, COV2: NORMAL
SODIUM SERPL-SCNC: 139 MMOL/L (ref 136–145)
SP GR UR REFRACTOMETRY: 1.01 (ref 1–1.03)
UR CULT HOLD, URHOLD: NORMAL
UROBILINOGEN UR QL STRIP.AUTO: 0.2 EU/DL (ref 0.2–1)
WBC # BLD AUTO: 7.8 K/UL (ref 3.6–11)
WBC URNS QL MICRO: NORMAL /HPF (ref 0–4)

## 2021-04-04 PROCEDURE — 99285 EMERGENCY DEPT VISIT HI MDM: CPT

## 2021-04-04 PROCEDURE — 95816 EEG AWAKE AND DROWSY: CPT | Performed by: INTERNAL MEDICINE

## 2021-04-04 PROCEDURE — 80053 COMPREHEN METABOLIC PANEL: CPT

## 2021-04-04 PROCEDURE — 65660000000 HC RM CCU STEPDOWN

## 2021-04-04 PROCEDURE — 74011250637 HC RX REV CODE- 250/637: Performed by: EMERGENCY MEDICINE

## 2021-04-04 PROCEDURE — 74011250637 HC RX REV CODE- 250/637: Performed by: INTERNAL MEDICINE

## 2021-04-04 PROCEDURE — 80156 ASSAY CARBAMAZEPINE TOTAL: CPT

## 2021-04-04 PROCEDURE — 51701 INSERT BLADDER CATHETER: CPT

## 2021-04-04 PROCEDURE — 93005 ELECTROCARDIOGRAM TRACING: CPT

## 2021-04-04 PROCEDURE — 71045 X-RAY EXAM CHEST 1 VIEW: CPT

## 2021-04-04 PROCEDURE — 74011250636 HC RX REV CODE- 250/636: Performed by: INTERNAL MEDICINE

## 2021-04-04 PROCEDURE — 36415 COLL VENOUS BLD VENIPUNCTURE: CPT

## 2021-04-04 PROCEDURE — U0005 INFEC AGEN DETEC AMPLI PROBE: HCPCS

## 2021-04-04 PROCEDURE — 70450 CT HEAD/BRAIN W/O DYE: CPT

## 2021-04-04 PROCEDURE — 85025 COMPLETE CBC W/AUTO DIFF WBC: CPT

## 2021-04-04 PROCEDURE — 81001 URINALYSIS AUTO W/SCOPE: CPT

## 2021-04-04 PROCEDURE — 80175 DRUG SCREEN QUAN LAMOTRIGINE: CPT

## 2021-04-04 RX ORDER — ONDANSETRON 2 MG/ML
4 INJECTION INTRAMUSCULAR; INTRAVENOUS
Status: DISCONTINUED | OUTPATIENT
Start: 2021-04-04 | End: 2021-04-07 | Stop reason: HOSPADM

## 2021-04-04 RX ORDER — CARBOXYMETHYLCELLULOSE SODIUM 5 MG/ML
2 SOLUTION/ DROPS OPHTHALMIC DAILY
COMMUNITY

## 2021-04-04 RX ORDER — HYDROCORTISONE 1 %
CREAM (GRAM) TOPICAL
COMMUNITY

## 2021-04-04 RX ORDER — TRAZODONE HYDROCHLORIDE 50 MG/1
25 TABLET ORAL
Status: CANCELLED | OUTPATIENT
Start: 2021-04-04

## 2021-04-04 RX ORDER — CARBAMAZEPINE 200 MG/1
400 TABLET, EXTENDED RELEASE ORAL 2 TIMES DAILY
Status: DISCONTINUED | OUTPATIENT
Start: 2021-04-04 | End: 2021-04-07 | Stop reason: HOSPADM

## 2021-04-04 RX ORDER — LOPERAMIDE HCL 2 MG
2 TABLET ORAL
COMMUNITY

## 2021-04-04 RX ORDER — SODIUM CHLORIDE, SODIUM LACTATE, POTASSIUM CHLORIDE, CALCIUM CHLORIDE 600; 310; 30; 20 MG/100ML; MG/100ML; MG/100ML; MG/100ML
75 INJECTION, SOLUTION INTRAVENOUS CONTINUOUS
Status: DISCONTINUED | OUTPATIENT
Start: 2021-04-04 | End: 2021-04-07 | Stop reason: HOSPADM

## 2021-04-04 RX ORDER — ACETAMINOPHEN 325 MG/1
650 TABLET ORAL
Status: DISCONTINUED | OUTPATIENT
Start: 2021-04-04 | End: 2021-04-07 | Stop reason: HOSPADM

## 2021-04-04 RX ORDER — LAMOTRIGINE 25 MG/1
2 TABLET ORAL 2 TIMES DAILY
Status: DISCONTINUED | OUTPATIENT
Start: 2021-04-04 | End: 2021-04-07 | Stop reason: HOSPADM

## 2021-04-04 RX ORDER — FLUTICASONE FUROATE, UMECLIDINIUM BROMIDE AND VILANTEROL TRIFENATATE 100; 62.5; 25 UG/1; UG/1; UG/1
1 POWDER RESPIRATORY (INHALATION) DAILY
Status: ON HOLD | COMMUNITY
End: 2022-09-12

## 2021-04-04 RX ORDER — LEVETIRACETAM 500 MG/1
500 TABLET ORAL 2 TIMES DAILY
Status: DISCONTINUED | OUTPATIENT
Start: 2021-04-04 | End: 2021-04-05

## 2021-04-04 RX ORDER — BUDESONIDE 0.5 MG/2ML
500 INHALANT ORAL 2 TIMES DAILY
Status: DISCONTINUED | OUTPATIENT
Start: 2021-04-04 | End: 2021-04-05

## 2021-04-04 RX ORDER — CALCIUM CARBONATE 200(500)MG
2 TABLET,CHEWABLE ORAL
COMMUNITY
End: 2021-04-13

## 2021-04-04 RX ORDER — SODIUM CHLORIDE 0.9 % (FLUSH) 0.9 %
5-40 SYRINGE (ML) INJECTION EVERY 8 HOURS
Status: DISCONTINUED | OUTPATIENT
Start: 2021-04-04 | End: 2021-04-07 | Stop reason: HOSPADM

## 2021-04-04 RX ORDER — PROMETHAZINE HYDROCHLORIDE 25 MG/1
12.5 TABLET ORAL
Status: DISCONTINUED | OUTPATIENT
Start: 2021-04-04 | End: 2021-04-07 | Stop reason: HOSPADM

## 2021-04-04 RX ORDER — LEVETIRACETAM 500 MG/1
500 TABLET ORAL 2 TIMES DAILY
Status: DISCONTINUED | OUTPATIENT
Start: 2021-04-04 | End: 2021-04-04

## 2021-04-04 RX ORDER — ALBUTEROL SULFATE 90 UG/1
1 AEROSOL, METERED RESPIRATORY (INHALATION)
COMMUNITY

## 2021-04-04 RX ORDER — ACETAMINOPHEN 650 MG/1
650 SUPPOSITORY RECTAL
Status: DISCONTINUED | OUTPATIENT
Start: 2021-04-04 | End: 2021-04-07 | Stop reason: HOSPADM

## 2021-04-04 RX ORDER — POLYETHYLENE GLYCOL 3350 17 G/17G
17 POWDER, FOR SOLUTION ORAL DAILY PRN
Status: DISCONTINUED | OUTPATIENT
Start: 2021-04-04 | End: 2021-04-07 | Stop reason: HOSPADM

## 2021-04-04 RX ORDER — CETIRIZINE HYDROCHLORIDE 10 MG/1
10 TABLET ORAL DAILY
Status: DISCONTINUED | OUTPATIENT
Start: 2021-04-05 | End: 2021-04-07 | Stop reason: HOSPADM

## 2021-04-04 RX ORDER — SODIUM CHLORIDE 0.9 % (FLUSH) 0.9 %
5-40 SYRINGE (ML) INJECTION AS NEEDED
Status: DISCONTINUED | OUTPATIENT
Start: 2021-04-04 | End: 2021-04-07 | Stop reason: HOSPADM

## 2021-04-04 RX ORDER — LEVOTHYROXINE SODIUM 100 UG/1
100 TABLET ORAL
Status: DISCONTINUED | OUTPATIENT
Start: 2021-04-05 | End: 2021-04-07 | Stop reason: HOSPADM

## 2021-04-04 RX ORDER — HEPARIN SODIUM 5000 [USP'U]/ML
5000 INJECTION, SOLUTION INTRAVENOUS; SUBCUTANEOUS EVERY 8 HOURS
Status: DISCONTINUED | OUTPATIENT
Start: 2021-04-04 | End: 2021-04-07 | Stop reason: HOSPADM

## 2021-04-04 RX ORDER — MONTELUKAST SODIUM 10 MG/1
10 TABLET ORAL DAILY
COMMUNITY

## 2021-04-04 RX ORDER — IPRATROPIUM BROMIDE AND ALBUTEROL SULFATE 2.5; .5 MG/3ML; MG/3ML
3 SOLUTION RESPIRATORY (INHALATION)
Status: DISCONTINUED | OUTPATIENT
Start: 2021-04-04 | End: 2021-04-07 | Stop reason: HOSPADM

## 2021-04-04 RX ORDER — SODIUM CHLORIDE 0.65 %
2 AEROSOL, SPRAY (ML) NASAL 2 TIMES DAILY
Status: ON HOLD | COMMUNITY
End: 2021-07-29

## 2021-04-04 RX ADMIN — LEVETIRACETAM 500 MG: 500 TABLET ORAL at 19:39

## 2021-04-04 RX ADMIN — HEPARIN SODIUM 5000 UNITS: 5000 INJECTION INTRAVENOUS; SUBCUTANEOUS at 23:02

## 2021-04-04 RX ADMIN — CARBAMAZEPINE 400 MG: 200 TABLET, EXTENDED RELEASE ORAL at 22:58

## 2021-04-04 RX ADMIN — LAMOTRIGINE 12.5 MG: 25 TABLET ORAL at 23:00

## 2021-04-04 NOTE — ED PROVIDER NOTES
72-year-old female with a history of COPD, dementia, hypothyroidism, and seizure disorder was reportedly not in her normal mental state when checked on by her family today. There was question of whether she was having a seizure. I was told her baseline is alert and oriented x4, but the patient is somewhat confused on things beyond who she is and where she is. She says she feels perfectly fine and has no symptoms of any sort. She says she has no headache, no vision change, no nausea or vomiting. No fever. No chest pain, no shortness of breath, no cough. She was being treated for urinary tract infection and the only new medication was tetracycline for the UTI. Past Medical History:   Diagnosis Date    [facility]     Dementia ([facility] 3/29/2017    Hypothyroid     Seizure ([facility]     Seizure disorder ([facility]        Past Surgical History:   Procedure Laterality Date    HX HIP REPLACEMENT           History reviewed. No pertinent family history.     Social History     Socioeconomic History    Marital status:      Spouse name: Not on file    Number of children: Not on file    Years of education: Not on file    Highest education level: Not on file   Occupational History    Not on file   Social Needs    Financial resource strain: Not on file    Food insecurity     Worry: Not on file     Inability: Not on file    Transportation needs     Medical: Not on file     Non-medical: Not on file   Tobacco Use    Smoking status: Never Smoker    Smokeless tobacco: Never Used   Substance and Sexual Activity    Alcohol use: Yes     Frequency: Never    Drug use: No    Sexual activity: Not on file   Lifestyle    Physical activity     Days per week: Not on file     Minutes per session: Not on file    Stress: Not on file   Relationships    Social connections     Talks on phone: Not on file     Gets together: Not on file     Attends Spiritism service: Not on file Active member of club or organization: Not on file     Attends meetings of clubs or organizations: Not on file     Relationship status: Not on file    Intimate partner violence     Fear of current or ex partner: Not on file     Emotionally abused: Not on file     Physically abused: Not on file     Forced sexual activity: Not on file   Other Topics Concern    Not on file   Social History Narrative    Not on file         ALLERGIES: Aricept [donepezil], Benadryl [diphenhydramine hcl], Ciprofloxacin, Codeine, Macrobid [nitrofurantoin monohyd/m-cryst], Pcn [penicillins], and Sulfa (sulfonamide antibiotics)    Review of Systems   Constitutional: Negative for fever. HENT: Negative for trouble swallowing. Eyes: Negative for visual disturbance. Respiratory: Negative for cough. Cardiovascular: Negative for chest pain. Gastrointestinal: Negative for abdominal pain. Genitourinary: Negative for difficulty urinating. Musculoskeletal: Negative for gait problem. Skin: Negative for rash. Neurological: Negative for headaches. Hematological: Bruises/bleeds easily. Psychiatric/Behavioral: Negative for sleep disturbance. Vitals:    04/04/21 1617 04/04/21 1645 04/04/21 1715   BP: 138/67 138/65 138/68   Pulse: 79 76 73   Resp: 20 18 18   Temp: 98.1 °F (36.7 °C)     SpO2: 97% 97% 98%            Physical Exam  Constitutional:       Appearance: Normal appearance. HENT:      Head: Normocephalic. Nose: Nose normal.      Mouth/Throat:      Mouth: Mucous membranes are moist.   Eyes:      Extraocular Movements: Extraocular movements intact. Conjunctiva/sclera: Conjunctivae normal.   Cardiovascular:      Rate and Rhythm: Normal rate. Pulmonary:      Effort: Pulmonary effort is normal. No respiratory distress. Abdominal:      General: Abdomen is flat. Musculoskeletal: Normal range of motion. Skin:     Findings: No rash. Neurological:      General: No focal deficit present.       Mental Status: She is alert. GCS: GCS eye subscore is 4. GCS verbal subscore is 5. GCS motor subscore is 6. Cranial Nerves: Cranial nerves are intact. Sensory: Sensation is intact. Motor: Motor function is intact. Coordination: Finger-Nose-Finger Test abnormal.   Psychiatric:         Behavior: Behavior normal.          MDM  Number of Diagnoses or Management Options  Confusion  First degree AV block  Seizures (HCC)  Diagnosis management comments: EKG at 1706  Sinus rhythm with first-degree AV block and normal axis at a rate of 77   ms  QRS and QTc within normal limits  No ST changes  Nonischemic    Perfect Serve Consult for Admission  6:51 PM    ED Room Number: ER25/25  Patient Name and age:  Jania Husbands 82 y.o.  female  Working Diagnosis: Seizures ([address]  (primary encounter diagnosis)  Confusion  First-degree AV block    COVID-19 Suspicion:  no  Sepsis present:  no  Reassessment needed: no  Code Status:  Do Not Resuscitate  Readmission: no  Isolation Requirements:  no  Recommended Level of Care:  med/surg  Department:[facility] Adult ED - 21   Other: Normally managed by [facility] neurology for seizures and does not frequently have seizures, but has not had multiple today and is not back to her baseline. I have not witnessed any seizures here, so I did not give her any antiepileptic yet, but I have placed a consult for neurology. She has been treated for the last week for UTI, but urine looks clean today. Daughter is at the bedside and can give much more the history than the patient can    Dr. Ade Jones, neurology, reviewed the case with me and tells me her colleague, Dr. Rogelio Preston had the patient on Keppra the last time she had an issue like this. She says the carbamazepine level appears to be where it should, so she suggested I add on 500 mg of Keppra twice daily. I ordered that.            Procedures

## 2021-04-04 NOTE — ED TRIAGE NOTES
Patient arrives to ED from [facility], per EMS patient being treated for UTI X10 days, tetracycline added yesterday. Family saw patient today and wanted evaluated by ED because altered mental status and potential seizure like activity. Family reports history of seizures.

## 2021-04-05 LAB
ALBUMIN SERPL-MCNC: 2.7 G/DL (ref 3.5–5)
ALBUMIN/GLOB SERPL: 0.8 {RATIO} (ref 1.1–2.2)
ALP SERPL-CCNC: 126 U/L (ref 45–117)
ALT SERPL-CCNC: 11 U/L (ref 12–78)
AMMONIA PLAS-SCNC: 25 UMOL/L
ANION GAP SERPL CALC-SCNC: 5 MMOL/L (ref 5–15)
APAP SERPL-MCNC: <2 UG/ML (ref 10–30)
AST SERPL-CCNC: 8 U/L (ref 15–37)
ATRIAL RATE: 77 BPM
BASOPHILS # BLD: 0 K/UL (ref 0–0.1)
BASOPHILS NFR BLD: 1 % (ref 0–1)
BILIRUB SERPL-MCNC: 0.3 MG/DL (ref 0.2–1)
BUN SERPL-MCNC: 22 MG/DL (ref 6–20)
BUN/CREAT SERPL: 35 (ref 12–20)
CALCIUM SERPL-MCNC: 8.2 MG/DL (ref 8.5–10.1)
CALCULATED P AXIS, ECG09: 61 DEGREES
CALCULATED R AXIS, ECG10: 49 DEGREES
CALCULATED T AXIS, ECG11: 41 DEGREES
CHLORIDE SERPL-SCNC: 109 MMOL/L (ref 97–108)
CO2 SERPL-SCNC: 26 MMOL/L (ref 21–32)
CREAT SERPL-MCNC: 0.63 MG/DL (ref 0.55–1.02)
DIAGNOSIS, 93000: NORMAL
DIFFERENTIAL METHOD BLD: ABNORMAL
EOSINOPHIL # BLD: 0.1 K/UL (ref 0–0.4)
EOSINOPHIL NFR BLD: 2 % (ref 0–7)
ERYTHROCYTE [DISTWIDTH] IN BLOOD BY AUTOMATED COUNT: 13.1 % (ref 11.5–14.5)
ETHANOL SERPL-MCNC: <10 MG/DL
FOLATE SERPL-MCNC: 17.4 NG/ML (ref 5–21)
GLOBULIN SER CALC-MCNC: 3.2 G/DL (ref 2–4)
GLUCOSE SERPL-MCNC: 125 MG/DL (ref 65–100)
HCT VFR BLD AUTO: 35.8 % (ref 35–47)
HGB BLD-MCNC: 11.4 G/DL (ref 11.5–16)
IMM GRANULOCYTES # BLD AUTO: 0 K/UL (ref 0–0.04)
IMM GRANULOCYTES NFR BLD AUTO: 0 % (ref 0–0.5)
LYMPHOCYTES # BLD: 1.1 K/UL (ref 0.8–3.5)
LYMPHOCYTES NFR BLD: 17 % (ref 12–49)
MAGNESIUM SERPL-MCNC: 2.1 MG/DL (ref 1.6–2.4)
MCH RBC QN AUTO: 33.2 PG (ref 26–34)
MCHC RBC AUTO-ENTMCNC: 31.8 G/DL (ref 30–36.5)
MCV RBC AUTO: 104.4 FL (ref 80–99)
MONOCYTES # BLD: 0.6 K/UL (ref 0–1)
MONOCYTES NFR BLD: 9 % (ref 5–13)
NEUTS SEG # BLD: 4.8 K/UL (ref 1.8–8)
NEUTS SEG NFR BLD: 71 % (ref 32–75)
NRBC # BLD: 0 K/UL (ref 0–0.01)
NRBC BLD-RTO: 0 PER 100 WBC
P-R INTERVAL, ECG05: 248 MS
PHOSPHATE SERPL-MCNC: 3.5 MG/DL (ref 2.6–4.7)
PLATELET # BLD AUTO: 211 K/UL (ref 150–400)
PMV BLD AUTO: 9.5 FL (ref 8.9–12.9)
POTASSIUM SERPL-SCNC: 3.6 MMOL/L (ref 3.5–5.1)
PROT SERPL-MCNC: 5.9 G/DL (ref 6.4–8.2)
Q-T INTERVAL, ECG07: 380 MS
QRS DURATION, ECG06: 88 MS
QTC CALCULATION (BEZET), ECG08: 430 MS
RBC # BLD AUTO: 3.43 M/UL (ref 3.8–5.2)
SALICYLATES SERPL-MCNC: <1.7 MG/DL (ref 2.8–20)
SARS-COV-2, XPLCVT: NOT DETECTED
SODIUM SERPL-SCNC: 140 MMOL/L (ref 136–145)
SOURCE, COVRS: NORMAL
TROPONIN I SERPL-MCNC: <0.05 NG/ML
TROPONIN I SERPL-MCNC: <0.05 NG/ML
TSH SERPL DL<=0.05 MIU/L-ACNC: 1.07 UIU/ML (ref 0.36–3.74)
VENTRICULAR RATE, ECG03: 77 BPM
VIT B12 SERPL-MCNC: 313 PG/ML (ref 193–986)
WBC # BLD AUTO: 6.6 K/UL (ref 3.6–11)

## 2021-04-05 PROCEDURE — 80143 DRUG ASSAY ACETAMINOPHEN: CPT

## 2021-04-05 PROCEDURE — 84100 ASSAY OF PHOSPHORUS: CPT

## 2021-04-05 PROCEDURE — 85025 COMPLETE CBC W/AUTO DIFF WBC: CPT

## 2021-04-05 PROCEDURE — 84443 ASSAY THYROID STIM HORMONE: CPT

## 2021-04-05 PROCEDURE — 80053 COMPREHEN METABOLIC PANEL: CPT

## 2021-04-05 PROCEDURE — 80179 DRUG ASSAY SALICYLATE: CPT

## 2021-04-05 PROCEDURE — 84484 ASSAY OF TROPONIN QUANT: CPT

## 2021-04-05 PROCEDURE — 94640 AIRWAY INHALATION TREATMENT: CPT

## 2021-04-05 PROCEDURE — 83735 ASSAY OF MAGNESIUM: CPT

## 2021-04-05 PROCEDURE — 82077 ASSAY SPEC XCP UR&BREATH IA: CPT

## 2021-04-05 PROCEDURE — 82140 ASSAY OF AMMONIA: CPT

## 2021-04-05 PROCEDURE — 65660000000 HC RM CCU STEPDOWN

## 2021-04-05 PROCEDURE — 74011250636 HC RX REV CODE- 250/636: Performed by: INTERNAL MEDICINE

## 2021-04-05 PROCEDURE — 99223 1ST HOSP IP/OBS HIGH 75: CPT | Performed by: PSYCHIATRY & NEUROLOGY

## 2021-04-05 PROCEDURE — 82746 ASSAY OF FOLIC ACID SERUM: CPT

## 2021-04-05 PROCEDURE — 36415 COLL VENOUS BLD VENIPUNCTURE: CPT

## 2021-04-05 PROCEDURE — 74011250637 HC RX REV CODE- 250/637: Performed by: INTERNAL MEDICINE

## 2021-04-05 PROCEDURE — 74011000250 HC RX REV CODE- 250: Performed by: HOSPITALIST

## 2021-04-05 PROCEDURE — 74011250637 HC RX REV CODE- 250/637: Performed by: PSYCHIATRY & NEUROLOGY

## 2021-04-05 PROCEDURE — 82607 VITAMIN B-12: CPT

## 2021-04-05 PROCEDURE — 94760 N-INVAS EAR/PLS OXIMETRY 1: CPT

## 2021-04-05 PROCEDURE — 94664 DEMO&/EVAL PT USE INHALER: CPT

## 2021-04-05 PROCEDURE — 95816 EEG AWAKE AND DROWSY: CPT | Performed by: PSYCHIATRY & NEUROLOGY

## 2021-04-05 RX ORDER — LACOSAMIDE 50 MG/1
50 TABLET ORAL EVERY 12 HOURS
Status: DISCONTINUED | OUTPATIENT
Start: 2021-04-05 | End: 2021-04-07 | Stop reason: HOSPADM

## 2021-04-05 RX ORDER — BUDESONIDE 0.5 MG/2ML
500 INHALANT ORAL
Status: DISCONTINUED | OUTPATIENT
Start: 2021-04-05 | End: 2021-04-07 | Stop reason: HOSPADM

## 2021-04-05 RX ADMIN — CARBAMAZEPINE 400 MG: 200 TABLET, EXTENDED RELEASE ORAL at 10:23

## 2021-04-05 RX ADMIN — CETIRIZINE HYDROCHLORIDE 10 MG: 10 TABLET, FILM COATED ORAL at 10:23

## 2021-04-05 RX ADMIN — HEPARIN SODIUM 5000 UNITS: 5000 INJECTION INTRAVENOUS; SUBCUTANEOUS at 14:53

## 2021-04-05 RX ADMIN — SODIUM CHLORIDE, POTASSIUM CHLORIDE, SODIUM LACTATE AND CALCIUM CHLORIDE 75 ML/HR: 600; 310; 30; 20 INJECTION, SOLUTION INTRAVENOUS at 00:10

## 2021-04-05 RX ADMIN — Medication 10 ML: at 22:00

## 2021-04-05 RX ADMIN — Medication 10 ML: at 05:51

## 2021-04-05 RX ADMIN — CARBAMAZEPINE 400 MG: 200 TABLET, EXTENDED RELEASE ORAL at 20:58

## 2021-04-05 RX ADMIN — LACOSAMIDE 50 MG: 50 TABLET, FILM COATED ORAL at 20:52

## 2021-04-05 RX ADMIN — Medication 10 ML: at 00:10

## 2021-04-05 RX ADMIN — LEVETIRACETAM 500 MG: 500 TABLET ORAL at 00:09

## 2021-04-05 RX ADMIN — Medication 10 ML: at 14:53

## 2021-04-05 RX ADMIN — HEPARIN SODIUM 5000 UNITS: 5000 INJECTION INTRAVENOUS; SUBCUTANEOUS at 05:52

## 2021-04-05 RX ADMIN — LAMOTRIGINE 12.5 MG: 25 TABLET ORAL at 18:55

## 2021-04-05 RX ADMIN — HEPARIN SODIUM 5000 UNITS: 5000 INJECTION INTRAVENOUS; SUBCUTANEOUS at 20:52

## 2021-04-05 RX ADMIN — SODIUM CHLORIDE, POTASSIUM CHLORIDE, SODIUM LACTATE AND CALCIUM CHLORIDE 75 ML/HR: 600; 310; 30; 20 INJECTION, SOLUTION INTRAVENOUS at 15:35

## 2021-04-05 RX ADMIN — LEVOTHYROXINE SODIUM 100 MCG: 0.1 TABLET ORAL at 07:18

## 2021-04-05 RX ADMIN — LAMOTRIGINE 12.5 MG: 25 TABLET ORAL at 10:23

## 2021-04-05 RX ADMIN — BUDESONIDE 500 MCG: 0.5 INHALANT RESPIRATORY (INHALATION) at 21:01

## 2021-04-05 RX ADMIN — LEVETIRACETAM 500 MG: 500 TABLET ORAL at 10:22

## 2021-04-05 NOTE — H&P
[facility]  HISTORY AND PHYSICAL    Name:  José Miguel Shirley  MR#:  [id number]  :  1935  ACCOUNT #:  [de-identified]  ADMIT DATE:  2021      The patient was seen, evaluated and admitted by me on 2021. PRIMARY CARE PHYSICIAN:  Angela Burton MD    SOURCE OF INFORMATION:  The patient who is not a good historian because of change in mental status and dementia. Review of ED and old electronic medical records. CHIEF COMPLAINT:  Change in mental status. HISTORY OF PRESENT ILLNESS:  This is an 82-year-old woman with past medical history significant for seizure disorder, dementia, COPD, and hypothyroidism, who in her usual state of until the day of her presentation at the emergency room and when it was reported that the patient developed change in mental status. The patient resides at [facility]. She was visited by family member today who noted that the patient has change in mental status. It was also suspected that the patient probably has seizure activity. The patient was brought to [facility] for further evaluation. Normally, the patient goes to [facility] for followup of her seizure. According to report, the patient does not usually have frequent seizure. The patient is not able to provide history because of the change in mental status and the underlying dementia. When the patient arrived at the emergency room, CT scan of the head was obtained. This was negative. The patient did not have any seizure activity throughout her stay in the emergency room. She was subsequently referred to the hospitalist service for evaluation for admission. According to report, she was treated for urinary tract infection with tetracycline but the patient's urinalysis was negative on arrival at the emergency room. She was last admitted to this hospital from 2019 to 2019. The patient was admitted and treated for COPD with acute exacerbation.   The emergency room physician consulted Neurology. The neurologist advised starting the patient on Keppra and this was carried out by the emergency room physician before the patient was referred to the hospitalist service for admission. PAST MEDICAL HISTORY:  1. Dementia. 2.  COPD. 3.  Seizure disorder. 4.  Hypothyroidism. ALLERGIES:  THE PATIENT IS ALLERGIC TO ARICEPT, BENADRYL, CIPRO, CODEINE, MACROBID, PENICILLIN, SULFA. MEDICATIONS:  1. Tylenol 500 mg every 6 hours as needed for pain. 2.  Albuterol 90 mcg inhalation every 4 hours as needed for wheezing. 3.  DuoNeb nebulizer 3 times daily. 4.  Calcium carbonate 500 mg 2 capsules every 4 hours as needed. 5.  Tegretol 400 mg twice daily. 6.  Trelegy inhalation daily. 7.  Lamictal 50 mg twice daily. 8.  Synthroid 100 mcg daily. 9.  Imodium 2 mg 3 times daily as needed for diarrhea. 10.  Singulair 10 mg daily. FAMILY HISTORY:  This was reviewed. Her mother had hypertension. PAST SURGICAL HISTORY:  This is significant for hip replacement. SOCIAL HISTORY:  No history of alcohol or tobacco abuse. REVIEW OF SYSTEMS:  Unable to obtain because of the patient's change in mental status. PHYSICAL EXAMINATION:  GENERAL APPEARANCE:  The patient appeared ill, in moderate distress. VITAL SIGNS:  On arrival at the emergency room; temperature 98.1, pulse 79, respiratory rate 20, blood pressure 138/67, oxygen saturation 97% on room air. HEENT:  Head:  Normocephalic, atraumatic. Eyes:  Normal eye movement. No redness, no drainage, no discharge. Ears:  Normal external ears with no evidence of drainage. Nose:  No deformity, no drainage. Mouth and Throat:  No visible oral lesion. Dry oral mucosa. NECK:  Neck is supple. No JVD. No thyromegaly. CHEST:  Clear breath sounds. No wheezing, no crackles. HEART:  Normal S1 and S2, regular. No clinically appreciable murmur. ABDOMEN:  Soft, nontender. Normal bowel sounds. CNS:  Alert, oriented  to person.   No gross focal neurological deficit. EXTREMITIES:  No edema. Pulses 2+ bilaterally. MUSCULOSKELETAL SYSTEM:  No obvious joint deformity or swelling. SKIN:  No active skin lesions seen in the exposed part of the body. PSYCHIATRY:  Unable to assess mood and affect. LYMPHATIC SYSTEM:  No cervical lymphadenopathy. DIAGNOSTIC DATA:  EKG shows sinus rhythm. No significant ST or T-waves abnormalities. CT scan of the head without contrast, no acute intracranial process. Chest x-ray shows no evidence of acute cardiopulmonary process. Chronic interstitial opacities in both lungs. LABORATORY DATA:  Hematology:  WBC 7.8, hemoglobin 12.3, hematocrit 38.8, platelets 432. Urinalysis: This is significant for negative nitrite, negative leukocyte esterase, negative bacteria. Chemistry:  Sodium 139, potassium 4.9, chloride 107, CO2 of 28, glucose 96, BUN 25, creatinine 0.73, calcium 8.3, total bilirubin 0.4, ALT 13, AST 22, alkaline phosphatase 138, total protein 6.9, albumin level 3.1, globulin 3.8. Tegretol level 9.6. ASSESSMENT:  1. Acute delirium. 2.  Seizure disorder. 3.  Dementia. 4.  Chronic obstructive pulmonary disease. 5.  Hypothyroidism. PLAN:  1. Acute delirium: We will admit the patient for further evaluation and treatment. We will identify and treat the underlying etiological factors which include metabolic event. Urinalysis is negative. We will carry out fluid therapy. We will obtain MRI of the brain to evaluate the patient for stroke as a possible cause of acute delirium. We will check ammonia level. We will check acetaminophen and salicylic acid level. We will monitor the patient closely. 2.  Seizure disorder: This may be the cause of the patient's acute delirium. Neurology was consulted by the emergency room physician. The patient has been started on Keppra as by advised by the neurologist in addition to her Tegretol.   We will obtain EEG and await result of the MRI of the brain. We will also await further recommendation from the Inpatient Neurology consulted by the emergency room physician. We will place the patient on seizure precaution. 3.  Dementia: We will continue supportive treatment. This is also most likely contributing to the patient's acute delirium. 4. COPD:  We will place the patient on DuoNeb as needed and supplemental oxygen as needed. 5.  Hypothyroidism. We will continue with Synthroid. We will check a TSH level. OTHER ISSUES:  Code status: The patient is a full code. We will place the patient on heparin for DVT prophylaxis. FUNCTIONAL STATUS PRIOR TO ADMISSION:  The patient came from assisted living facility. The patient is ambulatory with a device. COVID PRECAUTION:  The patient was wearing a face mask. I was wearing a face mask and gloves for this patient's encounter. Because the patient is coming from facility, the patient has been tested for COVID-19 virus infection and we will await test result.         MD JANET Lugo/[email or web address]_I/  D:  04/05/2021 3:51  T:  04/05/2021 5:48  JOB #:  [id number]  CC:  Josie Padilla MD

## 2021-04-05 NOTE — PROGRESS NOTES
Admission Medication Reconciliation:    Information obtained from:  Patient's record from facility and MARs  RxQuery data available¹: no    Comments/Recommendations: Updated PTA meds/reviewed patient's allergies. 1)  Spoke with nurse for facility who faxed over the patient's  medication list, including the MAR. 2)  Medication changes (since last review  in 2018): Added  -Trelegy Ellipta, Albuterol PRN  - Singular   -  Hydrocortisone cream, Loperamide, Tums, Saline nasal spray, Refresh eye drops    Adjusted  - Lamotrigine (was 25mg daily, now 50mg BID)- contacted provider and adjusted accordingly  -Duo-neb (was PRN, patient now takes TID; Dr. Bravo Sessions is fine cont. as PRN order)  -APAP    Removed  - Flovent Diskus   -loratadine, cranberry, zyrtec Azithromycin    3)  Last medication doses are noted below. 4) Aded Clindamycin to patient's allergies, as it was listed on patient's drug allergies on the most recently faxed medication list today and also on the external record  scanned on 10/25/2019. ¹RxQuery pharmacy benefit data reflects medications filled and processed through the patient's insurance, however   this data does NOT capture whether the medication was picked up or is currently being taken by the patient. Allergies:  Aricept [donepezil], Benadryl [diphenhydramine hcl], Ciprofloxacin, Clindamycin, Codeine, Macrobid [nitrofurantoin monohyd/m-cryst], Pcn [penicillins], and Sulfa (sulfonamide antibiotics)    Significant PMH/Disease States:   Past Medical History:   Diagnosis Date    [facility]     Dementia ([facility] 3/29/2017    Hypothyroid     Seizure ([facility]     Seizure disorder [facility])      Chief Complaint for this Admission:    Chief Complaint   Patient presents with    Altered mental status     Prior to Admission Medications:   Prior to Admission Medications   Prescriptions Last Dose Informant Patient Reported?  Taking?   acetaminophen (MAPAP) 500 mg cap 2021 at Unknown time  Yes Yes   Sig: Take 1 Cap by mouth daily. Indications: Arthritic Pain   acetaminophen (MAPAP) 500 mg cap   Yes Yes   Sig: Take 1 Cap by mouth every six (6) hours as needed for Pain. albuterol (ProAir HFA) 90 mcg/actuation inhaler   Yes Yes   Sig: Take 1 Puff by inhalation every four (4) hours as needed for Wheezing. albuterol-ipratropium (DUO-NEB) 2.5 mg-0.5 mg/3 ml nebu 2021 at Unknown time  Yes Yes   Sig: 3 mL by Nebulization route three (3) times daily. calcium carbonate (TUMS) 200 mg calcium (500 mg) chew   Yes Yes   Sig: Take 2 Tabs by mouth every four (4) hours as needed. carBAMazepine XR (TEGRETOL XR) 400 mg SR tablet 2021 at AM  Yes Yes   Sig: Take 400 mg by mouth two (2) times a day. Indications: TONIC-CLONIC EPILEPSY   carboxymethylcellulose sodium (Refresh Tears) 0.5 % drop ophthalmic solution 2021 at Unknown time  Yes Yes   Sig: Administer 2 Drops to both eyes daily. fluticasone-umeclidinium-vilanterol (Trelegy Ellipta) 100-62.5-25 mcg inhaler 2021 at Unknown time  Yes Yes   Sig: Take 1 Puff by inhalation daily. hydrocortisone (CORTAID) 1 % topical cream   Yes Yes   Sig: Apply  to affected area three (3) times daily as needed for Itching. use thin layer   lamoTRIgine (LAMICTAL) 25 mg tablet 2021 at Unknown time  Yes Yes   Sig: Take 50 mg by mouth two (2) times a day. levothyroxine (SYNTHROID) 100 mcg tablet 2021 at Unknown time  Yes Yes   Sig: Take 100 mcg by mouth Daily (before breakfast). Indications: hypothyroidism   loperamide (IMMODIUM) 2 mg tablet   Yes Yes   Sig: Take 2 mg by mouth three (3) times daily as needed for Diarrhea.   montelukast (Singulair) 10 mg tablet 2021 at Unknown time  Yes Yes   Sig: Take 10 mg by mouth daily. sodium chloride ([place] Saline) 0.65 % nasal squeeze bottle   Yes Yes   Si Sprays by Both Nostrils route two (2) times a day.       Facility-Administered Medications: None       Please contact the main inpatient pharmacy with any questions or concerns at [phone] and we will direct you to the clinical pharmacist covering this patient's care while in-house.    Luis Christensen, PHARMD

## 2021-04-05 NOTE — PROGRESS NOTES
[facility] visit. Mrs. María Walker is [address]. She is unable to receive communion due to medical restrictions. Prayer for spiritual communion offered outside her room.     RADHA Gonzalez, RN, ACSW, LCSW   Page:  [phone]

## 2021-04-05 NOTE — PROGRESS NOTES
Bedside and Verbal shift change report given to Sunni Ac RN (oncoming nurse) by Ama Phipps RN (offgoing nurse). Report included the following information SBAR, Kardex, Intake/Output and MAR.

## 2021-04-05 NOTE — PROGRESS NOTES
[address] [facility] Adult  Hospitalist Group                                                                                          Hospitalist Progress Note  Benjamin Mckeon MD  Answering service: [phone] from in house phone        Date of Service:  2021  NAME:  Anel Morin  :  1935  MRN:  [id number]      Admission Summary: This is an 72-year-old woman with past medical history significant for seizure disorder, dementia, COPD, and hypothyroidism, who in her usual state of until the day of her presentation at the emergency room and when it was reported that the patient developed change in mental status. The patient resides at [facility]. She was visited by family member today who noted that the patient has change in mental status. It was also suspected that the patient probably has seizure activity. The patient was brought to [facility] for further evaluation. Normally, the patient goes to [facility] for followup of her seizure. According to report, the patient does not usually have frequent seizure. Interval history / Subjective:    pt seen and examined AAO x1      Assessment & Plan:     1. Metabolic encephalopathy due to Acute delirium ? Infection VS seizure    -   does not look like infectious etiology  - MRI of the brain to evaluate the patient for stroke as a possible cause of acute delirium.   - Ammonia level IS 25    2. Seizure disorder: This may be the cause of the patient's acute delirium.    -Neurology was consulted by the emergency room physician. The patient has been started on Keppra as by advised by the neurologist in addition to her Tegretol.    -We will obtain EEG and await result of the MRI of the brain.    -seizure precaution    3. Dementia: We will continue supportive treatment. This is also most likely contributing to the patient's acute delirium. 4.   COPD:  We will place the patient on DuoNeb as needed and supplemental oxygen as needed. 5.  Hypothyroidism. We will continue with Synthroid. We will check a TSH level.     Code status: Full  DVT prophylaxis: scd    Care Plan discussed with: Patient/Family and Nurse  Anticipated Disposition: Home w/Family  Anticipated Discharge: 24 hours to 48 hours     Hospital Problems  Date Reviewed: 4/4/2021          Codes Class Noted POA    * (Principal) Acute delirium ICD-10-CM: R41.0  ICD-9-CM: 780.09  4/4/2021 Yes                Review of Systems:   Review of systems not obtained due to patient factors. Vital Signs:    Last 24hrs VS reviewed since prior progress note. Most recent are:  Visit Vitals  /70 (BP 1 Location: Right upper arm, BP Patient Position: At rest)   Pulse 74   Temp 98.6 °F (37 °C)   Resp 18   Ht 5' 2\" (1.575 m)   Wt 56.2 kg (123 lb 14.4 oz)   SpO2 93%   BMI 22.66 kg/m²         Intake/Output Summary (Last 24 hours) at 4/5/2021 1347  Last data filed at 4/5/2021 3021  Gross per 24 hour   Intake --   Output 1100 ml   Net -1100 ml        Physical Examination:     I had a face to face encounter with this patient and independently examined them on 4/5/2021 as outlined below:          Constitutional:  No acute distress, cooperative, pleasant    ENT:  Oral mucosa moist, oropharynx benign. Resp:  CTA bilaterally. No wheezing/rhonchi/rales. No accessory muscle use   CV:  Regular rhythm, normal rate, no murmurs, gallops, rubs    GI:  Soft, non distended, non tender. normoactive bowel sounds, no hepatosplenomegaly     Musculoskeletal:  No edema, warm, 2+ pulses throughout    Neurologic:  Moves all extremities. AAOx3, CN II-XII reviewed     Psych:  Good insight, Not anxious nor agitated.        Data Review:    I personally reviewed  Image and labs      Labs:     Recent Labs     04/05/21  0030 04/04/21  1628   WBC 6.6 7.8   HGB 11.4* 12.3   HCT 35.8 38.8    228     Recent Labs     04/05/21  0031 04/04/21  1628    139   K 3.6 4.9   * 107   CO2 26 28   BUN 22* 25* CREA 0.63 0.73   * 96   CA 8.2* 8.3*   MG 2.1  --    PHOS 3.5  --      Recent Labs     04/05/21  0031 04/04/21  1628   ALT 11* 13   * 138*   TBILI 0.3 0.4   TP 5.9* 6.9   ALB 2.7* 3.1*   GLOB 3.2 3.8     No results for input(s): INR, PTP, APTT, INREXT in the last 72 hours. No results for input(s): FE, TIBC, PSAT, FERR in the last 72 hours. Lab Results   Component Value Date/Time    Folate 17.4 04/05/2021 12:30 AM      No results for input(s): PH, PCO2, PO2 in the last 72 hours.   Recent Labs     04/05/21 0031 04/05/21  0030   TROIQ <0.05 <0.05     Lab Results   Component Value Date/Time    Cholesterol, total 234 (H) 10/26/2019 03:58 AM    HDL Cholesterol 101 10/26/2019 03:58 AM    LDL, calculated 127.4 (H) 10/26/2019 03:58 AM    Triglyceride 28 10/26/2019 03:58 AM    CHOL/HDL Ratio 2.3 10/26/2019 03:58 AM     Lab Results   Component Value Date/Time    Glucose (POC) 117 (H) 03/10/2017 07:36 PM     Lab Results   Component Value Date/Time    Color YELLOW/STRAW 04/04/2021 04:28 PM    Appearance CLEAR 04/04/2021 04:28 PM    Specific gravity 1.011 04/04/2021 04:28 PM    pH (UA) 5.5 04/04/2021 04:28 PM    Protein Negative 04/04/2021 04:28 PM    Glucose Negative 04/04/2021 04:28 PM    Ketone Negative 04/04/2021 04:28 PM    Bilirubin Negative 04/04/2021 04:28 PM    Urobilinogen 0.2 04/04/2021 04:28 PM    Nitrites Negative 04/04/2021 04:28 PM    Leukocyte Esterase Negative 04/04/2021 04:28 PM    Epithelial cells FEW 04/04/2021 04:28 PM    Bacteria Negative 04/04/2021 04:28 PM    WBC 0-4 04/04/2021 04:28 PM    RBC 0-5 04/04/2021 04:28 PM         Medications Reviewed:     Current Facility-Administered Medications   Medication Dose Route Frequency    budesonide (PULMICORT) 500 mcg/2 ml nebulizer suspension  500 mcg Nebulization BID RT    levETIRAcetam (KEPPRA) tablet 500 mg  500 mg Oral BID    albuterol-ipratropium (DUO-NEB) 2.5 MG-0.5 MG/3 ML  3 mL Nebulization BID PRN    carBAMazepine XR (TEGretol XR) tablet 400 mg  400 mg Oral BID    cetirizine (ZYRTEC) tablet 10 mg  10 mg Oral DAILY    lamoTRIgine (LaMICtal) tablet 12.5 mg  12.5 mg Oral BID    levothyroxine (SYNTHROID) tablet 100 mcg  100 mcg Oral ACB    sodium chloride (NS) flush 5-40 mL  5-40 mL IntraVENous Q8H    sodium chloride (NS) flush 5-40 mL  5-40 mL IntraVENous PRN    acetaminophen (TYLENOL) tablet 650 mg  650 mg Oral Q6H PRN    Or    acetaminophen (TYLENOL) suppository 650 mg  650 mg Rectal Q6H PRN    polyethylene glycol (MIRALAX) packet 17 g  17 g Oral DAILY PRN    promethazine (PHENERGAN) tablet 12.5 mg  12.5 mg Oral Q6H PRN    Or    ondansetron (ZOFRAN) injection 4 mg  4 mg IntraVENous Q6H PRN    heparin (porcine) injection 5,000 Units  5,000 Units SubCUTAneous Q8H    lactated Ringers infusion  75 mL/hr IntraVENous CONTINUOUS     ______________________________________________________________________  EXPECTED LENGTH OF STAY: 2d 14h  ACTUAL LENGTH OF STAY:          1                 Uzair Neil MD

## 2021-04-05 NOTE — CONSULTS
Neuro consult completed. Dictated note to follow. Pt with long standing h/o epilepsy, not followed at [facility], last seen in 2017 for breakthrough seizures while on Carbamazepine 400/200mg so Keppra was added. Pt now presenting with possible recurrent seizures on Carbamazepine 400mg bid. She also has bipolar d/o and is on Lamictal at low dose, 50mg bid, presumably for mood stabilization. Pt has dementia and is unable to provide any history. Exam reveals memory loss, oriented only to self, o/w appropriate, non-focal. CTH is neg. Given mood d/o and dementia, I will change Keppra to Vimpat given risk of worsening mood with Keppra. Considered increasing Lamictal, but this has significant interactions with carbamazepine making management of both ASD levels difficult. Continue Carbamazepine at home dose. EEG is pending, will review. I do not feel MRI is needed. No suspicion for stroke and pt has long standing h/o epilepsy.

## 2021-04-05 NOTE — PROGRESS NOTES
Bedside shift change report given to Amada Poon (oncoming nurse) by Daljit Bailon (offgoing nurse). Report included the following information SBAR, Kardex and MAR.

## 2021-04-05 NOTE — ED NOTES
TRANSFER - OUT REPORT:    Verbal report given to David Willis RN (name) on Jessica London  being transferred to [address] (unit) for routine progression of care       Report consisted of patients Situation, Background, Assessment and   Recommendations(SBAR). Information from the following report(s) SBAR, ED Summary, Intake/Output, MAR and Recent Results was reviewed with the receiving nurse. Lines:   Peripheral IV 04/04/21 Left Antecubital (Active)   Site Assessment Clean, dry, & intact 04/04/21 1616   Phlebitis Assessment 0 04/04/21 1616   Infiltration Assessment 0 04/04/21 1616   Dressing Status Clean, dry, & intact 04/04/21 1616   Hub Color/Line Status Green;Capped;Flushed 04/04/21 1616   Action Taken Blood drawn 04/04/21 1616        Opportunity for questions and clarification was provided.       Patient transported with:   [employer]

## 2021-04-06 LAB — LAMOTRIGINE SERPL-MCNC: 1.3 UG/ML (ref 2–20)

## 2021-04-06 PROCEDURE — 94760 N-INVAS EAR/PLS OXIMETRY 1: CPT

## 2021-04-06 PROCEDURE — 94640 AIRWAY INHALATION TREATMENT: CPT

## 2021-04-06 PROCEDURE — 74011250637 HC RX REV CODE- 250/637: Performed by: INTERNAL MEDICINE

## 2021-04-06 PROCEDURE — 65660000000 HC RM CCU STEPDOWN

## 2021-04-06 PROCEDURE — 74011000250 HC RX REV CODE- 250: Performed by: HOSPITALIST

## 2021-04-06 PROCEDURE — 74011250637 HC RX REV CODE- 250/637: Performed by: PSYCHIATRY & NEUROLOGY

## 2021-04-06 PROCEDURE — 74011250636 HC RX REV CODE- 250/636: Performed by: INTERNAL MEDICINE

## 2021-04-06 RX ADMIN — Medication 10 ML: at 21:23

## 2021-04-06 RX ADMIN — CARBAMAZEPINE 400 MG: 200 TABLET, EXTENDED RELEASE ORAL at 21:23

## 2021-04-06 RX ADMIN — HEPARIN SODIUM 5000 UNITS: 5000 INJECTION INTRAVENOUS; SUBCUTANEOUS at 13:46

## 2021-04-06 RX ADMIN — LACOSAMIDE 50 MG: 50 TABLET, FILM COATED ORAL at 21:23

## 2021-04-06 RX ADMIN — LACOSAMIDE 50 MG: 50 TABLET, FILM COATED ORAL at 08:28

## 2021-04-06 RX ADMIN — LAMOTRIGINE 12.5 MG: 25 TABLET ORAL at 18:01

## 2021-04-06 RX ADMIN — CETIRIZINE HYDROCHLORIDE 10 MG: 10 TABLET, FILM COATED ORAL at 08:28

## 2021-04-06 RX ADMIN — Medication 10 ML: at 06:16

## 2021-04-06 RX ADMIN — BUDESONIDE 500 MCG: 0.5 INHALANT RESPIRATORY (INHALATION) at 08:43

## 2021-04-06 RX ADMIN — BUDESONIDE 500 MCG: 0.5 INHALANT RESPIRATORY (INHALATION) at 20:47

## 2021-04-06 RX ADMIN — LAMOTRIGINE 12.5 MG: 25 TABLET ORAL at 08:29

## 2021-04-06 RX ADMIN — HEPARIN SODIUM 5000 UNITS: 5000 INJECTION INTRAVENOUS; SUBCUTANEOUS at 06:15

## 2021-04-06 RX ADMIN — LEVOTHYROXINE SODIUM 100 MCG: 0.1 TABLET ORAL at 06:16

## 2021-04-06 RX ADMIN — Medication 10 ML: at 13:48

## 2021-04-06 RX ADMIN — HEPARIN SODIUM 5000 UNITS: 5000 INJECTION INTRAVENOUS; SUBCUTANEOUS at 21:23

## 2021-04-06 RX ADMIN — CARBAMAZEPINE 400 MG: 200 TABLET, EXTENDED RELEASE ORAL at 08:28

## 2021-04-06 NOTE — CONSULTS
[address]    Name:  Ladarius Gamboa  MR#:  [id number]  :  1935  ACCOUNT #:  [de-identified]  DATE OF SERVICE:  2021      NEUROLOGY CONSULTATION    HISTORY OF PRESENT ILLNESS:  This is an 85-year right-handed female who was admitted yesterday 2021, with possible staring spells at her skilled nursing facility. The patient has a longstanding history of epilepsy, not followed at [facility], last seen in 2017 as an inpatient for breakthrough seizures while on carbamazepine 400 mg in the morning and 200 mg at night. At that time, Goldy Douse was added. The patient is now presenting with possible recurrent seizures on carbamazepine 400 mg b.i.d. and Lamictal at low dose 50 mg b.i.d. presumably for mood stabilization with history of bipolar disorder. The patient has dementia and is unable to provide any kind of coherent history. At presentation, she was reportedly being treated for UTI with tetracycline. There is no evidence for UTI on her UA here. According to chart notes, she was diagnosed with epilepsy around  and was initially followed by Dr. Daryl Pham at [address] [place] [address], initially treated with Depakote, but did not tolerate it, subsequently put on Tegretol. The patient has not had any known seizures since admission. Her head CT at presentation did not show any acute changes. Her carbamazepine level was 9.6. I suggested over the phone that the patient be started on Keppra given therapeutic carbamazepine level and breakthrough seizures. Seizures in the past have been described as staring spells. PAST MEDICAL HISTORY:  1. Dementia, possibly frontotemporal dementia. 2.  COPD. 3.  Hypothyroidism. 4.  History of seizures since approximately .  5. Total hip replacement. MEDICATIONS AT HOME:  1. Lamictal 50 mg b.i.d.  2.  Carbamazepine  mg b.i.d.  3.  Singulair. 4.  Refresh Tears. 5.  Trelegy Ellipta. 6.  Hydrocortisone topical.  7.  Imodium.   8. Tums.  9.  Albuterol. 10.  Synthroid. 11.  DuoNebs. ALLERGIES:  THERE ARE 8 ALLERGIES LISTED INCLUDING ARICEPT, BENADRYL, CIPRO, CLINDAMYCIN, CODEINE, MACROBID, PENICILLIN, AND SULFA. SOCIAL HISTORY:  She lives in the [address] [place] [address] of [address] [place] [address]. 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 No tobacco, alcohol, or drug use. FAMILY HISTORY:  No seizures that she reports in the family. Tells me she has four children. Other details cannot be obtained. REVIEW OF SYSTEMS:  Limited due to the patient's memory loss. PHYSICAL EXAMINATION:  VITAL SIGNS:  Blood pressure 109/65, pulse 73, respiratory rate 18, saturating 92% on room air, and temperature is 98.5. She has a BMI of 22.7. GENERAL:  She is a thin elderly female sitting beside the bed in no distress. HEART:  Regular rate and rhythm without murmurs. CAROTIDS: 2+. No bruits. EXTREMITIES:  Warm. No edema. 2+ radial pulses. NEUROLOGIC:  Mental Status:  She is alert and oriented to person, believes she is at her home currently, cannot tell me the date, cannot tell me what holiday just occurred. She is able to name and repeat, follow commands. She has obvious memory loss to history. Cranial nerve exam, she has no facial asymmetry or ptosis. Extraocular movements are intact without nystagmus or diplopia. Pupils round and reactive. Tongue midline. Palate elevated symmetrically. Sensation and hearing intact. Motor exam 5/5 throughout. No pronator drift. No tremor. Sensory exam is intact to light touch throughout. Reflexes symmetric. Toes downgoing. Coordination:  Intact to finger-to-nose, rapid alternating movements. Gait not assessed at this time. STUDIES AND REPORTS:  In addition to that mentioned above, B12 of 313.   TSH and ammonia levels were normal.    ASSESSMENT AND PLAN:  This is an 27-year-old right-handed female with longstanding history of epilepsy presenting with possible recurrent seizures while on carbamazepine  mg b.i.d. Exam reveals significant memory loss, otherwise, nonfocal.  CTA of the head is negative. Given her mood disorder and dementia, I will change Keppra to Vimpat. Start Vimpat 50 mg b.i.d.  I considered increasing Lamictal, but this has significant interactions with carbamazepine making management of both ASD levels difficult. Continue carbamazepine at home dose. EEG pending, will review. MRI is not needed, no suspicion for stroke and the patient has longstanding history of epilepsy.       Dimple Lanza MD      [id number]  D:  04/05/2021 22:31  T:  04/06/2021 2:23  JOB #:  [id number]

## 2021-04-06 NOTE — PROGRESS NOTES
STACEY:  1. RUR-18%  2. Admitted from [facility] apartment, planning to return pending pt/ot eval.  3. Transport- BLS vs Family. Care Management Interventions  PCP Verified by CM: Yes  Palliative Care Criteria Met (RRAT>21 & CHF Dx)?: No  Mode of Transport at Discharge: Other (see comment)(Family vs BLS)  Transition of Care Consult (CM Consult): Discharge Planning  MyChart Signup: No  Discharge Durable Medical Equipment: No  Health Maintenance Reviewed: Yes  Physical Therapy Consult: Yes  Occupational Therapy Consult: Yes  Speech Therapy Consult: No  Current Support Network: Nursing Facility  Confirm Follow Up Transport: Family  The Patient and/or Patient Representative was Provided with a Choice of Provider and Agrees with the Discharge Plan?: Yes   Resource Information Provided?: No  Discharge Location  Discharge Placement: Assisted Living      Reason for Admission:                       RUR Score:          18%           Plan for utilizing home health:      TBD, therapy eval pending. PCP: First and Last name:  Israel Loya MD     Name of Practice:    Are you a current patient: Yes/No:  Yes    Approximate date of last visit: unknown    Can you participate in a virtual visit with your PCP: No                     Current Advanced Directive/Advance Care Plan: Full Code      Healthcare Decision Maker:   Click here to complete [address] including selection of the Healthcare Decision Maker Relationship (ie \"Primary\")                             Transition of Care Plan:                      Reviewed chart for transitions of care,and discussed in rounds. CM met with patient at bedside to explain role and offer support. Patient is alert and oriented to some of the conversation. She lives at [address] [place] [address] memory care unit, planning to return. She explained that she is independent with ADLs, and her daughters do her laundry for her and assist with IADLs.  CM sent referral to [facility] they are reviewing to determine if she needs their SNF services. CM to follow.     Vivek Arnold, [facility]

## 2021-04-06 NOTE — PROGRESS NOTES
Problem: Falls - Risk of  Goal: *Absence of Falls  Description: Document Ana Marking Fall Risk and appropriate interventions in the flowsheet.   Outcome: Progressing Towards Goal  Note: Fall Risk Interventions:  Mobility Interventions: Assess mobility with egress test, Strengthening exercises (ROM-active/passive)    Mentation Interventions: Adequate sleep, hydration, pain control, Bed/chair exit alarm, More frequent rounding, Reorient patient, Evaluate medications/consider consulting pharmacy    Medication Interventions: Bed/chair exit alarm, Evaluate medications/consider consulting pharmacy, Patient to call before getting OOB    Elimination Interventions: Call light in reach, Bed/chair exit alarm, Patient to call for help with toileting needs    History of Falls Interventions: Bed/chair exit alarm, Door open when patient unattended         Problem: Patient Education: Go to Patient Education Activity  Goal: Patient/Family Education  Outcome: Progressing Towards Goal

## 2021-04-06 NOTE — PROGRESS NOTES
[address] [facility] Adult  Hospitalist Group                                                                                          Hospitalist Progress Note  Tammy Norman MD  Answering service: [phone] from in house phone        Date of Service:  2021  NAME:  Bola Godinez  :  1935  MRN:  [id number]      Admission Summary: This is an 74-year-old woman with past medical history significant for seizure disorder, dementia, COPD, and hypothyroidism, who in her usual state of until the day of her presentation at the emergency room and when it was reported that the patient developed change in mental status. The patient resides at [facility]. She was visited by family member today who noted that the patient has change in mental status. It was also suspected that the patient probably has seizure activity. The patient was brought to [facility] for further evaluation. Normally, the patient goes to [facility] for followup of her seizure. According to report, the patient does not usually have frequent seizure. Interval history / Subjective:    pt seen and examined AAO x1 d/w neurology      Assessment & Plan:     1. Metabolic encephalopathy due to Acute delirium ? Infection VS seizure    - seen by neurology possibly of  seizure uncontrolled   - Ammonia level is 25    2. Seizure disorder: This may be the cause of the patient's acute delirium.    -Neurology was consulted by the emergency room physician. - meds readjusted on tegretol / Connie Gray / lamictal   -We will obtain EEG and await result of the MRI of the brain.    -seizure precaution    3. Dementia: We will continue supportive treatment. This is also most likely contributing to the patient's acute delirium. 4. COPD:  We will place the patient on DuoNeb as needed and supplemental oxygen as needed. 5.  Hypothyroidism. We will continue with Synthroid.   We will check a TSH level.     Code status: Full  DVT prophylaxis: scd    Care Plan discussed with: Patient/Family and Nurse  Anticipated Disposition: Home w/Family  Anticipated Discharge: 24 hours to 48 hours     Hospital Problems  Date Reviewed: 4/4/2021          Codes Class Noted POA    * (Principal) Acute delirium ICD-10-CM: R41.0  ICD-9-CM: 780.09  4/4/2021 Yes                Review of Systems:   Review of systems not obtained due to patient factors. Ct Head Wo Cont    Result Date: 4/4/2021  No acute intracranial process identified    Xr Chest Port    Result Date: 4/4/2021  No evidence of acute cardiopulmonary process. Chronic interstitial opacities in both lungs. Vital Signs:    Last 24hrs VS reviewed since prior progress note. Most recent are:  Visit Vitals  /76 (BP 1 Location: Left upper arm, BP Patient Position: At rest)   Pulse 68   Temp 97.7 °F (36.5 °C)   Resp 18   Ht 5' 2\" (1.575 m)   Wt 56.2 kg (123 lb 14.4 oz)   SpO2 93%   BMI 22.66 kg/m²         Intake/Output Summary (Last 24 hours) at 4/6/2021 1304  Last data filed at 4/6/2021 1042  Gross per 24 hour   Intake 340 ml   Output --   Net 340 ml        Physical Examination:     I had a face to face encounter with this patient and independently examined them on 4/6/2021 as outlined below:          Constitutional:  No acute distress, cooperative    ENT:  MMM    Resp:  CTA bilaterally. CV:  Regular rhythm, normal rate    GI:  Soft, non distended, non tender. bs+    Musculoskeletal:  No edema, warm, 2+ pulses throughout    Neurologic:  Moves all extremities. AAOx1/2, CN II-XII reviewed     Psych:  Good insight, Not anxious nor agitated.        Data Review:    I personally reviewed  Image and labs      Labs:     Recent Labs     04/05/21  0030 04/04/21  1628   WBC 6.6 7.8   HGB 11.4* 12.3   HCT 35.8 38.8    228     Recent Labs     04/05/21  0031 04/04/21  1628    139   K 3.6 4.9   * 107   CO2 26 28   BUN 22* 25*   CREA 0.63 0.73   * 96   CA 8.2* 8.3*   MG 2.1  -- PHOS 3.5  --      Recent Labs     04/05/21  0031 04/04/21  1628   ALT 11* 13   * 138*   TBILI 0.3 0.4   TP 5.9* 6.9   ALB 2.7* 3.1*   GLOB 3.2 3.8     No results for input(s): INR, PTP, APTT, INREXT, INREXT in the last 72 hours. No results for input(s): FE, TIBC, PSAT, FERR in the last 72 hours. Lab Results   Component Value Date/Time    Folate 17.4 04/05/2021 12:30 AM      No results for input(s): PH, PCO2, PO2 in the last 72 hours.   Recent Labs     04/05/21  0031 04/05/21  0030   TROIQ <0.05 <0.05     Lab Results   Component Value Date/Time    Cholesterol, total 234 (H) 10/26/2019 03:58 AM    HDL Cholesterol 101 10/26/2019 03:58 AM    LDL, calculated 127.4 (H) 10/26/2019 03:58 AM    Triglyceride 28 10/26/2019 03:58 AM    CHOL/HDL Ratio 2.3 10/26/2019 03:58 AM     Lab Results   Component Value Date/Time    Glucose (POC) 117 (H) 03/10/2017 07:36 PM     Lab Results   Component Value Date/Time    Color YELLOW/STRAW 04/04/2021 04:28 PM    Appearance CLEAR 04/04/2021 04:28 PM    Specific gravity 1.011 04/04/2021 04:28 PM    pH (UA) 5.5 04/04/2021 04:28 PM    Protein Negative 04/04/2021 04:28 PM    Glucose Negative 04/04/2021 04:28 PM    Ketone Negative 04/04/2021 04:28 PM    Bilirubin Negative 04/04/2021 04:28 PM    Urobilinogen 0.2 04/04/2021 04:28 PM    Nitrites Negative 04/04/2021 04:28 PM    Leukocyte Esterase Negative 04/04/2021 04:28 PM    Epithelial cells FEW 04/04/2021 04:28 PM    Bacteria Negative 04/04/2021 04:28 PM    WBC 0-4 04/04/2021 04:28 PM    RBC 0-5 04/04/2021 04:28 PM         Medications Reviewed:     Current Facility-Administered Medications   Medication Dose Route Frequency    budesonide (PULMICORT) 500 mcg/2 ml nebulizer suspension  500 mcg Nebulization BID RT    lacosamide (VIMPAT) tablet 50 mg  50 mg Oral Q12H    albuterol-ipratropium (DUO-NEB) 2.5 MG-0.5 MG/3 ML  3 mL Nebulization BID PRN    carBAMazepine XR (TEGretol XR) tablet 400 mg  400 mg Oral BID    cetirizine (ZYRTEC) tablet 10 mg  10 mg Oral DAILY    lamoTRIgine (LaMICtal) tablet 12.5 mg  12.5 mg Oral BID    levothyroxine (SYNTHROID) tablet 100 mcg  100 mcg Oral ACB    sodium chloride (NS) flush 5-40 mL  5-40 mL IntraVENous Q8H    sodium chloride (NS) flush 5-40 mL  5-40 mL IntraVENous PRN    acetaminophen (TYLENOL) tablet 650 mg  650 mg Oral Q6H PRN    Or    acetaminophen (TYLENOL) suppository 650 mg  650 mg Rectal Q6H PRN    polyethylene glycol (MIRALAX) packet 17 g  17 g Oral DAILY PRN    promethazine (PHENERGAN) tablet 12.5 mg  12.5 mg Oral Q6H PRN    Or    ondansetron (ZOFRAN) injection 4 mg  4 mg IntraVENous Q6H PRN    heparin (porcine) injection 5,000 Units  5,000 Units SubCUTAneous Q8H    lactated Ringers infusion  75 mL/hr IntraVENous CONTINUOUS     ______________________________________________________________________  EXPECTED LENGTH OF STAY: 2d 14h  ACTUAL LENGTH OF STAY:          2                 Vishnu Rodrigues MD

## 2021-04-06 NOTE — PROGRESS NOTES
Bedside and Verbal shift change report given to Alaina Washburn RN (oncoming nurse) by Celeste Carvalho RN (offgoing nurse). Report included the following information SBAR, Kardex, Intake/Output and MAR.

## 2021-04-06 NOTE — PROGRESS NOTES
Rounded on Baptism patients and provided Anointing of the Sick at request of patient.     Karyn Wilde

## 2021-04-06 NOTE — PROCEDURES
PROCEDURE: ROUTINE INPATIENT EEG  NAME:   Jonelle Del Cid Cover NUMBER : [de-identified]  MRN:   [id number]  DATE OF SERVICE: 4/6/2021     HISTORY/INDICATION: Pt is an 81yo female with dementia and h/o seizure d/o admitted due to possible staring spell at [facility]. EEG is performed to assess for evidence of epilepsy.      MEDICATIONS:   Current Facility-Administered Medications   Medication Dose Route Frequency Provider Last Rate Last Admin    budesonide (PULMICORT) 500 mcg/2 ml nebulizer suspension  500 mcg Nebulization BID RT Azucena Mckeon MD   500 mcg at 04/05/21 2101    lacosamide (VIMPAT) tablet 50 mg  50 mg Oral Q12H Renay Nino MD   50 mg at 04/05/21 2052    albuterol-ipratropium (DUO-NEB) 2.5 MG-0.5 MG/3 ML  3 mL Nebulization BID PRN Huang Santana MD        carBAMazepine XR (TEGretol XR) tablet 400 mg  400 mg Oral BID Oleg Kendall MD   400 mg at 04/05/21 2058    cetirizine (ZYRTEC) tablet 10 mg  10 mg Oral DAILY Oleg Kendall MD   10 mg at 04/05/21 1023    lamoTRIgine (LaMICtal) tablet 12.5 mg  12.5 mg Oral BID Huang Santana MD   12.5 mg at 04/05/21 1855    levothyroxine (SYNTHROID) tablet 100 mcg  100 mcg Oral ACB Oleg Kendall MD   100 mcg at 04/06/21 0616    sodium chloride (NS) flush 5-40 mL  5-40 mL IntraVENous Q8H Oleg Kendall MD   10 mL at 04/06/21 0616    sodium chloride (NS) flush 5-40 mL  5-40 mL IntraVENous PRN Oleg Kendall MD        acetaminophen (TYLENOL) tablet 650 mg  650 mg Oral Q6H PRN Oleg Kendall MD        Or    acetaminophen (TYLENOL) suppository 650 mg  650 mg Rectal Q6H PRN Oleg Kendall MD        polyethylene glycol (MIRALAX) packet 17 g  17 g Oral DAILY PRN Oleg Kendall MD        promethazine (PHENERGAN) tablet 12.5 mg  12.5 mg Oral Q6H PRN Oleg Kendall MD        Or    ondansetron (ZOFRAN) injection 4 mg  4 mg IntraVENous Q6H PRN Oleg Kendall MD        heparin (porcine) injection 5,000 Units  5,000 Units SubCUTAneous Q8H Oleg Kendall MD   5,000 Units at 04/06/21 0615    lactated Ringers infusion  75 mL/hr IntraVENous CONTINUOUS Oleg Kendall MD 75 mL/hr at 04/05/21 1535 75 mL/hr at 04/05/21 1535       CONDITIONS OF RECORDING: This is a routine 21-channel EEG recording performed in accordance with the international 10-20 system with one channel devoted to limited EKG. This study was done during a state of wakefulness. Photic stimulation and hyperventilation were performed as activating procedures. DESCRIPTION:   Upon maximal arousal the posterior dominant rhythm has a frequency of 7.5Hz with an amplitude of 20uV. This activity is symmetric over the bilateral posterior derivations and attenuates with eye opening. Photic stimulation did not significantly alter the tracing. No sleep is seen. There are no focal abnormalities, epileptiform discharges, or electrographic seizures seen. INTERPRETATION:  Abnormal EEG due to mild slowing of the background     CLINICAL CORRELATION: Finding is c/w history of dementia.      Kiko Marin MD

## 2021-04-07 VITALS
HEIGHT: 62 IN | SYSTOLIC BLOOD PRESSURE: 145 MMHG | HEART RATE: 79 BPM | WEIGHT: 123.24 LBS | BODY MASS INDEX: 22.68 KG/M2 | DIASTOLIC BLOOD PRESSURE: 81 MMHG | TEMPERATURE: 97.4 F | OXYGEN SATURATION: 92 % | RESPIRATION RATE: 18 BRPM

## 2021-04-07 PROBLEM — R41.0 ACUTE DELIRIUM: Status: RESOLVED | Noted: 2021-04-04 | Resolved: 2021-04-07

## 2021-04-07 PROCEDURE — 97166 OT EVAL MOD COMPLEX 45 MIN: CPT

## 2021-04-07 PROCEDURE — 97530 THERAPEUTIC ACTIVITIES: CPT

## 2021-04-07 PROCEDURE — 74011250637 HC RX REV CODE- 250/637: Performed by: PSYCHIATRY & NEUROLOGY

## 2021-04-07 PROCEDURE — 74011250637 HC RX REV CODE- 250/637: Performed by: INTERNAL MEDICINE

## 2021-04-07 PROCEDURE — 94640 AIRWAY INHALATION TREATMENT: CPT

## 2021-04-07 PROCEDURE — 74011000250 HC RX REV CODE- 250: Performed by: HOSPITALIST

## 2021-04-07 PROCEDURE — 97535 SELF CARE MNGMENT TRAINING: CPT

## 2021-04-07 PROCEDURE — 97161 PT EVAL LOW COMPLEX 20 MIN: CPT

## 2021-04-07 PROCEDURE — 74011250636 HC RX REV CODE- 250/636: Performed by: INTERNAL MEDICINE

## 2021-04-07 RX ORDER — LACOSAMIDE 50 MG/1
50 TABLET ORAL EVERY 12 HOURS
Qty: 60 TAB | Refills: 0 | Status: SHIPPED | OUTPATIENT
Start: 2021-04-07 | End: 2021-04-13

## 2021-04-07 RX ORDER — LAMOTRIGINE 25 MG/1
12.5 TABLET ORAL 2 TIMES DAILY
Qty: 30 TAB | Refills: 0 | Status: ON HOLD | OUTPATIENT
Start: 2021-04-07 | End: 2021-04-14 | Stop reason: DRUGHIGH

## 2021-04-07 RX ADMIN — BUDESONIDE 500 MCG: 0.5 INHALANT RESPIRATORY (INHALATION) at 07:05

## 2021-04-07 RX ADMIN — LEVOTHYROXINE SODIUM 100 MCG: 0.1 TABLET ORAL at 06:23

## 2021-04-07 RX ADMIN — LACOSAMIDE 50 MG: 50 TABLET, FILM COATED ORAL at 08:52

## 2021-04-07 RX ADMIN — HEPARIN SODIUM 5000 UNITS: 5000 INJECTION INTRAVENOUS; SUBCUTANEOUS at 06:22

## 2021-04-07 RX ADMIN — Medication 10 ML: at 06:15

## 2021-04-07 RX ADMIN — CARBAMAZEPINE 400 MG: 200 TABLET, EXTENDED RELEASE ORAL at 08:52

## 2021-04-07 RX ADMIN — CETIRIZINE HYDROCHLORIDE 10 MG: 10 TABLET, FILM COATED ORAL at 08:52

## 2021-04-07 RX ADMIN — LAMOTRIGINE 12.5 MG: 25 TABLET ORAL at 08:52

## 2021-04-07 NOTE — PROGRESS NOTES
Occupational Therapy    Update: Seen for OT evaluation, recommend return to memory care (as they can A with all ADLs per CM and chart), [facility] if available, would benefit from rehab/therapy services. Formal note to follow    Lily Wells MS OTR/L

## 2021-04-07 NOTE — PROGRESS NOTES
Spiritual Care Assessment/Progress Note  [facility]      NAME: Ching Arango      MRN: [id number]  AGE: 82 y.o.  SEX: female  Amish Affiliation: Confucianist   Language: English     4/7/2021     Total Time (in minutes): 5     Spiritual Assessment begun in [facility] through conversation with:         [x]Patient        [] Family    [] Friend(s)        Reason for Consult: [facility] ministry     Spiritual beliefs: (Please include comment if needed)     [x] Identifies with a lu tradition:  Confucianist       [] Supported by a lu community:            [] Claims no spiritual orientation:           [] Seeking spiritual identity:                [] Adheres to an individual form of spirituality:           [] Not able to assess:                           Identified resources for coping:      [x] Prayer                               [x] Music                  [] Guided Imagery     [x] Family/friends                 [] Pet visits     [] Devotional reading                         [] Unknown     [] Other:                                               Interventions offered during this visit: (See comments for more details)    Patient Interventions: Affirmation of lu, Communion (Confucianist), Initial/Spiritual assessment, patient floor, Prayer (actual), Prayer (assurance of), Other (comment)           Plan of Care:     [x] Support spiritual and/or cultural needs    [] Support AMD and/or advance care planning process      [] Support grieving process   [] Coordinate Rites and/or Rituals    [] Coordination with community clergy   [] No spiritual needs identified at this time   [] Detailed Plan of Care below (See Comments)  [] Make referral to Music Therapy  [] Make referral to Pet Therapy     [] Make referral to Addiction services  [] Make referral to [facility]  [] Make referral to Spiritual Care Partner  [] No future visits requested        [] Follow up upon further referrals     Comments: Mrs. Ramirez Po was in bed with a novel. She was concerned about not being able to find her glasses. She said she was sitting in a chair and they came and put her back to bed and couldn't find her glasses. She kept telling the  she was sitting outside her room. Looked all around for her glasses and checked with the nurse. Eventually, one of her nurses came in and found the glasses in a gown pocket that was changed because it was wet. Mrs. Dannie Mullins was delighted but had difficulty communicating exactly where she had been sitting. Prayer and communion offered.     RADHA Obregon, RN, ACSW, LCSW   Page:  [phone]

## 2021-04-07 NOTE — PROGRESS NOTES
Problem: Mobility Impaired (Adult and Pediatric)  Goal: *Acute Goals and Plan of Care (Insert Text)  Description: FUNCTIONAL STATUS PRIOR TO ADMISSION: True baseline level is unclear as pt is a very poor historian. However, she reported to therapist that she was independent with ADLs and Mod I for mobility with rollator. Denies history of falls but did report a prior R hip fracture    HOME SUPPORT PRIOR TO ADMISSION: The patient lived at [facility]. Physical Therapy Goals  Initiated 4/7/2021  1. Patient will move from supine to sit and sit to supine  in bed with modified independence within 7 day(s). 2.  Patient will transfer from bed to chair and chair to bed with supervision/set-up using the least restrictive device within 7 day(s). 3.  Patient will perform sit to stand with supervision/set-up within 7 day(s). 4.  Patient will ambulate with minimal assistance/contact guard assist for 40 feet with the least restrictive device within 7 day(s). Outcome: Progressing Towards Goal      PHYSICAL THERAPY EVALUATION  Patient: Karis Drew [de-identified]82 y.o. female)  Date: 4/7/2021  Primary Diagnosis: Acute delirium [R41.0]        Precautions: Fall       ASSESSMENT  Based on the objective data described below, the patient presents with significant confusion, perseveration on odd topics and impaired attention during mobility tasks. Per RN, pt ambulated with RW to and from restroom with assistance earlier in the morning, however, pt refused ambulation today with therapist stating that she will only ambulate with her \"black walker with 4 wheels\" because she was convinced the RW available would cause her to break her hip. Pt was able to perform sit<>Stand with Min A and required constant HHA for balance support in standing. Performed sit<>stand x 8 (partial stands) with CGA. Baseline cognition is unclear but at this time, pt requires constant assistance for ADLs and mobility.  Since mobility could not be fully assessed d/t pt's level of confusion and cooperation, rec return to memory care center with 24/7 supervision and assistance and rec additional therapy for balance training and strengthening. Acute  PT will continue to follow pt while she remains in the acute setting. Current Level of Function Impacting Discharge (mobility/balance): Min A for sit<>stand transfers    Functional Outcome Measure: The patient scored 30/100 on the Barthel Index outcome measure which is indicative of 70% functional impairment. Other factors to consider for discharge: high risk for falls d/t confusion     Patient will benefit from skilled therapy intervention to address the above noted impairments. PLAN :  Recommendations and Planned Interventions: bed mobility training, transfer training, gait training, therapeutic exercises, patient and family training/education and therapeutic activities      Frequency/Duration: Patient will be followed by physical therapy:  3 times a week to address goals. Recommendation for discharge: (in order for the patient to meet his/her long term goals)  [facility] physical therapy follow up recommended for balance training and strengthening. Rec return to memory care [place] with 24/7 supervision and assistance    This discharge recommendation:  Has been made in collaboration with the attending provider and/or case management    IF patient discharges home will need the following DME: patient owns DME required for discharge         SUBJECTIVE:   Patient stated The boy needed a heart but there isn't enough money.     OBJECTIVE DATA SUMMARY:   HISTORY:    Past Medical History:   Diagnosis Date    [facility]     Dementia ([facility] [address] 3/29/2017    Hypothyroid     Seizure (HCC)     Seizure disorder (HCC)      Past Surgical History:   Procedure Laterality Date    HX HIP REPLACEMENT         Personal factors and/or comorbidities impacting plan of care:     Home Situation  Home Environment: [address] [facility] Name: [address]: Skilled nursing facility  Patient Expects to be Discharged to[de-identified] Skilled nursing facility  Current DME Used/Available at Home: [employer] [place]    EXAMINATION/PRESENTATION/DECISION MAKING:   Critical Behavior:  Neurologic State: Alert  Orientation Level: Oriented to person, Oriented to place, Disoriented to situation, Disoriented to time  Cognition: Memory loss, Poor safety awareness     Hearing:     Skin:  Defer to RN notes  Edema: Defer to RN notes  Range Of Motion:  AROM: Generally decreased, functional           PROM: Generally decreased, functional           Strength:    Strength: Generally decreased, functional                    Tone & Sensation:   Tone: Normal              Sensation: Intact               Coordination:  Coordination: Generally decreased, functional  Vision:      Functional Mobility:  Bed Mobility:              Transfers:  Sit to Stand: Minimum assistance  Stand to Sit: Minimum assistance                       Balance:   Sitting: Impaired  Sitting - Static: Good (unsupported)  Sitting - Dynamic: Fair (occasional)  Standing: Impaired  Standing - Static: Constant support; Fair  Ambulation/Gait Training:   Unable to assess- pt unwilling to perform    Functional Measure:  Barthel Index:    Bathin  Bladder: 5  Bowels: 5  Groomin  Dressin  Feedin  Mobility: 0  Stairs: 0  Toilet Use: 5  Transfer (Bed to Chair and Back): 5  Total: 30/100       The Barthel ADL Index: Guidelines  1. The index should be used as a record of what a patient does, not as a record of what a patient could do. 2. The main aim is to establish degree of independence from any help, physical or verbal, however minor and for whatever reason. 3. The need for supervision renders the patient not independent. 4. A patient's performance should be established using the best available evidence.  Asking the patient, friends/relatives and nurses are the usual sources, but direct observation and common sense are also important. However direct testing is not needed. 5. Usually the patient's performance over the preceding 24-48 hours is important, but occasionally longer periods will be relevant. 6. Middle categories imply that the patient supplies over 50 per cent of the effort. 7. Use of aids to be independent is allowed. Nella So., Barthel, D.W. (4028). Functional evaluation: the Barthel Index. [address] [facility] (14)2. Iman Holland caprice RUBEN Macias, Concha Ryan., Marilu Lujan., Evan Ali, [address] [facility] (1999). Measuring the change indisability after inpatient rehabilitation; comparison of the responsiveness of the Barthel Index and Functional [place] Measure. Journal of Neurology, Neurosurgery, and Psychiatry, 66(4), 743-639. MARIE Bundy.A, ALEJANDRO English, & Eriberto Hazel M.A. (2004.) Assessment of post-stroke quality of life in cost-effectiveness studies: The usefulness of the Barthel Index and the EuroQoL-5D. Quality of Life Research, 15, 946-35          Physical Therapy Evaluation Charge Determination   History Examination Presentation Decision-Making   HIGH Complexity :3+ comorbidities / personal factors will impact the outcome/ POC  LOW Complexity : 1-2 Standardized tests and measures addressing body structure, function, activity limitation and / or participation in recreation  MEDIUM Complexity : Evolving with changing characteristics  MEDIUM Complexity : FOTO score of 26-74      Based on the above components, the patient evaluation is determined to be of the following complexity level: LOW     Pain Rating:  Denied pain    Activity Tolerance:   Fair    After treatment patient left in no apparent distress:   Sitting in chair, Call bell within reach and Bed / chair alarm activated    COMMUNICATION/EDUCATION:   The patients plan of care was discussed with: Occupational therapist and Registered nurse.      Patient is unable to participate in goal setting and plan of care.     Thank you for this referral.  Viry Toledo PT, DPT   Time Calculation: 30 mins

## 2021-04-07 NOTE — PROGRESS NOTES
Bedside shift change report given to Freddy Galicia (oncoming nurse) by Shawna Duncan RN (offgoing nurse). Report included the following information SBAR, Kardex, MAR and Recent Results.

## 2021-04-07 NOTE — DISCHARGE INSTRUCTIONS
Discharge Instructions       PATIENT ID: Tato Sanchez  MRN: [id number]   YOB: 1935    DATE OF ADMISSION: 4/4/2021  4:08 PM    DATE OF DISCHARGE: 4/7/2021    PRIMARY CARE PROVIDER: Gabriella Gilbert MD     ATTENDING PHYSICIAN: Diane Molina MD  DISCHARGING PROVIDER: Shannan Benoit MD    To contact this individual call [phone] and ask the  to page. If unavailable ask to be transferred the Adult Hospitalist Department. DISCHARGE DIAGNOSES Seizure    CONSULTATIONS: IP CONSULT TO NEUROLOGY    PROCEDURES/SURGERIES: * No surgery found *    PENDING TEST RESULTS:   At the time of discharge the following test results are still pending:     FOLLOW UP APPOINTMENTS:   Follow-up Information     Follow up With Specialties Details Why Contact Info    Gabriella Gilbetr MD Internal Medicine, Wound Care In 1 week  [address] [place], [place] [id number] [phone]             ADDITIONAL CARE RECOMMENDATIONS:     DIET: Regular Diet and Cardiac Diet    ACTIVITY: Activity as tolerated    WOUND CARE:     EQUIPMENT needed:       Radiology      DISCHARGE MEDICATIONS:   See Medication Reconciliation Form    · It is important that you take the medication exactly as they are prescribed. · Keep your medication in the bottles provided by the pharmacist and keep a list of the medication names, dosages, and times to be taken in your wallet. · Do not take other medications without consulting your doctor. NOTIFY YOUR PHYSICIAN FOR ANY OF THE FOLLOWING:   Fever over 101 degrees for 24 hours. Chest pain, shortness of breath, fever, chills, nausea, vomiting, diarrhea, change in mentation, falling, weakness, bleeding. Severe pain or pain not relieved by medications. Or, any other signs or symptoms that you may have questions about.       DISPOSITION:  x  Home With:   OT  PT  HH  RN       SNF/Inpatient Rehab/LTAC    Independent/assisted living    Hospice    Other:     CDMP Checked:   Yes x PROBLEM LIST Updated:  Yes x       Signed:   Sameer Carrillo MD  4/7/2021  9:53 AM

## 2021-04-07 NOTE — PROGRESS NOTES
Bedside shift change report given to Andria Chamorro (oncoming nurse) by Shirley Braxton (offgoing nurse). Report included the following information SBAR, Kardex and MAR.

## 2021-04-07 NOTE — DISCHARGE SUMMARY
Discharge Summary       PATIENT ID: Carie Mcguire  MRN: [id number]   YOB: 1935    DATE OF ADMISSION: 4/4/2021  4:08 PM    DATE OF DISCHARGE: 4/7/21   PRIMARY CARE PROVIDER: Jacqueline Barbosa MD     ATTENDING PHYSICIAN: Daiana Garcia  DISCHARGING PROVIDER: Oleg Jordan MD    To contact this individual call [phone] and ask the  to page. If unavailable ask to be transferred the Adult Hospitalist Department. CONSULTATIONS: IP CONSULT TO NEUROLOGY    PROCEDURES/SURGERIES: * No surgery found *    [id number] [facility] COURSE:   This is an 77-year-old woman with past medical history significant for seizure disorder, dementia, COPD, and hypothyroidism, who in her usual state of until the day of her presentation at the emergency room and when it was reported that the patient developed change in mental status.  The patient resides at [facility]. Kelly Weber was visited by family member today who noted that the patient has change in mental status.  It was also suspected that the patient probably has seizure activity.  The patient was brought to [facility] for further evaluation.  Normally, the patient goes to [facility] for followup of her seizure.  According to report, the patient does not usually have frequent seizure.        Assessment & Plan:      1.  Metabolic encephalopathy due to Acute delirium ?  Infection VS seizure  - resolved she is confused about some of the things but can cont conversations   - seen by neurology possibly of  seizure uncontrolled   - Ammonia level is 25  - readjusted seizure meds      2.  Seizure disorder:  This may be the cause of the patient's acute delirium.    - meds readjusted on tegretol / Al Quitter / lamictal   -EEG reviewed - d/w neurology   -seizure precaution     3.  Dementia:  We will continue supportive treatment.       4.  COPD:  We will place the patient on DuoNeb as needed and supplemental oxygen as needed.     5.  Hypothyroidism.  We will continue with Synthroid.                 DISCHARGE DIAGNOSES / PLAN:      1.  d/c to memory care unit     ADDITIONAL CARE RECOMMENDATIONS:        PENDING TEST RESULTS:   At the time of discharge the following test results are still pending:     FOLLOW UP APPOINTMENTS:    Follow-up Information     Follow up With Specialties Details Why Contact Info    Brianne Pringle MD Internal Medicine, Wound Care In 1 week  [address] [place] [id number]  [phone]               DIET: Regular Diet and Cardiac Diet  ACTIVITY: Activity as tolerated    WOUND CARE:     EQUIPMENT needed:       DISCHARGE MEDICATIONS:  Current Discharge Medication List      START taking these medications    Details   lacosamide (VIMPAT) 50 mg tab tablet Take 1 Tab by mouth every twelve (12) hours for 30 days. Max Daily Amount: 100 mg. Qty: 60 Tab, Refills: 0    Associated Diagnoses: Seizure ([address]         CONTINUE these medications which have CHANGED    Details   lamoTRIgine (LaMICtal) 25 mg tablet Take 0.5 Tabs by mouth two (2) times a day for 30 days. Qty: 30 Tab, Refills: 0         CONTINUE these medications which have NOT CHANGED    Details   montelukast (Singulair) 10 mg tablet Take 10 mg by mouth daily. carboxymethylcellulose sodium (Refresh Tears) 0.5 % drop ophthalmic solution Administer 2 Drops to both eyes daily. fluticasone-umeclidinium-vilanterol (Trelegy Ellipta) 100-62.5-25 mcg inhaler Take 1 Puff by inhalation daily. sodium chloride (Ayr Saline) 0.65 % nasal squeeze bottle 2 Sprays by Both Nostrils route two (2) times a day. hydrocortisone (CORTAID) 1 % topical cream Apply  to affected area three (3) times daily as needed for Itching. use thin layer      loperamide (IMMODIUM) 2 mg tablet Take 2 mg by mouth three (3) times daily as needed for Diarrhea.      calcium carbonate (TUMS) 200 mg calcium (500 mg) chew Take 2 Tabs by mouth every four (4) hours as needed.       albuterol (ProAir HFA) 90 mcg/actuation inhaler Take 1 Puff by inhalation every four (4) hours as needed for Wheezing. carBAMazepine XR (TEGRETOL XR) 400 mg SR tablet Take 400 mg by mouth two (2) times a day. Indications: TONIC-CLONIC EPILEPSY      levothyroxine (SYNTHROID) 100 mcg tablet Take 100 mcg by mouth Daily (before breakfast). Indications: hypothyroidism      !! acetaminophen (MAPAP) 500 mg cap Take 1 Cap by mouth daily. Indications: Arthritic Pain      albuterol-ipratropium (DUO-NEB) 2.5 mg-0.5 mg/3 ml nebu 3 mL by Nebulization route three (3) times daily. !! acetaminophen (MAPAP) 500 mg cap Take 1 Cap by mouth every six (6) hours as needed for Pain. !! - Potential duplicate medications found. Please discuss with provider. STOP taking these medications       fluticasone propionate (FLOVENT DISKUS) 50 mcg/actuation inhaler Comments:   Reason for Stopping:         budesonide (PULMICORT) 0.5 mg/2 mL nbsp Comments:   Reason for Stopping:         cetirizine (ZYRTEC) 10 mg tablet Comments:   Reason for Stopping:                 NOTIFY YOUR PHYSICIAN FOR ANY OF THE FOLLOWING:   Fever over 101 degrees for 24 hours. Chest pain, shortness of breath, fever, chills, nausea, vomiting, diarrhea, change in mentation, falling, weakness, bleeding. Severe pain or pain not relieved by medications. Or, any other signs or symptoms that you may have questions about. DISPOSITION:  x  Home With:   OT  PT  HH  RN      x Long term SNF/Inpatient Rehab    Independent/assisted living    Hospice    Other:       PATIENT CONDITION AT DISCHARGE:     Functional status   x Poor     Deconditioned     Independent      Cognition     Lucid     Forgetful    x Dementia      Catheters/lines (plus indication)    Almaraz     PICC     PEG    x None      Code status   x  Full code     DNR      PHYSICAL EXAMINATION AT DISCHARGE:  General:          Alert, cooperative, no distress, appears stated age.      HEENT:           Atraumatic, anicteric sclerae, pink conjunctivae                          No oral ulcers, mucosa moist, throat clear, dentition fair  Neck:               Supple, symmetrical  Lungs:             Clear to auscultation bilaterally. No Wheezing or Rhonchi. No rales. Chest wall:      No tenderness  No Accessory muscle use. Heart:              Regular  rhythm,  No  murmur   No edema  Abdomen:        Soft, non-tender. Not distended. Bowel sounds normal  Extremities:     No cyanosis. No clubbing,                            Skin turgor normal, Capillary refill normal  Skin:                Not pale. Not Jaundiced  No rashes   Psych:             Not anxious or agitated.   Neurologic:      Alert, moves all extremities, answers questions      CHRONIC MEDICAL DIAGNOSES:  Problem List as of 4/7/2021 Date Reviewed: 4/4/2021          Codes Class Noted - Resolved    Acute cystitis ICD-10-CM: N30.00  ICD-9-CM: 595.0  8/28/2017 - Present        Dementia ([facility] (Chronic) ICD-10-CM: F03.90  ICD-9-CM: 294.20  3/29/2017 - Present        UTI (urinary tract infection) ICD-10-CM: N39.0  ICD-9-CM: 599.0  3/29/2017 - Present        Seizure ([facility] (Chronic) ICD-10-CM: R56.9  ICD-9-CM: 780.39  3/28/2017 - Present        Acquired hypothyroidism (Chronic) ICD-10-CM: E03.9  ICD-9-CM: 244.9  3/28/2017 - Present        * (Principal) RESOLVED: Acute delirium ICD-10-CM: R41.0  ICD-9-CM: 780.09  4/4/2021 - 4/7/2021        RESOLVED: COPD with acute exacerbation ([facility] ICD-10-CM: J44.1  ICD-9-CM: 491.21  10/26/2019 - 10/27/2019        RESOLVED: Hypoxia ICD-10-CM: R09.02  ICD-9-CM: 799.02  10/25/2019 - 10/27/2019        RESOLVED: COPD (chronic obstructive pulmonary disease) ([facility] (Chronic) ICD-10-CM: J44.9  ICD-9-CM: 496  3/28/2017 - 10/27/2019              Greater than 30  minutes were spent with the patient on counseling and coordination of care    Signed:   Deepa Susan, MD  4/7/2021  9:54 AM

## 2021-04-07 NOTE — PROGRESS NOTES
Report called to [address] [place] [address] at [phone] Patient discharged via [facility] with MAR,Kardex, discharge instructions and belongings.

## 2021-04-07 NOTE — PROGRESS NOTES
Problem: Self Care Deficits Care Plan (Adult)  Goal: *Acute Goals and Plan of Care (Insert Text)  Description:   FUNCTIONAL STATUS PRIOR TO ADMISSION: Patient was modified independent using a rollator for functional mobility. Patient was supervision for basic and instrumental ADLs per chart-at memory care unit who A as needed per DYLAN Razo historian on eval.    HOME SUPPORT: memory care unit [facility]    Occupational Therapy Goals  Initiated 4/7/2021  1. Patient will perform self-feeding with independence within 7 day(s). 2.  Patient will perform grooming with supervision/set-up within 7 day(s). 3.  Patient will perform upper body dressing with supervision/set-up within 7 day(s). 4.  Patient will perform toilet transfers with supervision/set-up within 7 day(s). 5.  Patient will perform all aspects of toileting with supervision/set-up within 7 day(s). 6.  Patient will participate in upper extremity therapeutic exercise/activities with supervision/set-up for 10 minutes within 7 day(s). 7.  Patient will utilize energy conservation techniques during functional activities with verbal cues within 7 day(s). Outcome: Progressing Towards Goal   OCCUPATIONAL THERAPY EVALUATION  Patient: Lakia Mock [de-identified]82 y.o. female)  Date: 4/7/2021  Primary Diagnosis: Acute delirium [R41.0]        Precautions: seizure       ASSESSMENT  Based on the objective data described below, the patient presents with decreased independence with self care from baseline, with generalized weakness, fair command following due to confusion with orientation to self only, difficult to re-orient with poor safety awareness and need for A, fair activity tolerance and fair functional reach. Currently min A x1 with max encouragement to participate, would benefit from return to memory care unit with A for ADLs for safety.     Current Level of Function Impacting Discharge (ADLs/self-care): min A for sit to stand from chair, ambulated with RN to bathroom, mod A to setup for ADls, poor safety awareness    Functional Outcome Measure: The patient scored Total: 30/100 on the Barthel Index outcome measure which is indicative of 70% impaired ability to care for basic self needs/dependency on others; inferred 100% dependency on others for instrumental ADLs. Other factors to consider for discharge: memory care unit     Patient will benefit from skilled therapy intervention to address the above noted impairments. PLAN :  Recommendations and Planned Interventions: self care training, functional mobility training, therapeutic exercise, balance training, therapeutic activities, cognitive retraining, endurance activities, and patient education    Frequency/Duration: Patient will be followed by occupational therapy 3 times a week to address goals. Recommendation for discharge: (in order for the patient to meet his/her long term goals)  Occupational therapy at least 2 days/week in the home AND ensure assist and/or supervision for safety with ADLs- if available at memory care unit    This discharge recommendation:  Has been made in collaboration with the attending provider and/or case management    IF patient discharges home will need the following DME: patient owns DME required for discharge       SUBJECTIVE:   Patient stated I don't want that walker.     OBJECTIVE DATA SUMMARY:   HISTORY:   Past Medical History:   Diagnosis Date    COPD (chronic obstructive pulmonary disease) ([facility]     Dementia ([facility] 3/29/2017    Hypothyroid     Seizure (HCC)     Seizure disorder (HCC)      Past Surgical History:   Procedure Laterality Date    HX HIP REPLACEMENT         Expanded or extensive additional review of patient history:     Home Situation  Home Environment: [address] [place] [address] Name: [facility] [address]: Skilled nursing facility  Patient Expects to be Discharged to[de-identified] Other (comment)(memory care)  Current DME Used/Available at Home: Walker, rollator    Hand dominance: Right    EXAMINATION OF PERFORMANCE DEFICITS:  Cognitive/Behavioral Status:  Neurologic State: Alert  Orientation Level: Oriented to person;Oriented to place; Disoriented to situation;Disoriented to time  Cognition: Memory loss;Poor safety awareness             Skin: intact, frail    Edema: none noted    Hearing: intact       Vision/Perceptual:  intact to normal print                                   Range of Motion:  B UE  AROM: Generally decreased, functional  PROM: Generally decreased, functional                      Strength:  B UE  Strength: Generally decreased, functional                Coordination:  Coordination: Generally decreased, functional  Fine Motor Skills-Upper: Left Intact; Right Intact         Tone & Sensation:  B UE  Tone: Normal  Sensation: Intact                      Balance:  Sitting: Impaired  Sitting - Static: Good (unsupported)  Sitting - Dynamic: Fair (occasional)  Standing: Impaired  Standing - Static: Constant support; Fair    Functional Mobility and Transfers for ADLs:  Bed Mobility:   Up in chair upon arrival    Transfers:  Sit to Stand: Minimum assistance  Stand to Sit: Minimum assistance sit to stand 2x from chair declined further mobility    ADL Assessment:  Feeding: Setup    Oral Facial Hygiene/Grooming: Setup      Mod A for LB ADLs and toileting    ADL Intervention and task modifications:  Feeding  Feeding Assistance: Set-up    Grooming  Grooming Assistance: Set-up  Position Performed: Seated in chair  Washing Face: Set-up  Brushing Teeth: Set-up; Compensatory technique training(cues for initiation)            Patient instructed and indicated understanding the benefits of maintaining activity tolerance, functional mobility, and independence with self care tasks during acute stay  to ensure safe return home and to baseline.  Encouraged patient to increase frequency and duration OOB, be out of bed for all meals, perform daily ADLs (as approved by RN/MD regarding bathing etc), and performing functional mobility to/from bathroom.  -however difficult to reorient, redirect and educate, noting patient with severe confusion and dementia. Cognitive Retraining  Organizing/Sequencing: Breaking task down  Attention to Task: Distractibility  Following Commands: Follows one step commands/directions    Therapeutic Exercise:  Eager to show therapists chair pushups, 5x   Functional Measure:  Barthel Index:    Bathin  Bladder: 5  Bowels: 5  Groomin  Dressin  Feedin  Mobility: 0  Stairs: 0  Toilet Use: 5  Transfer (Bed to Chair and Back): 5  Total: 30/100        The Barthel ADL Index: Guidelines  1. The index should be used as a record of what a patient does, not as a record of what a patient could do. 2. The main aim is to establish degree of independence from any help, physical or verbal, however minor and for whatever reason. 3. The need for supervision renders the patient not independent. 4. A patient's performance should be established using the best available evidence. Asking the patient, friends/relatives and nurses are the usual sources, but direct observation and common sense are also important. However direct testing is not needed. 5. Usually the patient's performance over the preceding 24-48 hours is important, but occasionally longer periods will be relevant. 6. Middle categories imply that the patient supplies over 50 per cent of the effort. 7. Use of aids to be independent is allowed. Jarret Blanc., Barthel, D.W. ([id number]). Functional evaluation: the Barthel Index. [facility] (14)2. RUBEN Pollard, Kassy Mendoza., Toula Phoenix., Sury Palacios, [address] [place] [address] (). Measuring the change indisability after inpatient rehabilitation; comparison of the responsiveness of the Barthel Index and Functional Lenore Measure. Journal of Neurology, Neurosurgery, and Psychiatry, 66(4), 407-836.   EMI Ayon, ALEJANDRO English, Jayy Brandon M.A. (2004.) Assessment of post-stroke quality of life in cost-effectiveness studies: The usefulness of the Barthel Index and the EuroQoL-5D. Quality of Life Research, 15, 748-66         Occupational Therapy Evaluation Charge Determination   History Examination Decision-Making   MEDIUM Complexity : Expanded review of history including physical, cognitive and psychosocial  history  HIGH Complexity : 5 or more performance deficits relating to physical, cognitive , or psychosocial skils that result in activity limitations and / or participation restrictions MEDIUM Complexity : Patient may present with comorbidities that affect occupational performnce. Miniml to moderate modification of tasks or assistance (eg, physical or verbal ) with assesment(s) is necessary to enable patient to complete evaluation       Based on the above components, the patient evaluation is determined to be of the following complexity level: MEDIUM  Pain Rating:  R hip however did no rate    Activity Tolerance:   Poor and requires rest breaks    After treatment patient left in no apparent distress:    Sitting in chair, Call bell within reach, and Bed / chair alarm activated    COMMUNICATION/EDUCATION:   The patients plan of care was discussed with: Physical therapist and Registered nurse. Home safety education was provided and the patient/caregiver indicated understanding. and Patient is unable to participate in goal setting and plan of care. This patients plan of care is appropriate for delegation to [facility].     Thank you for this referral.  Tam Wells OT  Time Calculation: 24 mins

## 2021-04-07 NOTE — PROGRESS NOTES
[address] [facility] Adult  Hospitalist Group                                                                                          Hospitalist Progress Note  Bernardo Marlow MD  Answering service: [phone] from in house phone        Date of Service:  2021  NAME:  Andrae Deleon  :  1935  MRN:  [id number]      Admission Summary: This is an 27-year-old woman with past medical history significant for seizure disorder, dementia, COPD, and hypothyroidism, who in her usual state of until the day of her presentation at the emergency room and when it was reported that the patient developed change in mental status. The patient resides at [facility]. She was visited by family member today who noted that the patient has change in mental status. It was also suspected that the patient probably has seizure activity. The patient was brought to [facility] for further evaluation. Normally, the patient goes to [facility] for followup of her seizure. According to report, the patient does not usually have frequent seizure. Interval history / Subjective:    pt seen and examined AAO x1 d/w neurology   Ready for d/c PT/OT CM consult for eval and plan   Lives at [facility] but may need higher care       Assessment & Plan:     1. Metabolic encephalopathy due to Acute delirium ? Infection VS seizure  - resolved she is confused about some of the things but can cont conversations   - seen by neurology possibly of  seizure uncontrolled   - Ammonia level is 25  - readjusted seizure meds     2. Seizure disorder: This may be the cause of the patient's acute delirium.    - meds readjusted on tegretol / Sigmund Prose / lamictal   -EEG reviewed - d/w neurology   -seizure precaution    3. Dementia: We will continue supportive treatment. 4.  COPD:  We will place the patient on DuoNeb as needed and supplemental oxygen as needed. 5.  Hypothyroidism.   We will continue with Synthroid.      Code status: Full  DVT prophylaxis: scd    Care Plan discussed with: Patient/Family and Nurse  Anticipated Disposition: Home w/Family  Anticipated Discharge: 24 hours to 48 hours ready for d/c awaiting CM/ PT/OT      Hospital Problems  Date Reviewed: 4/4/2021          Codes Class Noted POA    * (Principal) Acute delirium ICD-10-CM: R41.0  ICD-9-CM: 780.09  4/4/2021 Yes                Review of Systems:   Review of systems not obtained due to patient factors. Ct Head Wo Cont    Result Date: 4/4/2021  No acute intracranial process identified    Xr Chest Port    Result Date: 4/4/2021  No evidence of acute cardiopulmonary process. Chronic interstitial opacities in both lungs. Vital Signs:    Last 24hrs VS reviewed since prior progress note. Most recent are:  Visit Vitals  /77 (BP 1 Location: Left upper arm, BP Patient Position: Sitting)   Pulse 83   Temp 98.2 °F (36.8 °C)   Resp 18   Ht 5' 2\" (1.575 m)   Wt 55.9 kg (123 lb 3.8 oz)   SpO2 91%   BMI 22.54 kg/m²         Intake/Output Summary (Last 24 hours) at 4/7/2021 0920  Last data filed at 4/6/2021 1042  Gross per 24 hour   Intake 240 ml   Output --   Net 240 ml        Physical Examination:     I had a face to face encounter with this patient and independently examined them on 4/7/2021 as outlined below:          Constitutional:  No acute distress, cooperative    ENT:  MMM    Resp:  CTA bilaterally. CV:  Regular rhythm, normal rate    GI:  Soft, non distended, non tender. bs+    Musculoskeletal:  No edema, warm, 2+ pulses throughout    Neurologic:  Moves all extremities. AAOx1/2, CN II-XII reviewed           Data Review:    I personally reviewed  Image and labs    Ct Head Wo Cont    Result Date: 4/4/2021  No acute intracranial process identified    Xr Chest Port    Result Date: 4/4/2021  No evidence of acute cardiopulmonary process. Chronic interstitial opacities in both lungs.      Labs:     Recent Labs     04/05/21  0030 04/04/21  1628   WBC 6.6 7.8   HGB 11.4* 12.3   HCT 35.8 38.8    228     Recent Labs     04/05/21  0031 04/04/21  1628    139   K 3.6 4.9   * 107   CO2 26 28   BUN 22* 25*   CREA 0.63 0.73   * 96   CA 8.2* 8.3*   MG 2.1  --    PHOS 3.5  --      Recent Labs     04/05/21  0031 04/04/21  1628   ALT 11* 13   * 138*   TBILI 0.3 0.4   TP 5.9* 6.9   ALB 2.7* 3.1*   GLOB 3.2 3.8     No results for input(s): INR, PTP, APTT, INREXT, INREXT in the last 72 hours. No results for input(s): FE, TIBC, PSAT, FERR in the last 72 hours. Lab Results   Component Value Date/Time    Folate 17.4 04/05/2021 12:30 AM      No results for input(s): PH, PCO2, PO2 in the last 72 hours.   Recent Labs     04/05/21  0031 04/05/21  0030   TROIQ <0.05 <0.05     Lab Results   Component Value Date/Time    Cholesterol, total 234 (H) 10/26/2019 03:58 AM    HDL Cholesterol 101 10/26/2019 03:58 AM    LDL, calculated 127.4 (H) 10/26/2019 03:58 AM    Triglyceride 28 10/26/2019 03:58 AM    CHOL/HDL Ratio 2.3 10/26/2019 03:58 AM     Lab Results   Component Value Date/Time    Glucose (POC) 117 (H) 03/10/2017 07:36 PM     Lab Results   Component Value Date/Time    Color YELLOW/STRAW 04/04/2021 04:28 PM    Appearance CLEAR 04/04/2021 04:28 PM    Specific gravity 1.011 04/04/2021 04:28 PM    pH (UA) 5.5 04/04/2021 04:28 PM    Protein Negative 04/04/2021 04:28 PM    Glucose Negative 04/04/2021 04:28 PM    Ketone Negative 04/04/2021 04:28 PM    Bilirubin Negative 04/04/2021 04:28 PM    Urobilinogen 0.2 04/04/2021 04:28 PM    Nitrites Negative 04/04/2021 04:28 PM    Leukocyte Esterase Negative 04/04/2021 04:28 PM    Epithelial cells FEW 04/04/2021 04:28 PM    Bacteria Negative 04/04/2021 04:28 PM    WBC 0-4 04/04/2021 04:28 PM    RBC 0-5 04/04/2021 04:28 PM         Medications Reviewed:     Current Facility-Administered Medications   Medication Dose Route Frequency    budesonide (PULMICORT) 500 mcg/2 ml nebulizer suspension  500 mcg Nebulization BID RT    lacosamide (VIMPAT) tablet 50 mg  50 mg Oral Q12H    albuterol-ipratropium (DUO-NEB) 2.5 MG-0.5 MG/3 ML  3 mL Nebulization BID PRN    carBAMazepine XR (TEGretol XR) tablet 400 mg  400 mg Oral BID    cetirizine (ZYRTEC) tablet 10 mg  10 mg Oral DAILY    lamoTRIgine (LaMICtal) tablet 12.5 mg  12.5 mg Oral BID    levothyroxine (SYNTHROID) tablet 100 mcg  100 mcg Oral ACB    sodium chloride (NS) flush 5-40 mL  5-40 mL IntraVENous Q8H    sodium chloride (NS) flush 5-40 mL  5-40 mL IntraVENous PRN    acetaminophen (TYLENOL) tablet 650 mg  650 mg Oral Q6H PRN    Or    acetaminophen (TYLENOL) suppository 650 mg  650 mg Rectal Q6H PRN    polyethylene glycol (MIRALAX) packet 17 g  17 g Oral DAILY PRN    promethazine (PHENERGAN) tablet 12.5 mg  12.5 mg Oral Q6H PRN    Or    ondansetron (ZOFRAN) injection 4 mg  4 mg IntraVENous Q6H PRN    heparin (porcine) injection 5,000 Units  5,000 Units SubCUTAneous Q8H    lactated Ringers infusion  75 mL/hr IntraVENous CONTINUOUS     ______________________________________________________________________  EXPECTED LENGTH OF STAY: 2d 14h  ACTUAL LENGTH OF STAY:          3                 Rosio Rubio MD

## 2021-04-07 NOTE — PROGRESS NOTES
Problem: Falls - Risk of  Goal: *Absence of Falls  Description: Document Ana Marking Fall Risk and appropriate interventions in the flowsheet.   Outcome: Progressing Towards Goal  Note: Fall Risk Interventions:  Mobility Interventions: Assess mobility with egress test, Patient to call before getting OOB, Strengthening exercises (ROM-active/passive)    Mentation Interventions: Adequate sleep, hydration, pain control, Door open when patient unattended, Evaluate medications/consider consulting pharmacy    Medication Interventions: Assess postural VS orthostatic hypotension, Evaluate medications/consider consulting pharmacy, Patient to call before getting OOB    Elimination Interventions: Call light in reach, Patient to call for help with toileting needs    History of Falls Interventions: Bed/chair exit alarm, Door open when patient unattended, Evaluate medications/consider consulting pharmacy         Problem: Patient Education: Go to Patient Education Activity  Goal: Patient/Family Education  Outcome: Progressing Towards Goal

## 2021-04-07 NOTE — PROGRESS NOTES
Patient is returning back to Memory Care unit at [address], [address] transport at 1430. Report can be called to [phone] Ext. [phone]. Folder on chart. Cm informed her daughter, Bill Farmer [phone].     Cecilio Madden, [facility]

## 2021-04-12 NOTE — PROGRESS NOTES
Physician Progress Note      Nancy Booth  CSN #:                  [id number]  :                       1935  ADMIT DATE:       2021 4:08 PM  [address] [place] DATE:        2021 3:20 PM  RESPONDING  PROVIDER #:        Manan Montaño MD          QUERY TEXT:    Patient admitted with delirium and seizures . Noted documentation of  Metabolic encephalopathy  in attending PN 21 thru DC summary on 21. In order to support the diagnosis of Metabolic encephalopathy, please include additional clinical indicators in your documentation. Or please document if the diagnosis of metabolic encephalopathy has been ruled out after further study. The medical record reflects the following:  Risk Factors: 61-year-old woman with past medical history significant for seizure disorder, dementia. per ED documentation pt alert to person and place, per family baseline oriented x 4. Clinical Indicators: 21 H&P:  Acute delirium: We will admit the patient for further evaluation and treatment  Seizure disorder: This may be the cause of the patient's acute delirium  21 ED 0100 doc flowsheet neuro: alert, confused, oriented to person and place. 21 2N 0808 doc flowsheet neuro: Alert, oriented to person and place. 21 Neurology MD Procedure EEG:  INTERPRETATION:  Abnormal EEG due to mild slowing of the background  CLINICAL CORRELATION: Finding is c/w history of dementia. Treatment: admit, hospitalist consult  neurology consult, EEG, seizure precautions, vitals per unit routine, Bed/chair exit alarm, Door open when patient unattended  Options provided:  -- metabolic encephalopathy present as evidenced by, Please document evidence.   -- metabolic encephalopathy was ruled out  -- dementia only  -- Other - I will add my own diagnosis  -- Disagree - Not applicable / Not valid  -- Disagree - Clinically unable to determine / Unknown  -- Refer to Clinical Documentation Reviewer    PROVIDER RESPONSE TEXT:    metabolic encephalopathy was ruled out after study.     Query created by: Cynthia Page on 4/7/2021 3:54 PM      Electronically signed by:  Victor Manuel Morris MD 4/12/2021 11:20 AM

## 2021-04-13 ENCOUNTER — HOSPITAL ENCOUNTER (INPATIENT)
Age: 86
LOS: 3 days | Discharge: NURSING, REHAB OR LONG-TERM CARE | DRG: 101 | End: 2021-04-16
Attending: EMERGENCY MEDICINE | Admitting: FAMILY MEDICINE
Payer: MEDICARE

## 2021-04-13 ENCOUNTER — APPOINTMENT (OUTPATIENT)
Dept: CT IMAGING | Age: 86
DRG: 101 | End: 2021-04-13
Attending: EMERGENCY MEDICINE
Payer: MEDICARE

## 2021-04-13 DIAGNOSIS — R56.9 SEIZURE (HCC): Primary | ICD-10-CM

## 2021-04-13 DIAGNOSIS — N39.0 URINARY TRACT INFECTION WITHOUT HEMATURIA, SITE UNSPECIFIED: ICD-10-CM

## 2021-04-13 LAB
ALBUMIN SERPL-MCNC: 2.9 G/DL (ref 3.5–5)
ALBUMIN/GLOB SERPL: 0.8 {RATIO} (ref 1.1–2.2)
ALP SERPL-CCNC: 137 U/L (ref 45–117)
ALT SERPL-CCNC: 16 U/L (ref 12–78)
ANION GAP SERPL CALC-SCNC: 10 MMOL/L (ref 5–15)
APPEARANCE UR: CLEAR
AST SERPL-CCNC: 20 U/L (ref 15–37)
ATRIAL RATE: 77 BPM
BACTERIA URNS QL MICRO: ABNORMAL /HPF
BASOPHILS # BLD: 0 K/UL (ref 0–0.1)
BASOPHILS NFR BLD: 0 % (ref 0–1)
BILIRUB SERPL-MCNC: 0.3 MG/DL (ref 0.2–1)
BILIRUB UR QL: NEGATIVE
BUN SERPL-MCNC: 22 MG/DL (ref 6–20)
BUN/CREAT SERPL: 25 (ref 12–20)
CALCIUM SERPL-MCNC: 8.8 MG/DL (ref 8.5–10.1)
CALCULATED P AXIS, ECG09: 65 DEGREES
CALCULATED R AXIS, ECG10: 52 DEGREES
CALCULATED T AXIS, ECG11: 48 DEGREES
CHLORIDE SERPL-SCNC: 102 MMOL/L (ref 97–108)
CK SERPL-CCNC: 46 U/L (ref 26–192)
CO2 SERPL-SCNC: 27 MMOL/L (ref 21–32)
COLOR UR: ABNORMAL
CREAT SERPL-MCNC: 0.89 MG/DL (ref 0.55–1.02)
DIAGNOSIS, 93000: NORMAL
DIFFERENTIAL METHOD BLD: ABNORMAL
EOSINOPHIL # BLD: 0.1 K/UL (ref 0–0.4)
EOSINOPHIL NFR BLD: 1 % (ref 0–7)
EPITH CASTS URNS QL MICRO: ABNORMAL /LPF
ERYTHROCYTE [DISTWIDTH] IN BLOOD BY AUTOMATED COUNT: 13.2 % (ref 11.5–14.5)
GLOBULIN SER CALC-MCNC: 3.7 G/DL (ref 2–4)
GLUCOSE SERPL-MCNC: 98 MG/DL (ref 65–100)
GLUCOSE UR STRIP.AUTO-MCNC: NEGATIVE MG/DL
HCT VFR BLD AUTO: 38.8 % (ref 35–47)
HGB BLD-MCNC: 12 G/DL (ref 11.5–16)
HGB UR QL STRIP: NEGATIVE
IMM GRANULOCYTES # BLD AUTO: 0 K/UL (ref 0–0.04)
IMM GRANULOCYTES NFR BLD AUTO: 0 % (ref 0–0.5)
KETONES UR QL STRIP.AUTO: NEGATIVE MG/DL
LEUKOCYTE ESTERASE UR QL STRIP.AUTO: ABNORMAL
LYMPHOCYTES # BLD: 0.9 K/UL (ref 0.8–3.5)
LYMPHOCYTES NFR BLD: 10 % (ref 12–49)
MAGNESIUM SERPL-MCNC: 2.4 MG/DL (ref 1.6–2.4)
MCH RBC QN AUTO: 32.7 PG (ref 26–34)
MCHC RBC AUTO-ENTMCNC: 30.9 G/DL (ref 30–36.5)
MCV RBC AUTO: 105.7 FL (ref 80–99)
MONOCYTES # BLD: 0.7 K/UL (ref 0–1)
MONOCYTES NFR BLD: 7 % (ref 5–13)
NEUTS SEG # BLD: 7.4 K/UL (ref 1.8–8)
NEUTS SEG NFR BLD: 82 % (ref 32–75)
NITRITE UR QL STRIP.AUTO: POSITIVE
NRBC # BLD: 0 K/UL (ref 0–0.01)
NRBC BLD-RTO: 0 PER 100 WBC
P-R INTERVAL, ECG05: 210 MS
PH UR STRIP: 6 [PH] (ref 5–8)
PLATELET # BLD AUTO: 220 K/UL (ref 150–400)
PMV BLD AUTO: 10.4 FL (ref 8.9–12.9)
POTASSIUM SERPL-SCNC: 4.5 MMOL/L (ref 3.5–5.1)
PROT SERPL-MCNC: 6.6 G/DL (ref 6.4–8.2)
PROT UR STRIP-MCNC: NEGATIVE MG/DL
Q-T INTERVAL, ECG07: 388 MS
QRS DURATION, ECG06: 100 MS
QTC CALCULATION (BEZET), ECG08: 439 MS
RBC # BLD AUTO: 3.67 M/UL (ref 3.8–5.2)
RBC #/AREA URNS HPF: ABNORMAL /HPF (ref 0–5)
SODIUM SERPL-SCNC: 139 MMOL/L (ref 136–145)
SP GR UR REFRACTOMETRY: 1.01 (ref 1–1.03)
UR CULT HOLD, URHOLD: NORMAL
UROBILINOGEN UR QL STRIP.AUTO: 0.2 EU/DL (ref 0.2–1)
VENTRICULAR RATE, ECG03: 77 BPM
WBC # BLD AUTO: 9.2 K/UL (ref 3.6–11)
WBC URNS QL MICRO: ABNORMAL /HPF (ref 0–4)

## 2021-04-13 PROCEDURE — 87077 CULTURE AEROBIC IDENTIFY: CPT

## 2021-04-13 PROCEDURE — 74011250637 HC RX REV CODE- 250/637: Performed by: EMERGENCY MEDICINE

## 2021-04-13 PROCEDURE — 65660000000 HC RM CCU STEPDOWN

## 2021-04-13 PROCEDURE — 87186 SC STD MICRODIL/AGAR DIL: CPT

## 2021-04-13 PROCEDURE — 70450 CT HEAD/BRAIN W/O DYE: CPT

## 2021-04-13 PROCEDURE — 87086 URINE CULTURE/COLONY COUNT: CPT

## 2021-04-13 PROCEDURE — 74011250636 HC RX REV CODE- 250/636: Performed by: EMERGENCY MEDICINE

## 2021-04-13 PROCEDURE — 83735 ASSAY OF MAGNESIUM: CPT

## 2021-04-13 PROCEDURE — 80053 COMPREHEN METABOLIC PANEL: CPT

## 2021-04-13 PROCEDURE — 85025 COMPLETE CBC W/AUTO DIFF WBC: CPT

## 2021-04-13 PROCEDURE — 96360 HYDRATION IV INFUSION INIT: CPT

## 2021-04-13 PROCEDURE — 82550 ASSAY OF CK (CPK): CPT

## 2021-04-13 PROCEDURE — 99285 EMERGENCY DEPT VISIT HI MDM: CPT

## 2021-04-13 PROCEDURE — 93005 ELECTROCARDIOGRAM TRACING: CPT

## 2021-04-13 PROCEDURE — 81001 URINALYSIS AUTO W/SCOPE: CPT

## 2021-04-13 PROCEDURE — 74011000258 HC RX REV CODE- 258: Performed by: EMERGENCY MEDICINE

## 2021-04-13 PROCEDURE — 36415 COLL VENOUS BLD VENIPUNCTURE: CPT

## 2021-04-13 PROCEDURE — 96361 HYDRATE IV INFUSION ADD-ON: CPT

## 2021-04-13 RX ORDER — MIDAZOLAM HYDROCHLORIDE 1 MG/ML
2 INJECTION, SOLUTION INTRAMUSCULAR; INTRAVENOUS ONCE
Status: COMPLETED | OUTPATIENT
Start: 2021-04-13 | End: 2021-04-13

## 2021-04-13 RX ORDER — CEPHALEXIN 500 MG/1
500 CAPSULE ORAL 4 TIMES DAILY
Qty: 28 CAP | Refills: 0 | Status: SHIPPED | OUTPATIENT
Start: 2021-04-13 | End: 2021-04-16

## 2021-04-13 RX ORDER — LORAZEPAM 2 MG/ML
1 INJECTION INTRAMUSCULAR ONCE
Status: DISCONTINUED | OUTPATIENT
Start: 2021-04-13 | End: 2021-04-13

## 2021-04-13 RX ORDER — CEPHALEXIN 250 MG/1
500 CAPSULE ORAL
Status: COMPLETED | OUTPATIENT
Start: 2021-04-13 | End: 2021-04-13

## 2021-04-13 RX ORDER — CALCIUM CARBONATE 200(500)MG
2 TABLET,CHEWABLE ORAL
COMMUNITY

## 2021-04-13 RX ADMIN — SODIUM CHLORIDE 500 ML: 9 INJECTION, SOLUTION INTRAVENOUS at 16:59

## 2021-04-13 RX ADMIN — CEFTRIAXONE SODIUM 1 G: 1 INJECTION, POWDER, FOR SOLUTION INTRAMUSCULAR; INTRAVENOUS at 18:50

## 2021-04-13 RX ADMIN — MIDAZOLAM 2 MG: 1 INJECTION INTRAMUSCULAR; INTRAVENOUS at 19:26

## 2021-04-13 RX ADMIN — CEPHALEXIN 500 MG: 250 CAPSULE ORAL at 18:29

## 2021-04-13 NOTE — ED TRIAGE NOTES
Pt arrives via EMS with S/P witnessed seizure at [facility] while in bed. Ems states patient was post tical upon arrival and now at baseline. Medications recently changed.       per EMS

## 2021-04-13 NOTE — ED PROVIDER NOTES
26-year-old female with history of COPD, dementia, hypothyroidism and seizures presents with a chief complaint of seizure. Patient is a resident of a nursing facility in the memory care unit. She reportedly had a seizure while laying in bed today. There is no report of hitting her head. She did recently have one of her antiseizure medications changed; however, it is not clear which. She is currently taking Lamictal and Vimpat. Additional history and review of systems is limited by patient dementia. Past Medical History:   Diagnosis Date    COPD [facility] disease) ([facility]     [facility] 3/29/2017    Hypothyroid     [facility]     Seizure disorder (HCC)        Past Surgical History:   Procedure Laterality Date    HX HIP REPLACEMENT           No family history on file.     Social History     Socioeconomic History    Marital status:      Spouse name: Not on file    Number of children: Not on file    Years of education: Not on file    Highest education level: Not on file   Occupational History    Not on file   Social Needs    Financial resource strain: Not on file    Food insecurity     Worry: Not on file     Inability: Not on file    Transportation needs     Medical: Not on file     Non-medical: Not on file   Tobacco Use    Smoking status: Never Smoker    Smokeless tobacco: Never Used   Substance and Sexual Activity    Alcohol use: Yes     Frequency: Never    Drug use: No    Sexual activity: Not on file   Lifestyle    Physical activity     Days per week: Not on file     Minutes per session: Not on file    Stress: Not on file   Relationships    Social connections     Talks on phone: Not on file     Gets together: Not on file     Attends Christianity service: Not on file     Active member of club or organization: Not on file     Attends meetings of clubs or organizations: Not on file     Relationship status: Not on file    Intimate partner violence Fear of current or ex partner: Not on file     Emotionally abused: Not on file     Physically abused: Not on file     Forced sexual activity: Not on file   Other Topics Concern    Not on file   Social History Narrative    Not on file         ALLERGIES: Aricept [donepezil], Benadryl [diphenhydramine hcl], Ciprofloxacin, Clindamycin, Codeine, Macrobid [nitrofurantoin monohyd/m-cryst], Pcn [penicillins], and Sulfa (sulfonamide antibiotics)    Review of Systems   Unable to perform ROS: Dementia       There were no vitals filed for this visit. Physical Exam  Vitals signs and nursing note reviewed. Constitutional:       General: She is not in acute distress. Appearance: Normal appearance. She is not ill-appearing, toxic-appearing or diaphoretic. HENT:      Head: Normocephalic and atraumatic. Eyes:      Extraocular Movements: Extraocular movements intact. Neck:      Musculoskeletal: Normal range of motion. Cardiovascular:      Rate and Rhythm: Normal rate and regular rhythm. Pulses: Normal pulses. Heart sounds: Normal heart sounds. No murmur. No friction rub. No gallop. Pulmonary:      Effort: Pulmonary effort is normal. No respiratory distress. Breath sounds: Normal breath sounds. No wheezing. Abdominal:      General: Abdomen is flat. Bowel sounds are normal. There is no distension. Palpations: Abdomen is soft. Tenderness: There is no abdominal tenderness. There is no guarding. Musculoskeletal: Normal range of motion. Skin:     General: Skin is warm and dry. Neurological:      General: No focal deficit present. Mental Status: She is alert.    Psychiatric:         Mood and Affect: Mood normal.          MDM  Number of Diagnoses or Management Options  Seizure ([address]  Urinary tract infection without hematuria, site unspecified  Diagnosis management comments: 47-year-old female with a history of seizures and frequent urinary tract infections presents with a chief complaint of seizure. Patient did not fall, rather her seizures took place in bed. CT of the head was indicated to rule out intracranial hemorrhage versus mass. CT is unremarkable. Urinalysis shows evidence of urinary tract infection. Laboratory studies were unremarkable. The patient's daughter presented to the ER and states that the patient has had multiple seizures since she has been here. She did take a video of them with her phone and showed them to me. She did not notify nursing staff of the seizures while in the ER. Patient's daughter states that she has recently been admitted to [facility] with seizures and urinary tract infection. Apparently the Keflex that she had been on before was not clearing up her urinary tract infection. At this point the patient has had multiple seizures here in the ED and does not fact have a urinary tract infection I will treat her with Rocephin and admit her to the hospital.  Patient's daughter request that she be transferred to [address] [place] [address]; however, they are on diversion and the patient will be admitted to [facility] and transferred by ambulance    EKG shows sinus rhythm at a rate of 77, first-degree block, otherwise normal intervals, normal axis, no ischemic changes. Perfect Serve Consult for Admission  6:46 PM    ED Room Number: SER03/03  Patient Name and age:  Tres Stafford 82 y.o.  female  Working Diagnosis: Seizure ([address]  (primary encounter diagnosis)  Urinary tract infection without hematuria, site unspecified    COVID-19 Suspicion:  no  Sepsis present:  no  Reassessment needed: no  Code Status:  Full Code  Readmission: yes  Isolation Requirements:  no  Recommended Level of Care:  telemetry  Department:[place] [facility] - [phone]  Other: Multiple seizures in the ED.          Amount and/or Complexity of Data Reviewed  Clinical lab tests: ordered and reviewed  Tests in the radiology section of CPT®: ordered and reviewed Procedures

## 2021-04-13 NOTE — ED NOTES
Per daughter pt has had seizures x 2 since she has been in the room. Per daughter symptoms are that patient cannot get words out and muscles tense. MD notified. Pt daughter educated to call out next time patient has similar symptoms.

## 2021-04-14 LAB
ALBUMIN SERPL-MCNC: 2.7 G/DL (ref 3.5–5)
ALBUMIN/GLOB SERPL: 0.7 {RATIO} (ref 1.1–2.2)
ALP SERPL-CCNC: 141 U/L (ref 45–117)
ALT SERPL-CCNC: 14 U/L (ref 12–78)
ANION GAP SERPL CALC-SCNC: 5 MMOL/L (ref 5–15)
AST SERPL-CCNC: 18 U/L (ref 15–37)
BASOPHILS # BLD: 0.1 K/UL (ref 0–0.1)
BASOPHILS NFR BLD: 1 % (ref 0–1)
BILIRUB SERPL-MCNC: 0.4 MG/DL (ref 0.2–1)
BUN SERPL-MCNC: 13 MG/DL (ref 6–20)
BUN/CREAT SERPL: 22 (ref 12–20)
CALCIUM SERPL-MCNC: 8.7 MG/DL (ref 8.5–10.1)
CARBAMAZEPINE SERPL-MCNC: 6.5 UG/ML (ref 4–12)
CHLORIDE SERPL-SCNC: 110 MMOL/L (ref 97–108)
CO2 SERPL-SCNC: 25 MMOL/L (ref 21–32)
CREAT SERPL-MCNC: 0.6 MG/DL (ref 0.55–1.02)
DIFFERENTIAL METHOD BLD: ABNORMAL
EOSINOPHIL # BLD: 0.2 K/UL (ref 0–0.4)
EOSINOPHIL NFR BLD: 3 % (ref 0–7)
ERYTHROCYTE [DISTWIDTH] IN BLOOD BY AUTOMATED COUNT: 13 % (ref 11.5–14.5)
GLOBULIN SER CALC-MCNC: 3.9 G/DL (ref 2–4)
GLUCOSE SERPL-MCNC: 84 MG/DL (ref 65–100)
HCT VFR BLD AUTO: 36.7 % (ref 35–47)
HGB BLD-MCNC: 11.6 G/DL (ref 11.5–16)
IMM GRANULOCYTES # BLD AUTO: 0 K/UL (ref 0–0.04)
IMM GRANULOCYTES NFR BLD AUTO: 0 % (ref 0–0.5)
LYMPHOCYTES # BLD: 1.2 K/UL (ref 0.8–3.5)
LYMPHOCYTES NFR BLD: 17 % (ref 12–49)
MCH RBC QN AUTO: 33 PG (ref 26–34)
MCHC RBC AUTO-ENTMCNC: 31.6 G/DL (ref 30–36.5)
MCV RBC AUTO: 104.3 FL (ref 80–99)
MONOCYTES # BLD: 0.5 K/UL (ref 0–1)
MONOCYTES NFR BLD: 8 % (ref 5–13)
NEUTS SEG # BLD: 5 K/UL (ref 1.8–8)
NEUTS SEG NFR BLD: 71 % (ref 32–75)
NRBC # BLD: 0 K/UL (ref 0–0.01)
NRBC BLD-RTO: 0 PER 100 WBC
PLATELET # BLD AUTO: 213 K/UL (ref 150–400)
PMV BLD AUTO: 9.7 FL (ref 8.9–12.9)
POTASSIUM SERPL-SCNC: 4.9 MMOL/L (ref 3.5–5.1)
PROT SERPL-MCNC: 6.6 G/DL (ref 6.4–8.2)
RBC # BLD AUTO: 3.52 M/UL (ref 3.8–5.2)
SODIUM SERPL-SCNC: 140 MMOL/L (ref 136–145)
WBC # BLD AUTO: 7 K/UL (ref 3.6–11)

## 2021-04-14 PROCEDURE — 74011000250 HC RX REV CODE- 250: Performed by: STUDENT IN AN ORGANIZED HEALTH CARE EDUCATION/TRAINING PROGRAM

## 2021-04-14 PROCEDURE — 94760 N-INVAS EAR/PLS OXIMETRY 1: CPT

## 2021-04-14 PROCEDURE — 97535 SELF CARE MNGMENT TRAINING: CPT

## 2021-04-14 PROCEDURE — 77030029684 HC NEB SM VOL KT MONA -A

## 2021-04-14 PROCEDURE — 74011000258 HC RX REV CODE- 258: Performed by: STUDENT IN AN ORGANIZED HEALTH CARE EDUCATION/TRAINING PROGRAM

## 2021-04-14 PROCEDURE — 74011250637 HC RX REV CODE- 250/637: Performed by: PSYCHIATRY & NEUROLOGY

## 2021-04-14 PROCEDURE — 36415 COLL VENOUS BLD VENIPUNCTURE: CPT

## 2021-04-14 PROCEDURE — 74011250636 HC RX REV CODE- 250/636: Performed by: STUDENT IN AN ORGANIZED HEALTH CARE EDUCATION/TRAINING PROGRAM

## 2021-04-14 PROCEDURE — 85025 COMPLETE CBC W/AUTO DIFF WBC: CPT

## 2021-04-14 PROCEDURE — 80175 DRUG SCREEN QUAN LAMOTRIGINE: CPT

## 2021-04-14 PROCEDURE — 80156 ASSAY CARBAMAZEPINE TOTAL: CPT

## 2021-04-14 PROCEDURE — 99223 1ST HOSP IP/OBS HIGH 75: CPT | Performed by: PSYCHIATRY & NEUROLOGY

## 2021-04-14 PROCEDURE — 65660000000 HC RM CCU STEPDOWN

## 2021-04-14 PROCEDURE — 80053 COMPREHEN METABOLIC PANEL: CPT

## 2021-04-14 PROCEDURE — 94640 AIRWAY INHALATION TREATMENT: CPT

## 2021-04-14 PROCEDURE — 97165 OT EVAL LOW COMPLEX 30 MIN: CPT

## 2021-04-14 PROCEDURE — 97116 GAIT TRAINING THERAPY: CPT | Performed by: PHYSICAL THERAPIST

## 2021-04-14 PROCEDURE — 74011250637 HC RX REV CODE- 250/637: Performed by: INTERNAL MEDICINE

## 2021-04-14 PROCEDURE — 97161 PT EVAL LOW COMPLEX 20 MIN: CPT | Performed by: PHYSICAL THERAPIST

## 2021-04-14 PROCEDURE — 77030038269 HC DRN EXT URIN PURWCK BARD -A

## 2021-04-14 PROCEDURE — 74011250637 HC RX REV CODE- 250/637: Performed by: STUDENT IN AN ORGANIZED HEALTH CARE EDUCATION/TRAINING PROGRAM

## 2021-04-14 RX ORDER — CARBOXYMETHYLCELLULOSE SODIUM 10 MG/ML
1 GEL OPHTHALMIC
Status: ON HOLD | COMMUNITY
End: 2021-07-29

## 2021-04-14 RX ORDER — IPRATROPIUM BROMIDE AND ALBUTEROL SULFATE 2.5; .5 MG/3ML; MG/3ML
3 SOLUTION RESPIRATORY (INHALATION)
Status: DISCONTINUED | OUTPATIENT
Start: 2021-04-14 | End: 2021-04-16 | Stop reason: HOSPADM

## 2021-04-14 RX ORDER — ARFORMOTEROL TARTRATE 15 UG/2ML
15 SOLUTION RESPIRATORY (INHALATION)
Status: DISCONTINUED | OUTPATIENT
Start: 2021-04-14 | End: 2021-04-16 | Stop reason: HOSPADM

## 2021-04-14 RX ORDER — LACOSAMIDE 50 MG/1
50 TABLET ORAL EVERY 12 HOURS
Status: DISCONTINUED | OUTPATIENT
Start: 2021-04-14 | End: 2021-04-14

## 2021-04-14 RX ORDER — LAMOTRIGINE 100 MG/1
100 TABLET ORAL 2 TIMES DAILY
Status: DISCONTINUED | OUTPATIENT
Start: 2021-04-14 | End: 2021-04-16 | Stop reason: HOSPADM

## 2021-04-14 RX ORDER — SODIUM CHLORIDE 0.9 % (FLUSH) 0.9 %
5-40 SYRINGE (ML) INJECTION AS NEEDED
Status: DISCONTINUED | OUTPATIENT
Start: 2021-04-14 | End: 2021-04-16 | Stop reason: HOSPADM

## 2021-04-14 RX ORDER — ACETAMINOPHEN 500 MG
500 TABLET ORAL
COMMUNITY

## 2021-04-14 RX ORDER — LORAZEPAM 2 MG/ML
2 INJECTION INTRAMUSCULAR
Status: DISCONTINUED | OUTPATIENT
Start: 2021-04-14 | End: 2021-04-16 | Stop reason: HOSPADM

## 2021-04-14 RX ORDER — CARBAMAZEPINE 200 MG/1
400 TABLET, EXTENDED RELEASE ORAL DAILY
Status: DISCONTINUED | OUTPATIENT
Start: 2021-04-15 | End: 2021-04-16 | Stop reason: HOSPADM

## 2021-04-14 RX ORDER — CARBAMAZEPINE 200 MG/1
600 TABLET, EXTENDED RELEASE ORAL
Status: DISCONTINUED | OUTPATIENT
Start: 2021-04-14 | End: 2021-04-16 | Stop reason: HOSPADM

## 2021-04-14 RX ORDER — SODIUM CHLORIDE 9 MG/ML
100 INJECTION, SOLUTION INTRAVENOUS CONTINUOUS
Status: DISCONTINUED | OUTPATIENT
Start: 2021-04-14 | End: 2021-04-14

## 2021-04-14 RX ORDER — CARBAMAZEPINE 200 MG/1
400 TABLET, EXTENDED RELEASE ORAL 2 TIMES DAILY
Status: DISCONTINUED | OUTPATIENT
Start: 2021-04-14 | End: 2021-04-14

## 2021-04-14 RX ORDER — LAMOTRIGINE 100 MG/1
100 TABLET ORAL 2 TIMES DAILY
COMMUNITY
End: 2021-04-16

## 2021-04-14 RX ORDER — BUDESONIDE 0.5 MG/2ML
500 INHALANT ORAL
Status: DISCONTINUED | OUTPATIENT
Start: 2021-04-14 | End: 2021-04-16 | Stop reason: HOSPADM

## 2021-04-14 RX ORDER — LAMOTRIGINE 25 MG/1
12.5 TABLET ORAL 2 TIMES DAILY
Status: DISCONTINUED | OUTPATIENT
Start: 2021-04-14 | End: 2021-04-14

## 2021-04-14 RX ORDER — HEPARIN SODIUM 5000 [USP'U]/ML
5000 INJECTION, SOLUTION INTRAVENOUS; SUBCUTANEOUS EVERY 8 HOURS
Status: DISCONTINUED | OUTPATIENT
Start: 2021-04-14 | End: 2021-04-16 | Stop reason: HOSPADM

## 2021-04-14 RX ORDER — IPRATROPIUM BROMIDE 0.5 MG/2.5ML
0.5 SOLUTION RESPIRATORY (INHALATION)
Status: DISCONTINUED | OUTPATIENT
Start: 2021-04-14 | End: 2021-04-16 | Stop reason: HOSPADM

## 2021-04-14 RX ORDER — LEVOTHYROXINE SODIUM 100 UG/1
100 TABLET ORAL
Status: DISCONTINUED | OUTPATIENT
Start: 2021-04-14 | End: 2021-04-16 | Stop reason: HOSPADM

## 2021-04-14 RX ORDER — HEPARIN SODIUM 5000 [USP'U]/ML
INJECTION, SOLUTION INTRAVENOUS; SUBCUTANEOUS
Status: DISPENSED
Start: 2021-04-14 | End: 2021-04-14

## 2021-04-14 RX ORDER — SODIUM CHLORIDE 0.9 % (FLUSH) 0.9 %
5-40 SYRINGE (ML) INJECTION EVERY 8 HOURS
Status: DISCONTINUED | OUTPATIENT
Start: 2021-04-14 | End: 2021-04-16 | Stop reason: HOSPADM

## 2021-04-14 RX ADMIN — ARFORMOTEROL TARTRATE 15 MCG: 15 SOLUTION RESPIRATORY (INHALATION) at 19:24

## 2021-04-14 RX ADMIN — LAMOTRIGINE 100 MG: 100 TABLET ORAL at 18:02

## 2021-04-14 RX ADMIN — SODIUM CHLORIDE 100 ML/HR: 9 INJECTION, SOLUTION INTRAVENOUS at 03:41

## 2021-04-14 RX ADMIN — IPRATROPIUM BROMIDE 0.5 MG: 0.5 SOLUTION RESPIRATORY (INHALATION) at 19:24

## 2021-04-14 RX ADMIN — CEFTRIAXONE SODIUM 1 G: 1 INJECTION, POWDER, FOR SOLUTION INTRAMUSCULAR; INTRAVENOUS at 20:10

## 2021-04-14 RX ADMIN — HEPARIN SODIUM 5000 UNITS: 5000 INJECTION INTRAVENOUS; SUBCUTANEOUS at 03:44

## 2021-04-14 RX ADMIN — BUDESONIDE 500 MCG: 0.5 INHALANT RESPIRATORY (INHALATION) at 10:00

## 2021-04-14 RX ADMIN — CARBAMAZEPINE 400 MG: 200 TABLET, EXTENDED RELEASE ORAL at 08:59

## 2021-04-14 RX ADMIN — IPRATROPIUM BROMIDE 0.5 MG: 0.5 SOLUTION RESPIRATORY (INHALATION) at 13:58

## 2021-04-14 RX ADMIN — IPRATROPIUM BROMIDE 0.5 MG: 0.5 SOLUTION RESPIRATORY (INHALATION) at 10:00

## 2021-04-14 RX ADMIN — Medication 10 ML: at 22:13

## 2021-04-14 RX ADMIN — CARBAMAZEPINE 600 MG: 200 TABLET, EXTENDED RELEASE ORAL at 22:13

## 2021-04-14 RX ADMIN — LEVOTHYROXINE SODIUM 100 MCG: 0.1 TABLET ORAL at 06:56

## 2021-04-14 RX ADMIN — BUDESONIDE 500 MCG: 0.5 INHALANT RESPIRATORY (INHALATION) at 19:24

## 2021-04-14 RX ADMIN — LAMOTRIGINE 100 MG: 100 TABLET ORAL at 08:59

## 2021-04-14 RX ADMIN — HEPARIN SODIUM 5000 UNITS: 5000 INJECTION INTRAVENOUS; SUBCUTANEOUS at 20:10

## 2021-04-14 RX ADMIN — SODIUM CHLORIDE 100 ML/HR: 9 INJECTION, SOLUTION INTRAVENOUS at 13:33

## 2021-04-14 RX ADMIN — ARFORMOTEROL TARTRATE 15 MCG: 15 SOLUTION RESPIRATORY (INHALATION) at 10:00

## 2021-04-14 RX ADMIN — HEPARIN SODIUM 5000 UNITS: 5000 INJECTION INTRAVENOUS; SUBCUTANEOUS at 13:33

## 2021-04-14 RX ADMIN — Medication 10 ML: at 13:38

## 2021-04-14 RX ADMIN — Medication 10 ML: at 05:57

## 2021-04-14 NOTE — CONSULTS
NEUROLOGY CONSULT NOTE    Name Brittany Short Age 82 y.o. MRN [id number]  1935     Consulting Physician: Carlos Mckenna MD      Chief Complaint:  Seizure     Assessment:     · Active Problems:  ·   Seizure ([address] (3/28/2017)  ·   ·   82year old female with a h/o dementia, bipolar d/o, COPD, hypothyroidism, seizure d/o (staring episodes) previously followed by Dr. Kailash Alonzo at [address] [place] [address] presenting with breakthrough seizure activity. AEDs recently adjusted during admission 21 (seen by Dr. Rojelio Mayo), however it appears several additional adjustments were made since her discharge. Vimpat was discontinued due to reported side effects, and Lamictal was increased (unclear why adjustments were made). CBM levels are low therapeutic this admission. CT head did not demonstrate acute intracranial pathology. Examination is presently non-focal with low suspicion for any acute process contributing to above presentation. Defer additional neuroimaging at this time. Suspect concurrent UTI may be lowering her seizure threshold. Recommendations:   Increase CBM to 400mg AM and 600mg PM  Continue Lamictal 100mg BID  Treatment of UTI per primary team  Seizure precautions  [id number] [address] for discharge tomorrow AM if she remains clinically stable without seizure recurrence  She will require close outpatient F/U with her Neurologist post discharge  Please call if questions/concerns    Thank you very much for this referral. I appreciate the opportunity to participate in this patient's care. History of Present Illness: This is a 82 y.o.  female, we were asked to see for seizures. PMH notable for dementia, COPD, hypothyroidism, seizure d/o (staring episodes) previously followed by Dr. Kailash Alonzo at [address] [place] [address]. AEDs were recently adjusted during her admission 21 after being seen by Dr. Rojelio Mayo. LEV was transitioned to Vimpat due to concerns regarding mood disturbance on LEV.  She unfortunately became agitated on Vimpat per reports and this was discontinued. Her Lamictal was increased from 50mg BID to 100mg BID outpatient for unclear reasons. CBM dosing remains at 400mg BID. CBM levels are low therapeutic at 6.5. She is admitted from her memory facility due to reported witnessed seizure activity 4/13/21 while in her bed, post-ictal on arrival. In the ED she had 2 additional episodes of seizure described as muscle rigidity and non-responsiveness. No further noted events since admission. Head CT did not reveal any acute pathology. Admission labs revealed +UA/UTI now on ceftriaxone. She is interacting appropriately today. Allergies   Allergen Reactions    Aricept [Donepezil] Unknown (comments)    Benadryl [Diphenhydramine Hcl] Unknown (comments)    Ciprofloxacin Unknown (comments)    Clindamycin Unknown (comments)     Listed on [facility]    Codeine Unknown (comments)    Macrobid [Nitrofurantoin Monohyd/M-Cryst] Unknown (comments)    Pcn [Penicillins] Other (comments)    Sulfa (Sulfonamide Antibiotics) Other (comments)        Prior to Admission medications    Medication Sig Start Date End Date Taking? Authorizing Provider   lamoTRIgine (LaMICtal) 100 mg tablet Take 100 mg by mouth two (2) times a day. Yes Provider, Historical   acetaminophen (TYLENOL) 500 mg tablet Take 500 mg by mouth every six (6) hours as needed for Pain. Yes Provider, Historical   carboxymethylcellulose sodium (REFRESH LIQUIGEL) 1 % dlgl ophthalmic solution Administer 1 Drop to both eyes three (3) times daily as needed for Other (Dry eye). Yes Provider, Historical   calcium carbonate (David-Gest Antacid) 200 mg calcium (500 mg) chew Take 2 Tabs by mouth every four (4) hours as needed for Other (Indigestion). Yes Shelby, MD Shayne   cephALEXin (Keflex) 500 mg capsule Take 1 Cap by mouth four (4) times daily for 7 days. 4/13/21 4/20/21 Yes Glen Saucedo MD   montelukast (Singulair) 10 mg tablet Take 10 mg by mouth daily.    Yes Provider, Historical carboxymethylcellulose sodium (Refresh Tears) 0.5 % drop ophthalmic solution Administer 2 Drops to both eyes daily. Yes Provider, Historical   fluticasone-umeclidinium-vilanterol (Trelegy Ellipta) 100-62.5-25 mcg inhaler Take 1 Puff by inhalation daily. Yes Provider, Historical   sodium chloride (Ayr Saline) 0.65 % nasal squeeze bottle 2 Sprays by Both Nostrils route two (2) times a day. Yes Provider, Historical   hydrocortisone (CORTAID) 1 % topical cream Apply  to affected area three (3) times daily as needed for Itching. use thin layer   Yes Provider, Historical   loperamide (IMMODIUM) 2 mg tablet Take 2 mg by mouth three (3) times daily as needed for Diarrhea. Yes Provider, Historical   albuterol (ProAir HFA) 90 mcg/actuation inhaler Take 1 Puff by inhalation every four (4) hours as needed for Shortness of Breath or Other (COPD). Yes Provider, Historical   carBAMazepine XR (TEGRETOL XR) 400 mg SR tablet Take 400 mg by mouth two (2) times a day. Indications: TONIC-CLONIC EPILEPSY   Yes Provider, Historical   levothyroxine (SYNTHROID) 100 mcg tablet Take 100 mcg by mouth Daily (before breakfast). Indications: hypothyroidism   Yes Provider, Historical   acetaminophen (MAPAP) 500 mg cap Take 1 Cap by mouth daily. Indications: Arthritic Pain   Yes Provider, Historical   albuterol-ipratropium (DUO-NEB) 2.5 mg-0.5 mg/3 ml nebu 3 mL by Nebulization route three (3) times daily. Yes Provider, Historical       Past Medical History:   Diagnosis Date    [facility] obstructive [facility] disease) [facility]     Dementia ([facility] 3/29/2017    Hypothyroid     Seizure ([facility]     Seizure disorder (HCC)         Past Surgical History:   Procedure Laterality Date    HX HIP REPLACEMENT          Social History     Tobacco Use    Smoking status: Never Smoker    Smokeless tobacco: Never Used   Substance Use Topics    Alcohol use: Yes     Frequency: Never        History reviewed. No pertinent family history.      Review of Systems:   Comprehensive review of systems performed and negative except for as listed above. Exam:     Visit Vitals  /72 (BP 1 Location: Right upper arm, BP Patient Position: At rest)   Pulse 72   Temp 99 °F (37.2 °C)   Resp 24   Ht 5' 2\" (1.575 m)   Wt 131 lb 1.6 oz (59.5 kg)   SpO2 92%   BMI 23.98 kg/m²        General: Well developed, well nourished. Patient in no apparent distress   Head: Normocephalic, atraumatic, anicteric sclera   Lungs:  Clear to auscultation bilaterally, No wheezes or rubs   Cardiac: Regular rate and rhythm with no murmurs. Abd: Bowel sounds were audible. No tenderness on palpation   Ext: No pedal edema   Skin: No overt signs of rash     Neurological Exam:  Mental Status: Alert and oriented to person place not date. Speech: Fluent no aphasia or dysarthria. Cranial Nerves:   Intact visual fields. Facial sensation is normal. Facial movement is symmetric. Palate is midline. Normal sternocleidomastoid strength. Tongue is midline. Hearing is intact bilaterally. Eyes: PERRL, EOM's full, no nystagmus, no ptosis. Motor:  Full and symmetric strength of upper and lower proximal and distal muscles. Normal bulk and tone. Reflexes:   Deep tendon reflexes 1+/4 and symmetrical.  Plantar response is downgoing b/l. Sensory:   Symmetrically intact  with no perceived deficits modalities involving small or large fibers. Gait:  Gait is deferred   Tremor:   No tremor noted. Cerebellar:  Finger to nose and heel over shin to knee was demonstrated competently. Neurovascular: No carotid bruits. Imaging  CT Results (most recent):  Results from Hospital Encounter encounter on 04/13/21   CT HEAD WO CONT    Narrative INDICATION: seizure     Exam: Noncontrast CT of the brain is performed with 5 mm collimation. CT dose reduction was achieved with the use of the standardized protocol  tailored for this examination and automatic exposure control for dose  modulation.     Direct comparison is made to prior CT dated April 4, 2021. FINDINGS: There is age-appropriate diffuse cortical atrophy. There is no acute  intracranial hemorrhage, mass, mass effect or herniation. Ventricular system is  normal. The gray-white matter differentiation is well-preserved. The mastoid air  cells are well pneumatized. The visualized paranasal sinuses are normal.      Impression No acute intracranial hemorrhage, mass or infarct. MRI Results (maximum last 3): No results found for this or any previous visit. Lab Review  Lab Results   Component Value Date/Time    WBC 7.0 04/14/2021 03:37 AM    HCT 36.7 04/14/2021 03:37 AM    HGB 11.6 04/14/2021 03:37 AM    PLATELET 850 50/86/6237 03:37 AM     Lab Results   Component Value Date/Time    Sodium 140 04/14/2021 03:37 AM    Potassium 4.9 04/14/2021 03:37 AM    Chloride 110 (H) 04/14/2021 03:37 AM    CO2 25 04/14/2021 03:37 AM    Glucose 84 04/14/2021 03:37 AM    BUN 13 04/14/2021 03:37 AM    Creatinine 0.60 04/14/2021 03:37 AM    Calcium 8.7 04/14/2021 03:37 AM     No components found for: TROPQUANT  No results found for: NICOLAS    Signed:  Dago Tobin DO  4/14/2021  2:26 PM

## 2021-04-14 NOTE — ED NOTES
[facility] present at this time. All questions answered at this time. Verified room and hospital assignment.

## 2021-04-14 NOTE — PROGRESS NOTES
Admission Medication Reconciliation:    Information obtained from:  Current medication list from [address] [place] [address] of [address]:  NO    Comments/Recommendations: Patient presenting from [facility] facility where she currently resides in the memory care unit. Utilized current medication list from facility to update PTA meds/review patient's allergies. 1)  Of note, patient recently discharged from [facility] on 4/7. Patient's seizure medications were adjusted at the time and pt was discharged on lamotrigine 12.5 mg BID, Vimpat 50 mg BID and carbamazepine  mg BID. Since discharge it appears medications have been further adjusted and now pt is taking lamotrigine 100 mg BID and carbamazepine  mg BID. Appears Vimpat was stopped after discharge (not on current med list from [facility]). Patient's RN reports that family had stated Vimpat made patient very agitated. 2)  Medication changes (since last review): Added  - APAP prn (takes scheduled and PRN)  - Refresh prn (takes scheduled and PRN)    Adjusted  - Lamotrigine (from 12.5 mg to 100 mg BID)  - Calcium tabs (from 1 to 2 tabs q4h PRN)    Removed  - None     ¹[employer] pharmacy benefit data reflects medications filled and processed through the patient's insurance, however   this data does NOT capture whether the medication was picked up or is currently being taken by the patient.     Allergies:  Aricept [donepezil], Benadryl [diphenhydramine hcl], Ciprofloxacin, Clindamycin, Codeine, Macrobid [nitrofurantoin monohyd/m-cryst], Pcn [penicillins], and Sulfa (sulfonamide antibiotics)    Significant PMH/Disease States:   Past Medical History:   Diagnosis Date    COPD (chronic obstructive pulmonary disease) ([facility]     Dementia ([facility] 3/29/2017    Hypothyroid     Seizure ([facility]     Seizure disorder [facility])      Chief Complaint for this Admission:    Chief Complaint   Patient presents with    Seizure     Prior to Admission Medications:   Prior to Admission Medications   Prescriptions Last Dose Informant Taking?   acetaminophen (MAPAP) 500 mg cap   Yes   Sig: Take 1 Cap by mouth daily. Indications: Arthritic Pain   acetaminophen (TYLENOL) 500 mg tablet   Yes   Sig: Take 500 mg by mouth every six (6) hours as needed for Pain. albuterol (ProAir HFA) 90 mcg/actuation inhaler   Yes   Sig: Take 1 Puff by inhalation every four (4) hours as needed for Shortness of Breath or Other (COPD). albuterol-ipratropium (DUO-NEB) 2.5 mg-0.5 mg/3 ml nebu   Yes   Sig: 3 mL by Nebulization route three (3) times daily. calcium carbonate (David-Gest Antacid) 200 mg calcium (500 mg) chew   Yes   Sig: Take 2 Tabs by mouth every four (4) hours as needed for Other (Indigestion). carBAMazepine XR (TEGRETOL XR) 400 mg SR tablet   Yes   Sig: Take 400 mg by mouth two (2) times a day. Indications: TONIC-CLONIC EPILEPSY   carboxymethylcellulose sodium (REFRESH LIQUIGEL) 1 % dlgl ophthalmic solution   Yes   Sig: Administer 1 Drop to both eyes three (3) times daily as needed for Other (Dry eye). carboxymethylcellulose sodium (Refresh Tears) 0.5 % drop ophthalmic solution   Yes   Sig: Administer 2 Drops to both eyes daily. fluticasone-umeclidinium-vilanterol (Trelegy Ellipta) 100-62.5-25 mcg inhaler   Yes   Sig: Take 1 Puff by inhalation daily. hydrocortisone (CORTAID) 1 % topical cream   Yes   Sig: Apply  to affected area three (3) times daily as needed for Itching. use thin layer   lamoTRIgine (LaMICtal) 100 mg tablet   Yes   Sig: Take 100 mg by mouth two (2) times a day. levothyroxine (SYNTHROID) 100 mcg tablet   Yes   Sig: Take 100 mcg by mouth Daily (before breakfast). Indications: hypothyroidism   loperamide (IMMODIUM) 2 mg tablet   Yes   Sig: Take 2 mg by mouth three (3) times daily as needed for Diarrhea.   montelukast (Singulair) 10 mg tablet   Yes   Sig: Take 10 mg by mouth daily.    sodium chloride (Ayr Saline) 0.65 % nasal squeeze bottle   Yes   Si Sprays by Both Nostrils route two (2) times a day. Facility-Administered Medications: None     Please contact the main inpatient pharmacy with any questions or concerns at [phone] and we will direct you to the clinical pharmacist covering this patient's care while in-house.    JILL Ly

## 2021-04-14 NOTE — PROGRESS NOTES
TRANSFER - IN REPORT:    Verbal report received from Aster(name) on Candace Zarate  being received from [facility] ED(unit) for routine progression of care      Report consisted of patients Situation, Background, Assessment and   Recommendations(SBAR). Information from the following report(s) SBAR, Kardex, ED Summary, Procedure Summary, Intake/Output, MAR, Cardiac Rhythm NSR, Quality Measures and Dual Neuro Assessment was reviewed with the receiving nurse. Opportunity for questions and clarification was provided. Assessment completed upon patients arrival to unit and care assumed.

## 2021-04-14 NOTE — PROGRESS NOTES
Spiritual Care Assessment/Progress Note  [facility]      NAME: Bel Phipps      MRN: [id number]  AGE: 82 y.o.  SEX: female  Religion Affiliation: Synagogue   Language: English     4/14/2021     Total Time (in minutes): 5     Spiritual Assessment begun in [address] through conversation with:         [x]Patient        [] Family    [] Friend(s)        Reason for Consult: [facility]     Spiritual beliefs: (Please include comment if needed)     [x] Identifies with a lu tradition:   Synagogue      [] Supported by a lu community:            [] Claims no spiritual orientation:           [] Seeking spiritual identity:                [] Adheres to an individual form of spirituality:           [] Not able to assess:                           Identified resources for coping:      [x] Prayer                               [x] Music                  [] Guided Imagery     [x] Family/friends                 [] Pet visits     [] Devotional reading                         [] Unknown     [] Other:                                              Interventions offered during this visit: (See comments for more details)    Patient Interventions: Affirmation of lu, Communion (Synagogue), Initial/Spiritual assessment, patient floor, Prayer (actual), Prayer (assurance of)     Family/Friend(s): Prayer (actual)     Plan of Care:     [x] Support spiritual and/or cultural needs    [] Support AMD and/or advance care planning process      [] Support grieving process   [] Coordinate Rites and/or Rituals    [] Coordination with community clergy   [] No spiritual needs identified at this time   [] Detailed Plan of Care below (See Comments)  [] Make referral to Music Therapy  [] Make referral to Pet Therapy     [] Make referral to Addiction services  [] Make referral to [facility]  [] Make referral to Spiritual Care Partner  [] No future visits requested        [] Follow up upon further referrals     Comments: Mrs. Naty Jimenez was in bed, alert and watching TV. Prayer and communion offered. When asked if she lived alone her response was no, [place]. No family member was present at the time of the visit.     Chrystine Pallas, SBS, RN, ACSW, LCSW   Page:  [phone]

## 2021-04-14 NOTE — PROGRESS NOTES
Hospitalist Progress Note      Hospital summary: Renan Medina is a 82 y.o. female w/ hx of COPD, Seizure disorder and hypothyroidism who is transported to hospital via ems form long term care / memory facility due to a witnessed seizure. Per chart review, pt wa noted to have a seizure episode today and reportedly had her seizure medications changed recently. Additionally, pt was noted to have two more episodes while in ED. Pt was seen and examined at bedside. Has no acute complaints or concerns.  4/13/2021      Assessment/Plan:  Seizure Disorder w/o Status Epilepticus  - CT head did not demonstrate acute intracranial pathology  -Seizure and fall precautions  - appreciate neurology input  - CBM levels are low therapeutic this admission  - Increase CBM to 400mg AM and 600mg PM  - c/w Lamictal 100mg BID     Acute Cystitis  -c/w Rocephin 1g daily  -Urine culture - prelim - GNRs     Hypothyroidism  -TSH on 4/5 normal  -Continue synthroid     COPD  -Not in acute exarcebation  -Home trelegy and prn duonebs    Dementia  - supportive care    Code status:Full  DVT prophylaxis: heparin   Disposition: TBD. Came from memory care unit at [address]. SNF tomorrow if no seizures   ----------------------------------------------    CC: Seizures     S: Patient is seen and examined at bedside this AM. She is alert and oriented x 1-2. Denied any complaints. Discussed with nursing   Spoke with daughters on phone and updated patient's status - explained CBM levels are low therapeutic, neurology recommended increase dose pm. abx for urine infection. Possible dc tomorrow     Review of Systems:  Review of systems not obtained due to patient factors.     O:  Visit Vitals  /72 (BP 1 Location: Right upper arm, BP Patient Position: At rest)   Pulse 72   Temp 99 °F (37.2 °C)   Resp 24   Ht 5' 2\" (1.575 m)   Wt 59.5 kg (131 lb 1.6 oz)   SpO2 92%   BMI 23.98 kg/m²       PHYSICAL EXAM:  Gen: NAD, elderly   HEENT: anicteric sclerae, normal conjunctiva, oropharynx clear, MM moist  Neck: supple, trachea midline, no adenopathy  Heart: RRR, no MRG, no JVD, no peripheral edema  Lungs: CTA b/l, non-labored respirations  Abd: soft, NT, ND, BS+, no organomegaly  Extr: warm  Skin: dry, no rash  Neuro: Dementia, normal speech, moves all extremities        Intake/Output Summary (Last 24 hours) at 4/14/2021 1559  Last data filed at 4/14/2021 1340  Gross per 24 hour   Intake 31.67 ml   Output 450 ml   Net -418.33 ml        Recent labs & imaging reviewed:  Recent Results (from the past 24 hour(s))   EKG, 12 LEAD, INITIAL    Collection Time: 04/13/21  4:51 PM   Result Value Ref Range    Ventricular Rate 77 BPM    Atrial Rate 77 BPM    P-R Interval 210 ms    QRS Duration 100 ms    Q-T Interval 388 ms    QTC Calculation (Bezet) 439 ms    Calculated P Axis 65 degrees    Calculated R Axis 52 degrees    Calculated T Axis 48 degrees    Diagnosis       Sinus rhythm with 1st degree AV block  Otherwise normal ECG  When compared with ECG of 04-APR-2021 17:06,  No significant change was found  Confirmed by Cortez Acosta M.D., Paige Minh ([id number]) on 4/13/2021 5:01:06 PM     CBC WITH AUTOMATED DIFF    Collection Time: 04/13/21  5:08 PM   Result Value Ref Range    WBC 9.2 3.6 - 11.0 K/uL    RBC 3.67 (L) 3.80 - 5.20 M/uL    HGB 12.0 11.5 - 16.0 g/dL    HCT 38.8 35.0 - 47.0 %    .7 (H) 80.0 - 99.0 FL    MCH 32.7 26.0 - 34.0 PG    MCHC 30.9 30.0 - 36.5 g/dL    RDW 13.2 11.5 - 14.5 %    PLATELET 966 381 - 799 K/uL    MPV 10.4 8.9 - 12.9 FL    NRBC 0.0 0  WBC    ABSOLUTE NRBC 0.00 0.00 - 0.01 K/uL    NEUTROPHILS 82 (H) 32 - 75 %    LYMPHOCYTES 10 (L) 12 - 49 %    MONOCYTES 7 5 - 13 %    EOSINOPHILS 1 0 - 7 %    BASOPHILS 0 0 - 1 %    IMMATURE GRANULOCYTES 0 0.0 - 0.5 %    ABS. NEUTROPHILS 7.4 1.8 - 8.0 K/UL    ABS. LYMPHOCYTES 0.9 0.8 - 3.5 K/UL    ABS. MONOCYTES 0.7 0.0 - 1.0 K/UL    ABS. EOSINOPHILS 0.1 0.0 - 0.4 K/UL    ABS.  BASOPHILS 0.0 0.0 - 0.1 K/UL    ABS. IMM. GRANS. 0.0 0.00 - 0.04 K/UL    DF AUTOMATED     METABOLIC PANEL, COMPREHENSIVE    Collection Time: 04/13/21  5:08 PM   Result Value Ref Range    Sodium 139 136 - 145 mmol/L    Potassium 4.5 3.5 - 5.1 mmol/L    Chloride 102 97 - 108 mmol/L    CO2 27 21 - 32 mmol/L    Anion gap 10 5 - 15 mmol/L    Glucose 98 65 - 100 mg/dL    BUN 22 (H) 6 - 20 MG/DL    Creatinine 0.89 0.55 - 1.02 MG/DL    BUN/Creatinine ratio 25 (H) 12 - 20      GFR est AA >60 >60 ml/min/1.73m2    GFR est non-AA >60 >60 ml/min/1.73m2    Calcium 8.8 8.5 - 10.1 MG/DL    Bilirubin, total 0.3 0.2 - 1.0 MG/DL    ALT (SGPT) 16 12 - 78 U/L    AST (SGOT) 20 15 - 37 U/L    Alk. phosphatase 137 (H) 45 - 117 U/L    Protein, total 6.6 6.4 - 8.2 g/dL    Albumin 2.9 (L) 3.5 - 5.0 g/dL    Globulin 3.7 2.0 - 4.0 g/dL    A-G Ratio 0.8 (L) 1.1 - 2.2     MAGNESIUM    Collection Time: 04/13/21  5:08 PM   Result Value Ref Range    Magnesium 2.4 1.6 - 2.4 mg/dL   CK W/ REFLX CKMB    Collection Time: 04/13/21  5:08 PM   Result Value Ref Range    CK 46 26 - 192 U/L   URINALYSIS W/MICROSCOPIC    Collection Time: 04/13/21  5:08 PM   Result Value Ref Range    Color YELLOW/STRAW      Appearance CLEAR CLEAR      Specific gravity 1.015 1.003 - 1.030      pH (UA) 6.0 5.0 - 8.0      Protein Negative NEG mg/dL    Glucose Negative NEG mg/dL    Ketone Negative NEG mg/dL    Bilirubin Negative NEG      Blood Negative NEG      Urobilinogen 0.2 0.2 - 1.0 EU/dL    Nitrites Positive (A) NEG      Leukocyte Esterase TRACE (A) NEG      WBC 10-20 0 - 4 /hpf    RBC 0-5 0 - 5 /hpf    Epithelial cells FEW FEW /lpf    Bacteria 2+ (A) NEG /hpf   URINE CULTURE HOLD SAMPLE    Collection Time: 04/13/21  5:08 PM    Specimen: Serum; Urine   Result Value Ref Range    Urine culture hold        Urine on hold in Microbiology dept for 2 days. If unpreserved urine is submitted, it cannot be used for addtional testing after 24 hours, recollection will be required.    CULTURE, URINE    Collection Time: 04/13/21  5:08 PM    Specimen: Clean catch; Urine   Result Value Ref Range    Special Requests: NO SPECIAL REQUESTS      [place] Count >100,000  COLONIES/mL        Culture result: GRAM NEGATIVE RODS (A)     METABOLIC PANEL, COMPREHENSIVE    Collection Time: 04/14/21  3:37 AM   Result Value Ref Range    Sodium 140 136 - 145 mmol/L    Potassium 4.9 3.5 - 5.1 mmol/L    Chloride 110 (H) 97 - 108 mmol/L    CO2 25 21 - 32 mmol/L    Anion gap 5 5 - 15 mmol/L    Glucose 84 65 - 100 mg/dL    BUN 13 6 - 20 MG/DL    Creatinine 0.60 0.55 - 1.02 MG/DL    BUN/Creatinine ratio 22 (H) 12 - 20      GFR est AA >60 >60 ml/min/1.73m2    GFR est non-AA >60 >60 ml/min/1.73m2    Calcium 8.7 8.5 - 10.1 MG/DL    Bilirubin, total 0.4 0.2 - 1.0 MG/DL    ALT (SGPT) 14 12 - 78 U/L    AST (SGOT) 18 15 - 37 U/L    Alk. phosphatase 141 (H) 45 - 117 U/L    Protein, total 6.6 6.4 - 8.2 g/dL    Albumin 2.7 (L) 3.5 - 5.0 g/dL    Globulin 3.9 2.0 - 4.0 g/dL    A-G Ratio 0.7 (L) 1.1 - 2.2     CBC WITH AUTOMATED DIFF    Collection Time: 04/14/21  3:37 AM   Result Value Ref Range    WBC 7.0 3.6 - 11.0 K/uL    RBC 3.52 (L) 3.80 - 5.20 M/uL    HGB 11.6 11.5 - 16.0 g/dL    HCT 36.7 35.0 - 47.0 %    .3 (H) 80.0 - 99.0 FL    MCH 33.0 26.0 - 34.0 PG    MCHC 31.6 30.0 - 36.5 g/dL    RDW 13.0 11.5 - 14.5 %    PLATELET 549 732 - 661 K/uL    MPV 9.7 8.9 - 12.9 FL    NRBC 0.0 0  WBC    ABSOLUTE NRBC 0.00 0.00 - 0.01 K/uL    NEUTROPHILS 71 32 - 75 %    LYMPHOCYTES 17 12 - 49 %    MONOCYTES 8 5 - 13 %    EOSINOPHILS 3 0 - 7 %    BASOPHILS 1 0 - 1 %    IMMATURE GRANULOCYTES 0 0.0 - 0.5 %    ABS. NEUTROPHILS 5.0 1.8 - 8.0 K/UL    ABS. LYMPHOCYTES 1.2 0.8 - 3.5 K/UL    ABS. MONOCYTES 0.5 0.0 - 1.0 K/UL    ABS. EOSINOPHILS 0.2 0.0 - 0.4 K/UL    ABS. BASOPHILS 0.1 0.0 - 0.1 K/UL    ABS. IMM.  GRANS. 0.0 0.00 - 0.04 K/UL    DF AUTOMATED     CARBAMAZEPINE    Collection Time: 04/14/21  3:37 AM   Result Value Ref Range    Carbamazepine 6.5 4.0 - 12.0 ug/mL     Recent Labs     04/14/21  0337 04/13/21  1708   WBC 7.0 9.2   HGB 11.6 12.0   HCT 36.7 38.8    220     Recent Labs     04/14/21  0337 04/13/21  1708    139   K 4.9 4.5   * 102   CO2 25 27   BUN 13 22*   CREA 0.60 0.89   GLU 84 98   CA 8.7 8.8   MG  --  2.4     Recent Labs     04/14/21 0337 04/13/21  1708   ALT 14 16   * 137*   TBILI 0.4 0.3   TP 6.6 6.6   ALB 2.7* 2.9*   GLOB 3.9 3.7     No results for input(s): INR, PTP, APTT, INREXT in the last 72 hours. No results for input(s): FE, TIBC, PSAT, FERR in the last 72 hours. Lab Results   Component Value Date/Time    Folate 17.4 04/05/2021 12:30 AM      No results for input(s): PH, PCO2, PO2 in the last 72 hours. No results for input(s): CPK, CKNDX, TROIQ in the last 72 hours.     No lab exists for component: CPKMB  Lab Results   Component Value Date/Time    Cholesterol, total 234 (H) 10/26/2019 03:58 AM    HDL Cholesterol 101 10/26/2019 03:58 AM    LDL, calculated 127.4 (H) 10/26/2019 03:58 AM    Triglyceride 28 10/26/2019 03:58 AM    CHOL/HDL Ratio 2.3 10/26/2019 03:58 AM     Lab Results   Component Value Date/Time    Glucose (POC) 117 (H) 03/10/2017 07:36 PM     Lab Results   Component Value Date/Time    Color YELLOW/STRAW 04/13/2021 05:08 PM    Appearance CLEAR 04/13/2021 05:08 PM    Specific gravity 1.015 04/13/2021 05:08 PM    Specific gravity 1.011 04/04/2021 04:28 PM    pH (UA) 6.0 04/13/2021 05:08 PM    Protein Negative 04/13/2021 05:08 PM    Glucose Negative 04/13/2021 05:08 PM    Ketone Negative 04/13/2021 05:08 PM    Bilirubin Negative 04/13/2021 05:08 PM    Urobilinogen 0.2 04/13/2021 05:08 PM    Nitrites Positive (A) 04/13/2021 05:08 PM    Leukocyte Esterase TRACE (A) 04/13/2021 05:08 PM    Epithelial cells FEW 04/13/2021 05:08 PM    Bacteria 2+ (A) 04/13/2021 05:08 PM    WBC 10-20 04/13/2021 05:08 PM    RBC 0-5 04/13/2021 05:08 PM       Med list reviewed  Current Facility-Administered Medications Medication Dose Route Frequency    albuterol-ipratropium (DUO-NEB) 2.5 MG-0.5 MG/3 ML  3 mL Nebulization Q4H PRN    levothyroxine (SYNTHROID) tablet 100 mcg  100 mcg Oral ACB    sodium chloride (NS) flush 5-40 mL  5-40 mL IntraVENous Q8H    sodium chloride (NS) flush 5-40 mL  5-40 mL IntraVENous PRN    0.9% sodium chloride infusion  100 mL/hr IntraVENous CONTINUOUS    LORazepam (ATIVAN) injection 2 mg  2 mg IntraVENous Q5MIN PRN    heparin (porcine) injection 5,000 Units  5,000 Units SubCUTAneous Q8H    cefTRIAXone (ROCEPHIN) 1 g in 0.9% sodium chloride (MBP/ADV) 50 mL MBP  1 g IntraVENous Q24H    budesonide (PULMICORT) 500 mcg/2 ml nebulizer suspension  500 mcg Nebulization BID RT    ipratropium (ATROVENT) 0.02 % nebulizer solution 0.5 mg  0.5 mg Nebulization Q6H RT    arformoteroL (BROVANA) neb solution 15 mcg  15 mcg Nebulization BID RT    lamoTRIgine (LaMICtal) tablet 100 mg  100 mg Oral BID    [START ON 4/15/2021] carBAMazepine XR (TEGretol XR) tablet 400 mg  400 mg Oral DAILY    carBAMazepine XR (TEGretol XR) tablet 600 mg  600 mg Oral QHS       Care Plan discussed with:  Patient/Family, Nurse and     Margot Rader MD  Internal Medicine  Date of Service: 4/14/2021

## 2021-04-14 NOTE — PROGRESS NOTES
Problem: Falls - Risk of  Goal: *Absence of Falls  Description: Document Saundra Colon Fall Risk and appropriate interventions in the flowsheet. Outcome: Progressing Towards Goal  Note: Fall Risk Interventions:  Mobility Interventions: Bed/chair exit alarm    Mentation Interventions: Adequate sleep, hydration, pain control, Reorient patient    Medication Interventions: Bed/chair exit alarm    Elimination Interventions: Bed/chair exit alarm, Call light in reach              Problem: Pressure Injury - Risk of  Goal: *Prevention of pressure injury  Description: Document George Scale and appropriate interventions in the flowsheet. Outcome: Progressing Towards Goal  Note: Pressure Injury Interventions:  Sensory Interventions: Turn and reposition approx.  every two hours (pillows and wedges if needed)    Moisture Interventions: Absorbent underpads    Activity Interventions: PT/OT evaluation    Mobility Interventions: PT/OT evaluation    Nutrition Interventions: Document food/fluid/supplement intake    Friction and Shear Interventions: HOB 30 degrees or less                Problem: Seizure Disorder (Adult)  Goal: *STG: Remains free of seizure activity  Outcome: Progressing Towards Goal  Goal: *STG: Remains free of injury during seizure activity  Outcome: Progressing Towards Goal  Goal: *STG: Remains safe in hospital  Outcome: Progressing Towards Goal

## 2021-04-14 NOTE — PROGRESS NOTES
Problem: Self Care Deficits Care Plan (Adult)  Goal: *Acute Goals and Plan of Care (Insert Text)  Description:   FUNCTIONAL STATUS PRIOR TO ADMISSION: Patient was modified independent-SPV using a rollator for functional mobility, required assist for bathing and IADLs from [facility] staff, but was completing basic ADLs herself. Daughter reports new frequent UTIs, with decreased functional independence & safety, multiple recent falls. HOME SUPPORT: Lives at [facility]    Occupational Therapy Goals  Initiated 4/14/2021  1. Patient will perform grooming at the sink with minimal assistance within 7 day(s). 2.  Patient will perform bathing with moderate assistance within 7 day(s). 3.  Patient will perform lower body dressing with moderate assistance within 7 day(s). 4.  Patient will perform toilet transfers with minimal assistance within 7 day(s). 5.  Patient will perform all aspects of toileting with maximal assistance within 7 day(s). 6.  Patient will participate in upper extremity therapeutic exercise/activities with minimal assistance for 5 minutes within 7 day(s). 7.  Patient will utilize energy conservation techniques during functional activities with verbal and visual cues within 7 day(s). Outcome: Not Met     OCCUPATIONAL THERAPY EVALUATION  Patient: Allan Connell [de-identified]82 y.o. female)  Date: 4/14/2021  Primary Diagnosis: Seizure ([facility] [address] [R56.9]        Precautions: Fall, Skin, Seizure    ASSESSMENT  Based on the objective data described below, the patient presents with decreased balance, activity tolerance, memory, safety awareness, lower body access, coordination, strength, and ROM as well as incontinence and general debility s/p admission for seizures and UTI.  PTA, patient living at [facility], daughter reporting she is usually SPV for most basic ADLs and walks with a rollator, staff assist for bathing; however functional decline over the past couple of weeks with multiple falls and UTIs. She now presents well below her baseline, requiring Min-Mod A for bathroom mobility with RW support. Max-Total A for lower body ADLs with mod-max cues for coordination and sequencing. Considering her functional decline and high fall risk, recommend d/c to SNF/healthcare unit to maximize safety & functional independence. Current Level of Function Impacting Discharge (ADLs/self-care): setup-Mod A for upper body ADLs, Total A for lower body ADLs, Min-Mod A for mobility with RW    Functional Outcome Measure: The patient scored Total: 25/100 on the Barthel Index outcome measure which is indicative of 75% impaired ability to care for basic self needs/dependency on others; inferred 100% dependency on others for instrumental ADLs. Other factors to consider for discharge: PMH, PLOF     Patient will benefit from skilled therapy intervention to address the above noted impairments. PLAN :  Recommendations and Planned Interventions: self care training, functional mobility training, therapeutic exercise, balance training, therapeutic activities, endurance activities, patient education, home safety training, and family training/education    Frequency/Duration: Patient will be followed by occupational therapy 3 times a week to address goals. Recommendation for discharge: (in order for the patient to meet his/her long term goals)  Therapy up to 5 days/week in SNF setting/healthcare unit    This discharge recommendation:  Has been made in collaboration with the attending provider and/or case management    IF patient discharges home will need the following DME: To be determined (TBD)         SUBJECTIVE:   Patient stated I want to go home.     OBJECTIVE DATA SUMMARY:   HISTORY:   Past Medical History:   Diagnosis Date    COPD [facility]ch[facility]) ([facility] [address]     Dementia ([facility] 3/29/2017    Hypothyroid     Seizure (HCC)     Seizure disorder [facility])      Past Surgical History:   Procedure Laterality Date    HX HIP REPLACEMENT         Expanded or extensive additional review of patient history:     Home Situation  Home Environment: [facility] memory care)  Living Alone: (staff assist for bathing, but not for ADLs/mobility)  Support Systems: Assisted living(staff)    Hand dominance: Right    EXAMINATION OF PERFORMANCE DEFICITS:  Cognitive/Behavioral Status:  Neurologic State: Alert;Confused(Alert X1 )  Orientation Level: Oriented to person  Cognition: Decreased attention/concentration; Follows commands; Impaired decision making;Poor safety awareness;Memory loss  Perception: Appears intact  Perseveration: No perseveration noted  Safety/Judgement: Decreased awareness of environment;Decreased awareness of need for assistance;Decreased insight into deficits; Decreased awareness of need for safety    Skin: Appears intact    Edema: None    Hearing: Auditory  Auditory Impairment: None    Vision/Perceptual:    Tracking: Able to track stimulus in all quadrants w/o difficulty                                Range of Motion:  AROM: Generally decreased, functional                         Strength:  Strength: Generally decreased, functional                Coordination:  Coordination: Generally decreased, functional  Fine Motor Skills-Upper: Left Intact; Right Intact    Gross Motor Skills-Upper: Left Intact; Right Intact    Tone & Sensation:  Tone: Normal                         Balance:  Sitting: Intact  Standing: Impaired; With support(RW)    Functional Mobility and Transfers for ADLs:  Bed Mobility:  Supine to Sit: Minimum assistance; Additional time  Sit to Supine: (in chair)  Scooting: Moderate assistance;Assist x1(seated scooting)    Transfers:  Sit to Stand: Moderate assistance  Stand to Sit: Minimum assistance  Bathroom Mobility: Minimum assistance  Toilet Transfer :  Moderate assistance  Assistive Device : Gait Belt;Walker, rolling    ADL Assessment:  Feeding: Setup    Oral Facial Hygiene/Grooming: Setup(seated for EC)    Bathing: Maximum assistance    Upper Body Dressing: Moderate assistance    Lower Body Dressing: Total assistance    Toileting: Total assistance                ADL Intervention and task modifications:       Grooming  Grooming Assistance: Set-up  Position Performed: Seated in chair  Washing Hands: Set-up  Cues: Verbal cues provided                   Lower Body Dressing Assistance  Dressing Assistance: Total assistance(dependent)  Protective Undergarmet: Total assistance (dependent)(attempted distal dressing, poor coordination, unable)  Socks: Total assistance (dependent)  Leg Crossed Method Used: No  Position Performed: Long sitting on bed;Seated in chair;Standing  Cues: Physical assistance; Tactile cues provided;Verbal cues provided;Visual cues provided    Toileting  Toileting Assistance: Total assistance(dependent)(bowel/bladder incontinence)  Bladder Hygiene: Total assistance (dependent)  Bowel Hygiene: Total assistance (dependent)  Clothing Management: Total assistance (dependent)  Cues: Tactile cues provided;Verbal cues provided;Visual cues provided    Cognitive Retraining  Safety/Judgement: Decreased awareness of environment;Decreased awareness of need for assistance;Decreased insight into deficits; Decreased awareness of need for safety      Functional Measure:  Barthel Index:    Bathin  Bladder: 0  Bowels: 0  Groomin  Dressin  Feedin  Mobility: 0  Stairs: 0  Toilet Use: 5  Transfer (Bed to Chair and Back): 10  Total: 25/100        The Barthel ADL Index: Guidelines  1. The index should be used as a record of what a patient does, not as a record of what a patient could do. 2. The main aim is to establish degree of independence from any help, physical or verbal, however minor and for whatever reason. 3. The need for supervision renders the patient not independent. 4. A patient's performance should be established using the best available evidence.  Asking the patient, friends/relatives and nurses are the usual sources, but direct observation and common sense are also important. However direct testing is not needed. 5. Usually the patient's performance over the preceding 24-48 hours is important, but occasionally longer periods will be relevant. 6. Middle categories imply that the patient supplies over 50 per cent of the effort. 7. Use of aids to be independent is allowed. See Michael., Barthel, D.W. ([id number]). Functional evaluation: the Barthel Index. [address] [employer] (14)2. Liu Pham caprice RUBEN Macias, Lalita Cardoso., Amber Mcconnell., [place], [address] [facility] (1999). Measuring the change indisability after inpatient rehabilitation; comparison of the responsiveness of the Barthel Index and Functional Hunt Measure. Journal of Neurology, Neurosurgery, and Psychiatry, 66(4), 924-530. Rose Lorenzo, N.J.A, ALEJANDRO English, & Flor Kim M.A. (2004.) Assessment of post-stroke quality of life in cost-effectiveness studies: The usefulness of the Barthel Index and the EuroQoL-5D.  Quality of Life Research, 15, 304-80         Occupational Therapy Evaluation Charge Determination   History Examination Decision-Making   LOW Complexity : Brief history review  MEDIUM Complexity : 3-5 performance deficits relating to physical, cognitive , or psychosocial skils that result in activity limitations and / or participation restrictions LOW Complexity : No comorbidities that affect functional and no verbal or physical assistance needed to complete eval tasks       Based on the above components, the patient evaluation is determined to be of the following complexity level: LOW   Pain Rating:  None    Activity Tolerance:   Fair    After treatment patient left in no apparent distress:    Sitting in chair, Call bell within reach, Bed / chair alarm activated, and Caregiver / family present    COMMUNICATION/EDUCATION:   The patients plan of care was discussed with: Physical therapist, Registered nurse, and Case management. Home safety education was provided and the patient/caregiver indicated understanding., Patient/family have participated as able in goal setting and plan of care. , and Patient/family agree to work toward stated goals and plan of care. This patients plan of care is appropriate for delegation to [facility].     Thank you for this referral.  JUANITO Diallo, OTR/L  Time Calculation: 27 mins

## 2021-04-14 NOTE — PROGRESS NOTES
Transition of Care Plan   RUR- 13 % Low Risks    DISPOSITION: LTC/Memory Care Unit at [address] [facility] when ready    F/U with PCP/Specialist     [facility] for [facility]     Reason for Admission: admitted from [facility] via EMS with S/P witnessed seizure while in bed. RUR Score:    13 % Low Risks                  Plan for utilizing home health:     Not anticipated as patient is LTC      PCP: First and Last name:  Isrrael Ma MD     Name of Practice: [facility] [employer] [facility] [facility]    Are you a current patient: Yes/No: YES    Approximate date of last visit: unknown    Can you participate in a virtual visit with your PCP: NO                    Current Advanced Directive/Advance Care Plan: Full Code; on file       Healthcare Decision Maker:   Click here to complete [address] including selection of the Healthcare Decision Maker Relationship (ie \"Primary\")           DaughterAnamaria Siegel [phone]                   Transition of Care Plan:                      Reviewed chart for transitions of care, and discussed in rounds. CM called and spoke with patient's daughter Chuck Banks [phone] to explain role and offer support. She confirmed that she would like her mom to return back to [address] [place] [address] upon medical stability. 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 Therefore, her sister Susana Siegel [phone] the . Baseline: ambulates with walker/rollator   ADLs/IDALS:  Previous Home Health:  Previous SNF:  ER Contact: DaughterAnamaria Siegel [phone]     Patient lives in [address] [facility] at [facility]. Nursing Staff assist with daily living. Patient uses a walker and rollator to ambulate. Patient is expected to need BLS transport at discharge. [facility] on [facility] for Friday. Care Management Interventions  PCP Verified by CM:  Yes  Palliative Care Criteria Met (RRAT>21 & CHF Dx)?: No  Mode of Transport at Discharge: S  Transition of Care Consult (CM Consult): Long Term Care(Memory Care Unit at [address] )  [address] [place]: Yes  Discharge Durable Medical Equipment: No  Health Maintenance Reviewed: Yes  Physical Therapy Consult: Yes  Occupational Therapy Consult: Yes  Speech Therapy Consult: Yes  Current Support Network: Nursing Facility(Memory Care Unit at [address] )  Confirm Follow Up Transport: Other (see comment)(S )  The Patient and/or Patient Representative was Provided with a Choice of Provider and Agrees with the Discharge Plan?: Yes  Freedom of Choice List was Provided with Basic Dialogue that Supports the Patient's Individualized Plan of Care/Goals, Treatment Preferences and Shares the Quality Data Associated with the Providers?: Yes  [place] Resource Information Provided?: No  Discharge Location  Discharge Placement: Long Term Care(Memory Care Unit at [address] )    BILLIE Corley

## 2021-04-14 NOTE — PROGRESS NOTES
Patient's daughter Elise [place] [facility]) would like the neurologist to call her once he has seen the patient.

## 2021-04-14 NOTE — PROGRESS NOTES
I prayed with Jonelle and her  and celebrated with Jonelle the Anointing of the Sick.   Father Joyce Bedolla

## 2021-04-14 NOTE — PROGRESS NOTES
Problem: Mobility Impaired (Adult and Pediatric)  Goal: *Acute Goals and Plan of Care (Insert Text)  Description: FUNCTIONAL STATUS PRIOR TO ADMISSION: Patient was modified independent using a rollator for functional mobility. Daughter states she has been having trouble ambulating since her last admission to [facility] on Easter Sunday and had numerous falls. HOME SUPPORT PRIOR TO ADMISSION: Patient lives at [facility] in [address]  Initiated 4/14/2021  1. Patient will move from supine to sit and sit to supine  in bed with supervision/set-up within 7 day(s). 2.  Patient will transfer from bed to chair and chair to bed with minimal assistance using the least restrictive device within 7 day(s). 3.  Patient will perform sit to stand with minimal assistance within 7 day(s). 4.  Patient will ambulate with contact guard assist for 50 feet with the least restrictive device within 7 day(s). Outcome: Progressing Towards Goal   PHYSICAL THERAPY EVALUATION  Patient: Radhika Alva [de-identified]82 y.o. female)  Date: 4/14/2021  Primary Diagnosis: Seizure ([facility] [address] [R56.9]        Precautions:   Fall, Skin, Seizure    ASSESSMENT  Based on the objective data described below, the patient presents with decreased functional mobility from baseline level of function. Patient limited currently confused, gait instability, impaired balance and decreased activity tolerance. Patient needing Ava for bed mobility and Ava for transfers. Amb approx 15 ft x 2 with RW and Ava-CGA. Has difficulty managing RW as she is used to rollator and is overall at high fall risk. Anticipate she will need SNF level rehab at [place] prior to return to [facility]. Other factors to consider for discharge: at risk for falls, below baseline, frequent falls     Patient will benefit from skilled therapy intervention to address the above noted impairments.        PLAN :  Recommendations and Planned Interventions: bed mobility training, transfer training, gait training, therapeutic exercises, neuromuscular re-education, patient and family training/education, and therapeutic activities      Frequency/Duration: Patient will be followed by physical therapy:  3 times a week to address goals. Recommendation for discharge: (in order for the patient to meet his/her long term goals)  Therapy up to 5 days/week in SNF setting        IF patient discharges home will need the following DME: patient owns DME required for discharge         SUBJECTIVE:   Patient stated I want to go home.     OBJECTIVE DATA SUMMARY:   HISTORY:    Past Medical History:   Diagnosis Date    COPD (chronic obstructive pulmonary disease) ([facility]     Dementia ([facility] 3/29/2017    Hypothyroid     Seizure ([facility]     Seizure disorder (HCC)      Past Surgical History:   Procedure Laterality Date    HX HIP REPLACEMENT         Personal factors and/or comorbidities impacting plan of care:     Home Situation  Home Environment: [facility] memory care)  Living Alone: (staff assist for bathing, but not for ADLs/mobility)  Support Systems: Assisted living(staff)    EXAMINATION/PRESENTATION/DECISION MAKING:   Critical Behavior:  Neurologic State: Alert, Confused(Alert X1 )  Orientation Level: Oriented to person  Cognition: Decreased attention/concentration, Follows commands, Impaired decision making, Poor safety awareness, Memory loss  Safety/Judgement: Decreased awareness of environment, Decreased awareness of need for assistance, Decreased insight into deficits, Decreased awareness of need for safety  Hearing:   Auditory  Auditory Impairment: None    Range Of Motion:  AROM: Generally decreased, functional                       Strength:    Strength: Generally decreased, functional                    Tone & Sensation:   Tone: Normal                              Coordination:  Coordination: Generally decreased, functional  Vision:   Tracking: Able to track stimulus in all quadrants w/o difficulty  Functional Mobility:  Bed Mobility:     Supine to Sit: Minimum assistance; Additional time  Sit to Supine: (in chair)  Scooting: Moderate assistance;Assist x1(seated scooting)  Transfers:  Sit to Stand: Moderate assistance  Stand to Sit: Minimum assistance                       Balance:   Sitting: Intact  Standing: Impaired; With support(RW)  Ambulation/Gait Training:  Distance (ft): 15 Feet (ft)(x 2)  Assistive Device: Gait belt;Walker, rolling  Ambulation - Level of Assistance: Minimal assistance;Assist x1     Gait Description (WDL): Exceptions to WDL  Gait Abnormalities: Decreased step clearance; Festinating gait; Shuffling gait        Base of Support: Widened     Speed/Jenn: Pace decreased (<100 feet/min); Shuffled; Slow  Step Length: Left shortened;Right shortened                Pain Rating:  No c/o pain    Activity Tolerance:   Fair and requires rest breaks    After treatment patient left in no apparent distress:   Sitting in chair, Call bell within reach, Bed / chair alarm activated, and Caregiver / family present    COMMUNICATION/EDUCATION:   The patients plan of care was discussed with: Physical therapist, Occupational therapist, and Registered nurse. Fall prevention education was provided and the patient/caregiver indicated understanding., Patient/family have participated as able in goal setting and plan of care. , and Patient/family agree to work toward stated goals and plan of care.     Thank you for this referral.  Jyothi Hendricks PT, DPT   Time Calculation: 24 mins

## 2021-04-14 NOTE — H&P
History & Physical    Primary Care Provider: Maribel Mancuso MD  Source of Information: Patient and chart review    History of Presenting Illness:   Wendy Reyes is a 82 y.o. female w/ hx of COPD, Seizure disorder and hypothyroidism who is transported to hospital via ems form long term care / memory facility due to a witnessed seizure. Per chart review, pt wa noted to have a seizure episode today and reportedly had her seizure medications changed recently. Additionally, pt was noted to have two more episodes while in ED. Pt was seen and examined at bedside. Has no acute complaints or concerns. Of note, pt was recently admitted at [facility] from 4/4 to 4/7 for metabolic encephalopathy. Seizure meds at that time were tegretol and lamictal with addition of Vimpat prior to discharge. The patient denies any fever, chills, chest pain, cough, congestion, recent illness, palpitations, headaches, vision changes, nausea or vomiting. Review of Systems:  A comprehensive review of systems was negative except for that written in the History of Present Illness. Past Medical History:   Diagnosis Date    COPD (chronic obstructive pulmonary disease) ([facility]     Dementia ([facility] 3/29/2017    Hypothyroid     Seizure ([facility]     Seizure disorder (HCC)       Past Surgical History:   Procedure Laterality Date    HX HIP REPLACEMENT       Prior to Admission medications    Medication Sig Start Date End Date Taking? Authorizing Provider   calcium carbonate (David-Gest Antacid) 200 mg calcium (500 mg) chew Take 1 Tab by mouth every four (4) hours as needed. Yes Shayne Ryan MD   cephALEXin (Keflex) 500 mg capsule Take 1 Cap by mouth four (4) times daily for 7 days. 4/13/21 4/20/21 Yes Alpesh Parr MD   lamoTRIgine (LaMICtal) 25 mg tablet Take 0.5 Tabs by mouth two (2) times a day for 30 days. Patient taking differently: Take 100 mg by mouth two (2) times a day.  4/7/21 5/7/21 Yes Lizzette Goins MD Melly   montelukast (Singulair) 10 mg tablet Take 10 mg by mouth daily. Yes Provider, Historical   carboxymethylcellulose sodium (Refresh Tears) 0.5 % drop ophthalmic solution Administer 2 Drops to both eyes daily. Yes Provider, Historical   sodium chloride (Ayr Saline) 0.65 % nasal squeeze bottle 2 Sprays by Both Nostrils route two (2) times a day. Yes Provider, Historical   hydrocortisone (CORTAID) 1 % topical cream Apply  to affected area three (3) times daily as needed for Itching. use thin layer   Yes Provider, Historical   loperamide (IMMODIUM) 2 mg tablet Take 2 mg by mouth three (3) times daily as needed for Diarrhea. Yes Provider, Historical   albuterol (ProAir HFA) 90 mcg/actuation inhaler Take 1 Puff by inhalation every four (4) hours as needed for Wheezing. Yes Provider, Historical   carBAMazepine XR (TEGRETOL XR) 400 mg SR tablet Take 400 mg by mouth two (2) times a day. Indications: TONIC-CLONIC EPILEPSY   Yes Provider, Historical   levothyroxine (SYNTHROID) 100 mcg tablet Take 100 mcg by mouth Daily (before breakfast). Indications: hypothyroidism   Yes Provider, Historical   acetaminophen (MAPAP) 500 mg cap Take 1 Cap by mouth daily. Indications: Arthritic Pain   Yes Provider, Historical   albuterol-ipratropium (DUO-NEB) 2.5 mg-0.5 mg/3 ml nebu 3 mL by Nebulization route three (3) times daily. Yes Provider, Historical   fluticasone-umeclidinium-vilanterol (Trelegy Ellipta) 100-62.5-25 mcg inhaler Take 1 Puff by inhalation daily.     Provider, Historical     Allergies   Allergen Reactions    Aricept [Donepezil] Unknown (comments)    Benadryl [Diphenhydramine Hcl] Unknown (comments)    Ciprofloxacin Unknown (comments)    Clindamycin Unknown (comments)     Listed on [facility]    Codeine Unknown (comments)    Macrobid [Nitrofurantoin Monohyd/M-Cryst] Unknown (comments)    Pcn [Penicillins] Other (comments)    Sulfa (Sulfonamide Antibiotics) Other (comments)      History reviewed. No pertinent family history. SOCIAL HISTORY:  Patient resides: [address] [place] [address]   Independently    Assisted Living    SNF    With family care       Smoking history:   None x   Former    Chronic      Alcohol history:   None x   Social    Chronic      Ambulates:   Independently x   w/cane    w/walker    w/wc    CODE STATUS:  DNR    Full x   Other      Objective:     Physical Exam:     Visit Vitals  BP (!) 150/73 (BP 1 Location: Left upper arm, BP Patient Position: At rest)   Pulse 65   Temp 98.3 °F (36.8 °C)   Resp 22   Ht 5' 2\" (1.575 m)   Wt 64 kg (141 lb 1.5 oz)   SpO2 96%   BMI 25.81 kg/m²      O2 Device: None (Room air)    General:  Alert, cooperative, no distress, appears stated age. Head:  Normocephalic, without obvious abnormality, atraumatic. Eyes:  Conjunctivae/corneas clear. PERRL, EOMs intact. Nose: Nares normal. Septum midline. Mucosa normal.        Neck: Supple, symmetrical, trachea midline, no carotid bruit and no JVD. Lungs:   Anterior lung sounds Clear to auscultation bilaterally. Chest wall:  No tenderness or deformity. Heart:  Regular rate and rhythm, S1, S2 normal   Abdomen:   Soft, non-tender. Bowel sounds normal. No masses,  No organomegaly. Extremities: Extremities normal, atraumatic, no cyanosis or edema. Pulses: 2+ and symmetric all extremities. Skin: Skin color, texture, turgor normal. No rashes or lesions   Neurologic: CNII-XII intact. Data Review:     Recent Days:  Recent Labs     04/13/21  1708   WBC 9.2   HGB 12.0   HCT 38.8        Recent Labs     04/13/21  1708      K 4.5      CO2 27   GLU 98   BUN 22*   CREA 0.89   CA 8.8   MG 2.4   ALB 2.9*   ALT 16     No results for input(s): PH, PCO2, PO2, HCO3, FIO2 in the last 72 hours.     24 Hour Results:  Recent Results (from the past 24 hour(s))   EKG, 12 LEAD, INITIAL    Collection Time: 04/13/21  4:51 PM   Result Value Ref Range    Ventricular Rate 77 BPM    Atrial Rate 77 BPM    P-R Interval 210 ms    QRS Duration 100 ms    Q-T Interval 388 ms    QTC Calculation (Bezet) 439 ms    Calculated P Axis 65 degrees    Calculated R Axis 52 degrees    Calculated T Axis 48 degrees    Diagnosis       Sinus rhythm with 1st degree AV block  Otherwise normal ECG  When compared with ECG of 04-APR-2021 17:06,  No significant change was found  Confirmed by Falguni Myers M.D., Gayathri White ([id number]) on 4/13/2021 5:01:06 PM     CBC WITH AUTOMATED DIFF    Collection Time: 04/13/21  5:08 PM   Result Value Ref Range    WBC 9.2 3.6 - 11.0 K/uL    RBC 3.67 (L) 3.80 - 5.20 M/uL    HGB 12.0 11.5 - 16.0 g/dL    HCT 38.8 35.0 - 47.0 %    .7 (H) 80.0 - 99.0 FL    MCH 32.7 26.0 - 34.0 PG    MCHC 30.9 30.0 - 36.5 g/dL    RDW 13.2 11.5 - 14.5 %    PLATELET 687 879 - 665 K/uL    MPV 10.4 8.9 - 12.9 FL    NRBC 0.0 0  WBC    ABSOLUTE NRBC 0.00 0.00 - 0.01 K/uL    NEUTROPHILS 82 (H) 32 - 75 %    LYMPHOCYTES 10 (L) 12 - 49 %    MONOCYTES 7 5 - 13 %    EOSINOPHILS 1 0 - 7 %    BASOPHILS 0 0 - 1 %    IMMATURE GRANULOCYTES 0 0.0 - 0.5 %    ABS. NEUTROPHILS 7.4 1.8 - 8.0 K/UL    ABS. LYMPHOCYTES 0.9 0.8 - 3.5 K/UL    ABS. MONOCYTES 0.7 0.0 - 1.0 K/UL    ABS. EOSINOPHILS 0.1 0.0 - 0.4 K/UL    ABS. BASOPHILS 0.0 0.0 - 0.1 K/UL    ABS. IMM. GRANS. 0.0 0.00 - 0.04 K/UL    DF AUTOMATED     METABOLIC PANEL, COMPREHENSIVE    Collection Time: 04/13/21  5:08 PM   Result Value Ref Range    Sodium 139 136 - 145 mmol/L    Potassium 4.5 3.5 - 5.1 mmol/L    Chloride 102 97 - 108 mmol/L    CO2 27 21 - 32 mmol/L    Anion gap 10 5 - 15 mmol/L    Glucose 98 65 - 100 mg/dL    BUN 22 (H) 6 - 20 MG/DL    Creatinine 0.89 0.55 - 1.02 MG/DL    BUN/Creatinine ratio 25 (H) 12 - 20      GFR est AA >60 >60 ml/min/1.73m2    GFR est non-AA >60 >60 ml/min/1.73m2    Calcium 8.8 8.5 - 10.1 MG/DL    Bilirubin, total 0.3 0.2 - 1.0 MG/DL    ALT (SGPT) 16 12 - 78 U/L    AST (SGOT) 20 15 - 37 U/L    Alk.  phosphatase 137 (H) 45 - 117 U/L    Protein, total 6.6 6.4 - 8.2 g/dL    Albumin 2.9 (L) 3.5 - 5.0 g/dL    Globulin 3.7 2.0 - 4.0 g/dL    A-G Ratio 0.8 (L) 1.1 - 2.2     MAGNESIUM    Collection Time: 04/13/21  5:08 PM   Result Value Ref Range    Magnesium 2.4 1.6 - 2.4 mg/dL   CK W/ REFLX CKMB    Collection Time: 04/13/21  5:08 PM   Result Value Ref Range    CK 46 26 - 192 U/L   URINALYSIS W/MICROSCOPIC    Collection Time: 04/13/21  5:08 PM   Result Value Ref Range    Color YELLOW/STRAW      Appearance CLEAR CLEAR      Specific gravity 1.015 1.003 - 1.030      pH (UA) 6.0 5.0 - 8.0      Protein Negative NEG mg/dL    Glucose Negative NEG mg/dL    Ketone Negative NEG mg/dL    Bilirubin Negative NEG      Blood Negative NEG      Urobilinogen 0.2 0.2 - 1.0 EU/dL    Nitrites Positive (A) NEG      Leukocyte Esterase TRACE (A) NEG      WBC 10-20 0 - 4 /hpf    RBC 0-5 0 - 5 /hpf    Epithelial cells FEW FEW /lpf    Bacteria 2+ (A) NEG /hpf   URINE CULTURE HOLD SAMPLE    Collection Time: 04/13/21  5:08 PM    Specimen: Serum; Urine   Result Value Ref Range    Urine culture hold        Urine on hold in Microbiology dept for 2 days. If unpreserved urine is submitted, it cannot be used for addtional testing after 24 hours, recollection will be required. Imaging:     Assessment:     Bel Phipps is a 82 y.o. female w/ hx of COPD, Seizure disorder and hypothyroidism who is admitted for recurrent seizures. Plan:       Seizure Disorder w/o Status Epilepticus  -Admit to and monitor or tele-neuro  -Seizure and fall precautions  -Resume Vimpat, Lamictal and Tegretol  -Check therapeutic levels of antiepileptics  -Monitotor and replete lytes  -Neuro consult. Acute Cystitis /Assymptomatic Bacteuria  -Rocephin 1g daily  -Urine culture    Hypothyroidism  -TSH on 4/5 normal  -Continue synthroid    COPD  -Not in acute exarcebation  -Home trelegy and prn duonebs        FEN/GI -  NS !  75 ml/hr  Activity - As tolerated  DVT prophylaxis - SCDs  GI prophylaxis -  NI  Disposition - TBD    CODE STATUS:  Full code       Signed By: Jaden Galindo MD     April 13, 2021

## 2021-04-14 NOTE — ED NOTES
TRANSFER - OUT REPORT:    Verbal report given to Leah Huston RN(name) on Candace Zarate  being transferred to NSTU(unit) for routine progression of care       Report consisted of patients Situation, Background, Assessment and   Recommendations(SBAR). Information from the following report(s) SBAR was reviewed with the receiving nurse. Lines:   Peripheral IV 04/13/21 Left Hand (Active)       Peripheral IV 04/13/21 Right Hand (Active)   Site Assessment Clean, dry, & intact 04/13/21 1718   Dressing Status Clean, dry, & intact 04/13/21 1718        Opportunity for questions and clarification was provided.       Patient transported with:   Monitor

## 2021-04-14 NOTE — PROGRESS NOTES
Physical Therapy Screening:    [facility] based on results obtained during the nursing admission assessment. The patients chart was reviewed and the patient is appropriate for a skilled therapy evaluation if there is a decline in functional mobility from baseline. Please order a consult for physical therapy if you are in agreement and would like an evaluation to be completed. Thank you. Novmarti Marco A, PT    4/7/21  FUNCTIONAL STATUS PRIOR TO ADMISSION: True baseline level is unclear as pt is a very poor historian. However, she reported to therapist that she was independent with ADLs and Mod I for mobility with rollator. Denies history of falls but did report a prior R hip fracture    HOME SUPPORT PRIOR TO ADMISSION: The patient lived at [facility].      Home Situation  Home Environment: [address] [place] [address] Name: [address] [place] [address] Rd: Skilled nursing facility  Patient Expects to be Discharged to[de-identified] Skilled nursing facility  Current DME Used/Available at Home: antoni Byrd

## 2021-04-15 LAB
ANION GAP SERPL CALC-SCNC: 5 MMOL/L (ref 5–15)
BACTERIA SPEC CULT: ABNORMAL
BASOPHILS # BLD: 0.1 K/UL (ref 0–0.1)
BASOPHILS NFR BLD: 1 % (ref 0–1)
BUN SERPL-MCNC: 12 MG/DL (ref 6–20)
BUN/CREAT SERPL: 18 (ref 12–20)
CALCIUM SERPL-MCNC: 8.9 MG/DL (ref 8.5–10.1)
CC UR VC: ABNORMAL
CHLORIDE SERPL-SCNC: 112 MMOL/L (ref 97–108)
CO2 SERPL-SCNC: 25 MMOL/L (ref 21–32)
CREAT SERPL-MCNC: 0.68 MG/DL (ref 0.55–1.02)
DIFFERENTIAL METHOD BLD: ABNORMAL
EOSINOPHIL # BLD: 0.2 K/UL (ref 0–0.4)
EOSINOPHIL NFR BLD: 3 % (ref 0–7)
ERYTHROCYTE [DISTWIDTH] IN BLOOD BY AUTOMATED COUNT: 13.1 % (ref 11.5–14.5)
GLUCOSE SERPL-MCNC: 91 MG/DL (ref 65–100)
HCT VFR BLD AUTO: 35.4 % (ref 35–47)
HGB BLD-MCNC: 11 G/DL (ref 11.5–16)
IMM GRANULOCYTES # BLD AUTO: 0 K/UL
IMM GRANULOCYTES NFR BLD AUTO: 0 %
LAMOTRIGINE SERPL-MCNC: 1.5 UG/ML (ref 2–20)
LYMPHOCYTES # BLD: 1.6 K/UL (ref 0.8–3.5)
LYMPHOCYTES NFR BLD: 21 % (ref 12–49)
MCH RBC QN AUTO: 32.5 PG (ref 26–34)
MCHC RBC AUTO-ENTMCNC: 31.1 G/DL (ref 30–36.5)
MCV RBC AUTO: 104.7 FL (ref 80–99)
MONOCYTES # BLD: 0.5 K/UL (ref 0–1)
MONOCYTES NFR BLD: 7 % (ref 5–13)
NEUTS SEG # BLD: 5.2 K/UL (ref 1.8–8)
NEUTS SEG NFR BLD: 68 % (ref 32–75)
NRBC # BLD: 0 K/UL (ref 0–0.01)
NRBC BLD-RTO: 0 PER 100 WBC
PLATELET # BLD AUTO: 218 K/UL (ref 150–400)
PMV BLD AUTO: 9.7 FL (ref 8.9–12.9)
POTASSIUM SERPL-SCNC: 4 MMOL/L (ref 3.5–5.1)
RBC # BLD AUTO: 3.38 M/UL (ref 3.8–5.2)
RBC MORPH BLD: ABNORMAL
SERVICE CMNT-IMP: ABNORMAL
SODIUM SERPL-SCNC: 142 MMOL/L (ref 136–145)
WBC # BLD AUTO: 7.6 K/UL (ref 3.6–11)

## 2021-04-15 PROCEDURE — 74011250637 HC RX REV CODE- 250/637: Performed by: NURSE PRACTITIONER

## 2021-04-15 PROCEDURE — 94762 N-INVAS EAR/PLS OXIMTRY CONT: CPT

## 2021-04-15 PROCEDURE — 80048 BASIC METABOLIC PNL TOTAL CA: CPT

## 2021-04-15 PROCEDURE — 94640 AIRWAY INHALATION TREATMENT: CPT

## 2021-04-15 PROCEDURE — 85025 COMPLETE CBC W/AUTO DIFF WBC: CPT

## 2021-04-15 PROCEDURE — 74011000250 HC RX REV CODE- 250: Performed by: STUDENT IN AN ORGANIZED HEALTH CARE EDUCATION/TRAINING PROGRAM

## 2021-04-15 PROCEDURE — 74011250636 HC RX REV CODE- 250/636: Performed by: STUDENT IN AN ORGANIZED HEALTH CARE EDUCATION/TRAINING PROGRAM

## 2021-04-15 PROCEDURE — 74011000258 HC RX REV CODE- 258: Performed by: STUDENT IN AN ORGANIZED HEALTH CARE EDUCATION/TRAINING PROGRAM

## 2021-04-15 PROCEDURE — 74011250637 HC RX REV CODE- 250/637: Performed by: STUDENT IN AN ORGANIZED HEALTH CARE EDUCATION/TRAINING PROGRAM

## 2021-04-15 PROCEDURE — 36415 COLL VENOUS BLD VENIPUNCTURE: CPT

## 2021-04-15 PROCEDURE — 77010033678 HC OXYGEN DAILY

## 2021-04-15 PROCEDURE — 74011250637 HC RX REV CODE- 250/637: Performed by: PSYCHIATRY & NEUROLOGY

## 2021-04-15 PROCEDURE — 74011250637 HC RX REV CODE- 250/637: Performed by: INTERNAL MEDICINE

## 2021-04-15 PROCEDURE — 65660000000 HC RM CCU STEPDOWN

## 2021-04-15 RX ORDER — ACETAMINOPHEN 325 MG/1
650 TABLET ORAL
Status: DISCONTINUED | OUTPATIENT
Start: 2021-04-15 | End: 2021-04-16 | Stop reason: HOSPADM

## 2021-04-15 RX ADMIN — LAMOTRIGINE 100 MG: 100 TABLET ORAL at 17:00

## 2021-04-15 RX ADMIN — LEVOTHYROXINE SODIUM 100 MCG: 0.1 TABLET ORAL at 06:57

## 2021-04-15 RX ADMIN — Medication 10 ML: at 22:23

## 2021-04-15 RX ADMIN — Medication 10 ML: at 05:58

## 2021-04-15 RX ADMIN — IPRATROPIUM BROMIDE 0.5 MG: 0.5 SOLUTION RESPIRATORY (INHALATION) at 20:08

## 2021-04-15 RX ADMIN — IPRATROPIUM BROMIDE 0.5 MG: 0.5 SOLUTION RESPIRATORY (INHALATION) at 15:33

## 2021-04-15 RX ADMIN — HEPARIN SODIUM 5000 UNITS: 5000 INJECTION INTRAVENOUS; SUBCUTANEOUS at 20:53

## 2021-04-15 RX ADMIN — ACETAMINOPHEN 650 MG: 325 TABLET, FILM COATED ORAL at 17:03

## 2021-04-15 RX ADMIN — ARFORMOTEROL TARTRATE 15 MCG: 15 SOLUTION RESPIRATORY (INHALATION) at 20:08

## 2021-04-15 RX ADMIN — Medication 10 ML: at 17:00

## 2021-04-15 RX ADMIN — CARBAMAZEPINE 400 MG: 200 TABLET, EXTENDED RELEASE ORAL at 13:27

## 2021-04-15 RX ADMIN — HEPARIN SODIUM 5000 UNITS: 5000 INJECTION INTRAVENOUS; SUBCUTANEOUS at 13:27

## 2021-04-15 RX ADMIN — CEFTRIAXONE SODIUM 1 G: 1 INJECTION, POWDER, FOR SOLUTION INTRAMUSCULAR; INTRAVENOUS at 17:59

## 2021-04-15 RX ADMIN — BUDESONIDE 500 MCG: 0.5 INHALANT RESPIRATORY (INHALATION) at 07:28

## 2021-04-15 RX ADMIN — BUDESONIDE 500 MCG: 0.5 INHALANT RESPIRATORY (INHALATION) at 20:08

## 2021-04-15 RX ADMIN — CARBAMAZEPINE 600 MG: 200 TABLET, EXTENDED RELEASE ORAL at 20:52

## 2021-04-15 RX ADMIN — ACETAMINOPHEN 650 MG: 325 TABLET, FILM COATED ORAL at 08:45

## 2021-04-15 RX ADMIN — IPRATROPIUM BROMIDE 0.5 MG: 0.5 SOLUTION RESPIRATORY (INHALATION) at 07:28

## 2021-04-15 RX ADMIN — ARFORMOTEROL TARTRATE 15 MCG: 15 SOLUTION RESPIRATORY (INHALATION) at 07:28

## 2021-04-15 RX ADMIN — LAMOTRIGINE 100 MG: 100 TABLET ORAL at 10:14

## 2021-04-15 NOTE — PROGRESS NOTES
Transition of Care Plan   RUR- 15 % Low Risks    DISPOSITION: SNF- accepted by [address], pending auth, started 4/15/2021    F/U with PCP/Specialist     [facility] for tomorrow     Reviewed chart for transitions of care, and discussed in rounds. Discussed the case with Dr. Cecilio Danielle. Patient is stable awaiting insurance auth to go to [address] [place] [address] of [facility]. I have uploaded the therapy notes for auth on all scripts. CM has been on constant touch with patient's daughter/SARAH [employer] [phone] and has been updating her. Medicare pt has received, reviewed, and signed 1st IM letter informing them of their right to appeal the discharge. Signed copy has been placed on pt bedside chart.     BILLIE Rosales

## 2021-04-15 NOTE — PROGRESS NOTES
Rounded on Hinduism patients and provided Anointing of the Sick and Communion at request of patient.     Goldy Dos Santos

## 2021-04-15 NOTE — PROGRESS NOTES
Problem: Falls - Risk of  Goal: *Absence of Falls  Description: Document Janice Momin Fall Risk and appropriate interventions in the flowsheet. Outcome: Progressing Towards Goal  Note: Fall Risk Interventions:  Mobility Interventions: Bed/chair exit alarm    Mentation Interventions: Adequate sleep, hydration, pain control, Reorient patient    Medication Interventions: Bed/chair exit alarm    Elimination Interventions: Bed/chair exit alarm, Call light in reach              Problem: Patient Education: Go to Patient Education Activity  Goal: Patient/Family Education  Outcome: Progressing Towards Goal     Problem: Pressure Injury - Risk of  Goal: *Prevention of pressure injury  Description: Document George Scale and appropriate interventions in the flowsheet. Outcome: Progressing Towards Goal  Note: Pressure Injury Interventions:  Sensory Interventions: Turn and reposition approx.  every two hours (pillows and wedges if needed)    Moisture Interventions: Absorbent underpads    Activity Interventions: PT/OT evaluation    Mobility Interventions: PT/OT evaluation    Nutrition Interventions: Document food/fluid/supplement intake    Friction and Shear Interventions: HOB 30 degrees or less                Problem: Patient Education: Go to Patient Education Activity  Goal: Patient/Family Education  Outcome: Progressing Towards Goal     Problem: Seizure Disorder (Adult)  Goal: *STG: Remains free of seizure activity  Outcome: Progressing Towards Goal  Goal: *STG: Maintains lab values within therapeutic range  Outcome: Progressing Towards Goal  Goal: *STG/LTG: Complies with medication therapy  Outcome: Progressing Towards Goal  Goal: *STG: Remains free of injury during seizure activity  Outcome: Progressing Towards Goal  Goal: *STG: Remains safe in hospital  Outcome: Progressing Towards Goal  Goal: Interventions  Outcome: Progressing Towards Goal     Problem: Patient Education: Go to Patient Education Activity  Goal: Patient/Family Education  Outcome: Progressing Towards Goal     Problem: Discharge Planning  Goal: *Discharge to safe environment  Outcome: Progressing Towards Goal  Goal: *Knowledge of medication management  Outcome: Progressing Towards Goal  Goal: *Knowledge of discharge instructions  Outcome: Progressing Towards Goal     Problem: Patient Education: Go to Patient Education Activity  Goal: Patient/Family Education  Outcome: Progressing Towards Goal     Problem: Patient Education: Go to Patient Education Activity  Goal: Patient/Family Education  Outcome: Progressing Towards Goal     Problem: Patient Education: Go to Patient Education Activity  Goal: Patient/Family Education  Outcome: Progressing Towards Goal

## 2021-04-15 NOTE — PROGRESS NOTES
Hospitalist Progress Note      Hospital summary: Celeste Kauffman is a 82 y.o. female w/ hx of COPD, Seizure disorder and hypothyroidism who is transported to hospital via ems form long term care / memory facility due to a witnessed seizure. Per chart review, pt wa noted to have a seizure episode today and reportedly had her seizure medications changed recently. Additionally, pt was noted to have two more episodes while in ED. Pt was seen and examined at bedside. Has no acute complaints or concerns.  4/13/2021      Assessment/Plan:  Seizure Disorder w/o Status Epilepticus  - CT head did not demonstrate acute intracranial pathology  -Seizure and fall precautions  - appreciate neurology input  - CBM levels are low therapeutic this admission  - Increase CBM to 400mg AM and 600mg PM  - c/w Lamictal 100mg BID      Acute Cystitis  -c/w Rocephin 1g daily can change to po keflex on discharge to complete a 5 days course of tx   -Urine culture - Klebsiella pneumoniae     Hypothyroidism  -TSH on 4/5 normal  -Continue synthroid     COPD  -Not in acute exarcebation  -Home trelegy and prn duonebs    Dementia  - supportive care    Code status:Full  DVT prophylaxis: heparin   Disposition: TBD. Came from memory care unit at [address]. Will need SNF, d/w CM   ----------------------------------------------    CC: Seizures     S: Patient is seen and examined at bedside this AM. She is alert and oriented x 1-2. Denied any complaints. Discussed with nursing   Spoke with daughters on phone and updated patient's status - explained CBM levels are low therapeutic, neurology recommended increase dose pm. abx for urine infection. Awaiting SNF bed     Review of Systems:  Review of systems not obtained due to patient factors.     O:  Visit Vitals  /73 (BP 1 Location: Right arm, BP Patient Position: At rest)   Pulse 75   Temp 98.4 °F (36.9 °C)   Resp 18   Ht 5' 2\" (1.575 m)   Wt 63 kg (138 lb 12.8 oz) SpO2 91%   BMI 25.39 kg/m²       PHYSICAL EXAM:  Gen: NAD, elderly   HEENT: anicteric sclerae, normal conjunctiva, oropharynx clear, MM moist  Neck: supple, trachea midline, no adenopathy  Heart: RRR, no MRG, no JVD, no peripheral edema  Lungs: CTA b/l, non-labored respirations  Abd: soft, NT, ND, BS+, no organomegaly  Extr: warm  Skin: dry, no rash  Neuro: Dementia, normal speech, moves all extremities        Intake/Output Summary (Last 24 hours) at 4/15/2021 1355  Last data filed at 4/15/2021 0827  Gross per 24 hour   Intake 50 ml   Output 950 ml   Net -900 ml        Recent labs & imaging reviewed:  Recent Results (from the past 24 hour(s))   CBC WITH AUTOMATED DIFF    Collection Time: 04/15/21  1:26 AM   Result Value Ref Range    WBC 7.6 3.6 - 11.0 K/uL    RBC 3.38 (L) 3.80 - 5.20 M/uL    HGB 11.0 (L) 11.5 - 16.0 g/dL    HCT 35.4 35.0 - 47.0 %    .7 (H) 80.0 - 99.0 FL    MCH 32.5 26.0 - 34.0 PG    MCHC 31.1 30.0 - 36.5 g/dL    RDW 13.1 11.5 - 14.5 %    PLATELET 805 566 - 854 K/uL    MPV 9.7 8.9 - 12.9 FL    NRBC 0.0 0  WBC    ABSOLUTE NRBC 0.00 0.00 - 0.01 K/uL    EOSINOPHILS 3 0 - 7 %    BASOPHILS 1 0 - 1 %    ABS. EOSINOPHILS 0.2 0.0 - 0.4 K/UL    ABS. BASOPHILS 0.1 0.0 - 0.1 K/UL    DF SMEAR SCANNED      RBC COMMENTS MACROCYTOSIS  1+        NEUTROPHILS 68 32 - 75 %    LYMPHOCYTES 21 12 - 49 %    MONOCYTES 7 5 - 13 %    IMMATURE GRANULOCYTES 0 %    ABS. NEUTROPHILS 5.2 1.8 - 8.0 K/UL    ABS. LYMPHOCYTES 1.6 0.8 - 3.5 K/UL    ABS. MONOCYTES 0.5 0.0 - 1.0 K/UL    ABS. IMM.  GRANS. 0.0 K/UL   METABOLIC PANEL, BASIC    Collection Time: 04/15/21  1:26 AM   Result Value Ref Range    Sodium 142 136 - 145 mmol/L    Potassium 4.0 3.5 - 5.1 mmol/L    Chloride 112 (H) 97 - 108 mmol/L    CO2 25 21 - 32 mmol/L    Anion gap 5 5 - 15 mmol/L    Glucose 91 65 - 100 mg/dL    BUN 12 6 - 20 MG/DL    Creatinine 0.68 0.55 - 1.02 MG/DL    BUN/Creatinine ratio 18 12 - 20      GFR est AA >60 >60 ml/min/1.73m2    GFR est non-AA >60 >60 ml/min/1.73m2    Calcium 8.9 8.5 - 10.1 MG/DL     Recent Labs     04/15/21  0126 04/14/21  0337   WBC 7.6 7.0   HGB 11.0* 11.6   HCT 35.4 36.7    213     Recent Labs     04/15/21  0126 04/14/21  0337 04/13/21  1708    140 139   K 4.0 4.9 4.5   * 110* 102   CO2 25 25 27   BUN 12 13 22*   CREA 0.68 0.60 0.89   GLU 91 84 98   CA 8.9 8.7 8.8   MG  --   --  2.4     Recent Labs     04/14/21  0337 04/13/21  1708   ALT 14 16   * 137*   TBILI 0.4 0.3   TP 6.6 6.6   ALB 2.7* 2.9*   GLOB 3.9 3.7     No results for input(s): INR, PTP, APTT, INREXT, INREXT in the last 72 hours. No results for input(s): FE, TIBC, PSAT, FERR in the last 72 hours. Lab Results   Component Value Date/Time    Folate 17.4 04/05/2021 12:30 AM      No results for input(s): PH, PCO2, PO2 in the last 72 hours. No results for input(s): CPK, CKNDX, TROIQ in the last 72 hours.     No lab exists for component: CPKMB  Lab Results   Component Value Date/Time    Cholesterol, total 234 (H) 10/26/2019 03:58 AM    HDL Cholesterol 101 10/26/2019 03:58 AM    LDL, calculated 127.4 (H) 10/26/2019 03:58 AM    Triglyceride 28 10/26/2019 03:58 AM    CHOL/HDL Ratio 2.3 10/26/2019 03:58 AM     Lab Results   Component Value Date/Time    Glucose (POC) 117 (H) 03/10/2017 07:36 PM     Lab Results   Component Value Date/Time    Color YELLOW/STRAW 04/13/2021 05:08 PM    Appearance CLEAR 04/13/2021 05:08 PM    Specific gravity 1.015 04/13/2021 05:08 PM    Specific gravity 1.011 04/04/2021 04:28 PM    pH (UA) 6.0 04/13/2021 05:08 PM    Protein Negative 04/13/2021 05:08 PM    Glucose Negative 04/13/2021 05:08 PM    Ketone Negative 04/13/2021 05:08 PM    Bilirubin Negative 04/13/2021 05:08 PM    Urobilinogen 0.2 04/13/2021 05:08 PM    Nitrites Positive (A) 04/13/2021 05:08 PM    Leukocyte Esterase TRACE (A) 04/13/2021 05:08 PM    Epithelial cells FEW 04/13/2021 05:08 PM    Bacteria 2+ (A) 04/13/2021 05:08 PM    WBC 10-20 04/13/2021 05:08 PM RBC 0-5 04/13/2021 05:08 PM       Med list reviewed  Current Facility-Administered Medications   Medication Dose Route Frequency    acetaminophen (TYLENOL) tablet 650 mg  650 mg Oral Q6H PRN    albuterol-ipratropium (DUO-NEB) 2.5 MG-0.5 MG/3 ML  3 mL Nebulization Q4H PRN    levothyroxine (SYNTHROID) tablet 100 mcg  100 mcg Oral ACB    sodium chloride (NS) flush 5-40 mL  5-40 mL IntraVENous Q8H    sodium chloride (NS) flush 5-40 mL  5-40 mL IntraVENous PRN    LORazepam (ATIVAN) injection 2 mg  2 mg IntraVENous Q5MIN PRN    heparin (porcine) injection 5,000 Units  5,000 Units SubCUTAneous Q8H    cefTRIAXone (ROCEPHIN) 1 g in 0.9% sodium chloride (MBP/ADV) 50 mL MBP  1 g IntraVENous Q24H    budesonide (PULMICORT) 500 mcg/2 ml nebulizer suspension  500 mcg Nebulization BID RT    ipratropium (ATROVENT) 0.02 % nebulizer solution 0.5 mg  0.5 mg Nebulization Q6H RT    arformoteroL (BROVANA) neb solution 15 mcg  15 mcg Nebulization BID RT    lamoTRIgine (LaMICtal) tablet 100 mg  100 mg Oral BID    carBAMazepine XR (TEGretol XR) tablet 400 mg  400 mg Oral DAILY    carBAMazepine XR (TEGretol XR) tablet 600 mg  600 mg Oral QHS       Care Plan discussed with:  Patient/Family, Nurse and     Floyd Quiñonez MD  Internal Medicine  Date of Service: 4/15/2021

## 2021-04-15 NOTE — PROGRESS NOTES
Bedside and Verbal shift change report given to Georges Chang (oncoming nurse) by Dolores Harris (offgoing nurse). Report included the following information SBAR, Kardex, Procedure Summary, Intake/Output, MAR, Recent Results, Cardiac Rhythm NSR, Quality Measures and Dual Neuro Assessment.

## 2021-04-16 VITALS
HEIGHT: 62 IN | WEIGHT: 138.8 LBS | HEART RATE: 79 BPM | RESPIRATION RATE: 19 BRPM | OXYGEN SATURATION: 93 % | TEMPERATURE: 97.6 F | SYSTOLIC BLOOD PRESSURE: 120 MMHG | BODY MASS INDEX: 25.54 KG/M2 | DIASTOLIC BLOOD PRESSURE: 89 MMHG

## 2021-04-16 LAB
COVID-19 RAPID TEST, COVR: NOT DETECTED
SOURCE, COVRS: NORMAL

## 2021-04-16 PROCEDURE — 87635 SARS-COV-2 COVID-19 AMP PRB: CPT

## 2021-04-16 PROCEDURE — 74011000250 HC RX REV CODE- 250: Performed by: STUDENT IN AN ORGANIZED HEALTH CARE EDUCATION/TRAINING PROGRAM

## 2021-04-16 PROCEDURE — 74011250637 HC RX REV CODE- 250/637: Performed by: STUDENT IN AN ORGANIZED HEALTH CARE EDUCATION/TRAINING PROGRAM

## 2021-04-16 PROCEDURE — 74011250637 HC RX REV CODE- 250/637: Performed by: NURSE PRACTITIONER

## 2021-04-16 PROCEDURE — 94640 AIRWAY INHALATION TREATMENT: CPT

## 2021-04-16 PROCEDURE — 74011250637 HC RX REV CODE- 250/637: Performed by: INTERNAL MEDICINE

## 2021-04-16 PROCEDURE — 74011250637 HC RX REV CODE- 250/637: Performed by: PSYCHIATRY & NEUROLOGY

## 2021-04-16 PROCEDURE — 74011250636 HC RX REV CODE- 250/636: Performed by: STUDENT IN AN ORGANIZED HEALTH CARE EDUCATION/TRAINING PROGRAM

## 2021-04-16 PROCEDURE — 94760 N-INVAS EAR/PLS OXIMETRY 1: CPT

## 2021-04-16 RX ORDER — CEFUROXIME AXETIL 500 MG/1
500 TABLET ORAL 2 TIMES DAILY
Qty: 3 TAB | Refills: 0 | Status: ON HOLD | OUTPATIENT
Start: 2021-04-16 | End: 2021-07-29

## 2021-04-16 RX ORDER — CARBAMAZEPINE 200 MG/1
600 TABLET, EXTENDED RELEASE ORAL
Qty: 30 TAB | Refills: 1 | Status: ON HOLD | OUTPATIENT
Start: 2021-04-16 | End: 2021-07-29

## 2021-04-16 RX ORDER — LAMOTRIGINE 100 MG/1
100 TABLET ORAL 2 TIMES DAILY
Qty: 60 TAB | Refills: 1 | Status: ON HOLD | OUTPATIENT
Start: 2021-04-16 | End: 2021-07-30 | Stop reason: SDUPTHER

## 2021-04-16 RX ORDER — CARBAMAZEPINE 400 MG/1
400 TABLET, EXTENDED RELEASE ORAL DAILY
Qty: 30 TAB | Refills: 1 | Status: ON HOLD | OUTPATIENT
Start: 2021-04-17 | End: 2021-07-29

## 2021-04-16 RX ADMIN — HEPARIN SODIUM 5000 UNITS: 5000 INJECTION INTRAVENOUS; SUBCUTANEOUS at 11:45

## 2021-04-16 RX ADMIN — LEVOTHYROXINE SODIUM 100 MCG: 0.1 TABLET ORAL at 07:29

## 2021-04-16 RX ADMIN — LAMOTRIGINE 100 MG: 100 TABLET ORAL at 08:22

## 2021-04-16 RX ADMIN — IPRATROPIUM BROMIDE 0.5 MG: 0.5 SOLUTION RESPIRATORY (INHALATION) at 07:49

## 2021-04-16 RX ADMIN — Medication 10 ML: at 05:39

## 2021-04-16 RX ADMIN — BUDESONIDE 500 MCG: 0.5 INHALANT RESPIRATORY (INHALATION) at 07:52

## 2021-04-16 RX ADMIN — ACETAMINOPHEN 650 MG: 325 TABLET, FILM COATED ORAL at 08:22

## 2021-04-16 RX ADMIN — HEPARIN SODIUM 5000 UNITS: 5000 INJECTION INTRAVENOUS; SUBCUTANEOUS at 04:30

## 2021-04-16 RX ADMIN — CARBAMAZEPINE 400 MG: 200 TABLET, EXTENDED RELEASE ORAL at 08:22

## 2021-04-16 RX ADMIN — ARFORMOTEROL TARTRATE 15 MCG: 15 SOLUTION RESPIRATORY (INHALATION) at 07:52

## 2021-04-16 NOTE — PROGRESS NOTES
Transition of Care Plan to SNF/Rehab    SNF/Rehab Transition:  Patient has been accepted to [address] [place] [address]  and meets criteria for admission. Patient will transported by [facility] and expected to leave at 12:20 PM.    Communication to Patient/Family:  Met with patient and called and spoke with SARAH Guzman [phone] (identified care giver) and they are agreeable to the transition plan. Communication to SNF/Rehab:  Bedside RN, Jayshree Gallegos, has been notified to update the transition plan to the facility and call report (phone number [phone] ext [phone], room # [id number]). Discharge information has been updated on the AVS.     Discharge instructions to be fax'd to facility at [facility] # all scripts). SNF/Rehab Transition:  Patient to follow-up with Home Health: [facility], other  ,none)  PCP/Specialist:     Reviewed and confirmed with facility, Mina Weir in admissions they can manage the patient care needs for the following:     Santa Harry with (X) only those applicable:    Medication:  [x]  Medications will be available at the facility  []  IV Antibiotics   []  Controlled Substance - hard copy to be sent with patient   []  Weekly Labs   Documents:  [x] Hard RX  [x] MAR  [x] Kardex  [x] AVS  []Transfer Summary  [x]Discharge   Equipment:  []  CPAP/BiPAP  []  Wound Vacuum  []  Almaraz or Urinary Device  []  PICC/Central Line  []  Nebulizer  []  Ventilator   Treatment:  []Isolation (for MRSA, VRE, etc.)  []Surgical Drain Management  []Tracheostomy Care  []Dressing Changes  []Dialysis with transportation and chair time. []PEG Care  []Oxygen  []Daily Weights for Heart Failure   Dietary:  []Any diet limitations  []Tube Feedings   []Total Parenteral Management (TPN)   Eligible for Medicaid Long Term Services and Supports  Yes:  [] Eligible for medical assistance or will become eligible within 180 days and UAI completed. [] Provider/Patient and/or support system has requested screening.   [] UAI copy provided to patient or responsible party,  [] UAI unavailable at discharge will send once processed to SNF provider. [] UAI unavailable at discharged mailed to patient  No:   [x] Private pay and is not financially eligible for Medicaid within the next 180 days. [] Reside out-of-state. [] A residents of a state owned/operated facility that is licensed  by [address] [facility] or [facility]  [] Enrollment in [facility] hospice services  [] [address] [place] [address]  [] Patient /Family declines to have screening completed or provide financial information for screening     Financial Resources:  Medicaid    [] Initiated and application pending   [] Full coverage     Advanced Care Plan:  []Surrogate Decision Maker of Care  []POA  []Communicated Code Status  (DNR\")    Other  Auth received per [facility] in admissions at [facility].       BILLIE Heredia

## 2021-04-16 NOTE — PROGRESS NOTES
Pharmacist Discharge Medication Reconciliation    Discharging Provider: Dr. Meeta Jack    Significant PMH:   Past Medical History:   Diagnosis Date    COPD (chronic obstructive pulmonary disease) ([facility] [address]     Dementia ([facility] [address] 3/29/2017    Hypothyroid     Seizure ([facility] [address]     Seizure disorder [facility])      Chief Complaint for this Admission:   Chief Complaint   Patient presents with    Seizure     Allergies: Aricept [donepezil], Benadryl [diphenhydramine hcl], Ciprofloxacin, Clindamycin, Codeine, Macrobid [nitrofurantoin monohyd/m-cryst], Pcn [penicillins], and Sulfa (sulfonamide antibiotics)    Discharge Medications:   Current Discharge Medication List        START taking these medications    Details   cefUROXime (CEFTIN) 500 mg tablet Take 1 Tab by mouth two (2) times a day. Qty: 3 Tab, Refills: 0           CONTINUE these medications which have CHANGED    Details   !! carBAMazepine XR (TEGretol XR) 400 mg SR tablet Take 1 Tab by mouth daily. 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 carBAMazepine XR (TEGretol XR) 200 mg SR tablet Take 3 Tabs by mouth nightly. Qty: 30 Tab, Refills: 1      lamoTRIgine (LaMICtal) 100 mg tablet Take 1 Tab by mouth two (2) times a day. Qty: 60 Tab, Refills: 1       !! - Potential duplicate medications found. Please discuss with provider. CONTINUE these medications which have NOT CHANGED    Details   acetaminophen (TYLENOL) 500 mg tablet Take 500 mg by mouth every six (6) hours as needed for Pain. carboxymethylcellulose sodium (REFRESH LIQUIGEL) 1 % dlgl ophthalmic solution Administer 1 Drop to both eyes three (3) times daily as needed for Other (Dry eye). calcium carbonate (David-Gest Antacid) 200 mg calcium (500 mg) chew Take 2 Tabs by mouth every four (4) hours as needed for Other (Indigestion). montelukast (Singulair) 10 mg tablet Take 10 mg by mouth daily.       carboxymethylcellulose sodium (Refresh Tears) 0.5 % drop ophthalmic solution Administer 2 Drops to both eyes daily. fluticasone-umeclidinium-vilanterol (Trelegy Ellipta) 100-62.5-25 mcg inhaler Take 1 Puff by inhalation daily. sodium chloride (Ayr Saline) 0.65 % nasal squeeze bottle 2 Sprays by Both Nostrils route two (2) times a day. hydrocortisone (CORTAID) 1 % topical cream Apply  to affected area three (3) times daily as needed for Itching. use thin layer      loperamide (IMMODIUM) 2 mg tablet Take 2 mg by mouth three (3) times daily as needed for Diarrhea. albuterol (ProAir HFA) 90 mcg/actuation inhaler Take 1 Puff by inhalation every four (4) hours as needed for Shortness of Breath or Other (COPD). levothyroxine (SYNTHROID) 100 mcg tablet Take 100 mcg by mouth Daily (before breakfast). Indications: hypothyroidism      acetaminophen (MAPAP) 500 mg cap Take 1 Cap by mouth daily. Indications: Arthritic Pain      albuterol-ipratropium (DUO-NEB) 2.5 mg-0.5 mg/3 ml nebu 3 mL by Nebulization route three (3) times daily. The patient's chart, MAR and AVS were reviewed by Esau Blanc.

## 2021-04-16 NOTE — PROGRESS NOTES
Problem: Falls - Risk of  Goal: *Absence of Falls  Description: Document Erica Gum Fall Risk and appropriate interventions in the flowsheet. Outcome: Progressing Towards Goal  Note: Fall Risk Interventions:  Mobility Interventions: Assess mobility with egress test, Patient to call before getting OOB, Bed/chair exit alarm, Utilize walker, cane, or other assistive device    Mentation Interventions: Adequate sleep, hydration, pain control, Bed/chair exit alarm, Door open when patient unattended, Familiar objects from home, Gait belt with transfers/ambulation, Reorient patient, Room close to nurse's station    Medication Interventions: Patient to call before getting OOB, Bed/chair exit alarm, Utilize gait belt for transfers/ambulation    Elimination Interventions: Bed/chair exit alarm, Call light in reach, Patient to call for help with toileting needs, Toilet paper/wipes in reach, Stay With Me (per policy), Toileting schedule/hourly rounds              Problem: Patient Education: Go to Patient Education Activity  Goal: Patient/Family Education  Outcome: Progressing Towards Goal     Problem: Pressure Injury - Risk of  Goal: *Prevention of pressure injury  Description: Document George Scale and appropriate interventions in the flowsheet. Outcome: Progressing Towards Goal  Note: Pressure Injury Interventions:  Sensory Interventions: Assess changes in LOC, Assess need for specialty bed, Avoid rigorous massage over bony prominences, Discuss PT/OT consult with provider, Keep linens dry and wrinkle-free, Maintain/enhance activity level, Minimize linen layers, Monitor skin under medical devices, Turn and reposition approx.  every two hours (pillows and wedges if needed)    Moisture Interventions: Absorbent underpads, Apply protective barrier, creams and emollients, Assess need for specialty bed, Check for incontinence Q2 hours and as needed, Offer toileting Q_hr, Moisture barrier    Activity Interventions: Assess need for specialty bed, Increase time out of bed, Pressure redistribution bed/mattress(bed type), PT/OT evaluation    Mobility Interventions: Assess need for specialty bed, Float heels, HOB 30 degrees or less, Pressure redistribution bed/mattress (bed type), PT/OT evaluation, Turn and reposition approx.  every two hours(pillow and wedges)    Nutrition Interventions: Document food/fluid/supplement intake, Discuss nutritional consult with provider, Offer support with meals,snacks and hydration    Friction and Shear Interventions: Apply protective barrier, creams and emollients, Transferring/repositioning devices, Minimize layers                Problem: Patient Education: Go to Patient Education Activity  Goal: Patient/Family Education  Outcome: Progressing Towards Goal     Problem: Seizure Disorder (Adult)  Goal: *STG: Remains free of seizure activity  Outcome: Progressing Towards Goal  Goal: *STG: Maintains lab values within therapeutic range  Outcome: Progressing Towards Goal  Goal: *STG/LTG: Complies with medication therapy  Outcome: Progressing Towards Goal  Goal: *STG: Remains free of injury during seizure activity  Outcome: Progressing Towards Goal  Goal: *STG: Remains safe in hospital  Outcome: Progressing Towards Goal  Goal: Interventions  Outcome: Progressing Towards Goal     Problem: Patient Education: Go to Patient Education Activity  Goal: Patient/Family Education  Outcome: Progressing Towards Goal     Problem: Discharge Planning  Goal: *Discharge to safe environment  Outcome: Progressing Towards Goal  Goal: *Knowledge of medication management  Outcome: Progressing Towards Goal  Goal: *Knowledge of discharge instructions  Outcome: Progressing Towards Goal     Problem: Patient Education: Go to Patient Education Activity  Goal: Patient/Family Education  Outcome: Progressing Towards Goal     Problem: Patient Education: Go to Patient Education Activity  Goal: Patient/Family Education  Outcome: Progressing Towards Goal Problem: Patient Education: Go to Patient Education Activity  Goal: Patient/Family Education  Outcome: Progressing Towards Goal

## 2021-04-16 NOTE — PROGRESS NOTES
Bedside and Verbal shift change report given to [address] (oncoming nurse) by Primo De La Cruz RN (offgoing nurse). Report included the following information SBAR, Kardex, Procedure Summary, Intake/Output, MAR, Accordion and Recent Results.

## 2021-04-16 NOTE — DISCHARGE SUMMARY
Discharge Summary       PATIENT ID: Simona Buchanan  MRN: [id number]   YOB: 1935    DATE OF ADMISSION: 4/13/2021  4:39 PM    DATE OF DISCHARGE: 4/16/2021   PRIMARY CARE PROVIDER: Raf Boyer MD     ATTENDING PHYSICIAN: Dr Ana Jean  DISCHARGING PROVIDER: Ana Jean MD    To contact this individual call [phone] and ask the  to page. If unavailable ask to be transferred the Adult Hospitalist Department. CONSULTATIONS: IP CONSULT TO NEUROLOGY    PROCEDURES/SURGERIES: * No surgery found *    ADMITTING [address] [place] [address] COURSE:   Seizure Disorder w/o Status Epilepticus  - CT head did not demonstrate acute intracranial pathology  -Seizure and fall precautions  - appreciate neurology input  - CBM levels are low therapeutic this admission  - Increase CBM to 400mg AM and 600mg PM  - c/w Lamictal 100mg BID      Acute Cystitis  -c/w Rocephin 1g daily can change to po keflex on discharge to complete a 5 days course of tx   -Urine culture - Klebsiella pneumoniae     Hypothyroidism  -TSH on 4/5 normal  -Continue synthroid     COPD  -Not in acute exarcebation  -Home trelegy and prn duonebs    Dementia  - supportive care    Code status:DNR  DVT prophylaxis: heparin         DISCHARGE DIAGNOSES / PLAN:      1.   Breakthrough seizures       PENDING TEST RESULTS:   At the time of discharge the following test results are still pending: none    FOLLOW UP APPOINTMENTS:    Follow-up Information     Follow up With Specialties Details Why Contact Info    Raf Boyer MD Internal Medicine, Wound Care Schedule an appointment as soon as possible for a visit   [address] [facility]  [phone]      [facility] [[email or web address]] [address] [facility] [place], [place] Emergency Medicine  As needed, If symptoms worsen [phone] [facility] [address] [phone]    [address] (Ag) - For Patients 62Yrs Or Older [facility], [address] today  [address] [facility] [address]    Jayshree Dalal MD Internal Medicine, Wound Care In 1 week  [address] [facility]  [phone]             ADDITIONAL CARE RECOMMENDATIONS:   Follow up with PMD  Follow up with Neurology  Seizure precautions    DIET: Cardiac Diet    ACTIVITY: Activity as tolerated      DISCHARGE MEDICATIONS:   See Medication Reconciliation Form      NOTIFY YOUR PHYSICIAN FOR ANY OF THE FOLLOWING:   Fever over 101 degrees for 24 hours. Chest pain, shortness of breath, fever, chills, nausea, vomiting, diarrhea, change in mentation, falling, weakness, bleeding. Severe pain or pain not relieved by medications. Or, any other signs or symptoms that you may have questions about.     DISPOSITION:    Home With:   OT  PT  HH  RN      x SNF/Inpatient Rehab/LTAC    Independent/assisted living    Hospice    Other:       PATIENT CONDITION AT DISCHARGE:     Functional status   x Poor     Deconditioned     Independent      Cognition     Lucid    x Forgetful     Dementia      Catheters/lines (plus indication)    Almaraz     PICC     PEG    x None      Code status     Full code    x DNR      PHYSICAL EXAMINATION AT DISCHARGE:  Please see progress note    CHRONIC MEDICAL DIAGNOSES:  Problem List as of 4/16/2021 Date Reviewed: 4/16/2021          Codes Class Noted - Resolved    Acute cystitis ICD-10-CM: N30.00  ICD-9-CM: 595.0  8/28/2017 - Present        Dementia ([facility] 75.) (Chronic) ICD-10-CM: F03.90  ICD-9-CM: 294.20  3/29/2017 - Present        UTI (urinary tract infection) ICD-10-CM: N39.0  ICD-9-CM: 599.0  3/29/2017 - Present        * (Principal) Seizure ([facility] (Chronic) ICD-10-CM: R56.9  ICD-9-CM: 780.39  3/28/2017 - Present        Acquired hypothyroidism (Chronic) ICD-10-CM: E03.9  ICD-9-CM: 244.9  3/28/2017 - Present        RESOLVED: Acute delirium ICD-10-CM: R41.0  ICD-9-CM: 780.09  4/4/2021 - 4/7/2021        RESOLVED: COPD with acute exacerbation ([facility] ICD-10-CM: J44.1  ICD-9-CM: 491.21  10/26/2019 - 10/27/2019        RESOLVED: Hypoxia ICD-10-CM: R09.02  ICD-9-CM: 799.02  10/25/2019 - 10/27/2019        RESOLVED: COPD (chronic obstructive pulmonary disease) (HCC) (Chronic) ICD-10-CM: J44.9  ICD-9-CM: 496  3/28/2017 - 10/27/2019              Greater than 39 minutes were spent with the patient on counseling and coordination of care    Signed:   Carlotta Heart MD  4/16/2021  9:32 AM   .

## 2021-04-16 NOTE — PROGRESS NOTES
Problem: Falls - Risk of  Goal: *Absence of Falls  Description: Document Apoorva Walden Fall Risk and appropriate interventions in the flowsheet. Outcome: Progressing Towards Goal  Note: Fall Risk Interventions:  Mobility Interventions: Bed/chair exit alarm, Communicate number of staff needed for ambulation/transfer, OT consult for ADLs, Patient to call before getting OOB, Utilize gait belt for transfers/ambulation    Mentation Interventions: Bed/chair exit alarm, Familiar objects from home, Gait belt with transfers/ambulation, Increase mobility, Reorient patient    Medication Interventions: Bed/chair exit alarm, Patient to call before getting OOB, Utilize gait belt for transfers/ambulation    Elimination Interventions: Bed/chair exit alarm, Call light in reach, Toileting schedule/hourly rounds              Problem: Patient Education: Go to Patient Education Activity  Goal: Patient/Family Education  Outcome: Progressing Towards Goal     Problem: Pressure Injury - Risk of  Goal: *Prevention of pressure injury  Description: Document George Scale and appropriate interventions in the flowsheet. Outcome: Progressing Towards Goal  Note: Pressure Injury Interventions:  Sensory Interventions: Assess changes in LOC, Discuss PT/OT consult with provider, Keep linens dry and wrinkle-free, Turn and reposition approx.  every two hours (pillows and wedges if needed)    Moisture Interventions: Check for incontinence Q2 hours and as needed, Apply protective barrier, creams and emollients, Absorbent underpads, Internal/External urinary devices    Activity Interventions: PT/OT evaluation, Increase time out of bed    Mobility Interventions: PT/OT evaluation, Pressure redistribution bed/mattress (bed type), Float heels    Nutrition Interventions: Offer support with meals,snacks and hydration    Friction and Shear Interventions: Apply protective barrier, creams and emollients, HOB 30 degrees or less, Transferring/repositioning devices Problem: Patient Education: Go to Patient Education Activity  Goal: Patient/Family Education  Outcome: Progressing Towards Goal     Problem: Seizure Disorder (Adult)  Goal: *STG: Remains free of seizure activity  Outcome: Progressing Towards Goal  Goal: *STG: Maintains lab values within therapeutic range  Outcome: Progressing Towards Goal  Goal: *STG/LTG: Complies with medication therapy  Outcome: Progressing Towards Goal  Goal: *STG: Remains free of injury during seizure activity  Outcome: Progressing Towards Goal  Goal: *STG: Remains safe in hospital  Outcome: Progressing Towards Goal  Goal: Interventions  Outcome: Progressing Towards Goal     Problem: Patient Education: Go to Patient Education Activity  Goal: Patient/Family Education  Outcome: Progressing Towards Goal     Problem: Discharge Planning  Goal: *Discharge to safe environment  Outcome: Progressing Towards Goal  Goal: *Knowledge of medication management  Outcome: Progressing Towards Goal  Goal: *Knowledge of discharge instructions  Outcome: Progressing Towards Goal     Problem: Patient Education: Go to Patient Education Activity  Goal: Patient/Family Education  Outcome: Progressing Towards Goal     Problem: Patient Education: Go to Patient Education Activity  Goal: Patient/Family Education  Outcome: Progressing Towards Goal     Problem: Patient Education: Go to Patient Education Activity  Goal: Patient/Family Education  Outcome: Progressing Towards Goal

## 2021-04-16 NOTE — DISCHARGE INSTRUCTIONS
Discharge SNF/Rehab Instructions/LTAC       PATIENT ID: Jonna Norman  MRN: [id number]   YOB: 1935    DATE OF ADMISSION: 4/13/2021  4:39 PM    DATE OF DISCHARGE: 4/16/2021    PRIMARY CARE PROVIDER: Romi Antonio MD       ATTENDING PHYSICIAN: Teresita Shaikh MD  DISCHARGING PROVIDER: Werner Manrique MD     To contact this individual call [phone] and ask the  to page. If unavailable ask to be transferred the Adult Hospitalist Department. CONSULTATIONS: IP CONSULT TO NEUROLOGY    PROCEDURES/SURGERIES: * No surgery found *    ADMITTING [address] [place] [address] COURSE:   Seizure Disorder w/o Status Epilepticus  - CT head did not demonstrate acute intracranial pathology  -Seizure and fall precautions  - appreciate neurology input  - CBM levels are low therapeutic this admission  - Increase CBM to 400mg AM and 600mg PM  - c/w Lamictal 100mg BID      Acute Cystitis  -c/w Rocephin 1g daily can change to po keflex on discharge to complete a 5 days course of tx   -Urine culture - Klebsiella pneumoniae     Hypothyroidism  -TSH on 4/5 normal  -Continue synthroid     COPD  -Not in acute exarcebation  -Home trelegy and prn duonebs    Dementia  - supportive care    Code status:DNR  DVT prophylaxis: heparin         DISCHARGE DIAGNOSES / PLAN:      1.   Breakthrough seizures       PENDING TEST RESULTS:   At the time of discharge the following test results are still pending: none    FOLLOW UP APPOINTMENTS:    Follow-up Information     Follow up With Specialties Details Why Contact Info    Romi Antonio MD Internal Medicine, Wound Care Schedule an appointment as soon as possible for a visit   [address] [facility]  [phone]      [place] [address] Emergency Medicine  As needed, If symptoms worsen [phone] [facility] [address] [facility] (Ag) - For Patients 62Yrs Or Older [facility], [address] [facility] [address] today Catrachito Villanueva MD Internal Medicine, Wound Care In 1 week  [address] [facility]  [phone]             ADDITIONAL CARE RECOMMENDATIONS:   Follow up with PMD  Follow up with Neurology  Seizure precautions    DIET: Cardiac Diet    ACTIVITY: Activity as tolerated      DISCHARGE MEDICATIONS:   See Medication Reconciliation Form      NOTIFY YOUR PHYSICIAN FOR ANY OF THE FOLLOWING:   Fever over 101 degrees for 24 hours. Chest pain, shortness of breath, fever, chills, nausea, vomiting, diarrhea, change in mentation, falling, weakness, bleeding. Severe pain or pain not relieved by medications. Or, any other signs or symptoms that you may have questions about.     DISPOSITION:    Home With:   OT  PT  HH  RN      x SNF/Inpatient Rehab/LTAC    Independent/assisted living    Hospice    Other:       PATIENT CONDITION AT DISCHARGE:     Functional status   x Poor     Deconditioned     Independent      Cognition     Lucid    x Forgetful     Dementia      Catheters/lines (plus indication)    Almaraz     PICC     PEG    x None      Code status     Full code    x DNR      PHYSICAL EXAMINATION AT DISCHARGE:  Please see progress note      CHRONIC MEDICAL DIAGNOSES:  Problem List as of 4/16/2021 Date Reviewed: 4/16/2021          Codes Class Noted - Resolved    Acute cystitis ICD-10-CM: N30.00  ICD-9-CM: 595.0  8/28/2017 - Present        Dementia ([facility] 75.) (Chronic) ICD-10-CM: F03.90  ICD-9-CM: 294.20  3/29/2017 - Present        UTI (urinary tract infection) ICD-10-CM: N39.0  ICD-9-CM: 599.0  3/29/2017 - Present        * (Principal) Seizure ([facility] (Chronic) ICD-10-CM: R56.9  ICD-9-CM: 780.39  3/28/2017 - Present        Acquired hypothyroidism (Chronic) ICD-10-CM: E03.9  ICD-9-CM: 244.9  3/28/2017 - Present        RESOLVED: Acute delirium ICD-10-CM: R41.0  ICD-9-CM: 780.09  4/4/2021 - 4/7/2021        RESOLVED: COPD with acute exacerbation ([facility] ICD-10-CM: J44.1  ICD-9-CM: 491.21  10/26/2019 - 10/27/2019        RESOLVED: Hypoxia ICD-10-CM: R09.02  ICD-9-CM: 799.02  10/25/2019 - 10/27/2019        RESOLVED: COPD (chronic obstructive pulmonary disease) ([facility] (Chronic) ICD-10-CM: J44.9  ICD-9-CM: 496  3/28/2017 - 10/27/2019                CDMP Checked:   Yes x     PROBLEM LIST Updated:  Yes x         Signed:   Kelly West MD  4/16/2021  9:31 AM

## 2021-04-16 NOTE — PROGRESS NOTES
Hospitalist Progress Note      Hospital summary: Bandar Schumacher is a 82 y.o. female w/ hx of COPD, Seizure disorder and hypothyroidism who is transported to hospital via ems form long term care / memory facility due to a witnessed seizure. Per chart review, pt wa noted to have a seizure episode today and reportedly had her seizure medications changed recently. Additionally, pt was noted to have two more episodes while in ED. Pt was seen and examined at bedside. Has no acute complaints or concerns.  4/13/2021      Assessment/Plan:  Seizure Disorder w/o Status Epilepticus  - CT head did not demonstrate acute intracranial pathology  -Seizure and fall precautions  - appreciate neurology input  - CBM levels are low therapeutic this admission  - Increase CBM to 400mg AM and 600mg PM  - c/w Lamictal 100mg BID      Acute Cystitis  -c/w Rocephin 1g daily can change to po keflex on discharge to complete a 5 days course of tx   -Urine culture - Klebsiella pneumoniae     Hypothyroidism  -TSH on 4/5 normal  -Continue synthroid     COPD  -Not in acute exarcebation  -Home trelegy and prn duonebs    Dementia  - supportive care    Code status:DNR  DVT prophylaxis: heparin     Plan: Discharge back to [facility] today, spoke to CM  Disposition: TBD. Came from memory care unit at [address]. \   ----------------------------------------------    CC: Seizures     S: F/u seizures  Feels fine   No new issues overnight    Review of Systems:  Review of systems not obtained due to patient factors.     O:  Visit Vitals  BP (!) 119/57   Pulse 73   Temp 97.8 °F (36.6 °C)   Resp 18   Ht 5' 2\" (1.575 m)   Wt 63 kg (138 lb 12.8 oz)   SpO2 94%   BMI 25.39 kg/m²       PHYSICAL EXAM:  Gen: NAD, elderly   HEENT: anicteric sclerae, normal conjunctiva, oropharynx clear, MM moist  Neck: supple, trachea midline, no adenopathy  Heart: RRR, no MRG, no JVD, no peripheral edema  Lungs: CTA b/l, non-labored respirations  Abd: soft, NT, ND, BS+, no organomegaly  Extr: warm  Skin: dry, no rash  Neuro: Dementia, normal speech, moves all extremities      No intake or output data in the 24 hours ending 04/16/21 0926     Recent labs & imaging reviewed:  Recent Results (from the past 24 hour(s))   COVID-19 RAPID TEST    Collection Time: 04/16/21  5:40 AM   Result Value Ref Range    Specimen source Nasopharyngeal      COVID-19 rapid test Not detected NOTD       Recent Labs     04/15/21  0126 04/14/21  0337   WBC 7.6 7.0   HGB 11.0* 11.6   HCT 35.4 36.7    213     Recent Labs     04/15/21  0126 04/14/21  0337 04/13/21  1708    140 139   K 4.0 4.9 4.5   * 110* 102   CO2 25 25 27   BUN 12 13 22*   CREA 0.68 0.60 0.89   GLU 91 84 98   CA 8.9 8.7 8.8   MG  --   --  2.4     Recent Labs     04/14/21 0337 04/13/21  1708   ALT 14 16   * 137*   TBILI 0.4 0.3   TP 6.6 6.6   ALB 2.7* 2.9*   GLOB 3.9 3.7     No results for input(s): INR, PTP, APTT, INREXT, INREXT in the last 72 hours. No results for input(s): FE, TIBC, PSAT, FERR in the last 72 hours. Lab Results   Component Value Date/Time    Folate 17.4 04/05/2021 12:30 AM      No results for input(s): PH, PCO2, PO2 in the last 72 hours. No results for input(s): CPK, CKNDX, TROIQ in the last 72 hours.     No lab exists for component: CPKMB  Lab Results   Component Value Date/Time    Cholesterol, total 234 (H) 10/26/2019 03:58 AM    HDL Cholesterol 101 10/26/2019 03:58 AM    LDL, calculated 127.4 (H) 10/26/2019 03:58 AM    Triglyceride 28 10/26/2019 03:58 AM    CHOL/HDL Ratio 2.3 10/26/2019 03:58 AM     Lab Results   Component Value Date/Time    Glucose (POC) 117 (H) 03/10/2017 07:36 PM     Lab Results   Component Value Date/Time    Color YELLOW/STRAW 04/13/2021 05:08 PM    Appearance CLEAR 04/13/2021 05:08 PM    Specific gravity 1.015 04/13/2021 05:08 PM    Specific gravity 1.011 04/04/2021 04:28 PM    pH (UA) 6.0 04/13/2021 05:08 PM    Protein Negative 04/13/2021 05:08 PM    Glucose Negative 04/13/2021 05:08 PM    Ketone Negative 04/13/2021 05:08 PM    Bilirubin Negative 04/13/2021 05:08 PM    Urobilinogen 0.2 04/13/2021 05:08 PM    Nitrites Positive (A) 04/13/2021 05:08 PM    Leukocyte Esterase TRACE (A) 04/13/2021 05:08 PM    Epithelial cells FEW 04/13/2021 05:08 PM    Bacteria 2+ (A) 04/13/2021 05:08 PM    WBC 10-20 04/13/2021 05:08 PM    RBC 0-5 04/13/2021 05:08 PM       Med list reviewed  Current Facility-Administered Medications   Medication Dose Route Frequency    acetaminophen (TYLENOL) tablet 650 mg  650 mg Oral Q6H PRN    albuterol-ipratropium (DUO-NEB) 2.5 MG-0.5 MG/3 ML  3 mL Nebulization Q4H PRN    levothyroxine (SYNTHROID) tablet 100 mcg  100 mcg Oral ACB    sodium chloride (NS) flush 5-40 mL  5-40 mL IntraVENous Q8H    sodium chloride (NS) flush 5-40 mL  5-40 mL IntraVENous PRN    LORazepam (ATIVAN) injection 2 mg  2 mg IntraVENous Q5MIN PRN    heparin (porcine) injection 5,000 Units  5,000 Units SubCUTAneous Q8H    cefTRIAXone (ROCEPHIN) 1 g in 0.9% sodium chloride (MBP/ADV) 50 mL MBP  1 g IntraVENous Q24H    budesonide (PULMICORT) 500 mcg/2 ml nebulizer suspension  500 mcg Nebulization BID RT    ipratropium (ATROVENT) 0.02 % nebulizer solution 0.5 mg  0.5 mg Nebulization Q6H RT    arformoteroL (BROVANA) neb solution 15 mcg  15 mcg Nebulization BID RT    lamoTRIgine (LaMICtal) tablet 100 mg  100 mg Oral BID    carBAMazepine XR (TEGretol XR) tablet 400 mg  400 mg Oral DAILY    carBAMazepine XR (TEGretol XR) tablet 600 mg  600 mg Oral QHS       Care Plan discussed with:  Patient/Family, Nurse and     Dixon Gonzáles MD  Internal Medicine  Date of Service: 4/16/2021

## 2021-04-16 NOTE — PROGRESS NOTES
Bedside RN performed patient education and medication education. Discharge concerns initiated and discussed with patient, including clarification on \"who\" assists the patient at their home and instructions for when the home going patient should call their provider after discharge. Opportunity for questions and clarification was provided. Patient receptive to education: YES  Patient stated: verbal w teachback  Barriers to Education: n/a  Diagnosis Education given:  YES    Length of stay: 3  Expected Day of Discharge: 3  Ask if they have \"Help at Home\" & add to white board? YES    Education Day #: 3    Medication Education Given:  YES  M in the box Medication name: ceftin    Pt aware of HCAHPS survey: YES  ______________________________________________________    TRANSFER - OUT REPORT:    Verbal report given to Germain Walters RN(name) on [employer]  being transferred to [address] [place] [address](unit) for routine progression of care       Report consisted of patients Situation, Background, Assessment and   Recommendations(SBAR). Information from the following report(s) SBAR, Kardex, ED Summary, Intake/Output, MAR and Cardiac Rhythm NSR was reviewed with the receiving nurse. Opportunity for questions and clarification was provided.

## 2021-05-02 ENCOUNTER — HOSPITAL ENCOUNTER (EMERGENCY)
Age: 86
Discharge: HOME | End: 2021-05-02
Attending: STUDENT IN AN ORGANIZED HEALTH CARE EDUCATION/TRAINING PROGRAM
Payer: MEDICARE

## 2021-05-02 VITALS
BODY MASS INDEX: 25.16 KG/M2 | TEMPERATURE: 98.2 F | RESPIRATION RATE: 22 BRPM | SYSTOLIC BLOOD PRESSURE: 122 MMHG | WEIGHT: 137.57 LBS | HEART RATE: 61 BPM | DIASTOLIC BLOOD PRESSURE: 71 MMHG | OXYGEN SATURATION: 96 %

## 2021-05-02 DIAGNOSIS — G40.909 SEIZURE DISORDER (HCC): Primary | ICD-10-CM

## 2021-05-02 DIAGNOSIS — N30.00 ACUTE CYSTITIS WITHOUT HEMATURIA: ICD-10-CM

## 2021-05-02 LAB
ALBUMIN SERPL-MCNC: 3.3 G/DL (ref 3.5–5)
ALBUMIN/GLOB SERPL: 0.9 {RATIO} (ref 1.1–2.2)
ALP SERPL-CCNC: 153 U/L (ref 45–117)
ALT SERPL-CCNC: 13 U/L (ref 12–78)
ANION GAP SERPL CALC-SCNC: 11 MMOL/L (ref 5–15)
APPEARANCE UR: ABNORMAL
AST SERPL-CCNC: 9 U/L (ref 15–37)
BACTERIA URNS QL MICRO: NEGATIVE /HPF
BASOPHILS # BLD: 0.1 K/UL (ref 0–0.1)
BASOPHILS NFR BLD: 1 % (ref 0–1)
BILIRUB SERPL-MCNC: 0.1 MG/DL (ref 0.2–1)
BILIRUB UR QL: NEGATIVE
BUN SERPL-MCNC: 23 MG/DL (ref 6–20)
BUN/CREAT SERPL: 26 (ref 12–20)
CALCIUM SERPL-MCNC: 9.4 MG/DL (ref 8.5–10.1)
CARBAMAZEPINE SERPL-MCNC: 5.6 UG/ML (ref 4–12)
CHLORIDE SERPL-SCNC: 108 MMOL/L (ref 97–108)
CO2 SERPL-SCNC: 28 MMOL/L (ref 21–32)
COLOR UR: ABNORMAL
CREAT SERPL-MCNC: 0.89 MG/DL (ref 0.55–1.02)
DIFFERENTIAL METHOD BLD: ABNORMAL
EOSINOPHIL # BLD: 0.2 K/UL (ref 0–0.4)
EOSINOPHIL NFR BLD: 3 % (ref 0–7)
EPITH CASTS URNS QL MICRO: ABNORMAL /LPF
ERYTHROCYTE [DISTWIDTH] IN BLOOD BY AUTOMATED COUNT: 12.9 % (ref 11.5–14.5)
GLOBULIN SER CALC-MCNC: 3.6 G/DL (ref 2–4)
GLUCOSE SERPL-MCNC: 149 MG/DL (ref 65–100)
GLUCOSE UR STRIP.AUTO-MCNC: NEGATIVE MG/DL
HCT VFR BLD AUTO: 42.2 % (ref 35–47)
HGB BLD-MCNC: 13.1 G/DL (ref 11.5–16)
HGB UR QL STRIP: ABNORMAL
IMM GRANULOCYTES # BLD AUTO: 0 K/UL (ref 0–0.04)
IMM GRANULOCYTES NFR BLD AUTO: 0 % (ref 0–0.5)
KETONES UR QL STRIP.AUTO: NEGATIVE MG/DL
LEUKOCYTE ESTERASE UR QL STRIP.AUTO: ABNORMAL
LYMPHOCYTES # BLD: 1.1 K/UL (ref 0.8–3.5)
LYMPHOCYTES NFR BLD: 19 % (ref 12–49)
MCH RBC QN AUTO: 32.8 PG (ref 26–34)
MCHC RBC AUTO-ENTMCNC: 31 G/DL (ref 30–36.5)
MCV RBC AUTO: 105.5 FL (ref 80–99)
MONOCYTES # BLD: 0.5 K/UL (ref 0–1)
MONOCYTES NFR BLD: 8 % (ref 5–13)
MUCOUS THREADS URNS QL MICRO: ABNORMAL /LPF
NEUTS SEG # BLD: 4.3 K/UL (ref 1.8–8)
NEUTS SEG NFR BLD: 69 % (ref 32–75)
NITRITE UR QL STRIP.AUTO: NEGATIVE
NRBC # BLD: 0 K/UL (ref 0–0.01)
NRBC BLD-RTO: 0 PER 100 WBC
PH UR STRIP: 6 [PH] (ref 5–8)
PLATELET # BLD AUTO: 228 K/UL (ref 150–400)
PMV BLD AUTO: 9.2 FL (ref 8.9–12.9)
POTASSIUM SERPL-SCNC: 3.7 MMOL/L (ref 3.5–5.1)
PROT SERPL-MCNC: 6.9 G/DL (ref 6.4–8.2)
PROT UR STRIP-MCNC: NEGATIVE MG/DL
RBC # BLD AUTO: 4 M/UL (ref 3.8–5.2)
RBC #/AREA URNS HPF: ABNORMAL /HPF (ref 0–5)
SODIUM SERPL-SCNC: 147 MMOL/L (ref 136–145)
SP GR UR REFRACTOMETRY: 1.02 (ref 1–1.03)
UR CULT HOLD, URHOLD: NORMAL
UROBILINOGEN UR QL STRIP.AUTO: 0.2 EU/DL (ref 0.2–1)
WBC # BLD AUTO: 6.1 K/UL (ref 3.6–11)
WBC URNS QL MICRO: >100 /HPF (ref 0–4)

## 2021-05-02 PROCEDURE — 81001 URINALYSIS AUTO W/SCOPE: CPT

## 2021-05-02 PROCEDURE — 80156 ASSAY CARBAMAZEPINE TOTAL: CPT

## 2021-05-02 PROCEDURE — 87077 CULTURE AEROBIC IDENTIFY: CPT

## 2021-05-02 PROCEDURE — 80175 DRUG SCREEN QUAN LAMOTRIGINE: CPT

## 2021-05-02 PROCEDURE — 74011250637 HC RX REV CODE- 250/637: Performed by: STUDENT IN AN ORGANIZED HEALTH CARE EDUCATION/TRAINING PROGRAM

## 2021-05-02 PROCEDURE — 87186 SC STD MICRODIL/AGAR DIL: CPT

## 2021-05-02 PROCEDURE — 99285 EMERGENCY DEPT VISIT HI MDM: CPT

## 2021-05-02 PROCEDURE — 74011250636 HC RX REV CODE- 250/636: Performed by: STUDENT IN AN ORGANIZED HEALTH CARE EDUCATION/TRAINING PROGRAM

## 2021-05-02 PROCEDURE — 80053 COMPREHEN METABOLIC PANEL: CPT

## 2021-05-02 PROCEDURE — 87086 URINE CULTURE/COLONY COUNT: CPT

## 2021-05-02 PROCEDURE — 85025 COMPLETE CBC W/AUTO DIFF WBC: CPT

## 2021-05-02 PROCEDURE — 36415 COLL VENOUS BLD VENIPUNCTURE: CPT

## 2021-05-02 RX ORDER — CEPHALEXIN 250 MG/1
500 CAPSULE ORAL
Status: COMPLETED | OUTPATIENT
Start: 2021-05-02 | End: 2021-05-02

## 2021-05-02 RX ORDER — CEPHALEXIN 500 MG/1
500 CAPSULE ORAL 4 TIMES DAILY
Qty: 28 CAP | Refills: 0 | Status: SHIPPED | OUTPATIENT
Start: 2021-05-02 | End: 2021-05-09

## 2021-05-02 RX ADMIN — CEPHALEXIN 500 MG: 250 CAPSULE ORAL at 22:56

## 2021-05-02 RX ADMIN — SODIUM CHLORIDE 500 ML: 9 INJECTION, SOLUTION INTRAVENOUS at 20:05

## 2021-05-02 NOTE — ED PROVIDER NOTES
The history is provided by the patient and the EMS personnel. History limited by: dementia. Seizure   This is a recurrent problem. The problem has been resolved. Number of times: 2, per report. The most recent episode lasted 2 to 5 minutes. Pertinent negatives include no confusion, no headaches, no speech difficulty, no neck stiffness, no chest pain, no cough and no vomiting. Characteristics include rhythmic jerking and loss of consciousness. The episode was witnessed (reported to have been witnessed by staff at [address], per EMS). There was no sensation of an aura present. There was return to baseline postseizure. The seizures did not continue in the ED. The seizure(s) had no focality. Possible causes include recent illness (recent hospitalization for seizures, CBM increased and pt started on Lamictal. Also had UTI.). Possible causes do not include missed seizure meds, head injury or missed seizure meds. There has been no fever. She reports no chest pain, no confusion, no vomiting, no headaches, no stiff neck, no speech difficulty, and no cough. There were no medications administered prior to arrival. Home seizure medications include: Lamictal and Tegretol. Past Medical History:   Diagnosis Date    [facility]     Dementia ([facility] 3/29/2017    Hypothyroid     Seiz[facility]     [facility]        Past Surgical History:   Procedure Laterality Date    HX HIP REPLACEMENT           History reviewed. No pertinent family history.     Social History     Socioeconomic History    Marital status:      Spouse name: Not on file    Number of children: Not on file    Years of education: Not on file    Highest education level: Not on file   Occupational History    Not on file   Social Needs    Financial resource strain: Not on file    Food insecurity     Worry: Not on file     Inability: Not on file    Transportation needs     Medical: Not on file     Non-medical: Not on file   Tobacco Use    Smoking status: Never Smoker    Smokeless tobacco: Never Used   Substance and Sexual Activity    Alcohol use: Yes     Frequency: Never    Drug use: No    Sexual activity: Not on file   Lifestyle    Physical activity     Days per week: Not on file     Minutes per session: Not on file    Stress: Not on file   Relationships    Social connections     Talks on phone: Not on file     Gets together: Not on file     Attends Mormonism service: Not on file     Active member of club or organization: Not on file     Attends meetings of clubs or organizations: Not on file     Relationship status: Not on file    Intimate partner violence     Fear of current or ex partner: Not on file     Emotionally abused: Not on file     Physically abused: Not on file     Forced sexual activity: Not on file   Other Topics Concern    Not on file   Social History Narrative    Not on file         ALLERGIES: Aricept [donepezil], Benadryl [diphenhydramine hcl], Ciprofloxacin, Clindamycin, Codeine, Macrobid [nitrofurantoin monohyd/m-cryst], Pcn [penicillins], and Sulfa (sulfonamide antibiotics)    Review of Systems   Constitutional: Negative for fever. Respiratory: Negative for cough. Cardiovascular: Negative for chest pain. Gastrointestinal: Negative for vomiting. Neurological: Positive for seizures (2 PTA earlier today; resolved; see HPI) and loss of consciousness. Negative for speech difficulty and headaches. Psychiatric/Behavioral: Negative for confusion. Vitals:    05/02/21 1944   BP: 138/69   Pulse: 93   Resp: 16   Temp: 98 °F (36.7 °C)   SpO2: 93%   Weight: 62.4 kg (137 lb 9.1 oz)            Physical Exam  Vitals signs and nursing note reviewed. Constitutional:       General: She is not in acute distress. Appearance: She is well-developed. HENT:      Head: Normocephalic and atraumatic.    Eyes:      Conjunctiva/sclera: Conjunctivae normal.   Neck: Musculoskeletal: Normal range of motion and neck supple. Cardiovascular:      Rate and Rhythm: Normal rate and regular rhythm. Pulmonary:      Effort: Pulmonary effort is normal. No respiratory distress. Abdominal:      Palpations: Abdomen is soft. Tenderness: There is no abdominal tenderness. There is no guarding. Musculoskeletal: Normal range of motion. Skin:     General: Skin is warm and dry. Neurological:      Mental Status: She is alert. Mental status is at baseline. GCS: GCS eye subscore is 4. GCS verbal subscore is 5. GCS motor subscore is 6. Cranial Nerves: No dysarthria or facial asymmetry. Motor: No abnormal muscle tone or seizure activity. MDM       Procedures    Assessment and plan: This is a 68-year-old female with a known seizure disorder here after 2 witnessed seizures at our [facility] today. Resolved, now back to baseline. Was recently hospitalized for the same, also discovered to have UTI and low to normal carbamazepine levels. No evidence of trauma. No focal neuro deficits on exam today. Plan obtain labs including drug levels and a urinalysis to see if there is any contributing cause to these breakthrough seizures. Urinalysis consistent with UTI. She also appears slightly dry on her chemistry. Will hydrate and start patient on Keflex. Drug levels pending and I have instructed patient to follow-up with her neurologist and/or PCP for these results. Daughter aware of patient's disposition and treatment plan.

## 2021-05-02 NOTE — ED TRIAGE NOTES
Patient arrives by EMS from [address] with c/o having a seizure earlier today, witnessed by staff. Patient at baseline.

## 2021-05-03 NOTE — ED NOTES
[facility] here to transport patient back to [address]. VSS. Respirations equal and unlabored on RA. Patient in no acute distress upon discharge.

## 2021-05-03 NOTE — ED NOTES
TRANSFER - OUT REPORT:    Verbal report given to Nurse Marybeth Allne (name) on Reanna Rodriguez  being transferred to [address] [place] [address] (unit) for routine progression of care       Report consisted of patients Situation, Background, Assessment and   Recommendations(SBAR). Information from the following report(s) SBAR, ED Summary, [facility] and Recent Results was reviewed with the receiving nurse. Lines:   Peripheral IV 05/02/21 Right Antecubital (Active)   Site Assessment Clean, dry, & intact 05/02/21 1947   Phlebitis Assessment 0 05/02/21 1947   Infiltration Assessment 0 05/02/21 1947   Dressing Status Clean, dry, & intact 05/02/21 1947   Hub Color/Line Status Pink 05/02/21 1947        Opportunity for questions and clarification was provided.       Patient transported with:   DAVID via [facility]

## 2021-05-04 LAB — LAMOTRIGINE SERPL-MCNC: 3.4 UG/ML (ref 2–20)

## 2021-05-05 LAB
BACTERIA SPEC CULT: ABNORMAL
CC UR VC: ABNORMAL
SERVICE CMNT-IMP: ABNORMAL

## 2021-07-28 ENCOUNTER — APPOINTMENT (OUTPATIENT)
Dept: CT IMAGING | Age: 86
End: 2021-07-28
Attending: EMERGENCY MEDICINE
Payer: MEDICARE

## 2021-07-28 ENCOUNTER — HOSPITAL ENCOUNTER (OUTPATIENT)
Age: 86
Setting detail: OBSERVATION
Discharge: NURSING, REHAB OR LONG-TERM CARE | End: 2021-07-30
Attending: EMERGENCY MEDICINE | Admitting: INTERNAL MEDICINE
Payer: MEDICARE

## 2021-07-28 DIAGNOSIS — R56.9 SEIZURE (HCC): Primary | ICD-10-CM

## 2021-07-28 LAB
ALBUMIN SERPL-MCNC: 3 G/DL (ref 3.5–5)
ALBUMIN/GLOB SERPL: 0.8 {RATIO} (ref 1.1–2.2)
ALP SERPL-CCNC: 124 U/L (ref 45–117)
ALT SERPL-CCNC: 15 U/L (ref 12–78)
ANION GAP SERPL CALC-SCNC: 6 MMOL/L (ref 5–15)
APPEARANCE UR: CLEAR
AST SERPL-CCNC: 14 U/L (ref 15–37)
BACTERIA URNS QL MICRO: NEGATIVE /HPF
BASOPHILS # BLD: 0 K/UL (ref 0–0.1)
BASOPHILS NFR BLD: 0 % (ref 0–1)
BILIRUB SERPL-MCNC: 0.2 MG/DL (ref 0.2–1)
BILIRUB UR QL: NEGATIVE
BUN SERPL-MCNC: 17 MG/DL (ref 6–20)
BUN/CREAT SERPL: 25 (ref 12–20)
CALCIUM SERPL-MCNC: 8.2 MG/DL (ref 8.5–10.1)
CARBAMAZEPINE SERPL-MCNC: 9.2 UG/ML (ref 4–12)
CHLORIDE SERPL-SCNC: 108 MMOL/L (ref 97–108)
CO2 SERPL-SCNC: 29 MMOL/L (ref 21–32)
COLOR UR: ABNORMAL
CREAT SERPL-MCNC: 0.67 MG/DL (ref 0.55–1.02)
DIFFERENTIAL METHOD BLD: ABNORMAL
EOSINOPHIL # BLD: 0.2 K/UL (ref 0–0.4)
EOSINOPHIL NFR BLD: 3 % (ref 0–7)
EPITH CASTS URNS QL MICRO: ABNORMAL /LPF
ERYTHROCYTE [DISTWIDTH] IN BLOOD BY AUTOMATED COUNT: 12.9 % (ref 11.5–14.5)
GLOBULIN SER CALC-MCNC: 3.6 G/DL (ref 2–4)
GLUCOSE SERPL-MCNC: 116 MG/DL (ref 65–100)
GLUCOSE UR STRIP.AUTO-MCNC: NEGATIVE MG/DL
HCT VFR BLD AUTO: 38.6 % (ref 35–47)
HGB BLD-MCNC: 12.1 G/DL (ref 11.5–16)
HGB UR QL STRIP: NEGATIVE
IMM GRANULOCYTES # BLD AUTO: 0 K/UL
IMM GRANULOCYTES NFR BLD AUTO: 0 %
KETONES UR QL STRIP.AUTO: NEGATIVE MG/DL
LEUKOCYTE ESTERASE UR QL STRIP.AUTO: ABNORMAL
LYMPHOCYTES # BLD: 1 K/UL (ref 0.8–3.5)
LYMPHOCYTES NFR BLD: 16 % (ref 12–49)
MCH RBC QN AUTO: 32.4 PG (ref 26–34)
MCHC RBC AUTO-ENTMCNC: 31.3 G/DL (ref 30–36.5)
MCV RBC AUTO: 103.2 FL (ref 80–99)
MONOCYTES # BLD: 0.4 K/UL (ref 0–1)
MONOCYTES NFR BLD: 7 % (ref 5–13)
NEUTS SEG # BLD: 4.4 K/UL (ref 1.8–8)
NEUTS SEG NFR BLD: 74 % (ref 32–75)
NITRITE UR QL STRIP.AUTO: NEGATIVE
NRBC # BLD: 0 K/UL (ref 0–0.01)
NRBC BLD-RTO: 0 PER 100 WBC
PH UR STRIP: 7 [PH] (ref 5–8)
PLATELET # BLD AUTO: 219 K/UL (ref 150–400)
PMV BLD AUTO: 9.4 FL (ref 8.9–12.9)
POTASSIUM SERPL-SCNC: 4 MMOL/L (ref 3.5–5.1)
PROT SERPL-MCNC: 6.6 G/DL (ref 6.4–8.2)
PROT UR STRIP-MCNC: NEGATIVE MG/DL
RBC # BLD AUTO: 3.74 M/UL (ref 3.8–5.2)
RBC #/AREA URNS HPF: ABNORMAL /HPF (ref 0–5)
RBC MORPH BLD: ABNORMAL
SODIUM SERPL-SCNC: 143 MMOL/L (ref 136–145)
SP GR UR REFRACTOMETRY: 1.01 (ref 1–1.03)
UA: UC IF INDICATED,UAUC: ABNORMAL
UROBILINOGEN UR QL STRIP.AUTO: 0.2 EU/DL (ref 0.2–1)
WBC # BLD AUTO: 6 K/UL (ref 3.6–11)
WBC MORPH BLD: ABNORMAL
WBC URNS QL MICRO: ABNORMAL /HPF (ref 0–4)

## 2021-07-28 PROCEDURE — 81001 URINALYSIS AUTO W/SCOPE: CPT

## 2021-07-28 PROCEDURE — 99218 HC RM OBSERVATION: CPT

## 2021-07-28 PROCEDURE — 85025 COMPLETE CBC W/AUTO DIFF WBC: CPT

## 2021-07-28 PROCEDURE — 80053 COMPREHEN METABOLIC PANEL: CPT

## 2021-07-28 PROCEDURE — 74011000258 HC RX REV CODE- 258: Performed by: EMERGENCY MEDICINE

## 2021-07-28 PROCEDURE — 96374 THER/PROPH/DIAG INJ IV PUSH: CPT

## 2021-07-28 PROCEDURE — 80156 ASSAY CARBAMAZEPINE TOTAL: CPT

## 2021-07-28 PROCEDURE — 80175 DRUG SCREEN QUAN LAMOTRIGINE: CPT

## 2021-07-28 PROCEDURE — 36415 COLL VENOUS BLD VENIPUNCTURE: CPT

## 2021-07-28 PROCEDURE — 74011250636 HC RX REV CODE- 250/636: Performed by: EMERGENCY MEDICINE

## 2021-07-28 PROCEDURE — 96375 TX/PRO/DX INJ NEW DRUG ADDON: CPT

## 2021-07-28 PROCEDURE — 74176 CT ABD & PELVIS W/O CONTRAST: CPT

## 2021-07-28 PROCEDURE — 99285 EMERGENCY DEPT VISIT HI MDM: CPT

## 2021-07-28 RX ORDER — MIDAZOLAM HYDROCHLORIDE 1 MG/ML
2 INJECTION, SOLUTION INTRAMUSCULAR; INTRAVENOUS
Status: COMPLETED | OUTPATIENT
Start: 2021-07-28 | End: 2021-07-28

## 2021-07-28 RX ORDER — MIDAZOLAM HYDROCHLORIDE 1 MG/ML
INJECTION, SOLUTION INTRAMUSCULAR; INTRAVENOUS
Status: DISPENSED
Start: 2021-07-28 | End: 2021-07-29

## 2021-07-28 RX ADMIN — SODIUM CHLORIDE 500 MG: 900 INJECTION, SOLUTION INTRAVENOUS at 19:26

## 2021-07-28 RX ADMIN — MIDAZOLAM 2 MG: 1 INJECTION INTRAMUSCULAR; INTRAVENOUS at 18:21

## 2021-07-28 NOTE — ED PROVIDER NOTES
HPI   The patient is an 82-year-old white female with a history of COPD dementia and a seizure disorder who presents with a history of a possible seizure while at our [facility] today. She commonly has a seizure from time to time and is sent in for further evaluation. She takes Lamictal and carbamazepine for her seizures. Currently she has no post ictal state and is alert and partially oriented to her baseline state. She has no new focal neurologic deficits. Past Medical History:   Diagnosis Date    COPD (chronic obstructive [facility] disease) ([facility]     Dementia ([facility] 3/29/2017    Hypothyroid     Seizure ([facility]     Seizure disorder ([facility]        Past Surgical History:   Procedure Laterality Date    HX HIP REPLACEMENT           History reviewed. No pertinent family history. Social History     Socioeconomic History    Marital status:      Spouse name: Not on file    Number of children: Not on file    Years of education: Not on file    Highest education level: Not on file   Occupational History    Not on file   Tobacco Use    Smoking status: Never Smoker    Smokeless tobacco: Never Used   Substance and Sexual Activity    Alcohol use: Yes    Drug use: No    Sexual activity: Not on file   Other Topics Concern    Not on file   Social History Narrative    Not on file     Social Determinants of Health     Financial Resource Strain:     Difficulty of Paying Living Expenses:    Food Insecurity:     Worried About Running Out of Food in the Last Year:     [address] in the Last Year:    Transportation Needs:     Lack of Transportation (Medical):      Lack of Transportation (Non-Medical):    Physical Activity:     Days of Exercise per Week:     Minutes of Exercise per Session:    Stress:     Feeling of Stress :    Social Connections:     Frequency of Communication with Friends and Family:     Frequency of Social Gatherings with Friends and Family:     Attends Adventism Services:  Active Member of Clubs or Organizations:     Attends Club or Organization Meetings:     Marital Status:    Intimate Partner Violence:     Fear of Current or Ex-Partner:     Emotionally Abused:     Physically Abused:     Sexually Abused: ALLERGIES: Aricept [donepezil], Benadryl [diphenhydramine hcl], Ciprofloxacin, Clindamycin, Codeine, Macrobid [nitrofurantoin monohyd/m-cryst], Pcn [penicillins], and Sulfa (sulfonamide antibiotics)    Review of Systems   All other systems reviewed and are negative. Vitals:    07/28/21 1449   BP: 131/76   Pulse: 74   Resp: 21   Temp: 98.2 °F (36.8 °C)   SpO2: 95%   Weight: 61.4 kg (135 lb 5.8 oz)   Height: 5' 2\" (1.575 m)            Physical Exam  HENT:      Head: Normocephalic. Nose: Nose normal.      Mouth/Throat:      Mouth: Mucous membranes are moist.   Cardiovascular:      Rate and Rhythm: Normal rate. Pulmonary:      Effort: Pulmonary effort is normal.   Abdominal:      General: Abdomen is flat. Musculoskeletal:         General: Normal range of motion. Skin:     General: Skin is warm. Capillary Refill: Capillary refill takes less than 2 seconds. Neurological:      General: No focal deficit present. Mental Status: She is alert. She is disoriented. Psychiatric:         Mood and Affect: Mood normal.          MDM  Number of Diagnoses or Management Options     Amount and/or Complexity of Data Reviewed  Clinical lab tests: ordered and reviewed  Tests in the radiology section of CPT®: ordered and reviewed  Tests in the medicine section of CPT®: ordered and reviewed    Risk of Complications, Morbidity, and/or Mortality  Presenting problems: moderate  Diagnostic procedures: moderate  Management options: moderate           Procedures          Assessment and plan the patient had a seizure in the ED which responded to Versed 2 mg IV.   Her Tegretol and Lamictal levels are pending, the patient's family would like her admitted as they do not feel comfortable having her return to the assisted living facility will admit to [facility] for further evaluation. Perfect Serve Consult for Admission  6:34 PM    ED Room Number: SER07/07  Patient Name and age:  Mahsa Almazan 82 y.o.  female  Working Diagnosis:   1.  Seizure ([address]        COVID-19 Suspicion:  no  Sepsis present:  no  Reassessment needed: yes  Code Status:  Full Code  Readmission: no  Isolation Requirements:  no  Recommended Level of Care:  telemetry  Department:[place] [facility] - [phone]  Other:

## 2021-07-28 NOTE — Clinical Note
Patient Class[de-identified] OBSERVATION [301]   Type of Bed: Neuro/Stroke [9]   Cardiac Monitoring Required?: Yes   Reason for Observation: seizure   Admitting Diagnosis: Seizure [facility]) [[id number]]   Admitting Physician: Shakir Husbands   Attending Physician: Grace March [[id number]]

## 2021-07-28 NOTE — ED NOTES
TRANSFER - OUT REPORT:    Verbal report given to jimmy SWENSON (name) on Alvaro Stearns  being transferred to [facility] (unit) for routine progression of care       Report consisted of patients Situation, Background, Assessment and   Recommendations(SBAR). Information from the following report(s) SBAR and ED Summary was reviewed with the receiving nurse. Lines:   Peripheral IV 07/28/21 Left Hand (Active)   Site Assessment Clean, dry, & intact 07/28/21 1500   Phlebitis Assessment 0 07/28/21 1500   Infiltration Assessment 0 07/28/21 1500   Dressing Status Clean, dry, & intact 07/28/21 1500        Opportunity for questions and clarification was provided.       Patient transported with:   Monitor

## 2021-07-28 NOTE — DISCHARGE INSTRUCTIONS
Discharge Instructions       PATIENT ID: Jonah Jacome  MRN: [id number]   YOB: 1935    DATE OF ADMISSION: 7/28/2021  2:44 PM    DATE OF DISCHARGE: 7/29/2021    PRIMARY CARE PROVIDER: Kai Frazier MD     ATTENDING PHYSICIAN: Sarah Choi MD  DISCHARGING PROVIDER: Faby Tabares MD    To contact this individual call [phone] and ask the  to page. If unavailable ask to be transferred the Adult Hospitalist Department. DISCHARGE DIAGNOSES   Breakthrough seizures    CONSULTATIONS: None    PROCEDURES/SURGERIES: * No surgery found *    PENDING TEST RESULTS:   At the time of discharge the following test results are still pending: none    FOLLOW UP APPOINTMENTS:   Follow-up Information     Follow up With Specialties Details Why Contact Info    Kai Frazier MD Emergency Medicine In 1 week      Darcy Chambers MD Neurology In 2 weeks  [address] [facility]  [phone]             ADDITIONAL CARE RECOMMENDATIONS:   Follow up with PMD  Follow up with Neurology    DIET: Cardiac Diet    ACTIVITY: Activity as tolerated      DISCHARGE MEDICATIONS:   See Medication Reconciliation Form    · It is important that you take the medication exactly as they are prescribed. · Keep your medication in the bottles provided by the pharmacist and keep a list of the medication names, dosages, and times to be taken in your wallet. · Do not take other medications without consulting your doctor. NOTIFY YOUR PHYSICIAN FOR ANY OF THE FOLLOWING:   Fever over 101 degrees for 24 hours. Chest pain, shortness of breath, fever, chills, nausea, vomiting, diarrhea, change in mentation, falling, weakness, bleeding. Severe pain or pain not relieved by medications. Or, any other signs or symptoms that you may have questions about.       DISPOSITION:    Home With:   OT  PT  HH  RN      x SNF/Inpatient Rehab/LTAC    Independent/assisted living    Hospice    Other:     CDMP Checked: Yes x     PROBLEM LIST Updated:  Yes x       Signed:   Elana Sommers MD  7/29/2021  11:50 AM

## 2021-07-28 NOTE — ED NOTES
Pt having episode of non-verbal state and twitching her face. Pt's daughter who is at bedside reports this is typical seizure activity for her. Pt being given IV Versed. All VSS remain stable.

## 2021-07-28 NOTE — PROGRESS NOTES
1902: TRANSFER - IN REPORT:    Verbal report received from Lilia Kenney RN(name) on Dago Bromarc  being received from [facility](unit) for routine progression of care      Report consisted of patients Situation, Background, Assessment and   Recommendations(SBAR). Information from the following report(s) SBAR, Kardex, Intake/Output, MAR, Recent Results, Med Rec Status and Cardiac Rhythm SR was reviewed with the receiving nurse. Opportunity for questions and clarification was provided. Assessment completed upon patients arrival to unit and care assumed. 1938: Verbal shift change report given to Gayatri Flores RN (oncoming nurse) by Brenda Moore RN (offgoing nurse).  Report included the following information SBAR, Kardex, Intake/Output, MAR, Recent Results, Med Rec Status and Cardiac Rhythm SR.

## 2021-07-28 NOTE — ED TRIAGE NOTES
Patient arrived via [address] from [address] [facility]. EMS reports facility reports patient had a seizure prior to coming to ED. Patient denies having seizure and does not appear to be post ictal upon arrival to ED. Patient is oriented to situation, place and self and reports \"the year is 2000 something. \"    Patient denies all complaints at this time.

## 2021-07-29 LAB
ANION GAP SERPL CALC-SCNC: 3 MMOL/L (ref 5–15)
BASOPHILS # BLD: 0.1 K/UL (ref 0–0.1)
BASOPHILS NFR BLD: 1 % (ref 0–1)
BUN SERPL-MCNC: 12 MG/DL (ref 6–20)
BUN/CREAT SERPL: 25 (ref 12–20)
CALCIUM SERPL-MCNC: 8.4 MG/DL (ref 8.5–10.1)
CHLORIDE SERPL-SCNC: 112 MMOL/L (ref 97–108)
CO2 SERPL-SCNC: 29 MMOL/L (ref 21–32)
CREAT SERPL-MCNC: 0.48 MG/DL (ref 0.55–1.02)
DIFFERENTIAL METHOD BLD: ABNORMAL
EOSINOPHIL # BLD: 0.5 K/UL (ref 0–0.4)
EOSINOPHIL NFR BLD: 9 % (ref 0–7)
ERYTHROCYTE [DISTWIDTH] IN BLOOD BY AUTOMATED COUNT: 12.8 % (ref 11.5–14.5)
GLUCOSE SERPL-MCNC: 86 MG/DL (ref 65–100)
HCT VFR BLD AUTO: 36.1 % (ref 35–47)
HGB BLD-MCNC: 11.4 G/DL (ref 11.5–16)
IMM GRANULOCYTES # BLD AUTO: 0 K/UL
IMM GRANULOCYTES NFR BLD AUTO: 0 %
LAMOTRIGINE SERPL-MCNC: 3 UG/ML (ref 2–20)
LYMPHOCYTES # BLD: 1.2 K/UL (ref 0.8–3.5)
LYMPHOCYTES NFR BLD: 22 % (ref 12–49)
MAGNESIUM SERPL-MCNC: 2.5 MG/DL (ref 1.6–2.4)
MCH RBC QN AUTO: 32.6 PG (ref 26–34)
MCHC RBC AUTO-ENTMCNC: 31.6 G/DL (ref 30–36.5)
MCV RBC AUTO: 103.1 FL (ref 80–99)
MONOCYTES # BLD: 0.4 K/UL (ref 0–1)
MONOCYTES NFR BLD: 7 % (ref 5–13)
NEUTS SEG # BLD: 3.4 K/UL (ref 1.8–8)
NEUTS SEG NFR BLD: 61 % (ref 32–75)
NRBC # BLD: 0 K/UL (ref 0–0.01)
NRBC BLD-RTO: 0 PER 100 WBC
PLATELET # BLD AUTO: 214 K/UL (ref 150–400)
PMV BLD AUTO: 9.6 FL (ref 8.9–12.9)
POTASSIUM SERPL-SCNC: 3.5 MMOL/L (ref 3.5–5.1)
RBC # BLD AUTO: 3.5 M/UL (ref 3.8–5.2)
RBC MORPH BLD: ABNORMAL
SODIUM SERPL-SCNC: 144 MMOL/L (ref 136–145)
WBC # BLD AUTO: 5.6 K/UL (ref 3.6–11)

## 2021-07-29 PROCEDURE — 74011250637 HC RX REV CODE- 250/637: Performed by: FAMILY MEDICINE

## 2021-07-29 PROCEDURE — 74011250637 HC RX REV CODE- 250/637: Performed by: HOSPITALIST

## 2021-07-29 PROCEDURE — 80048 BASIC METABOLIC PNL TOTAL CA: CPT

## 2021-07-29 PROCEDURE — 36415 COLL VENOUS BLD VENIPUNCTURE: CPT

## 2021-07-29 PROCEDURE — 99218 HC RM OBSERVATION: CPT

## 2021-07-29 PROCEDURE — 85025 COMPLETE CBC W/AUTO DIFF WBC: CPT

## 2021-07-29 PROCEDURE — 96372 THER/PROPH/DIAG INJ SC/IM: CPT

## 2021-07-29 PROCEDURE — 74011250636 HC RX REV CODE- 250/636: Performed by: FAMILY MEDICINE

## 2021-07-29 PROCEDURE — 83735 ASSAY OF MAGNESIUM: CPT

## 2021-07-29 RX ORDER — MONTELUKAST SODIUM 10 MG/1
10 TABLET ORAL DAILY
Status: DISCONTINUED | OUTPATIENT
Start: 2021-07-29 | End: 2021-07-30 | Stop reason: HOSPADM

## 2021-07-29 RX ORDER — ALBUTEROL SULFATE 0.83 MG/ML
2.5 SOLUTION RESPIRATORY (INHALATION)
Status: DISCONTINUED | OUTPATIENT
Start: 2021-07-29 | End: 2021-07-30 | Stop reason: HOSPADM

## 2021-07-29 RX ORDER — CARBAMAZEPINE 400 MG/1
500 TABLET, EXTENDED RELEASE ORAL EVERY EVENING
Status: ON HOLD | COMMUNITY
End: 2022-09-12

## 2021-07-29 RX ORDER — SODIUM CHLORIDE 0.9 % (FLUSH) 0.9 %
5-40 SYRINGE (ML) INJECTION EVERY 8 HOURS
Status: DISCONTINUED | OUTPATIENT
Start: 2021-07-29 | End: 2021-07-30 | Stop reason: HOSPADM

## 2021-07-29 RX ORDER — LOPERAMIDE HYDROCHLORIDE 2 MG/1
2 CAPSULE ORAL
Status: DISCONTINUED | OUTPATIENT
Start: 2021-07-29 | End: 2021-07-30 | Stop reason: HOSPADM

## 2021-07-29 RX ORDER — CARBAMAZEPINE 400 MG/1
400 TABLET, EXTENDED RELEASE ORAL 2 TIMES DAILY
COMMUNITY

## 2021-07-29 RX ORDER — LEVOTHYROXINE SODIUM 100 UG/1
100 TABLET ORAL
Status: DISCONTINUED | OUTPATIENT
Start: 2021-07-29 | End: 2021-07-30 | Stop reason: HOSPADM

## 2021-07-29 RX ORDER — IPRATROPIUM BROMIDE AND ALBUTEROL SULFATE 2.5; .5 MG/3ML; MG/3ML
3 SOLUTION RESPIRATORY (INHALATION)
Status: ON HOLD | COMMUNITY
End: 2022-09-12

## 2021-07-29 RX ORDER — CEPHALEXIN 250 MG/1
250 CAPSULE ORAL
Status: ON HOLD | COMMUNITY
End: 2021-07-29

## 2021-07-29 RX ORDER — ERGOCALCIFEROL 1.25 MG/1
50000 CAPSULE ORAL
Status: ON HOLD | COMMUNITY
End: 2022-09-12

## 2021-07-29 RX ORDER — HEPARIN SODIUM 5000 [USP'U]/ML
5000 INJECTION, SOLUTION INTRAVENOUS; SUBCUTANEOUS EVERY 8 HOURS
Status: DISCONTINUED | OUTPATIENT
Start: 2021-07-29 | End: 2021-07-30 | Stop reason: HOSPADM

## 2021-07-29 RX ORDER — CARBAMAZEPINE 200 MG/1
400 TABLET, EXTENDED RELEASE ORAL DAILY
Status: DISCONTINUED | OUTPATIENT
Start: 2021-07-29 | End: 2021-07-30 | Stop reason: HOSPADM

## 2021-07-29 RX ORDER — CEPHALEXIN 250 MG/1
250 CAPSULE ORAL DAILY
Status: ON HOLD | COMMUNITY
End: 2022-09-12

## 2021-07-29 RX ORDER — ACETAMINOPHEN 500 MG
2000 TABLET ORAL DAILY
COMMUNITY

## 2021-07-29 RX ORDER — ACETAMINOPHEN 500 MG
500 TABLET ORAL DAILY
COMMUNITY
End: 2022-09-16

## 2021-07-29 RX ORDER — CALCIUM CARBONATE 200(500)MG
400 TABLET,CHEWABLE ORAL
Status: DISCONTINUED | OUTPATIENT
Start: 2021-07-29 | End: 2021-07-30 | Stop reason: HOSPADM

## 2021-07-29 RX ORDER — LAMOTRIGINE 100 MG/1
100 TABLET ORAL 2 TIMES DAILY
Status: DISCONTINUED | OUTPATIENT
Start: 2021-07-29 | End: 2021-07-30

## 2021-07-29 RX ORDER — ACETAMINOPHEN 500 MG
500 TABLET ORAL DAILY
Status: DISCONTINUED | OUTPATIENT
Start: 2021-07-29 | End: 2021-07-30 | Stop reason: HOSPADM

## 2021-07-29 RX ORDER — DICLOFENAC SODIUM 10 MG/G
4 GEL TOPICAL 2 TIMES DAILY
COMMUNITY

## 2021-07-29 RX ORDER — SODIUM CHLORIDE 0.9 % (FLUSH) 0.9 %
5-40 SYRINGE (ML) INJECTION AS NEEDED
Status: DISCONTINUED | OUTPATIENT
Start: 2021-07-29 | End: 2021-07-30 | Stop reason: HOSPADM

## 2021-07-29 RX ORDER — DIAZEPAM 10 MG/2ML
0.15 INJECTION INTRAMUSCULAR
Status: DISCONTINUED | OUTPATIENT
Start: 2021-07-29 | End: 2021-07-30 | Stop reason: HOSPADM

## 2021-07-29 RX ADMIN — Medication 10 ML: at 21:22

## 2021-07-29 RX ADMIN — Medication 10 ML: at 14:00

## 2021-07-29 RX ADMIN — ACETAMINOPHEN 500 MG: 500 TABLET ORAL at 09:20

## 2021-07-29 RX ADMIN — LAMOTRIGINE 100 MG: 100 TABLET ORAL at 09:20

## 2021-07-29 RX ADMIN — LEVOTHYROXINE SODIUM 100 MCG: 100 TABLET ORAL at 07:30

## 2021-07-29 RX ADMIN — HEPARIN SODIUM 5000 UNITS: 5000 INJECTION INTRAVENOUS; SUBCUTANEOUS at 02:48

## 2021-07-29 RX ADMIN — CARBAMAZEPINE 400 MG: 200 TABLET, EXTENDED RELEASE ORAL at 10:21

## 2021-07-29 RX ADMIN — Medication 10 ML: at 06:00

## 2021-07-29 RX ADMIN — HEPARIN SODIUM 5000 UNITS: 5000 INJECTION INTRAVENOUS; SUBCUTANEOUS at 09:20

## 2021-07-29 RX ADMIN — Medication 10 ML: at 02:00

## 2021-07-29 RX ADMIN — MONTELUKAST 10 MG: 10 TABLET, FILM COATED ORAL at 09:20

## 2021-07-29 RX ADMIN — CARBAMAZEPINE 500 MG: 200 TABLET, EXTENDED RELEASE ORAL at 21:48

## 2021-07-29 RX ADMIN — LAMOTRIGINE 100 MG: 100 TABLET ORAL at 21:22

## 2021-07-29 RX ADMIN — HEPARIN SODIUM 5000 UNITS: 5000 INJECTION INTRAVENOUS; SUBCUTANEOUS at 19:08

## 2021-07-29 NOTE — PROGRESS NOTES
Admission Medication Reconciliation:    Information obtained from: [facility] medication list updated with information provided by [address] dated 7/28/21. Significant PMH/Disease States:   Past Medical History:   Diagnosis Date    COPD (chronic obstructive pulmonary disease) ([facility]     Dementia ([facility] 3/29/2017    Hypothyroid     Seizure (HCC)     Seizure disorder (HCC)        Allergies:  Aricept [donepezil], Ativan [lorazepam], Benadryl [diphenhydramine hcl], Ciprofloxacin, Clindamycin, Codeine, Macrobid [nitrofurantoin monohyd/m-cryst], Pcn [penicillins], and Sulfa (sulfonamide antibiotics)    Prior to Admission Medications:   Prior to Admission Medications   Prescriptions Last Dose Informant Patient Reported? Taking?   acetaminophen (TYLENOL) 500 mg tablet 6/29/2021  Yes Yes   Sig: Take 500 mg by mouth every six (6) hours as needed for Pain. acetaminophen (TYLENOL) 500 mg tablet   Yes Yes   Sig: Take 500 mg by mouth daily. albuterol (ProAir HFA) 90 mcg/actuation inhaler 7/22/2021 Family Member Yes Yes   Sig: Take 1 Puff by inhalation every four (4) hours as needed for Shortness of Breath or Other (COPD). albuterol-ipratropium (DUO-NEB) 2.5 mg-0.5 mg/3 ml nebu   Yes Yes   Sig: 3 mL by Nebulization route every four (4) hours as needed for Wheezing. calcium carbonate (David-Gest Antacid) 200 mg calcium (500 mg) chew 6/29/2021 Family Member Yes Yes   Sig: Take 2 Tabs by mouth every four (4) hours as needed for Other (Indigestion). carBAMazepine XR (TEGretol XR) 400 mg SR tablet   Yes Yes   Sig: Take 400 mg by mouth daily. (400 mg in the morning; 500 mg in the evening)   carBAMazepine XR (TEGretol XR) 400 mg SR tablet   Yes Yes   Sig: Take 500 mg by mouth every evening. (400 mg in the morning; 500 mg in the evening)(dose = 400 mg + 100 mg tablets)   carboxymethylcellulose sodium (Refresh Tears) 0.5 % drop ophthalmic solution   Yes Yes   Sig: Administer 2 Drops to both eyes daily.    cephALEXin (KEFLEX) 250 mg capsule   Yes Yes   Sig: Take 250 mg by mouth daily. cholecalciferol (VITAMIN D3) (2,000 UNITS /50 MCG) cap capsule   Yes Yes   Sig: Take 2,000 Units by mouth daily. cranberry extract 450 mg tab tablet   Yes Yes   Sig: Take 450 mg by mouth daily. diclofenac (VOLTAREN) 1 % gel   Yes Yes   Sig: Apply 4 g to affected area two (2) times a day. (right back and hip joints)   ergocalciferol (ERGOCALCIFEROL) 1,250 mcg (50,000 unit) capsule   Yes Yes   Sig: Take 50,000 Units by mouth every seven (7) days. fluticasone-umeclidinium-vilanterol (Trelegy Ellipta) 100-62.5-25 mcg inhaler   Yes Yes   Sig: Take 1 Puff by inhalation daily. hydrocortisone (CORTAID) 1 % topical cream   Yes Yes   Sig: Apply  to affected area three (3) times daily as needed for Itching. use thin layer   ipratropium-albuteroL (Combivent Respimat)  mcg/actuation inhaler   Yes Yes   Sig: Take 1 Puff by inhalation three (3) times daily. lamoTRIgine (LaMICtal) 100 mg tablet 7/22/2021 Family Member No Yes   Sig: Take 1 Tab by mouth two (2) times a day. levothyroxine (SYNTHROID) 100 mcg tablet   Yes Yes   Sig: Take 100 mcg by mouth Daily (before breakfast). Indications: hypothyroidism   loperamide (IMMODIUM) 2 mg tablet 7/22/2021 Family Member Yes Yes   Sig: Take 2 mg by mouth three (3) times daily as needed for Diarrhea.   montelukast (Singulair) 10 mg tablet   Yes Yes   Sig: Take 10 mg by mouth daily. Facility-Administered Medications: None     Thank you for allowing me to participate in the care of this patient. Please contact the pharmacy at  or the medication reconciliation pharmacist at  with any questions. Alexa Pereyra Pharm. D., BCPS, BCPPS

## 2021-07-29 NOTE — H&P
[address] [facility] Adult  Hospitalist Group  History and Physical    Primary Care Provider: Carolee Duke MD  Date of Service:  7/28/2021    Subjective:     Danica Valladares is a 82 y.o. female with hx COPD, seizure disorder, dementia, and hypothyroidism who presents with possible seizure activity while at [facility]. She takes lamictal and carbamazepine regularly. She states that she had no prodrome prior to seizure events today, and states that she tried to focus to get herself out of the seizure. In the ED, she was hemodynamically stable. Labs were grossly unremarkable. UA showed trace leukocyte esterase. In the ED, she received keppra, and versed. Review of Systems:    All other review of systems were negative except for that written in the History of Present Illness. Past Medical History:   Diagnosis Date    COPD [facility]chronic obstructive pulmonary disease) [facility]     Dementia ([facility] 3/29/2017    Hypothyroid     Seizure ([facility]     Seizure disorder (HCC)       Past Surgical History:   Procedure Laterality Date    HX HIP REPLACEMENT       Prior to Admission medications    Medication Sig Start Date End Date Taking? Authorizing Provider   carBAMazepine XR (TEGretol XR) 400 mg SR tablet Take 1 Tab by mouth daily. 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 4/16/21   Karen Simpson MD   lamoTRIgine (LaMICtal) 100 mg tablet Take 1 Tab by mouth two (2) times a day. 4/16/21   Karen Simpson MD   cefUROXime (CEFTIN) 500 mg tablet Take 1 Tab by mouth two (2) times a day. 4/16/21   Karen Simpson MD   acetaminophen (TYLENOL) 500 mg tablet Take 500 mg by mouth every six (6) hours as needed for Pain.     Provider, Marylin   carboxymethylcellulose sodium (REFRESH LIQUIGEL) 1 % dlgl ophthalmic solution Administer 1 Drop to both eyes three (3) times daily as needed for Other (Dry eye). Provider, Historical   calcium carbonate (David-Gest Antacid) 200 mg calcium (500 mg) chew Take 2 Tabs by mouth every four (4) hours as needed for Other (Indigestion). Other, MD Shayne   montelukast (Singulair) 10 mg tablet Take 10 mg by mouth daily. Provider, Historical   carboxymethylcellulose sodium (Refresh Tears) 0.5 % drop ophthalmic solution Administer 2 Drops to both eyes daily. Provider, Historical   fluticasone-umeclidinium-vilanterol (Trelegy Ellipta) 100-62.5-25 mcg inhaler Take 1 Puff by inhalation daily. Provider, Historical   sodium chloride (Ayr Saline) 0.65 % nasal squeeze bottle 2 Sprays by Both Nostrils route two (2) times a day. Provider, Historical   hydrocortisone (CORTAID) 1 % topical cream Apply  to affected area three (3) times daily as needed for Itching. use thin layer    Provider, Historical   loperamide (IMMODIUM) 2 mg tablet Take 2 mg by mouth three (3) times daily as needed for Diarrhea. Provider, Historical   albuterol (ProAir HFA) 90 mcg/actuation inhaler Take 1 Puff by inhalation every four (4) hours as needed for Shortness of Breath or Other (COPD). Provider, Historical   levothyroxine (SYNTHROID) 100 mcg tablet Take 100 mcg by mouth Daily (before breakfast). Indications: hypothyroidism    Provider, Historical   acetaminophen (MAPAP) 500 mg cap Take 1 Cap by mouth daily. Indications: Arthritic Pain    Provider, Historical   albuterol-ipratropium (DUO-NEB) 2.5 mg-0.5 mg/3 ml nebu 3 mL by Nebulization route three (3) times daily.     Provider, Historical     Allergies   Allergen Reactions    Aricept [Donepezil] Unknown (comments)    Ativan [Lorazepam] Unable to Obtain    Benadryl [Diphenhydramine Hcl] Unknown (comments)    Ciprofloxacin Unknown (comments)    Clindamycin Unknown (comments)     Listed on [facility]    Codeine Unknown (comments)    Macrobid [Nitrofurantoin Monohyd/M-Cryst] Unknown (comments)    Pcn [Penicillins] Other (comments)    Sulfa (Sulfonamide Antibiotics) Other (comments)      History reviewed. No pertinent family history. SOCIAL HISTORY:  Patient resides at [facility]  Smoking history: none  Alcohol history: none    Objective:       Physical Exam:   Visit Vitals  BP (!) 145/92 (BP Patient Position: At rest)   Pulse 77   Temp 98.1 °F (36.7 °C)   Resp 20   Ht 5' 2\" (1.575 m)   Wt 61.4 kg (135 lb 5.8 oz)   SpO2 94%   BMI 24.76 kg/m²     General:  Alert, cooperative, no distress, appears stated age. Head:  Normocephalic, without obvious abnormality, atraumatic. Eyes:  Conjunctivae/corneas clear. PERRL, EOMs intact. Ears:  Normal TMs and external ear canals both ears. Nose: Nares normal. Septum midline. Throat: Lips, mucosa, and tongue normal.    Neck: Supple, symmetrical, trachea midline   Back:   Symmetric, no curvature. ROM normal.   Lungs:   Clear to auscultation bilaterally. Chest wall:  No tenderness or deformity. Heart:  Regular rate and rhythm, S1, S2 normal, no murmur, click, rub or gallop. Abdomen:   Soft, non-tender. Bowel sounds normal.            Extremities: Extremities normal, atraumatic, no cyanosis or edema. Pulses: 2+ radial pulses   Skin: Skin color, texture, turgor normal. No rashes or lesions. Neurologic: No focal deficit     Data Review: All diagnostic labs and studies have been reviewed.     Assessment:     Active Problems:    Seizure ([address] (3/28/2017)        Plan:     #Seizure d/o  -reported seizure activity  -s/p versed and keppra  -therapeutic AED drug levels pending  -restart home meds  -seizure precautions  -consult neuro    #Hypothyroidism  -continue home levothyroxine    #Allergies/Asthma  -continue singulair    FEN: cardiac  dvt ppx: heparin SC  MPOA: Min Hannibal (daughter)  Code: DNR    Signed By: Amanda Hutton MD     July 28, 2021

## 2021-07-29 NOTE — PROGRESS NOTES
Transition of Care Plan   RUR- Observation   DISPOSITION: [facility] to [facility] when stable   F/U with PCP/Specialist     Transport: [facility] for 11am tomorrow morning      Reason for Admission:  Possible seizure activity                      RUR Score:     observation                 Plan for utilizing home health:   N/A  - no consults to PT/OT       PCP: First and Last name:  Cyn Peng MD     Name of Practice: At [address] [place] [address]   Are you a current patient: Yes/No:    Approximate date of last visit:    Can you participate in a virtual visit with your PCP:                     Current Advanced Directive/Advance Care Plan: DNR      Healthcare Decision Maker: Jane Lo, [phone]  Click here to complete [place] including selection of the Healthcare Decision Maker Relationship (ie \"Primary\")                             Transition of Care Plan:    CM spoke with patient's daughter via phone to complete initial assessment. Living situation: Lives at [address] [place] [address] in [address]  ADLs: needs assistance  DME: RW  Previous I[facility]SNF: [facility]  Previous home health: none  Demographics:confirmed  Pharmacy: on site  Main point of contact: Mallyjose emeche Wally, [phone]    It is the patient's daughter's goal for patient to return to [facility] when stable. CM left message for JOAN Alaniz at [facility] regarding return to facility. CM to follow patient progress and assist as recommended with STACEY plan. 10:39am: CM spoke with Pascale at [facility] and faxed clinicals to [phone] for their review. 12:34pm: CM left message for JOAN at [facility].    3:38pm: CM received call from Huntington at [facility] regarding patient's clinicals. Patient would need to be back no later than 4pm today. Dr. Delia yao, CM plan to arrange discharge tomorrow morning. Call report will be [phone] [facility].                     Care Management Interventions  PCP Verified by CM: Yes  Palliative Care Criteria Met (RRAT>21 & CHF Dx)?: No  Mode of Transport at Discharge: S  MyChart Signup: Yes  Discharge Durable Medical Equipment: No  Health Maintenance Reviewed: Yes  Physical Therapy Consult: No  Occupational Therapy Consult: No  Speech Therapy Consult: No  Current Support Network: Assisted Living  Confirm Follow Up Transport: Other (see comment) (BLS)  Discharge Location  Discharge Placement: Assisted Living    Oral and Written notification given to patient and/or caregiver informing them that they are currently an Outpatient receiving care in our facility. Outpatient services include Observation Services. KATIE Triana.

## 2021-07-29 NOTE — PROGRESS NOTES
[facility] Adult  Hospitalist Group                                                                                          Hospitalist Progress Note  Michael Welch MD  Answering service: [phone] from in house phone        Date of Service:  2021  NAME:  Connie Garcia  :  1935  MRN:  [id number]      Admission Summary:   Connie Garcia is a 82 y.o. female with hx COPD, seizure disorder, dementia, and hypothyroidism who presents with possible seizure activity while at [facility]. She takes lamictal and carbamazepine regularly. She states that she had no prodrome prior to seizure events today, and states that she tried to focus to get herself out of the seizure.     In the ED, she was hemodynamically stable. Labs were grossly unremarkable. UA showed trace leukocyte esterase.     In the ED, she received keppra, and versed.     Interval history / Subjective:     F/u possible seizure   Admitted this am  Feels fine now  Assessment & Plan:     #Seizure d/o  -last admission about 4 months ago with similar presentation when CBM increased  -reported seizure activity  -s/p versed and keppra  -CBM levels normal  -restart home meds  -seizure precautions  -Awaiting Neuro     #Hypothyroidism  -continue home levothyroxine     #Allergies/Asthma/COPD  -continue singulair    Cholelithiasis  -Outpatient follow up    Dementia  -supportive care    Cardiac diet    Code status: DNR  DVT prophylaxis: Heparin  MPOA: Eva Cardenas (daughter)  PTA: [facility]: Follow Neuro, will discharge if cleared    Care Plan discussed with: Patient/Family  Anticipated Disposition: SNF/LTC  Anticipated Discharge: Less than 24 hours     Hospital Problems  Date Reviewed: 2021        Codes Class Noted POA    * (Principal) Seizure ([facility] (Chronic) ICD-10-CM: R56.9  ICD-9-CM: 780.39  3/28/2017 Yes                Review of Systems:   A comprehensive review of systems was negative except for that written in the HPI. Vital Signs:    Last 24hrs VS reviewed since prior progress note. Most recent are:  Visit Vitals  /68   Pulse 71   Temp 97.9 °F (36.6 °C)   Resp 17   Ht 5' 2\" (1.575 m)   Wt 61.4 kg (135 lb 5.8 oz)   SpO2 94%   BMI 24.76 kg/m²       No intake or output data in the 24 hours ending 07/29/21 0720     Physical Examination:     I had a face to face encounter with this patient and independently examined them on 7/29/2021 as outlined below:          Constitutional:  No acute distress   ENT:  Oral mucosa moist, oropharynx benign. Resp:  CTA bilaterally. No wheezing/rhonchi/rales. No accessory muscle use   CV:  Regular rhythm, normal rate, no murmurs, gallops, rubs    GI:  Soft, non distended, non tender. normoactive bowel sounds, no hepatosplenomegaly     Musculoskeletal:  No edema, warm, 2+ pulses throughout    Neurologic:  Moves all extremities. AAOx3, CN II-XII reviewed            Data Review:    Review and/or order of clinical lab test      Labs:     Recent Labs     07/29/21  0326 07/28/21  1450   WBC 5.6 6.0   HGB 11.4* 12.1   HCT 36.1 38.6    219     Recent Labs     07/29/21  0326 07/28/21  1450    143   K 3.5 4.0   * 108   CO2 29 29   BUN 12 17   CREA 0.48* 0.67   GLU 86 116*   CA 8.4* 8.2*   MG 2.5*  --      Recent Labs     07/28/21  1450   ALT 15   *   TBILI 0.2   TP 6.6   ALB 3.0*   GLOB 3.6     No results for input(s): INR, PTP, APTT, INREXT in the last 72 hours. No results for input(s): FE, TIBC, PSAT, FERR in the last 72 hours. Lab Results   Component Value Date/Time    Folate 17.4 04/05/2021 12:30 AM      No results for input(s): PH, PCO2, PO2 in the last 72 hours. No results for input(s): CPK, CKNDX, TROIQ in the last 72 hours.     No lab exists for component: CPKMB  Lab Results   Component Value Date/Time    Cholesterol, total 234 (H) 10/26/2019 03:58 AM    HDL Cholesterol 101 10/26/2019 03:58 AM    LDL, calculated 127.4 (H) 10/26/2019 03:58 AM Triglyceride 28 10/26/2019 03:58 AM    CHOL/HDL Ratio 2.3 10/26/2019 03:58 AM     Lab Results   Component Value Date/Time    Glucose (POC) 117 (H) 03/10/2017 07:36 PM     Lab Results   Component Value Date/Time    Color YELLOW/STRAW 07/28/2021 06:06 PM    Appearance CLEAR 07/28/2021 06:06 PM    Specific gravity 1.010 07/28/2021 06:06 PM    Specific gravity 1.011 04/04/2021 04:28 PM    pH (UA) 7.0 07/28/2021 06:06 PM    Protein Negative 07/28/2021 06:06 PM    Glucose Negative 07/28/2021 06:06 PM    Ketone Negative 07/28/2021 06:06 PM    Bilirubin Negative 07/28/2021 06:06 PM    Urobilinogen 0.2 07/28/2021 06:06 PM    Nitrites Negative 07/28/2021 06:06 PM    Leukocyte Esterase TRACE (A) 07/28/2021 06:06 PM    Epithelial cells FEW 07/28/2021 06:06 PM    Bacteria Negative 07/28/2021 06:06 PM    WBC 0-4 07/28/2021 06:06 PM    RBC 0-5 07/28/2021 06:06 PM         Medications Reviewed:     Current Facility-Administered Medications   Medication Dose Route Frequency    sodium chloride (NS) flush 5-40 mL  5-40 mL IntraVENous Q8H    sodium chloride (NS) flush 5-40 mL  5-40 mL IntraVENous PRN    diazePAM (VALIUM) injection 9.2 mg  0.15 mg/kg IntraVENous Q5MIN PRN    heparin (porcine) injection 5,000 Units  5,000 Units SubCUTAneous Q8H    levothyroxine (SYNTHROID) tablet 100 mcg  100 mcg Oral ACB    montelukast (SINGULAIR) tablet 10 mg  10 mg Oral DAILY     ______________________________________________________________________  EXPECTED LENGTH OF STAY: - - -  ACTUAL LENGTH OF STAY:          Cassie Barrow MD

## 2021-07-29 NOTE — PROGRESS NOTES
Rounded on Gnosticism patients and provided Anointing of the Sick at request of patient.     Rufina Cruz

## 2021-07-29 NOTE — ROUTINE PROCESS
Bedside and Verbal shift change report given to [address] (oncoming nurse) by Mriella Cortez (offgoing nurse).  Report included the following information SBAR, Kardex, Intake/Output, MAR, Recent Results, Cardiac Rhythm NSR, Alarm Parameters , Quality Measures and Dual Neuro Assessment. '

## 2021-07-29 NOTE — PROGRESS NOTES
Bedside shift change report given to Doreen Salas 71 (oncoming nurse) by Ellen Alfaro (offgoing nurse). Report included the following information SBAR, Kardex, Intake/Output, MAR, Cardiac Rhythm NSR and Dual Neuro Assessment.

## 2021-07-29 NOTE — PROGRESS NOTES
Problem: Pressure Injury - Risk of  Goal: *Prevention of pressure injury  Description: Document George Scale and appropriate interventions in the flowsheet.   Outcome: Progressing Towards Goal  Note: Pressure Injury Interventions:       Moisture Interventions: Absorbent underpads, Apply protective barrier, creams and emollients, Check for incontinence Q2 hours and as needed, Internal/External urinary devices, Minimize layers    Activity Interventions: Increase time out of bed, PT/OT evaluation    Mobility Interventions: Float heels, HOB 30 degrees or less, Pressure redistribution bed/mattress (bed type), PT/OT evaluation    Nutrition Interventions: Document food/fluid/supplement intake, Offer support with meals,snacks and hydration

## 2021-07-30 VITALS
WEIGHT: 138.89 LBS | HEIGHT: 62 IN | SYSTOLIC BLOOD PRESSURE: 163 MMHG | OXYGEN SATURATION: 98 % | RESPIRATION RATE: 20 BRPM | TEMPERATURE: 98.1 F | DIASTOLIC BLOOD PRESSURE: 82 MMHG | BODY MASS INDEX: 25.56 KG/M2 | HEART RATE: 68 BPM

## 2021-07-30 PROBLEM — R56.9 SEIZURE (HCC): Chronic | Status: RESOLVED | Noted: 2017-03-28 | Resolved: 2021-07-30

## 2021-07-30 PROCEDURE — 96372 THER/PROPH/DIAG INJ SC/IM: CPT

## 2021-07-30 PROCEDURE — 74011250636 HC RX REV CODE- 250/636: Performed by: FAMILY MEDICINE

## 2021-07-30 PROCEDURE — 99215 OFFICE O/P EST HI 40 MIN: CPT | Performed by: NURSE PRACTITIONER

## 2021-07-30 PROCEDURE — 99218 HC RM OBSERVATION: CPT

## 2021-07-30 PROCEDURE — 74011250637 HC RX REV CODE- 250/637: Performed by: FAMILY MEDICINE

## 2021-07-30 PROCEDURE — 74011250637 HC RX REV CODE- 250/637: Performed by: HOSPITALIST

## 2021-07-30 RX ORDER — LAMOTRIGINE 100 MG/1
150 TABLET ORAL 2 TIMES DAILY
Qty: 60 TABLET | Refills: 1 | Status: ON HOLD | OUTPATIENT
Start: 2021-07-30 | End: 2022-09-12

## 2021-07-30 RX ORDER — CARBAMAZEPINE 200 MG/1
600 TABLET, EXTENDED RELEASE ORAL
Status: DISCONTINUED | OUTPATIENT
Start: 2021-07-30 | End: 2021-07-30 | Stop reason: HOSPADM

## 2021-07-30 RX ORDER — LAMOTRIGINE 100 MG/1
150 TABLET ORAL 2 TIMES DAILY
Status: DISCONTINUED | OUTPATIENT
Start: 2021-07-30 | End: 2021-07-30 | Stop reason: HOSPADM

## 2021-07-30 RX ADMIN — LAMOTRIGINE 100 MG: 100 TABLET ORAL at 08:58

## 2021-07-30 RX ADMIN — LEVOTHYROXINE SODIUM 100 MCG: 100 TABLET ORAL at 07:16

## 2021-07-30 RX ADMIN — Medication 10 ML: at 07:17

## 2021-07-30 RX ADMIN — HEPARIN SODIUM 5000 UNITS: 5000 INJECTION INTRAVENOUS; SUBCUTANEOUS at 10:11

## 2021-07-30 RX ADMIN — HEPARIN SODIUM 5000 UNITS: 5000 INJECTION INTRAVENOUS; SUBCUTANEOUS at 01:46

## 2021-07-30 RX ADMIN — CARBAMAZEPINE 400 MG: 200 TABLET, EXTENDED RELEASE ORAL at 08:58

## 2021-07-30 RX ADMIN — MONTELUKAST 10 MG: 10 TABLET, FILM COATED ORAL at 08:57

## 2021-07-30 RX ADMIN — ACETAMINOPHEN 500 MG: 500 TABLET ORAL at 08:57

## 2021-07-30 NOTE — PROGRESS NOTES
Transition of Care Plan   RUR- Observation   DISPOSITION: Return to [address] [place] [address] of [address] pending Neuro eval   F/U with PCP/Specialist     [facility]: [facility]    [facility]. Patient is pending Neuro eval for discharge. Patient is unable to return to [facility] over the weekend. CM to follow progress with Neuro and adjust transport as needed. 9:18am: CM spoke with [address] [place] [address] [phone] to inform nursing staff of patient discharge. Call report to [address] [place] [address] [phone] , Sathya Guy 32 Patient's daughter has been informed of discharge and is in agreement with the plan. 12:11pm: CM called [facility] for updated ETA, 4:40pm. CM called [facility] who is able to transport patient at 3pm today. [facility] has been cancelled. Carrillo Quiles M.S.JEREMY.

## 2021-07-30 NOTE — PROGRESS NOTES
Problem: Pressure Injury - Risk of  Goal: *Prevention of pressure injury  Description: Document George Scale and appropriate interventions in the flowsheet. Outcome: Progressing Towards Goal  Note: Pressure Injury Interventions:       Moisture Interventions: Absorbent underpads, Apply protective barrier, creams and emollients, Assess need for specialty bed, Check for incontinence Q2 hours and as needed    Activity Interventions: Increase time out of bed    Mobility Interventions: Assess need for specialty bed, Chair cushion, Float heels    Nutrition Interventions: Document food/fluid/supplement intake                     Problem: Patient Education: Go to Patient Education Activity  Goal: Patient/Family Education  Outcome: Progressing Towards Goal     Problem: Falls - Risk of  Goal: *Absence of Falls  Description: Document Leigh Ann Pillow Fall Risk and appropriate interventions in the flowsheet.   Outcome: Progressing Towards Goal  Note: Fall Risk Interventions:       Mentation Interventions: Bed/chair exit alarm    Medication Interventions: Bed/chair exit alarm    Elimination Interventions: Bed/chair exit alarm, Call light in reach              Problem: Patient Education: Go to Patient Education Activity  Goal: Patient/Family Education  Outcome: Progressing Towards Goal     Problem: Discharge Planning  Goal: *Discharge to safe environment  Outcome: Progressing Towards Goal     Problem: Seizure Disorder (Adult)  Goal: *STG: Remains free of seizure activity  Outcome: Progressing Towards Goal  Goal: *STG: Maintains lab values within therapeutic range  Outcome: Progressing Towards Goal  Goal: *STG/LTG: Complies with medication therapy  Outcome: Progressing Towards Goal  Goal: *STG: Remains free of injury during seizure activity  Outcome: Progressing Towards Goal  Goal: *STG: Remains safe in hospital  Outcome: Progressing Towards Goal  Goal: Interventions  Outcome: Progressing Towards Goal

## 2021-07-30 NOTE — DISCHARGE SUMMARY
Discharge Summary       PATIENT ID: Alvaro Stearns  MRN: [id number]   YOB: 1935    DATE OF ADMISSION: 7/28/2021  2:44 PM    DATE OF DISCHARGE: 7/31/21   PRIMARY CARE PROVIDER: Segundo Echavarria MD     ATTENDING PHYSICIAN: Darby Braxton  DISCHARGING PROVIDER: Emerson Alicea MD    To contact this individual call [phone] and ask the  to page. If unavailable ask to be transferred the Adult Hospitalist Department. CONSULTATIONS: IP CONSULT TO NEUROLOGY    PROCEDURES/SURGERIES: * No surgery found *    ADMITTING DIAGNOSES & HOSPITAL COURSE:   Jonelle Jones is a 82 y. o. female with hx COPD, seizure disorder, dementia, and hypothyroidism who presents with possible seizure activity while at [facility]. Micki Wilson takes lamictal and carbamazepine regularly.  She states that she had no prodrome prior to seizure events today, and states that she tried to focus to get herself out of the seizure.           Assessment & Plan:      #Seizure d/o  -last admission about 4 months ago with similar presentation when CBM increased  -reported seizure activity  -s/p versed and keppra  -CBM levels normal  -restart home meds lamictal increased to 150  -seizure precautions  -Awaiting Neuro     #Hypothyroidism  -continue home levothyroxine     #Allergies/Asthma/COPD  -continue singulair     Cholelithiasis  -Outpatient follow up     Dementia  -supportive care    Pt on keflex from Urology and continuing for renal stone per urology        DISCHARGE DIAGNOSES / PLAN:      1. D/c to PTA address     ADDITIONAL CARE RECOMMENDATIONS:        PENDING TEST RESULTS:   At the time of discharge the following test results are still pending:     FOLLOW UP APPOINTMENTS:    Follow-up Information     Follow up With Specialties Details Why Contact Info    Gaby Nielson MD Neurology In 2 weeks  [address] [facility]      Segundo Echavarria MD Emergency Medicine                DIET: Regular Diet and Cardiac Diet    ACTIVITY: Activity as tolerated    WOUND CARE:     EQUIPMENT needed:       DISCHARGE MEDICATIONS:  Current Discharge Medication List      CONTINUE these medications which have CHANGED    Details   lamoTRIgine (LaMICtal) 100 mg tablet Take 1.5 Tablets by mouth two (2) times a day. Qty: 60 Tablet, Refills: 1  Start date: 7/30/2021         CONTINUE these medications which have NOT CHANGED    Details   !! acetaminophen (TYLENOL) 500 mg tablet Take 500 mg by mouth daily. !! carBAMazepine XR (TEGretol XR) 400 mg SR tablet Take 400 mg by mouth daily. (400 mg in the morning; 500 mg in the evening)      ! ! carBAMazepine XR (TEGretol XR) 400 mg SR tablet Take 500 mg by mouth every evening. (400 mg in the morning; 500 mg in the evening)(dose = 400 mg + 100 mg tablets)      cephALEXin (KEFLEX) 250 mg capsule Take 250 mg by mouth daily. cranberry extract 450 mg tab tablet Take 450 mg by mouth daily. diclofenac (VOLTAREN) 1 % gel Apply 4 g to affected area two (2) times a day. (right back and hip joints)      ipratropium-albuteroL (Combivent Respimat)  mcg/actuation inhaler Take 1 Puff by inhalation three (3) times daily. ergocalciferol (ERGOCALCIFEROL) 1,250 mcg (50,000 unit) capsule Take 50,000 Units by mouth every seven (7) days. cholecalciferol (VITAMIN D3) (2,000 UNITS /50 MCG) cap capsule Take 2,000 Units by mouth daily. albuterol-ipratropium (DUO-NEB) 2.5 mg-0.5 mg/3 ml nebu 3 mL by Nebulization route every four (4) hours as needed for Wheezing. !! acetaminophen (TYLENOL) 500 mg tablet Take 500 mg by mouth every six (6) hours as needed for Pain. calcium carbonate (David-Gest Antacid) 200 mg calcium (500 mg) chew Take 2 Tabs by mouth every four (4) hours as needed for Other (Indigestion). montelukast (Singulair) 10 mg tablet Take 10 mg by mouth daily.       carboxymethylcellulose sodium (Refresh Tears) 0.5 % drop ophthalmic solution Administer 2 Drops to both eyes daily. fluticasone-umeclidinium-vilanterol (Trelegy Ellipta) 100-62.5-25 mcg inhaler Take 1 Puff by inhalation daily. hydrocortisone (CORTAID) 1 % topical cream Apply  to affected area three (3) times daily as needed for Itching. use thin layer      loperamide (IMMODIUM) 2 mg tablet Take 2 mg by mouth three (3) times daily as needed for Diarrhea. albuterol (ProAir HFA) 90 mcg/actuation inhaler Take 1 Puff by inhalation every four (4) hours as needed for Shortness of Breath or Other (COPD). levothyroxine (SYNTHROID) 100 mcg tablet Take 100 mcg by mouth Daily (before breakfast). Indications: hypothyroidism       !! - Potential duplicate medications found. Please discuss with provider. STOP taking these medications       acetaminophen (MAPAP) 500 mg cap Comments:   Reason for Stopping:                 NOTIFY YOUR PHYSICIAN FOR ANY OF THE FOLLOWING:   Fever over 101 degrees for 24 hours. Chest pain, shortness of breath, fever, chills, nausea, vomiting, diarrhea, change in mentation, falling, weakness, bleeding. Severe pain or pain not relieved by medications. Or, any other signs or symptoms that you may have questions about. DISPOSITION:  x  Home With:   OT  PT  HH  RN       Long term SNF/Inpatient Rehab    Independent/assisted living    Hospice    Other:       PATIENT CONDITION AT DISCHARGE:     Functional status    Poor    x Deconditioned     Independent      Cognition    x Lucid     Forgetful     Dementia      Catheters/lines (plus indication)    Almaraz     PICC     PEG    x None      Code status     Full code    x DNR      PHYSICAL EXAMINATION AT DISCHARGE:  General:          Alert, cooperative, no distress, appears stated age.      HEENT:           Atraumatic, anicteric sclerae, pink conjunctivae                          No oral ulcers, mucosa moist, throat clear, dentition fair  Neck:               Supple, symmetrical  Lungs:             Clear to auscultation bilaterally. No Wheezing or Rhonchi. No rales. Chest wall:      No tenderness  No Accessory muscle use. Heart:              Regular  rhythm,  No  murmur   No edema  Abdomen:        Soft, non-tender. Not distended. Bowel sounds normal  Extremities:     No cyanosis. No clubbing,                            Skin turgor normal, Capillary refill normal  Skin:                Not pale. Not Jaundiced  No rashes   Psych:             Not anxious or agitated.   Neurologic:      Alert, moves all extremities, answers questions appropriately and responds to commands       CHRONIC MEDICAL DIAGNOSES:  Problem List as of 7/30/2021 Date Reviewed: 7/29/2021        Codes Class Noted - Resolved    Acute cystitis ICD-10-CM: N30.00  ICD-9-CM: 595.0  8/28/2017 - Present        Dementia ([facility] (Chronic) ICD-10-CM: F03.90  ICD-9-CM: 294.20  3/29/2017 - Present        UTI (urinary tract infection) ICD-10-CM: N39.0  ICD-9-CM: 599.0  3/29/2017 - Present        Acquired hypothyroidism (Chronic) ICD-10-CM: E03.9  ICD-9-CM: 244.9  3/28/2017 - Present        RESOLVED: Acute delirium ICD-10-CM: R41.0  ICD-9-CM: 780.09  4/4/2021 - 4/7/2021        RESOLVED: COPD with acute exacerbation ([facility] ICD-10-CM: J44.1  ICD-9-CM: 491.21  10/26/2019 - 10/27/2019        RESOLVED: Hypoxia ICD-10-CM: R09.02  ICD-9-CM: 799.02  10/25/2019 - 10/27/2019        * (Principal) RESOLVED: Seizure ([facility] (Chronic) ICD-10-CM: R56.9  ICD-9-CM: 780.39  3/28/2017 - 7/30/2021        RESOLVED: COPD (chronic obstructive pulmonary disease) ([facility] (Chronic) ICD-10-CM: J44.9  ICD-9-CM: 496  3/28/2017 - 10/27/2019              Greater than 30  minutes were spent with the patient on counseling and coordination of care    Signed:   Bj Arreaga MD  7/30/2021  11:05 AM

## 2021-07-30 NOTE — CONSULTS
Neurology  Consult  [facility] FNP-C  Neurology NP  [phone]    Patient: Shital Powell MRN: [id number]  SSN: [id number]    YOB: 1935  Age: 82 y.o. Sex: female        Chief Complaint:Seizures     Subjective:      Shital Powell is a 82 y.o. female with a PMH significant for COPD, seizure disease, dementia, and hypothyroidism who presents at [address] [place] [address] with suspected seizure activity . She received keppra and versed in the ED from another episode of nonverbal state and twitching her face. Her Carbamazepine was 9.2 and Lamotrigine  Was 3.0. CTH no acute intracranial hemorrhage, mass, or infarct. She was recently seen in the hospital 4/14/2021by Dr. Lali Lemus and prior hospitalization 4/3/2021 she was seen by Dr. Citlaly Jean. ASD were adjusted between the 2 hospitalization with ASD dose being Carbamazepine 600 mg PM and Lamictal 100 BID. On 5/2/2021 her Lamotrigine  Level was 1.0 and Carbamazepine  Level was 5.6     Clinically she is doing well. She is alert and orient to self ,place, location,, unable to tell me year and day \" 2000 something' which is her baseline. No events overnight. She denies HA, dizziness, weakness, vision changes. Past Medical History:   Diagnosis Date    COPD (chronic obstructive pulmonary disease) ([facility]     Dementia ([facility] 3/29/2017    Hypothyroid     Seizure ([facility]     Seizure disorder ([facility]      Past Surgical History:   Procedure Laterality Date    HX HIP REPLACEMENT        History reviewed. No pertinent family history.   Social History     Tobacco Use    Smoking status: Never Smoker    Smokeless tobacco: Never Used   Substance Use Topics    Alcohol use: Yes      Current Facility-Administered Medications   Medication Dose Route Frequency Provider Last Rate Last Admin    sodium chloride (NS) flush 5-40 mL  5-40 mL IntraVENous Q8H Karen Mina MD   10 mL at 07/30/21 0717    sodium chloride (NS) flush 5-40 mL  5-40 mL IntraVENous PRN Gorge Hernandez Rosa Isela Sharpe MD        diazePAM (VALIUM) injection 9.2 mg  0.15 mg/kg IntraVENous Q5MIN PRN Angelica Jacobson MD        heparin (porcine) injection 5,000 Units  5,000 Units SubCUTAneous Q8H Angelica Jacobson MD   5,000 Units at 07/30/21 0146    levothyroxine (SYNTHROID) tablet 100 mcg  100 mcg Oral ACB Angelica Jacobson MD   100 mcg at 07/30/21 0716    montelukast (SINGULAIR) tablet 10 mg  10 mg Oral DAILY Angelica Jacobson MD   10 mg at 07/29/21 0920    carBAMazepine XR (TEGretol XR) tablet 500 mg  500 mg Oral QHS Ruby Alvarado MD   500 mg at 07/29/21 2148    carBAMazepine XR (TEGretol XR) tablet 400 mg  400 mg Oral DAILY Ruby Alvarado MD   400 mg at 07/29/21 1021    lamoTRIgine (LaMICtal) tablet 100 mg  100 mg Oral BID Ruby Alvarado MD   100 mg at 07/29/21 2122    acetaminophen (TYLENOL) tablet 500 mg  500 mg Oral DAILY Ruby Alvarado MD   500 mg at 07/29/21 0920    albuterol (PROVENTIL VENTOLIN) nebulizer solution 2.5 mg  2.5 mg Nebulization Q6H PRN Ruby Alvarado MD        calcium carbonate (TUMS) chewable tablet 400 mg [elemental]  400 mg Oral Q4H PRN Ruby Alvarado MD        loperamide (IMODIUM) capsule 2 mg  2 mg Oral TID PRN Ruby Alvarado MD            Allergies   Allergen Reactions    Aricept [Donepezil] Unknown (comments)    Ativan [Lorazepam] Unable to Obtain    Benadryl [Diphenhydramine Hcl] Unknown (comments)    Ciprofloxacin Unknown (comments)    Clindamycin Unknown (comments)     Listed on [facility]    Codeine Unknown (comments)    Macrobid [Nitrofurantoin Monohyd/M-Cryst] Unknown (comments)    Pcn [Penicillins] Other (comments)    Sulfa (Sulfonamide Antibiotics) Other (comments)       Review of Systems:  Pertinent items are noted in the History of Present Illness.       Objective:     Vitals:    07/29/21 2215 07/30/21 0145 07/30/21 0200 07/30/21 0600   BP: (!) 150/73  (!) 113/57 (!) 126/98   Pulse: 67  63 91   Resp: 23  21 19   Temp: 98.2 °F (36.8 °C)  97.8 °F (36.6 °C) 97.8 °F (36.6 °C) SpO2: 95%  97% 98%   Weight:  138 lb 14.2 oz (63 kg)     Height:            Physical Exam:  GENERAL: alert, cooperative, no distress, appears stated age  LUNG: clear to auscultation bilaterally  HEART: regular rate and rhythm      Neurologic Exam:  Mental Status:  Alert and oriented x 3 unable to tell me the year ( baseline) . Appropriate affect, mood and behavior. Language:    Normal fluency, repetition, comprehension and naming. Cranial Nerves:  Pupils equal, round and reactive to light. Visual fields full to confrontation. Extraocular movements intact. Facial sensation intact. Full facial strength, no asymmetry. Hearing intact bilaterally. No dysarthria. Tongue protrudes to midline, palate elevates symmetrically. Shoulder shrug 5/5 bilaterally. Motor:    No pronator drift. Bulk and tone normal.      5/5 power in all extremities proximally and distally. No involuntary movements. Sensation:      Reflexes:         Coordination & Gait: FTN  intact Normal.    No results found for this or any previous visit (from the past 24 hour(s)). Imaging:  CT Results (most recent):  Results from Hospital Encounter encounter on 07/28/21    CT ABD PELV WO CONT    Narrative  EXAM: CT ABD PELV WO CONT    INDICATION: recurrent uti    COMPARISON: January 2018    CONTRAST:  None. TECHNIQUE:  Thin axial images were obtained through the abdomen and pelvis. Coronal and  sagittal reformats were generated. Oral contrast was not administered. CT dose  reduction was achieved through use of a standardized protocol tailored for this  examination and automatic exposure control for dose modulation. The absence of intravenous contrast material reduces the sensitivity for  evaluation of the vasculature and solid organs. FINDINGS:  LOWER THORAX: No significant abnormality in the incidentally imaged lower chest.  LIVER: No mass.   BILIARY TREE: Gallstones in a nondistended gallbladder. . CBD is not dilated. SPLEEN: within normal limits. PANCREAS: No focal abnormality. ADRENALS: Unremarkable. KIDNEYS/URETERS: Bilateral intrarenal calcifications, no obstruction suspected. STOMACH: Unremarkable. SMALL BOWEL: No dilatation or wall thickening. COLON: No dilatation or wall thickening. APPENDIX: No significant abnormalities suspected. PERITONEUM: No ascites or pneumoperitoneum. RETROPERITONEUM: No lymphadenopathy or aortic aneurysm. REPRODUCTIVE ORGANS: No significant abnormalities by this technique. URINARY BLADDER: No mass or calculus. BONES: No destructive bone lesion. Right hip prosthesis  ABDOMINAL WALL: No mass or hernia. ADDITIONAL COMMENTS: N/A    Impression  1. Cholelithiasis. 2. Nonobstructing bilateral intrarenal calculi. Assessment:     Hospital Problems  Date Reviewed: 7/29/2021        Codes Class Noted POA    * (Principal) Seizure ([facility] (Chronic) ICD-10-CM: R56.9  ICD-9-CM: 780.39  3/28/2017 Yes              Plan: This is an 82-year-old right-handed female with longstanding history of epilepsy presenting with possible recurrent seizures while on carbamazepine 400 mg in the am/ 600 mg at night and Lamictal 100 mg BID. CTH negative. Exam non focal, but she does have some memory loss which is her baseline. I will increase her Lamictal 150 mg BID. Recommend she follow up in the clinic for seizure management. Breakthrough Seizure ( nonverbal staring spell)    -continue Carbamazepine 400 mg am/600mg PM   -continue Lamictal 150 mg BID    -Follow up with neurology outpatient    -okay for discharge     Thank you for allowing the Neurology Service the pleasure of participating in the care of your patient. This patient will be discussed with my collaborating care team physician  and she may have further recommendations regarding this patient's care.          Signed By: Analisa Alexis NP     July 30, 2021

## 2021-07-30 NOTE — PROGRESS NOTES
I have reviewed discharge instructions with the patient. The patient verbalized understanding. patient got transferred to our [facility] with [facility]. RN called Report to RN of [facility] [place].

## 2021-07-30 NOTE — PROGRESS NOTES
Problem: Pressure Injury - Risk of  Goal: *Prevention of pressure injury  Description: Document George Scale and appropriate interventions in the flowsheet. Outcome: Progressing Towards Goal  Note: Pressure Injury Interventions:       Moisture Interventions: Absorbent underpads, Check for incontinence Q2 hours and as needed    Activity Interventions: PT/OT evaluation, Pressure redistribution bed/mattress(bed type)    Mobility Interventions: HOB 30 degrees or less, PT/OT evaluation, Pressure redistribution bed/mattress (bed type)    Nutrition Interventions: Document food/fluid/supplement intake                     Problem: Patient Education: Go to Patient Education Activity  Goal: Patient/Family Education  Outcome: Progressing Towards Goal     Problem: Falls - Risk of  Goal: *Absence of Falls  Description: Document Forrest Fall Risk and appropriate interventions in the flowsheet.   Outcome: Progressing Towards Goal  Note: Fall Risk Interventions:       Mentation Interventions: Bed/chair exit alarm, Door open when patient unattended    Medication Interventions: Bed/chair exit alarm, Patient to call before getting OOB    Elimination Interventions: Bed/chair exit alarm, Call light in reach, Patient to call for help with toileting needs              Problem: Patient Education: Go to Patient Education Activity  Goal: Patient/Family Education  Outcome: Progressing Towards Goal     Problem: Discharge Planning  Goal: *Discharge to safe environment  Outcome: Progressing Towards Goal     Problem: Seizure Disorder (Adult)  Goal: *STG: Remains free of seizure activity  Outcome: Progressing Towards Goal  Goal: *STG: Maintains lab values within therapeutic range  Outcome: Progressing Towards Goal  Goal: *STG/LTG: Complies with medication therapy  Outcome: Progressing Towards Goal  Goal: *STG: Remains free of injury during seizure activity  Outcome: Progressing Towards Goal  Goal: *STG: Remains safe in hospital  Outcome: Progressing Towards Goal  Goal: Interventions  Outcome: Progressing Towards Goal

## 2021-07-30 NOTE — PROGRESS NOTES
Bedside and Verbal shift change report given to Kristen RN (oncoming nurse) by Dwayne Menchaca RN (offgoing nurse). Report included the following information SBAR, Kardex, Procedure Summary, Intake/Output, Recent Results, Cardiac Rhythm SR, Alarm Parameters  and Dual Neuro Assessment.

## 2021-07-30 NOTE — PROGRESS NOTES
Bedside shift change report given to Qing Galicia RN (oncoming nurse) by Brittany Denton RN (offgoing nurse). Report included the following information SBAR, Kardex, Intake/Output, MAR, Med Rec Status, Cardiac Rhythm NSR, Quality Measures and Dual Neuro Assessment.

## 2021-07-30 NOTE — PROGRESS NOTES
Problem: Pressure Injury - Risk of  Goal: *Prevention of pressure injury  Description: Document George Scale and appropriate interventions in the flowsheet. 7/29/2021 2045 by Ayse Sheikh RN  Outcome: Progressing Towards Goal  Note: Pressure Injury Interventions:       Moisture Interventions: Absorbent underpads    Activity Interventions: Increase time out of bed, PT/OT evaluation    Mobility Interventions: Float heels, Turn and reposition approx. every two hours(pillow and wedges)    Nutrition Interventions: Document food/fluid/supplement intake                  7/29/2021 2044 by Ayse Sheikh RN  Outcome: Progressing Towards Goal  Note: Pressure Injury Interventions:       Moisture Interventions: Absorbent underpads    Activity Interventions: Increase time out of bed, PT/OT evaluation    Mobility Interventions: Float heels, Turn and reposition approx. every two hours(pillow and wedges)    Nutrition Interventions: Document food/fluid/supplement intake                     Problem: Patient Education: Go to Patient Education Activity  Goal: Patient/Family Education  7/29/2021 2045 by Ayse Sheikh RN  Outcome: Progressing Towards Goal  7/29/2021 2044 by Ayse Sheikh RN  Outcome: Progressing Towards Goal     Problem: Falls - Risk of  Goal: *Absence of Falls  Description: Document Celeste Spare Fall Risk and appropriate interventions in the flowsheet.   7/29/2021 2045 by Ayse Sheikh RN  Outcome: Progressing Towards Goal  Note: Fall Risk Interventions:            Medication Interventions: Bed/chair exit alarm    Elimination Interventions: Bed/chair exit alarm           7/29/2021 2044 by Ayse Sheikh RN  Outcome: Progressing Towards Goal  Note: Fall Risk Interventions:            Medication Interventions: Bed/chair exit alarm    Elimination Interventions: Bed/chair exit alarm              Problem: Patient Education: Go to Patient Education Activity  Goal: Patient/Family Education  Outcome: Progressing Towards Goal     Problem: Discharge Planning  Goal: *Discharge to safe environment  7/29/2021 2045 by Trinity Brumfield RN  Outcome: Progressing Towards Goal  7/29/2021 2044 by Trinity Brumfield RN  Outcome: Progressing Towards Goal     Problem: Seizure Disorder (Adult)  Goal: *STG: Remains free of seizure activity  Outcome: Progressing Towards Goal  Goal: *STG: Maintains lab values within therapeutic range  Outcome: Progressing Towards Goal  Goal: *STG/LTG: Complies with medication therapy  Outcome: Progressing Towards Goal  Goal: *STG: Remains free of injury during seizure activity  Outcome: Progressing Towards Goal  Goal: *STG: Remains safe in hospital  Outcome: Progressing Towards Goal  Goal: Interventions  Outcome: Progressing Towards Goal

## 2021-08-29 ENCOUNTER — HOSPITAL ENCOUNTER (EMERGENCY)
Age: 86
Discharge: HOME | End: 2021-08-29
Attending: EMERGENCY MEDICINE | Admitting: EMERGENCY MEDICINE
Payer: MEDICARE

## 2021-08-29 VITALS
RESPIRATION RATE: 18 BRPM | TEMPERATURE: 98.3 F | OXYGEN SATURATION: 95 % | DIASTOLIC BLOOD PRESSURE: 77 MMHG | HEART RATE: 82 BPM | SYSTOLIC BLOOD PRESSURE: 140 MMHG

## 2021-08-29 DIAGNOSIS — R56.9 SEIZURE (HCC): Primary | ICD-10-CM

## 2021-08-29 LAB
ALBUMIN SERPL-MCNC: 3.3 G/DL (ref 3.5–5)
ALBUMIN/GLOB SERPL: 0.9 {RATIO} (ref 1.1–2.2)
ALP SERPL-CCNC: 130 U/L (ref 45–117)
ALT SERPL-CCNC: 10 U/L (ref 12–78)
ANION GAP SERPL CALC-SCNC: 8 MMOL/L (ref 5–15)
APPEARANCE UR: ABNORMAL
AST SERPL-CCNC: 15 U/L (ref 15–37)
BACTERIA URNS QL MICRO: NEGATIVE /HPF
BASOPHILS # BLD: 0 K/UL (ref 0–0.1)
BASOPHILS NFR BLD: 1 % (ref 0–1)
BILIRUB SERPL-MCNC: 0.1 MG/DL (ref 0.2–1)
BILIRUB UR QL: NEGATIVE
BUN SERPL-MCNC: 16 MG/DL (ref 6–20)
BUN/CREAT SERPL: 26 (ref 12–20)
CALCIUM SERPL-MCNC: 8.7 MG/DL (ref 8.5–10.1)
CARBAMAZEPINE SERPL-MCNC: 7.7 UG/ML (ref 4–12)
CHLORIDE SERPL-SCNC: 107 MMOL/L (ref 97–108)
CO2 SERPL-SCNC: 30 MMOL/L (ref 21–32)
COLOR UR: ABNORMAL
CREAT SERPL-MCNC: 0.62 MG/DL (ref 0.55–1.02)
DIFFERENTIAL METHOD BLD: ABNORMAL
EOSINOPHIL # BLD: 0.2 K/UL (ref 0–0.4)
EOSINOPHIL NFR BLD: 3 % (ref 0–7)
EPITH CASTS URNS QL MICRO: ABNORMAL /LPF
ERYTHROCYTE [DISTWIDTH] IN BLOOD BY AUTOMATED COUNT: 13.2 % (ref 11.5–14.5)
GLOBULIN SER CALC-MCNC: 3.7 G/DL (ref 2–4)
GLUCOSE SERPL-MCNC: 97 MG/DL (ref 65–100)
GLUCOSE UR STRIP.AUTO-MCNC: NEGATIVE MG/DL
HCT VFR BLD AUTO: 40.5 % (ref 35–47)
HGB BLD-MCNC: 12.7 G/DL (ref 11.5–16)
HGB UR QL STRIP: ABNORMAL
IMM GRANULOCYTES # BLD AUTO: 0 K/UL (ref 0–0.04)
IMM GRANULOCYTES NFR BLD AUTO: 0 % (ref 0–0.5)
KETONES UR QL STRIP.AUTO: NEGATIVE MG/DL
LEUKOCYTE ESTERASE UR QL STRIP.AUTO: NEGATIVE
LYMPHOCYTES # BLD: 1 K/UL (ref 0.8–3.5)
LYMPHOCYTES NFR BLD: 16 % (ref 12–49)
MAGNESIUM SERPL-MCNC: 2.3 MG/DL (ref 1.6–2.4)
MCH RBC QN AUTO: 32.4 PG (ref 26–34)
MCHC RBC AUTO-ENTMCNC: 31.4 G/DL (ref 30–36.5)
MCV RBC AUTO: 103.3 FL (ref 80–99)
MONOCYTES # BLD: 0.4 K/UL (ref 0–1)
MONOCYTES NFR BLD: 7 % (ref 5–13)
NEUTS SEG # BLD: 4.6 K/UL (ref 1.8–8)
NEUTS SEG NFR BLD: 73 % (ref 32–75)
NITRITE UR QL STRIP.AUTO: NEGATIVE
NRBC # BLD: 0 K/UL (ref 0–0.01)
NRBC BLD-RTO: 0 PER 100 WBC
PH UR STRIP: 7 [PH] (ref 5–8)
PLATELET # BLD AUTO: 211 K/UL (ref 150–400)
PMV BLD AUTO: 9.6 FL (ref 8.9–12.9)
POTASSIUM SERPL-SCNC: 3.8 MMOL/L (ref 3.5–5.1)
PROT SERPL-MCNC: 7 G/DL (ref 6.4–8.2)
PROT UR STRIP-MCNC: NEGATIVE MG/DL
RBC # BLD AUTO: 3.92 M/UL (ref 3.8–5.2)
RBC #/AREA URNS HPF: ABNORMAL /HPF (ref 0–5)
SODIUM SERPL-SCNC: 145 MMOL/L (ref 136–145)
SP GR UR REFRACTOMETRY: 1.01 (ref 1–1.03)
TROPONIN I SERPL-MCNC: <0.05 NG/ML
UR CULT HOLD, URHOLD: NORMAL
UROBILINOGEN UR QL STRIP.AUTO: 0.2 EU/DL (ref 0.2–1)
WBC # BLD AUTO: 6.3 K/UL (ref 3.6–11)
WBC URNS QL MICRO: ABNORMAL /HPF (ref 0–4)

## 2021-08-29 PROCEDURE — 80053 COMPREHEN METABOLIC PANEL: CPT

## 2021-08-29 PROCEDURE — 80175 DRUG SCREEN QUAN LAMOTRIGINE: CPT

## 2021-08-29 PROCEDURE — 80156 ASSAY CARBAMAZEPINE TOTAL: CPT

## 2021-08-29 PROCEDURE — 85025 COMPLETE CBC W/AUTO DIFF WBC: CPT

## 2021-08-29 PROCEDURE — 83735 ASSAY OF MAGNESIUM: CPT

## 2021-08-29 PROCEDURE — 93005 ELECTROCARDIOGRAM TRACING: CPT

## 2021-08-29 PROCEDURE — 36415 COLL VENOUS BLD VENIPUNCTURE: CPT

## 2021-08-29 PROCEDURE — 84484 ASSAY OF TROPONIN QUANT: CPT

## 2021-08-29 PROCEDURE — 99284 EMERGENCY DEPT VISIT MOD MDM: CPT

## 2021-08-29 PROCEDURE — 81001 URINALYSIS AUTO W/SCOPE: CPT

## 2021-08-29 RX ORDER — LAMOTRIGINE 100 MG/1
150 TABLET ORAL
Status: DISCONTINUED | OUTPATIENT
Start: 2021-08-29 | End: 2021-08-29

## 2021-08-29 RX ORDER — CARBAMAZEPINE 200 MG/1
500 TABLET ORAL
Status: DISCONTINUED | OUTPATIENT
Start: 2021-08-29 | End: 2021-08-29

## 2021-08-29 NOTE — ED PROVIDER NOTES
HPI   83 yo F presents with seizure. Hx of seizure. Denies fever, chills, cp, sob. Pt lives at [facility]. No head injury reported. Pt is poor historian. Past Medical History:   Diagnosis Date    COPD [facility]chronic obstructive [facility] disease) ([facility]     Dementia ([facility] 3/29/2017    Hypothyroid     Seizure ([facility]     Seizure disorder ([facility]        Past Surgical History:   Procedure Laterality Date    HX HIP REPLACEMENT           History reviewed. No pertinent family history. Social History     Socioeconomic History    Marital status:      Spouse name: Not on file    Number of children: Not on file    Years of education: Not on file    Highest education level: Not on file   Occupational History    Not on file   Tobacco Use    Smoking status: Never Smoker    Smokeless tobacco: Never Used   Substance and Sexual Activity    Alcohol use: Yes    Drug use: No    Sexual activity: Not on file   Other Topics Concern    Not on file   Social History Narrative    Not on file     Social Determinants of Health     Financial Resource Strain:     Difficulty of Paying Living Expenses:    Food Insecurity:     Worried About Running Out of Food in the Last Year:     [address] in the Last Year:    Transportation Needs:     Lack of Transportation (Medical):  Lack of Transportation (Non-Medical):    Physical Activity:     Days of Exercise per Week:     Minutes of Exercise per Session:    Stress:     Feeling of Stress :    Social Connections:     Frequency of Communication with Friends and Family:     Frequency of Social Gatherings with Friends and Family:     Attends Hoahaoism Services:     Active Member of Clubs or Organizations:     Attends Club or Organization Meetings:     Marital Status:    Intimate Partner Violence:     Fear of Current or Ex-Partner:     Emotionally Abused:     Physically Abused:     Sexually Abused:           ALLERGIES: Aricept [donepezil], Ativan [lorazepam], Benadryl [diphenhydramine hcl], Ciprofloxacin, Clindamycin, Codeine, Macrobid [nitrofurantoin monohyd/m-cryst], Pcn [penicillins], and Sulfa (sulfonamide antibiotics)    Review of Systems   Unable to perform ROS: Dementia       Vitals:    08/29/21 1808   BP: (!) 168/72   Pulse: 74   Resp: 19   Temp: 99.6 °F (37.6 °C)   SpO2: 96%            Physical Exam   Physical Examination: General appearance - alert, elderly, chronically ill-appearing, baseline dementia  Eyes - pupils equal and reactive, extraocular eye movements intact  HEENT-atraumatic  Neck - supple, no significant adenopathy  Chest - clear to auscultation, no wheezes, rales or rhonchi, symmetric air entry  Heart - normal rate, regular rhythm, normal S1, S2, no murmurs, rubs, clicks or gallops  Abdomen - soft, nontender, nondistended, no masses or organomegaly  Back exam - full range of motion, no tenderness, palpable spasm or pain on motion  Neurological - alert, oriented to self, normal speech, no focal findings or movement disorder noted  Musculoskeletal - no joint tenderness, deformity or swelling  Extremities - peripheral pulses normal, no pedal edema, no clubbing or cyanosis  Skin - normal coloration and turgor, no rashes, no suspicious skin lesions noted  MDM  Number of Diagnoses or Management Options     Amount and/or Complexity of Data Reviewed  Clinical lab tests: ordered and reviewed  Decide to obtain previous medical records or to obtain history from someone other than the patient: yes  Obtain history from someone other than the patient: yes  Review and summarize past medical records: yes  Independent visualization of images, tracings, or specimens: yes    Patient Progress  Patient progress: stable         Procedures  ED EKG interpretation:  Rhythm: normal sinus rhythm; and regular . Rate (approx.): 69;  Axis: normal; P wave: prolonged; QRS interval: normal ; ST/T wave: non-specific changes;   EKG documented by Mini Miller MD    Daughter bedside states patient is at her baseline mental status. Will discharge with outpatient follow-up. Daughter concerned patient may have had another quick seizure. Patient back to her baseline. Will observe.

## 2021-08-29 NOTE — ED TRIAGE NOTES
Triage Note: Pt arrives via EMS from [address] for seizure. Patient has extensive hx of seizures. Patient had seizure prior to EMS arrival witnessed by staff. Daughter wants patient to be evaluated.

## 2021-08-30 LAB
ATRIAL RATE: 69 BPM
CALCULATED P AXIS, ECG09: 62 DEGREES
CALCULATED R AXIS, ECG10: 56 DEGREES
CALCULATED T AXIS, ECG11: 48 DEGREES
DIAGNOSIS, 93000: NORMAL
P-R INTERVAL, ECG05: 228 MS
Q-T INTERVAL, ECG07: 402 MS
QRS DURATION, ECG06: 84 MS
QTC CALCULATION (BEZET), ECG08: 430 MS
VENTRICULAR RATE, ECG03: 69 BPM

## 2021-08-30 NOTE — ED NOTES
Pt daughter came to nurse station and informed nurse that patient was having another seizure. Pt witnessed to be staring and not responding to interaction. Episode lasted approximately 1 minute. Pt then was confused following episode and unable to recall name. Per daughter, pt often has memory issues and is in a nursing home due to memory problems but it is always more pronounced following episodes.

## 2021-08-30 NOTE — ED NOTES
Pt transferred to car for transport home with daughter. Pt having another episode where she zoned out, stared into space, and was unable to answer questions. Daughter requested giving pt keppra which is usually what breaks her seizures and when we attempted to get patient back out of car into wheelchair she refused by yelling at daughter and saying she wanted to go home. Dr. Mercedes Bray notified of pt request for medication followed by patient refusal. Daughter plans to take patient home to facility with the instructions to return by squad if further seizures. Pt provided discharge paperwork.

## 2021-08-31 LAB — LAMOTRIGINE SERPL-MCNC: 8.7 UG/ML (ref 2–20)

## 2021-12-03 NOTE — ED TRIAGE NOTES
Can you please call patient to let the patient know that her labs for TSH and hepatitis panel are normal. Per EMS pt was having breakfast in the dining farias of the nursing home where she had a witnessed seizure. Pt denies having HX of seizure. Pt awake, alert and oriented to person and place only.

## 2021-12-31 ENCOUNTER — HOSPITAL ENCOUNTER (EMERGENCY)
Age: 86
Discharge: HOME | End: 2022-01-01
Attending: EMERGENCY MEDICINE
Payer: MEDICARE

## 2021-12-31 DIAGNOSIS — F03.90 DEMENTIA WITHOUT BEHAVIORAL DISTURBANCE, UNSPECIFIED DEMENTIA TYPE: ICD-10-CM

## 2021-12-31 DIAGNOSIS — G40.909 SEIZURE DISORDER (HCC): Primary | ICD-10-CM

## 2021-12-31 LAB
ALBUMIN SERPL-MCNC: 3.1 G/DL (ref 3.5–5)
ALBUMIN/GLOB SERPL: 1 {RATIO} (ref 1.1–2.2)
ALP SERPL-CCNC: 105 U/L (ref 45–117)
ALT SERPL-CCNC: 13 U/L (ref 12–78)
ANION GAP SERPL CALC-SCNC: 10 MMOL/L (ref 5–15)
APPEARANCE UR: CLEAR
AST SERPL-CCNC: 8 U/L (ref 15–37)
BACTERIA URNS QL MICRO: NEGATIVE /HPF
BASOPHILS # BLD: 0 K/UL (ref 0–0.1)
BASOPHILS NFR BLD: 0 % (ref 0–1)
BILIRUB SERPL-MCNC: 0.2 MG/DL (ref 0.2–1)
BILIRUB UR QL: NEGATIVE
BUN SERPL-MCNC: 22 MG/DL (ref 6–20)
BUN/CREAT SERPL: 30 (ref 12–20)
CALCIUM SERPL-MCNC: 9 MG/DL (ref 8.5–10.1)
CHLORIDE SERPL-SCNC: 107 MMOL/L (ref 97–108)
CO2 SERPL-SCNC: 29 MMOL/L (ref 21–32)
COLOR UR: NORMAL
CREAT SERPL-MCNC: 0.74 MG/DL (ref 0.55–1.02)
DIFFERENTIAL METHOD BLD: ABNORMAL
EOSINOPHIL # BLD: 0.1 K/UL (ref 0–0.4)
EOSINOPHIL NFR BLD: 1 % (ref 0–7)
EPITH CASTS URNS QL MICRO: NORMAL /LPF
ERYTHROCYTE [DISTWIDTH] IN BLOOD BY AUTOMATED COUNT: 13.5 % (ref 11.5–14.5)
GLOBULIN SER CALC-MCNC: 3.2 G/DL (ref 2–4)
GLUCOSE SERPL-MCNC: 130 MG/DL (ref 65–100)
GLUCOSE UR STRIP.AUTO-MCNC: NEGATIVE MG/DL
HCT VFR BLD AUTO: 35.9 % (ref 35–47)
HGB BLD-MCNC: 11.3 G/DL (ref 11.5–16)
HGB UR QL STRIP: NEGATIVE
IMM GRANULOCYTES # BLD AUTO: 0 K/UL
IMM GRANULOCYTES NFR BLD AUTO: 0 %
KETONES UR QL STRIP.AUTO: NEGATIVE MG/DL
LEUKOCYTE ESTERASE UR QL STRIP.AUTO: NEGATIVE
LYMPHOCYTES # BLD: 0.9 K/UL (ref 0.8–3.5)
LYMPHOCYTES NFR BLD: 14 % (ref 12–49)
MAGNESIUM SERPL-MCNC: 2.4 MG/DL (ref 1.6–2.4)
MCH RBC QN AUTO: 32.8 PG (ref 26–34)
MCHC RBC AUTO-ENTMCNC: 31.5 G/DL (ref 30–36.5)
MCV RBC AUTO: 104.4 FL (ref 80–99)
MONOCYTES # BLD: 0.3 K/UL (ref 0–1)
MONOCYTES NFR BLD: 4 % (ref 5–13)
NEUTS BAND NFR BLD MANUAL: 1 % (ref 0–6)
NEUTS SEG # BLD: 5.4 K/UL (ref 1.8–8)
NEUTS SEG NFR BLD: 80 % (ref 32–75)
NITRITE UR QL STRIP.AUTO: NEGATIVE
NRBC # BLD: 0 K/UL (ref 0–0.01)
NRBC BLD-RTO: 0 PER 100 WBC
PH UR STRIP: 6 [PH] (ref 5–8)
PLATELET # BLD AUTO: 218 K/UL (ref 150–400)
PMV BLD AUTO: 9.2 FL (ref 8.9–12.9)
POTASSIUM SERPL-SCNC: 3.7 MMOL/L (ref 3.5–5.1)
PROT SERPL-MCNC: 6.3 G/DL (ref 6.4–8.2)
PROT UR STRIP-MCNC: NEGATIVE MG/DL
RBC # BLD AUTO: 3.44 M/UL (ref 3.8–5.2)
RBC #/AREA URNS HPF: NORMAL /HPF (ref 0–5)
RBC MORPH BLD: ABNORMAL
RBC MORPH BLD: ABNORMAL
SODIUM SERPL-SCNC: 146 MMOL/L (ref 136–145)
SP GR UR REFRACTOMETRY: 1.02 (ref 1–1.03)
UR CULT HOLD, URHOLD: NORMAL
UROBILINOGEN UR QL STRIP.AUTO: 0.2 EU/DL (ref 0.2–1)
WBC # BLD AUTO: 6.7 K/UL (ref 3.6–11)
WBC URNS QL MICRO: NORMAL /HPF (ref 0–4)

## 2021-12-31 PROCEDURE — 80175 DRUG SCREEN QUAN LAMOTRIGINE: CPT

## 2021-12-31 PROCEDURE — 85025 COMPLETE CBC W/AUTO DIFF WBC: CPT

## 2021-12-31 PROCEDURE — 83735 ASSAY OF MAGNESIUM: CPT

## 2021-12-31 PROCEDURE — 93005 ELECTROCARDIOGRAM TRACING: CPT

## 2021-12-31 PROCEDURE — 80053 COMPREHEN METABOLIC PANEL: CPT

## 2021-12-31 PROCEDURE — 81001 URINALYSIS AUTO W/SCOPE: CPT

## 2021-12-31 PROCEDURE — 99285 EMERGENCY DEPT VISIT HI MDM: CPT

## 2021-12-31 PROCEDURE — 36415 COLL VENOUS BLD VENIPUNCTURE: CPT

## 2021-12-31 PROCEDURE — 80156 ASSAY CARBAMAZEPINE TOTAL: CPT

## 2022-01-01 VITALS
BODY MASS INDEX: 24.84 KG/M2 | DIASTOLIC BLOOD PRESSURE: 66 MMHG | HEART RATE: 60 BPM | TEMPERATURE: 97.6 F | WEIGHT: 135.8 LBS | OXYGEN SATURATION: 93 % | RESPIRATION RATE: 16 BRPM | SYSTOLIC BLOOD PRESSURE: 117 MMHG

## 2022-01-01 LAB
ATRIAL RATE: 65 BPM
CALCULATED P AXIS, ECG09: 61 DEGREES
CALCULATED R AXIS, ECG10: 65 DEGREES
CALCULATED T AXIS, ECG11: 58 DEGREES
CARBAMAZEPINE SERPL-MCNC: 9.1 UG/ML (ref 4–12)
DIAGNOSIS, 93000: NORMAL
P-R INTERVAL, ECG05: 250 MS
Q-T INTERVAL, ECG07: 394 MS
QRS DURATION, ECG06: 82 MS
QTC CALCULATION (BEZET), ECG08: 409 MS
VENTRICULAR RATE, ECG03: 65 BPM

## 2022-01-01 PROCEDURE — 74011250637 HC RX REV CODE- 250/637: Performed by: EMERGENCY MEDICINE

## 2022-01-01 RX ORDER — CARBAMAZEPINE 200 MG/1
400 TABLET, EXTENDED RELEASE ORAL DAILY
Status: DISCONTINUED | OUTPATIENT
Start: 2022-01-01 | End: 2022-01-01 | Stop reason: HOSPADM

## 2022-01-01 RX ORDER — LEVOTHYROXINE SODIUM 100 UG/1
100 TABLET ORAL
Status: DISCONTINUED | OUTPATIENT
Start: 2022-01-01 | End: 2022-01-01 | Stop reason: HOSPADM

## 2022-01-01 RX ORDER — LAMOTRIGINE 100 MG/1
150 TABLET ORAL 2 TIMES DAILY
Status: DISCONTINUED | OUTPATIENT
Start: 2022-01-01 | End: 2022-01-01 | Stop reason: HOSPADM

## 2022-01-01 RX ADMIN — CARBAMAZEPINE 400 MG: 200 TABLET, EXTENDED RELEASE ORAL at 08:23

## 2022-01-01 RX ADMIN — LAMOTRIGINE 150 MG: 100 TABLET ORAL at 08:22

## 2022-01-01 RX ADMIN — LEVOTHYROXINE SODIUM 100 MCG: 0.1 TABLET ORAL at 08:23

## 2022-01-01 NOTE — ED TRIAGE NOTES
Pt brought from [address] for c/o sz today. Pt has known sz hx. EMS witnessed event, say that \"pt shook when she was angry\". Pt daughter wanted her evaluated.

## 2022-01-01 NOTE — ED NOTES
Pt daughter updated on POC via telephone call, verbalizes knowledge.  Waiting on EMS transport to [address]

## 2022-01-01 NOTE — ED PROVIDER NOTES
47-year-old female presenting from nursing facility after having reported seizure. Patient has history of epilepsy is being worked up by her neurologist.  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 No report of any head trauma. Patient with history of dementia. Patient had her typical seizure-like activity which includes her stopping speaking and her head shaking from side to side. Patient has been receiving her medications including carbamazepine and Lamictal.  Patient's daughter reports that whenever she has a UTI this occurs. Patient is on prophylactic Keflex based on her home meds. No report of any fevers or chills. Patient is at her baseline mental status and according to nursing home. Patient's daughter reports that she has had 3 episodes of this today. There is no loss of conscious. Patient is awake and alert and during these episodes. No postictal period. 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 Neurologic exam initially is notable for clear dementia, otherwise nonfocal; I do not appreciate substantial parkinsonism when accounted for age and no eye movement abnormalities, though does have clear imbalance. Labs are notable for normal B12, TSH, and Carbamazepine. Studies are notable for MRI brain with substantial temporal > frontal atrophy, normal EEG, second hand report that prior neuropsych testing consistent with FTD. 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 Has seen Dr. 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 Omega Garces [facility] psychiatry) not sure about diagnosis of FTD. [facility] EEG 5/2017 normal. MRI brain w/ and w/o con 3/2017 with substantial temporal > frontal atrophy bilaterally, some scattered mild WM changes, otherwise unremarkable.      Overall, agree with diagnosis of dementia, FTD vs. other (a number of doctors had previously felt it was FTD, though geriatric psych Dr. Claudean Pester not sure that it is FTD). Her reported \"epilepsy\" has semiology of stops talking, nonrhythmic head left and right, mouth move up and down, eyes open staring straight, tensing up, and can proceed to GTC; considered that it could be nonepileptic spells given that there is reportedly retained awareness and abortable with relaxation and triggered only with anxiety, and never captured on EEG with seizure workup benign; however, semiology of GTCs is convincing, and takes weeks to recover. 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 When admitted, Carbamazepine level was 4, when discharged level 13; ideal level is 10-13 per family. Has had subsequent events, but almost ALWAYS linked to UTI (exception: 7/2021 without UTI). Brief prior medications/treatment trials/notables: Had been on VPA, stopped for unclear cause 20 years ago. Not tolerate low dose keppra per notes (but tolerates when given in ED). Aricept caused substantial hallucinations. Ativan causes delirium. Current treatment: Carbamazepine ER 400mg QAM and 500mg QHS, lamictal 150mg BID   At the last visit with me 09/2021, referred to epilepsy team.   Visit done with patient's daughter who helps provide history. In the interim, no seizures. Doing okay per patient. She reports she is walking well, but has fallen twice since her last visit, she has fallen backwards due to losing her balance, loses her balance, does not think her legs give out on her. In regard to ADLs, the patient needs assistance with everything (she reports she does not need help but daughter says she gets help with everything); in regard to IADLs, the patient has trouble with everything.  Uses walker   Sleep: sleeps well unless someone wakes her up, might get up and go to the bathroom but can get back to sleep   Mood: she denies mood issues, says she walks away if things are bothering her, gets crabby with her daughter, she admits she gets upset if staff wants her to get her pajamas on or because her family doesn't visit, daughter thinks she's weepy lately and might be seasonal   Constitutional: getting Ensure because she does not get enough food for her meals, has snacks but forgets to eat them, will eat well if family takes her out   Cognitive: she reports memory is great   No new medical issues, medications or hospital visits since last visit. \"    Past Medical History   No changes     Past Surgical / Procedure History   No changes   Procedure History:   Past Surgical History This information was current as of 11/23/21 @ 14:28:57.   - Hip replacement   - removal of kidney stone   - 01/2017: MRI   - 05/12/2017: EEG - Electroencephalography   . Social History     Social History This information as of 14:28 on 11/23/21.  -Tobacco Assessment   SHX Tobacco use: Former smoker. Comment: quit 45 years ago  -Alcohol Assessment  SHX Alcohol use:Past.   -Substance Abuse Assessment  SHX Substance abuse use:Denies Substance Use. Past Medical History:   Diagnosis Date    [facility] obstructive [facility]) [facility]     Dementia ([facility] 3/29/2017    Hypothyroid     Seizure ([facility]     Seizure disorder ([facility]        Past Surgical History:   Procedure Laterality Date    HX HIP REPLACEMENT           History reviewed. No pertinent family history. Social History     Socioeconomic History    Marital status:      Spouse name: Not on file    Number of children: Not on file    Years of education: Not on file    Highest education level: Not on file   Occupational History    Not on file   Tobacco Use    Smoking status: Never Smoker    Smokeless tobacco: Never Used   Substance and Sexual Activity    Alcohol use:  Yes    Drug use: No    Sexual activity: Not on file   Other Topics Concern    Not on file   Social History Narrative    Not on file     Social Determinants of Health Financial Resource Strain:     Difficulty of Paying Living Expenses: Not on file   Food Insecurity:     Worried About Running Out of Food in the Last Year: Not on file    Heidy of Food in the Last Year: Not on file   Transportation Needs:     Lack of Transportation (Medical): Not on file    Lack of Transportation (Non-Medical): Not on file   Physical Activity:     Days of Exercise per Week: Not on file    Minutes of Exercise per Session: Not on file   Stress:     Feeling of Stress : Not on file   Social Connections:     Frequency of Communication with Friends and Family: Not on file    Frequency of Social Gatherings with Friends and Family: Not on file    Attends Cheondoism Services: Not on file    Active Member of [address] [place] [address] or Organizations: Not on file    Attends Club or Organization Meetings: Not on file    Marital Status: Not on file   Intimate Partner Violence:     Fear of Current or Ex-Partner: Not on file    Emotionally Abused: Not on file    Physically Abused: Not on file    Sexually Abused: Not on file   Housing Stability:     Unable to Pay for Housing in the Last Year: Not on file    Number of [place] in the Last Year: Not on file    Unstable Housing in the Last Year: Not on file         ALLERGIES: Aricept [donepezil], Ativan [lorazepam], Benadryl [diphenhydramine hcl], Ciprofloxacin, Clindamycin, Codeine, Macrobid [nitrofurantoin monohyd/m-cryst], Pcn [penicillins], and Sulfa (sulfonamide antibiotics)    Review of Systems   Unable to perform ROS: Dementia       Vitals:    12/31/21 2214   BP: (!) 150/79   Pulse: 71   Resp: 17   Temp: 98 °F (36.7 °C)   SpO2: 93%   Weight: 61.6 kg (135 lb 12.9 oz)            Physical Exam  Constitutional:       Appearance: She is well-developed. HENT:      Head: Normocephalic and atraumatic. Mouth/Throat:      Pharynx: Oropharynx is clear.    Eyes:      Conjunctiva/sclera: Conjunctivae normal.   Cardiovascular:      Rate and Rhythm: Normal rate and regular rhythm. Pulmonary:      Effort: Pulmonary effort is normal. No respiratory distress. Breath sounds: Normal breath sounds. Abdominal:      General: Bowel sounds are normal.      Palpations: Abdomen is soft. Tenderness: There is no abdominal tenderness. Musculoskeletal:         General: Normal range of motion. Cervical back: Normal range of motion and neck supple. Skin:     General: Skin is warm. Capillary Refill: Capillary refill takes less than 2 seconds. Findings: No rash. Neurological:      General: No focal deficit present. Mental Status: She is alert. Mental status is at baseline. She is disoriented. Comments: No gross motor or sensory deficits          MDM  Number of Diagnoses or Management Options  Dementia without behavioral disturbance, unspecified dementia type ([address]  Seizure disorder ([facility]  Diagnosis management comments: Patient with history of epilepsy versus psychogenic nonepileptic syndrome  Normal electrolytes. Patient is at her baseline mental status. No signs of infection. Spoke with [facility] neurology no change in her medications. Patient stable for discharge back to nursing facility. Spoke with patient's POA informed her of labs    Discussed the discharge impression and any labs and the results with the patient's POA. Answered any questions and addressed any concerns. Discussed the importance of following up with their primary care provider and/or specialist.  Discussed signs or symptoms that would warrant return back to the ER for further evaluation. The patient's POA are agreeable with discharge. Amount and/or Complexity of Data Reviewed  Clinical lab tests: reviewed  Tests in the medicine section of CPT®: reviewed      ED Course as of 12/31/21 3428   Fri Dec 31, 2021   2254 EKG sinus rhythm with first-degree AV block. Rate of 68 bpm.  Normal QRS/QT interval.  No ST elevation or depressions.   EKG interpreted by Ian Mo Mathew Burton MD   [ZD]   2294 Spoke with Dr. Rene Mitchell, [facility]. Recommending that we send off levels for her medications however no changes in her medication dosages at this time. Patient may be discharged back to her nursing facility and follow-up with them in the clinic. [ZD]      ED Course User Index  [ZD] Rafi Bhatt MD       Procedures          Recent Results (from the past 24 hour(s))   CBC WITH AUTOMATED DIFF    Collection Time: 12/31/21 10:31 PM   Result Value Ref Range    WBC 6.7 3.6 - 11.0 K/uL    RBC 3.44 (L) 3.80 - 5.20 M/uL    HGB 11.3 (L) 11.5 - 16.0 g/dL    HCT 35.9 35.0 - 47.0 %    .4 (H) 80.0 - 99.0 FL    MCH 32.8 26.0 - 34.0 PG    MCHC 31.5 30.0 - 36.5 g/dL    RDW 13.5 11.5 - 14.5 %    PLATELET 976 116 - 216 K/uL    MPV 9.2 8.9 - 12.9 FL    NRBC 0.0 0  WBC    ABSOLUTE NRBC 0.00 0.00 - 0.01 K/uL    NEUTROPHILS 80 (H) 32 - 75 %    BAND NEUTROPHILS 1 0 - 6 %    LYMPHOCYTES 14 12 - 49 %    MONOCYTES 4 (L) 5 - 13 %    EOSINOPHILS 1 0 - 7 %    BASOPHILS 0 0 - 1 %    IMMATURE GRANULOCYTES 0 %    ABS. NEUTROPHILS 5.4 1.8 - 8.0 K/UL    ABS. LYMPHOCYTES 0.9 0.8 - 3.5 K/UL    ABS. MONOCYTES 0.3 0.0 - 1.0 K/UL    ABS. EOSINOPHILS 0.1 0.0 - 0.4 K/UL    ABS. BASOPHILS 0.0 0.0 - 0.1 K/UL    ABS. IMM.  GRANS. 0.0 K/UL    DF MANUAL      RBC COMMENTS MACROCYTOSIS  2+        RBC COMMENTS ANISOCYTOSIS  2+       METABOLIC PANEL, COMPREHENSIVE    Collection Time: 12/31/21 10:31 PM   Result Value Ref Range    Sodium 146 (H) 136 - 145 mmol/L    Potassium 3.7 3.5 - 5.1 mmol/L    Chloride 107 97 - 108 mmol/L    CO2 29 21 - 32 mmol/L    Anion gap 10 5 - 15 mmol/L    Glucose 130 (H) 65 - 100 mg/dL    BUN 22 (H) 6 - 20 MG/DL    Creatinine 0.74 0.55 - 1.02 MG/DL    BUN/Creatinine ratio 30 (H) 12 - 20      GFR est AA >60 >60 ml/min/1.73m2    GFR est non-AA >60 >60 ml/min/1.73m2    Calcium 9.0 8.5 - 10.1 MG/DL    Bilirubin, total 0.2 0.2 - 1.0 MG/DL    ALT (SGPT) 13 12 - 78 U/L    AST (SGOT) 8 (L) 15 - 37 U/L    Alk. phosphatase 105 45 - 117 U/L    Protein, total 6.3 (L) 6.4 - 8.2 g/dL    Albumin 3.1 (L) 3.5 - 5.0 g/dL    Globulin 3.2 2.0 - 4.0 g/dL    A-G Ratio 1.0 (L) 1.1 - 2.2     MAGNESIUM    Collection Time: 12/31/21 10:31 PM   Result Value Ref Range    Magnesium 2.4 1.6 - 2.4 mg/dL   EKG, 12 LEAD, INITIAL    Collection Time: 12/31/21 10:52 PM   Result Value Ref Range    Ventricular Rate 65 BPM    Atrial Rate 65 BPM    P-R Interval 250 ms    QRS Duration 82 ms    Q-T Interval 394 ms    QTC Calculation (Bezet) 409 ms    Calculated P Axis 61 degrees    Calculated R Axis 65 degrees    Calculated T Axis 58 degrees    Diagnosis       Sinus rhythm with 1st degree AV block  Septal infarct (cited on or before 31-DEC-2021)  Abnormal ECG  When compared with ECG of 29-AUG-2021 20:44,  No significant change was found     URINALYSIS W/MICROSCOPIC    Collection Time: 12/31/21 11:00 PM   Result Value Ref Range    Color DARK YELLOW      Appearance CLEAR CLEAR      Specific gravity 1.025 1.003 - 1.030      pH (UA) 6.0 5.0 - 8.0      Protein Negative NEG mg/dL    Glucose Negative NEG mg/dL    Ketone Negative NEG mg/dL    Bilirubin Negative NEG      Blood Negative NEG      Urobilinogen 0.2 0.2 - 1.0 EU/dL    Nitrites Negative NEG      Leukocyte Esterase Negative NEG      WBC 0-4 0 - 4 /hpf    RBC 0-5 0 - 5 /hpf    Epithelial cells FEW FEW /lpf    Bacteria Negative NEG /hpf   URINE CULTURE HOLD SAMPLE    Collection Time: 12/31/21 11:00 PM    Specimen: Serum; Urine   Result Value Ref Range    Urine culture hold        Urine on hold in Microbiology dept for 2 days. If unpreserved urine is submitted, it cannot be used for addtional testing after 24 hours, recollection will be required. No results found.

## 2022-01-01 NOTE — ED NOTES
Attempted return report to [address] [facility] [address]. Message left for nurse to return call.

## 2022-01-03 LAB — LAMOTRIGINE SERPL-MCNC: 4.5 UG/ML (ref 2–20)

## 2022-02-09 ENCOUNTER — APPOINTMENT (OUTPATIENT)
Dept: CT IMAGING | Age: 87
End: 2022-02-09
Attending: EMERGENCY MEDICINE
Payer: MEDICARE

## 2022-02-09 ENCOUNTER — APPOINTMENT (OUTPATIENT)
Dept: GENERAL RADIOLOGY | Age: 87
End: 2022-02-09
Attending: EMERGENCY MEDICINE
Payer: MEDICARE

## 2022-02-09 ENCOUNTER — HOSPITAL ENCOUNTER (EMERGENCY)
Age: 87
Discharge: NURSING, REHAB OR LONG-TERM CARE | End: 2022-02-10
Attending: EMERGENCY MEDICINE
Payer: MEDICARE

## 2022-02-09 VITALS
SYSTOLIC BLOOD PRESSURE: 131 MMHG | HEART RATE: 78 BPM | DIASTOLIC BLOOD PRESSURE: 62 MMHG | BODY MASS INDEX: 21.95 KG/M2 | RESPIRATION RATE: 16 BRPM | WEIGHT: 119.27 LBS | HEIGHT: 62 IN | OXYGEN SATURATION: 94 % | TEMPERATURE: 97.4 F

## 2022-02-09 DIAGNOSIS — R56.9 SEIZURE-LIKE ACTIVITY (HCC): Primary | ICD-10-CM

## 2022-02-09 LAB
ALBUMIN SERPL-MCNC: 2.8 G/DL (ref 3.5–5)
ALBUMIN/GLOB SERPL: 0.8 {RATIO} (ref 1.1–2.2)
ALP SERPL-CCNC: 105 U/L (ref 45–117)
ALT SERPL-CCNC: 9 U/L (ref 12–78)
ANION GAP SERPL CALC-SCNC: 14 MMOL/L (ref 5–15)
APPEARANCE UR: CLEAR
AST SERPL-CCNC: 19 U/L (ref 15–37)
BACTERIA URNS QL MICRO: NEGATIVE /HPF
BASOPHILS # BLD: 0 K/UL (ref 0–0.1)
BASOPHILS NFR BLD: 0 % (ref 0–1)
BILIRUB SERPL-MCNC: 0.2 MG/DL (ref 0.2–1)
BILIRUB UR QL: NEGATIVE
BUN SERPL-MCNC: 18 MG/DL (ref 6–20)
BUN/CREAT SERPL: 31 (ref 12–20)
CALCIUM SERPL-MCNC: 8.1 MG/DL (ref 8.5–10.1)
CHLORIDE SERPL-SCNC: 107 MMOL/L (ref 97–108)
CO2 SERPL-SCNC: 21 MMOL/L (ref 21–32)
COLOR UR: NORMAL
CREAT SERPL-MCNC: 0.59 MG/DL (ref 0.55–1.02)
DIFFERENTIAL METHOD BLD: ABNORMAL
EOSINOPHIL # BLD: 0.3 K/UL (ref 0–0.4)
EOSINOPHIL NFR BLD: 4 % (ref 0–7)
EPITH CASTS URNS QL MICRO: NORMAL /LPF
ERYTHROCYTE [DISTWIDTH] IN BLOOD BY AUTOMATED COUNT: 12.9 % (ref 11.5–14.5)
GLOBULIN SER CALC-MCNC: 3.4 G/DL (ref 2–4)
GLUCOSE SERPL-MCNC: 128 MG/DL (ref 65–100)
GLUCOSE UR STRIP.AUTO-MCNC: NEGATIVE MG/DL
HCT VFR BLD AUTO: 42 % (ref 35–47)
HGB BLD-MCNC: 13.3 G/DL (ref 11.5–16)
HGB UR QL STRIP: NEGATIVE
IMM GRANULOCYTES # BLD AUTO: 0 K/UL (ref 0–0.04)
IMM GRANULOCYTES NFR BLD AUTO: 0 % (ref 0–0.5)
KETONES UR QL STRIP.AUTO: NEGATIVE MG/DL
LEUKOCYTE ESTERASE UR QL STRIP.AUTO: NEGATIVE
LYMPHOCYTES # BLD: 1.3 K/UL (ref 0.8–3.5)
LYMPHOCYTES NFR BLD: 19 % (ref 12–49)
MAGNESIUM SERPL-MCNC: 2.2 MG/DL (ref 1.6–2.4)
MCH RBC QN AUTO: 33.3 PG (ref 26–34)
MCHC RBC AUTO-ENTMCNC: 31.7 G/DL (ref 30–36.5)
MCV RBC AUTO: 105.3 FL (ref 80–99)
MONOCYTES # BLD: 0.4 K/UL (ref 0–1)
MONOCYTES NFR BLD: 6 % (ref 5–13)
NEUTS SEG # BLD: 4.9 K/UL (ref 1.8–8)
NEUTS SEG NFR BLD: 71 % (ref 32–75)
NITRITE UR QL STRIP.AUTO: NEGATIVE
NRBC # BLD: 0 K/UL (ref 0–0.01)
NRBC BLD-RTO: 0 PER 100 WBC
PH UR STRIP: 7 [PH] (ref 5–8)
PLATELET # BLD AUTO: 231 K/UL (ref 150–400)
PMV BLD AUTO: 10.1 FL (ref 8.9–12.9)
POTASSIUM SERPL-SCNC: 4.2 MMOL/L (ref 3.5–5.1)
PROT SERPL-MCNC: 6.2 G/DL (ref 6.4–8.2)
PROT UR STRIP-MCNC: NEGATIVE MG/DL
RBC # BLD AUTO: 3.99 M/UL (ref 3.8–5.2)
RBC #/AREA URNS HPF: NORMAL /HPF (ref 0–5)
SODIUM SERPL-SCNC: 142 MMOL/L (ref 136–145)
SP GR UR REFRACTOMETRY: 1.01 (ref 1–1.03)
UR CULT HOLD, URHOLD: NORMAL
UROBILINOGEN UR QL STRIP.AUTO: 0.2 EU/DL (ref 0.2–1)
WBC # BLD AUTO: 7 K/UL (ref 3.6–11)
WBC URNS QL MICRO: NORMAL /HPF (ref 0–4)

## 2022-02-09 PROCEDURE — 70450 CT HEAD/BRAIN W/O DYE: CPT

## 2022-02-09 PROCEDURE — 36415 COLL VENOUS BLD VENIPUNCTURE: CPT

## 2022-02-09 PROCEDURE — 71045 X-RAY EXAM CHEST 1 VIEW: CPT

## 2022-02-09 PROCEDURE — 81001 URINALYSIS AUTO W/SCOPE: CPT

## 2022-02-09 PROCEDURE — 83735 ASSAY OF MAGNESIUM: CPT

## 2022-02-09 PROCEDURE — 74011250636 HC RX REV CODE- 250/636: Performed by: EMERGENCY MEDICINE

## 2022-02-09 PROCEDURE — 99285 EMERGENCY DEPT VISIT HI MDM: CPT

## 2022-02-09 PROCEDURE — 80053 COMPREHEN METABOLIC PANEL: CPT

## 2022-02-09 PROCEDURE — 85025 COMPLETE CBC W/AUTO DIFF WBC: CPT

## 2022-02-09 RX ADMIN — SODIUM CHLORIDE 500 ML: 9 INJECTION, SOLUTION INTRAVENOUS at 21:36

## 2022-02-09 NOTE — ED TRIAGE NOTES
Pt arrived to ed via ems from [facility] per family's request to get evaluated after having 2 witnessed seizure pta.  Ems reported pt was having seizures but completely aware and will just stare then will come back to normal. No other voiced c/o from pt

## 2022-02-10 NOTE — ED NOTES
Attempt to call [address] [place] [address] of [facility] wing, no answer.

## 2022-02-10 NOTE — ED NOTES
Spoke with pt's daughter on phone. She reports pt fell 8 days ago and did not receive medical treatment at that time; she was advised pt had no complaints from the fall. Family noticed a \"green bruise\" on the back of pt's head today. Daughter also reports pt diagnosed with UTI and started Keflex TID 3 days ago.

## 2022-02-10 NOTE — ROUTINE PROCESS
I have reviewed discharge instructions with the caregiver, daughter. The caregiver, daughter verbalized understanding.

## 2022-02-10 NOTE — ED NOTES
Attempted clean catch urine, pt emptied bladder but did not collect any in hat. Will attempt cath urine sample at a later time.

## 2022-02-10 NOTE — ED PROVIDER NOTES
Patient is an 61-year-old woman with a history of seizure-like activity and dementia. She was sent in from her facility after having 2 episodes of seizure-like activity that she describes as staring spell where she was completely alert, knew what was happening, and was able to speak, but was staring. She reports that these episodes have been happening for years and chart review reveals 7 visits in the last year for similar symptoms. Patient denies fevers, chills, dysuria, cough, shortness of breath. She reports that she has frequent urination but that this is not unusual for her because she drinks a lot of water. The history is provided by the patient and medical records. Seizure   Pertinent negatives include no chest pain, no nausea, no vomiting and no diarrhea. She reports no chest pain, no diarrhea, no vomiting. Past Medical History:   Diagnosis Date    [facility] (chronic obstructive [facility]) ([facility]     D[facility] [place] 3/29/2017    Hypothyroid     Seizure ([facility]     Seizure disorder ([facility]        Past Surgical History:   Procedure Laterality Date    HX HIP REPLACEMENT           History reviewed. No pertinent family history. Social History     Socioeconomic History    Marital status:      Spouse name: Not on file    Number of children: Not on file    Years of education: Not on file    Highest education level: Not on file   Occupational History    Not on file   Tobacco Use    Smoking status: Never Smoker    Smokeless tobacco: Never Used   Substance and Sexual Activity    Alcohol use:  Yes    Drug use: No    Sexual activity: Not on file   Other Topics Concern    Not on file   Social History Narrative    Not on file     Social Determinants of Health     Financial Resource Strain:     Difficulty of Paying Living Expenses: Not on file   Food Insecurity:     Worried About Running Out of Food in the Last Year: Not on file    Heidy of Food in the Last Year: Not on file Transportation Needs:     Lack of Transportation (Medical): Not on file    Lack of Transportation (Non-Medical): Not on file   Physical Activity:     Days of Exercise per Week: Not on file    Minutes of Exercise per Session: Not on file   Stress:     Feeling of Stress : Not on file   Social Connections:     Frequency of Communication with Friends and Family: Not on file    Frequency of Social Gatherings with Friends and Family: Not on file    Attends Voodoo Services: Not on file    Active Member of [address] [place] [address] or Organizations: Not on file    Attends Club or Organization Meetings: Not on file    Marital Status: Not on file   Intimate Partner Violence:     Fear of Current or Ex-Partner: Not on file    Emotionally Abused: Not on file    Physically Abused: Not on file    Sexually Abused: Not on file   Housing Stability:     Unable to Pay for Housing in the Last Year: Not on file    Number of [place] in the Last Year: Not on file    Unstable Housing in the Last Year: Not on file         ALLERGIES: Aricept [donepezil], Ativan [lorazepam], Benadryl [diphenhydramine hcl], Ciprofloxacin, Clindamycin, Codeine, Macrobid [nitrofurantoin monohyd/m-cryst], Pcn [penicillins], and Sulfa (sulfonamide antibiotics)    Review of Systems   Constitutional: Negative for chills and fever. Respiratory: Negative for shortness of breath. Cardiovascular: Negative for chest pain. Gastrointestinal: Negative for abdominal pain, diarrhea, nausea and vomiting. Neurological: Positive for seizures. Negative for weakness and numbness. All other systems reviewed and are negative. Vitals:    02/09/22 1850   BP: (!) 159/77   Pulse: 78   Resp: 17   Temp: 97.4 °F (36.3 °C)   SpO2: 96%   Weight: 54.1 kg (119 lb 4.3 oz)   Height: 5' 2\" (1.575 m)            Physical Exam  Vitals and nursing note reviewed. Constitutional:       Appearance: She is well-developed. HENT:      Head: Normocephalic and atraumatic.    Eyes: Pupils: Pupils are equal, round, and reactive to light. Cardiovascular:      Rate and Rhythm: Normal rate and regular rhythm. Pulmonary:      Effort: Pulmonary effort is normal.      Breath sounds: Normal breath sounds. Abdominal:      General: There is no distension. Palpations: Abdomen is soft. Tenderness: There is no abdominal tenderness. Musculoskeletal:      Cervical back: Normal range of motion and neck supple. Skin:     General: Skin is warm and dry. Capillary Refill: Capillary refill takes less than 2 seconds. Neurological:      General: No focal deficit present. Mental Status: She is alert and oriented to person, place, and time. Cranial Nerves: No cranial nerve deficit. Sensory: No sensory deficit. Motor: No weakness. Psychiatric:         Mood and Affect: Mood normal.         Behavior: Behavior normal.          MDM       Procedures      The patient is resting comfortably and feels better, is alert, talkative, interactive and in no distress. The repeat examination is unremarkable and benign. The history, exam, diagnostic testing (if any) and the patient's current condition do not suggest intractable seizures, meningitis, stroke, sepsis, subarachnoid hemorrhage, intracranial bleeding, encephalitis, sepsis, UTI, PNA, or other significant pathology that would warrant further testing, continued ED treatment, admission, neurological consultation, or other specialist evaluation at this point. The vital signs have been stable. The patient's condition is stable and appropriate for discharge. The patient will pursue further outpatient evaluation with the primary care physician or other designated or consulting physician as indicated in the discharge instructions.

## 2022-02-10 NOTE — ED NOTES
Pt's daughter called and updated to discharge dispo and ETA of ambulance to take pt back to [address].

## 2022-02-10 NOTE — ED NOTES
[facility] at bedside for report. Pt changed into her clothes. PT assisted to bedside commode prior to transport.

## 2022-03-18 PROBLEM — E03.9 ACQUIRED HYPOTHYROIDISM: Status: ACTIVE | Noted: 2017-03-28

## 2022-03-19 PROBLEM — N30.00 ACUTE CYSTITIS: Status: ACTIVE | Noted: 2017-08-28

## 2022-03-19 PROBLEM — F03.90 DEMENTIA (HCC): Status: ACTIVE | Noted: 2017-03-29

## 2022-03-19 PROBLEM — N39.0 UTI (URINARY TRACT INFECTION): Status: ACTIVE | Noted: 2017-03-29

## 2022-05-29 ENCOUNTER — HOSPITAL ENCOUNTER (EMERGENCY)
Age: 87
Discharge: HOME HEALTH | End: 2022-05-29
Attending: EMERGENCY MEDICINE
Payer: MEDICARE

## 2022-05-29 ENCOUNTER — APPOINTMENT (OUTPATIENT)
Dept: CT IMAGING | Age: 87
End: 2022-05-29
Attending: EMERGENCY MEDICINE
Payer: MEDICARE

## 2022-05-29 VITALS
SYSTOLIC BLOOD PRESSURE: 107 MMHG | WEIGHT: 119 LBS | TEMPERATURE: 97.9 F | DIASTOLIC BLOOD PRESSURE: 83 MMHG | RESPIRATION RATE: 16 BRPM | BODY MASS INDEX: 21.77 KG/M2 | OXYGEN SATURATION: 100 % | HEART RATE: 85 BPM

## 2022-05-29 DIAGNOSIS — R56.9 SEIZURE-LIKE ACTIVITY (HCC): Primary | ICD-10-CM

## 2022-05-29 LAB
ALBUMIN SERPL-MCNC: 3.5 G/DL (ref 3.5–5)
ALBUMIN/GLOB SERPL: 1 {RATIO} (ref 1.1–2.2)
ALP SERPL-CCNC: 108 U/L (ref 45–117)
ALT SERPL-CCNC: 12 U/L (ref 12–78)
ANION GAP SERPL CALC-SCNC: 6 MMOL/L (ref 5–15)
AST SERPL-CCNC: 9 U/L (ref 15–37)
ATRIAL RATE: 75 BPM
BASOPHILS # BLD: 0 K/UL (ref 0–0.1)
BASOPHILS NFR BLD: 1 % (ref 0–1)
BILIRUB SERPL-MCNC: 0.4 MG/DL (ref 0.2–1)
BUN SERPL-MCNC: 12 MG/DL (ref 6–20)
BUN/CREAT SERPL: 16 (ref 12–20)
CALCIUM SERPL-MCNC: 9.1 MG/DL (ref 8.5–10.1)
CALCULATED P AXIS, ECG09: 49 DEGREES
CALCULATED R AXIS, ECG10: 59 DEGREES
CALCULATED T AXIS, ECG11: 52 DEGREES
CHLORIDE SERPL-SCNC: 108 MMOL/L (ref 97–108)
CO2 SERPL-SCNC: 30 MMOL/L (ref 21–32)
CREAT SERPL-MCNC: 0.77 MG/DL (ref 0.55–1.02)
DIAGNOSIS, 93000: NORMAL
DIFFERENTIAL METHOD BLD: ABNORMAL
EOSINOPHIL # BLD: 0.2 K/UL (ref 0–0.4)
EOSINOPHIL NFR BLD: 4 % (ref 0–7)
ERYTHROCYTE [DISTWIDTH] IN BLOOD BY AUTOMATED COUNT: 12.8 % (ref 11.5–14.5)
GLOBULIN SER CALC-MCNC: 3.4 G/DL (ref 2–4)
GLUCOSE SERPL-MCNC: 104 MG/DL (ref 65–100)
HCT VFR BLD AUTO: 41.4 % (ref 35–47)
HGB BLD-MCNC: 13.2 G/DL (ref 11.5–16)
IMM GRANULOCYTES # BLD AUTO: 0 K/UL (ref 0–0.04)
IMM GRANULOCYTES NFR BLD AUTO: 0 % (ref 0–0.5)
LACTATE SERPL-SCNC: 0.5 MMOL/L (ref 0.4–2)
LYMPHOCYTES # BLD: 0.9 K/UL (ref 0.8–3.5)
LYMPHOCYTES NFR BLD: 19 % (ref 12–49)
MAGNESIUM SERPL-MCNC: 2.4 MG/DL (ref 1.6–2.4)
MCH RBC QN AUTO: 32.9 PG (ref 26–34)
MCHC RBC AUTO-ENTMCNC: 31.9 G/DL (ref 30–36.5)
MCV RBC AUTO: 103.2 FL (ref 80–99)
MONOCYTES # BLD: 0.4 K/UL (ref 0–1)
MONOCYTES NFR BLD: 8 % (ref 5–13)
NEUTS SEG # BLD: 3.1 K/UL (ref 1.8–8)
NEUTS SEG NFR BLD: 68 % (ref 32–75)
NRBC # BLD: 0 K/UL (ref 0–0.01)
NRBC BLD-RTO: 0 PER 100 WBC
P-R INTERVAL, ECG05: 202 MS
PLATELET # BLD AUTO: 234 K/UL (ref 150–400)
PMV BLD AUTO: 9.5 FL (ref 8.9–12.9)
POTASSIUM SERPL-SCNC: 4 MMOL/L (ref 3.5–5.1)
PROT SERPL-MCNC: 6.9 G/DL (ref 6.4–8.2)
Q-T INTERVAL, ECG07: 378 MS
QRS DURATION, ECG06: 72 MS
QTC CALCULATION (BEZET), ECG08: 422 MS
RBC # BLD AUTO: 4.01 M/UL (ref 3.8–5.2)
SODIUM SERPL-SCNC: 144 MMOL/L (ref 136–145)
TROPONIN-HIGH SENSITIVITY: 6 NG/L (ref 0–51)
VENTRICULAR RATE, ECG03: 75 BPM
WBC # BLD AUTO: 4.6 K/UL (ref 3.6–11)

## 2022-05-29 PROCEDURE — 99284 EMERGENCY DEPT VISIT MOD MDM: CPT

## 2022-05-29 PROCEDURE — 84484 ASSAY OF TROPONIN QUANT: CPT

## 2022-05-29 PROCEDURE — 36415 COLL VENOUS BLD VENIPUNCTURE: CPT

## 2022-05-29 PROCEDURE — 80053 COMPREHEN METABOLIC PANEL: CPT

## 2022-05-29 PROCEDURE — 93005 ELECTROCARDIOGRAM TRACING: CPT

## 2022-05-29 PROCEDURE — 74011250637 HC RX REV CODE- 250/637: Performed by: EMERGENCY MEDICINE

## 2022-05-29 PROCEDURE — 70450 CT HEAD/BRAIN W/O DYE: CPT

## 2022-05-29 PROCEDURE — 85025 COMPLETE CBC W/AUTO DIFF WBC: CPT

## 2022-05-29 PROCEDURE — 83735 ASSAY OF MAGNESIUM: CPT

## 2022-05-29 PROCEDURE — 83605 ASSAY OF LACTIC ACID: CPT

## 2022-05-29 RX ORDER — CARBAMAZEPINE 200 MG/1
400 TABLET, EXTENDED RELEASE ORAL
Status: DISCONTINUED | OUTPATIENT
Start: 2022-05-29 | End: 2022-05-29 | Stop reason: ALTCHOICE

## 2022-05-29 RX ORDER — CARBAMAZEPINE 100 MG/1
400 TABLET, CHEWABLE ORAL
Status: COMPLETED | OUTPATIENT
Start: 2022-05-29 | End: 2022-05-29

## 2022-05-29 RX ADMIN — CARBAMAZEPINE 400 MG: 100 TABLET, CHEWABLE ORAL at 10:48

## 2022-05-29 NOTE — ED PROVIDER NOTES
22-year-old female with a history of COPD, dementia, seizures, presents to the emergency department from her [facility] facility where she reportedly was seen to have 2 episodes where she \"stared off into space\" and seemed to be less responsive than she normally is. Nursing staff states that this is consistent with her prior seizures. She reportedly is on Tegretol but they have not given her her medication this morning yet however she reportedly got her medication last night. On arrival by EMS she has returned to her neurologic baseline awake alert and oriented x1 with no focal deficits or complaints. Past Medical History:   Diagnosis Date    [facility]ronic obstructive [facility]) ([facility]     [facility]2017    Hypothyroid     [facility]     Seizure disorder ([facility]        Past Surgical History:   Procedure Laterality Date    HX HIP REPLACEMENT           History reviewed. No pertinent family history. Social History     Socioeconomic History    Marital status:      Spouse name: Not on file    Number of children: Not on file    Years of education: Not on file    Highest education level: Not on file   Occupational History    Not on file   Tobacco Use    Smoking status: Never Smoker    Smokeless tobacco: Never Used   Substance and Sexual Activity    Alcohol use: Yes    Drug use: No    Sexual activity: Not on file   Other Topics Concern    Not on file   Social History Narrative    Not on file     Social Determinants of Health     Financial Resource Strain:     Difficulty of Paying Living Expenses: Not on file   Food Insecurity:     Worried About Running Out of Food in the Last Year: Not on file    Heidy of Food in the Last Year: Not on file   Transportation Needs:     Lack of Transportation (Medical): Not on file    Lack of Transportation (Non-Medical):  Not on file   Physical Activity:     Days of Exercise per Week: Not on file    Minutes of Exercise per Session: Not on file   Stress:     Feeling of Stress : Not on file   Social Connections:     Frequency of Communication with Friends and Family: Not on file    Frequency of Social Gatherings with Friends and Family: Not on file    Attends Christian Services: Not on file    Active Member of [address] [place] [address] or Organizations: Not on file    Attends Club or Organization Meetings: Not on file    Marital Status: Not on file   Intimate Partner Violence:     Fear of Current or Ex-Partner: Not on file    Emotionally Abused: Not on file    Physically Abused: Not on file    Sexually Abused: Not on file   Housing Stability:     Unable to Pay for Housing in the Last Year: Not on file    Number of [place] in the Last Year: Not on file    Unstable Housing in the Last Year: Not on file         ALLERGIES: Aricept [donepezil], Ativan [lorazepam], Benadryl [diphenhydramine hcl], Ciprofloxacin, Clindamycin, Codeine, Macrobid [nitrofurantoin monohyd/m-cryst], Pcn [penicillins], and Sulfa (sulfonamide antibiotics)    Review of Systems   Unable to perform ROS: Dementia       Vitals:    05/29/22 1029   BP: 107/83   Pulse: 85   Resp: 16   Temp: 97.9 °F (36.6 °C)   SpO2: 100%   Weight: 54 kg (119 lb)            Physical Exam  Vitals and nursing note reviewed. Constitutional:       General: She is not in acute distress. Appearance: Normal appearance. She is well-developed. She is not diaphoretic. Comments: Pleasant, well-appearing, no acute distress. HENT:      Head: Normocephalic and atraumatic. Nose: Nose normal.   Eyes:      Extraocular Movements: Extraocular movements intact. Conjunctiva/sclera: Conjunctivae normal.      Pupils: Pupils are equal, round, and reactive to light. Cardiovascular:      Rate and Rhythm: Normal rate and regular rhythm. Heart sounds: Normal heart sounds. Pulmonary:      Effort: Pulmonary effort is normal.      Breath sounds: Normal breath sounds.    Abdominal: General: There is no distension. Palpations: Abdomen is soft. Tenderness: There is no abdominal tenderness. Musculoskeletal:         General: No tenderness. Cervical back: Neck supple. Skin:     General: Skin is warm and dry. Neurological:      General: No focal deficit present. Mental Status: She is alert. Mental status is at baseline. She is disoriented. GCS: GCS eye subscore is 4. GCS verbal subscore is 5. GCS motor subscore is 6. Cranial Nerves: No cranial nerve deficit or facial asymmetry. Sensory: Sensation is intact. No sensory deficit. Motor: No weakness or pronator drift. Coordination: Coordination normal. Finger-Nose-Finger Test normal.      Comments: She is oriented to person but not place or time. Reported neurologic baseline, history of dementia. MDM   Well-appearing 82-year-old female with history of seizures presents after she had potential seizure episode earlier this morning. It is reportedly been over an hour since her episode and nursing staff reports they have not yet given her her seizure medicines this morning. Now seems to be neurologically intact at her baseline. Labs returned very reassuringly showing no significant normalities, normal electrolytes, lactate, CBC CMP, troponin. CT head shows no acute intracranial abnormalities. She was monitored in the ED for couple hours and had no recurrence of symptoms remained at her neurologic baseline. She was given dose of her home seizure medication in the ED and was discharged back to her facility for further care. Recommended neurology follow-up for further evaluation as needed. Procedures    1039 EKG shows normal sinus rhythm with a rate of 75 bpm with no acute ST or T wave abnormalities suggestive of ischemia.

## 2022-05-29 NOTE — ED NOTES
Patient report given to [address] (receiving nurse) at [facility]. [phone].

## 2022-05-29 NOTE — ED NOTES
Spoke to patient's daughter Sharonda Rawls) and updated her on patient's condition and plan of care.

## 2022-05-29 NOTE — ED TRIAGE NOTES
Patient arrives with EMS from [address] chief complaint of 2 episodes of \"staring off\". Patient has a history of seizures and per nursing home staff, patient \"stares off\" when she has her seizures. Patient is currently A&O x2, which is her baseline.

## 2022-07-20 ENCOUNTER — HOSPITAL ENCOUNTER (EMERGENCY)
Age: 87
Discharge: OTHER INSTITUTION | End: 2022-07-20
Attending: EMERGENCY MEDICINE
Payer: MEDICARE

## 2022-07-20 VITALS
BODY MASS INDEX: 21.05 KG/M2 | TEMPERATURE: 98.2 F | RESPIRATION RATE: 18 BRPM | SYSTOLIC BLOOD PRESSURE: 142 MMHG | HEART RATE: 75 BPM | OXYGEN SATURATION: 100 % | WEIGHT: 115.08 LBS | DIASTOLIC BLOOD PRESSURE: 89 MMHG

## 2022-07-20 DIAGNOSIS — R56.9 SEIZURE (HCC): Primary | ICD-10-CM

## 2022-07-20 LAB
ALBUMIN SERPL-MCNC: 3.1 G/DL (ref 3.5–5)
ALBUMIN/GLOB SERPL: 1 {RATIO} (ref 1.1–2.2)
ALP SERPL-CCNC: 104 U/L (ref 45–117)
ALT SERPL-CCNC: 12 U/L (ref 12–78)
ANION GAP SERPL CALC-SCNC: 5 MMOL/L (ref 5–15)
APPEARANCE UR: ABNORMAL
APPEARANCE UR: ABNORMAL
AST SERPL-CCNC: 12 U/L (ref 15–37)
BACTERIA URNS QL MICRO: ABNORMAL /HPF
BACTERIA URNS QL MICRO: ABNORMAL /HPF
BASOPHILS # BLD: 0 K/UL (ref 0–0.1)
BASOPHILS NFR BLD: 1 % (ref 0–1)
BILIRUB SERPL-MCNC: 0.2 MG/DL (ref 0.2–1)
BILIRUB UR QL: NEGATIVE
BILIRUB UR QL: NEGATIVE
BUN SERPL-MCNC: 20 MG/DL (ref 6–20)
BUN/CREAT SERPL: 23 (ref 12–20)
CALCIUM SERPL-MCNC: 9.5 MG/DL (ref 8.5–10.1)
CAOX CRY URNS QL MICRO: ABNORMAL
CAOX CRY URNS QL MICRO: ABNORMAL
CARBAMAZEPINE SERPL-MCNC: 5.8 UG/ML (ref 4–12)
CHLORIDE SERPL-SCNC: 107 MMOL/L (ref 97–108)
CO2 SERPL-SCNC: 33 MMOL/L (ref 21–32)
COLOR UR: ABNORMAL
COLOR UR: ABNORMAL
CREAT SERPL-MCNC: 0.86 MG/DL (ref 0.55–1.02)
DIFFERENTIAL METHOD BLD: ABNORMAL
EOSINOPHIL # BLD: 0.2 K/UL (ref 0–0.4)
EOSINOPHIL NFR BLD: 3 % (ref 0–7)
EPITH CASTS URNS QL MICRO: ABNORMAL /LPF
EPITH CASTS URNS QL MICRO: ABNORMAL /LPF
ERYTHROCYTE [DISTWIDTH] IN BLOOD BY AUTOMATED COUNT: 13 % (ref 11.5–14.5)
GLOBULIN SER CALC-MCNC: 3.2 G/DL (ref 2–4)
GLUCOSE SERPL-MCNC: 149 MG/DL (ref 65–100)
GLUCOSE UR STRIP.AUTO-MCNC: NEGATIVE MG/DL
GLUCOSE UR STRIP.AUTO-MCNC: NEGATIVE MG/DL
GRAN CASTS URNS QL MICRO: ABNORMAL /LPF
HCT VFR BLD AUTO: 41 % (ref 35–47)
HGB BLD-MCNC: 12.9 G/DL (ref 11.5–16)
HGB UR QL STRIP: ABNORMAL
HGB UR QL STRIP: ABNORMAL
HYALINE CASTS URNS QL MICRO: ABNORMAL /LPF (ref 0–5)
IMM GRANULOCYTES # BLD AUTO: 0 K/UL (ref 0–0.04)
IMM GRANULOCYTES NFR BLD AUTO: 0 % (ref 0–0.5)
KETONES UR QL STRIP.AUTO: NEGATIVE MG/DL
KETONES UR QL STRIP.AUTO: NEGATIVE MG/DL
LEUKOCYTE ESTERASE UR QL STRIP.AUTO: ABNORMAL
LEUKOCYTE ESTERASE UR QL STRIP.AUTO: ABNORMAL
LYMPHOCYTES # BLD: 0.9 K/UL (ref 0.8–3.5)
LYMPHOCYTES NFR BLD: 15 % (ref 12–49)
MCH RBC QN AUTO: 32.5 PG (ref 26–34)
MCHC RBC AUTO-ENTMCNC: 31.5 G/DL (ref 30–36.5)
MCV RBC AUTO: 103.3 FL (ref 80–99)
MONOCYTES # BLD: 0.4 K/UL (ref 0–1)
MONOCYTES NFR BLD: 6 % (ref 5–13)
MUCOUS THREADS URNS QL MICRO: ABNORMAL /LPF
NEUTS SEG # BLD: 4.5 K/UL (ref 1.8–8)
NEUTS SEG NFR BLD: 75 % (ref 32–75)
NITRITE UR QL STRIP.AUTO: POSITIVE
NITRITE UR QL STRIP.AUTO: POSITIVE
NRBC # BLD: 0 K/UL (ref 0–0.01)
NRBC BLD-RTO: 0 PER 100 WBC
PH UR STRIP: 6 [PH] (ref 5–8)
PH UR STRIP: 6.5 [PH] (ref 5–8)
PLATELET # BLD AUTO: 220 K/UL (ref 150–400)
PMV BLD AUTO: 9.7 FL (ref 8.9–12.9)
POTASSIUM SERPL-SCNC: 3.4 MMOL/L (ref 3.5–5.1)
PROT SERPL-MCNC: 6.3 G/DL (ref 6.4–8.2)
PROT UR STRIP-MCNC: ABNORMAL MG/DL
PROT UR STRIP-MCNC: NEGATIVE MG/DL
RBC # BLD AUTO: 3.97 M/UL (ref 3.8–5.2)
RBC #/AREA URNS HPF: ABNORMAL /HPF (ref 0–5)
RBC #/AREA URNS HPF: ABNORMAL /HPF (ref 0–5)
SODIUM SERPL-SCNC: 145 MMOL/L (ref 136–145)
SP GR UR REFRACTOMETRY: 1.02 (ref 1–1.03)
SP GR UR REFRACTOMETRY: 1.02 (ref 1–1.03)
UA: UC IF INDICATED,UAUC: ABNORMAL
UA: UC IF INDICATED,UAUC: ABNORMAL
UROBILINOGEN UR QL STRIP.AUTO: 0.2 EU/DL (ref 0.2–1)
UROBILINOGEN UR QL STRIP.AUTO: 0.2 EU/DL (ref 0.2–1)
WBC # BLD AUTO: 6 K/UL (ref 3.6–11)
WBC URNS QL MICRO: ABNORMAL /HPF (ref 0–4)
WBC URNS QL MICRO: ABNORMAL /HPF (ref 0–4)

## 2022-07-20 PROCEDURE — 87186 SC STD MICRODIL/AGAR DIL: CPT

## 2022-07-20 PROCEDURE — 85025 COMPLETE CBC W/AUTO DIFF WBC: CPT

## 2022-07-20 PROCEDURE — 36415 COLL VENOUS BLD VENIPUNCTURE: CPT

## 2022-07-20 PROCEDURE — 93005 ELECTROCARDIOGRAM TRACING: CPT

## 2022-07-20 PROCEDURE — 81001 URINALYSIS AUTO W/SCOPE: CPT

## 2022-07-20 PROCEDURE — 99284 EMERGENCY DEPT VISIT MOD MDM: CPT

## 2022-07-20 PROCEDURE — 87086 URINE CULTURE/COLONY COUNT: CPT

## 2022-07-20 PROCEDURE — 80156 ASSAY CARBAMAZEPINE TOTAL: CPT

## 2022-07-20 PROCEDURE — 80053 COMPREHEN METABOLIC PANEL: CPT

## 2022-07-20 PROCEDURE — 87077 CULTURE AEROBIC IDENTIFY: CPT

## 2022-07-20 RX ORDER — CEFPODOXIME PROXETIL 100 MG/1
100 TABLET, FILM COATED ORAL 2 TIMES DAILY
Qty: 10 TABLET | Refills: 0 | Status: ON HOLD | OUTPATIENT
Start: 2022-07-20 | End: 2022-09-12

## 2022-07-20 NOTE — ED NOTES
[facility] here to transport patient back to [address]. TRANSFER - OUT REPORT:    Verbal report given to Marquis(name) on Brianna Brown  being transferred to [facility](unit) for transfer back to nursing facility. Report consisted of patients Situation, Background, Assessment and   Recommendations(SBAR). Information from the following report(s) SBAR and ED Summary was reviewed with the receiving nurse. Opportunity for questions and clarification was provided.

## 2022-07-20 NOTE — DISCHARGE INSTRUCTIONS
Thank you for allowing us to provide you with medical care today. We realize that you have many choices for your emergency care needs. We thank you for choosing [facility]. Please choose us in the future for any continued health care needs. We hope we addressed all of your medical concerns. We strive to provide excellent quality care in the Emergency Department. Anything less than excellent does not meet our expectations. The exam and treatment you received in the Emergency Department were for an emergent problem and are not intended as complete care. It is important that you follow up with a doctor, nurse practitioner, or physician's assistant for ongoing care. If your symptoms worsen or you do not improve as expected and you are unable to reach your usual health care provider, you should return to the Emergency Department. We are available 24 hours a day. Take this sheet with you when you go to your follow-up visit. If you have any problem arranging the follow-up visit, contact the Emergency Department immediately. Make an appointment your family doctor for follow up of this visit. Return to the ER if you are unable to be seen in a timely manner.

## 2022-07-20 NOTE — ED PROVIDER NOTES
46-year-old female with history of COPD, dementia, hypothyroidism, seizures presents to the emergency department by EMS from her facility with concern for grand mal seizure today lasting about 1 minute witnessed by facility staff. Unclear what her baseline is but she is alert and oriented upon arrival.  She was reportedly confused after the event. No loss of bowel or bladder. No tongue biting. Patient does complain of some right posterior lower molar pain to me. Review of her medical record indicates recent evaluation in the ER for concern for seizure. She takes Tegretol and reportedly has been compliant. The history is provided by the patient, the EMS personnel and medical records. Seizure   This is a chronic problem. The problem has been resolved. There was 1 seizure. The most recent episode lasted 30 to 120 seconds. Pertinent negatives include no headaches, no sore throat, no chest pain, no cough, no nausea, no vomiting and no diarrhea. Characteristics include rhythmic jerking and loss of consciousness. The episode was Witnessed. There was return to baseline postseizure. There has been no fever. She reports No chest pain, no diarrhea, no vomiting, no headaches, no sore throat, no cough. Home seizure medications include: Tegretol. Past Medical History:   Diagnosis Date    COPD [facility]chronic obstructive pulmonary disease) ([facility]     Dementia ([facility] 3/29/2017    Hypothyroid     Seizure ([facility]     [facility]        Past Surgical History:   Procedure Laterality Date    HX HIP REPLACEMENT           No family history on file.     Social History     Socioeconomic History    Marital status:      Spouse name: Not on file    Number of children: Not on file    Years of education: Not on file    Highest education level: Not on file   Occupational History    Not on file   Tobacco Use    Smoking status: Never    Smokeless tobacco: Never   Substance and Sexual Activity    Alcohol use: Yes    Drug use: No    Sexual activity: Not on file   Other Topics Concern    Not on file   Social History Narrative    Not on file     Social Determinants of Health     Financial Resource Strain: Not on file   Food Insecurity: Not on file   Transportation Needs: Not on file   Physical Activity: Not on file   Stress: Not on file   Social Connections: Not on file   Intimate Partner Violence: Not on file   Housing Stability: Not on file         ALLERGIES: Aricept [donepezil], Ativan [lorazepam], Benadryl [diphenhydramine hcl], Ciprofloxacin, Clindamycin, Codeine, Macrobid [nitrofurantoin monohyd/m-cryst], Pcn [penicillins], and Sulfa (sulfonamide antibiotics)    Review of Systems   Constitutional:  Negative for fatigue and fever. HENT:  Negative for sneezing and sore throat. Respiratory:  Negative for cough and shortness of breath. Cardiovascular:  Negative for chest pain and leg swelling. Gastrointestinal:  Negative for abdominal pain, diarrhea, nausea and vomiting. Genitourinary:  Negative for difficulty urinating and dysuria. Musculoskeletal:  Negative for arthralgias and myalgias. Skin:  Negative for color change and rash. Neurological:  Positive for seizures and loss of consciousness. Negative for weakness and headaches. Psychiatric/Behavioral:  Negative for agitation and behavioral problems. There were no vitals filed for this visit. Physical Exam  Vitals and nursing note reviewed. Constitutional:       General: She is not in acute distress. Appearance: Normal appearance. She is well-developed. She is not ill-appearing, toxic-appearing or diaphoretic. HENT:      Head: Normocephalic and atraumatic. Nose: Nose normal.      Mouth/Throat:      Mouth: Mucous membranes are moist.      Pharynx: Oropharynx is clear. Comments: Dental carry in the posterior right lower molar  Eyes:      Extraocular Movements: Extraocular movements intact.       Conjunctiva/sclera: Conjunctivae normal. Pupils: Pupils are equal, round, and reactive to light. Cardiovascular:      Rate and Rhythm: Normal rate and regular rhythm. Pulses: Normal pulses. Heart sounds: Normal heart sounds. Pulmonary:      Effort: Pulmonary effort is normal. No respiratory distress. Breath sounds: Normal breath sounds. No wheezing. Chest:      Chest wall: No tenderness. Abdominal:      General: Abdomen is flat. There is no distension. Palpations: Abdomen is soft. Tenderness: There is no abdominal tenderness. There is no guarding or rebound. Musculoskeletal:         General: No swelling, tenderness, deformity or signs of injury. Normal range of motion. Cervical back: Normal range of motion and neck supple. No rigidity. No muscular tenderness. Right lower leg: No edema. Left lower leg: No edema. Skin:     General: Skin is warm and dry. Capillary Refill: Capillary refill takes less than 2 seconds. Neurological:      General: No focal deficit present. Mental Status: She is alert and oriented to person, place, and time. Psychiatric:         Mood and Affect: Mood normal.         Behavior: Behavior normal.        MDM  Number of Diagnoses or Management Options  Seizure [facility])  Diagnosis management comments: 46-year-old female presents from memory care as above with reported seizure. She is apparently compliant with her Tegretol. We will send Tegretol level with instruction to follow-up with neurology, return if needed. Urine with concern for infection vs contaminant. Repeat with same findings. Discussed with daughter and will prescribe Vantin pending urine culture results. Amount and/or Complexity of Data Reviewed  Clinical lab tests: reviewed  Tests in the medicine section of CPT®: reviewed      ED Course as of 07/20/22 1918 Wed Jul 20, 2022   1457   ED EKG interpretation:  Rhythm: Sinus rhythm with first-degree AV block rate of approximate 72.   Axis: normal.  ST segment: No concerning ST elevations or depressions. This EKG was interpreted by Konrad Waldrop MD,ED Provider.  [JM]      ED Course User Index  [JM] Vinayak Erwin MD       Procedures

## 2022-07-20 NOTE — ED TRIAGE NOTES
Triage Note: Patient arrives via EMS from facility. CNA called 911 after she witnessed patient having a seizure, and was not herself afterwards. Patient has a hx of seizures and dementia. A&Ox1. BG- 161 enroute. VSS.

## 2022-07-21 LAB
ATRIAL RATE: 72 BPM
ATRIAL RATE: 73 BPM
CALCULATED P AXIS, ECG09: 65 DEGREES
CALCULATED P AXIS, ECG09: 66 DEGREES
CALCULATED R AXIS, ECG10: 62 DEGREES
CALCULATED R AXIS, ECG10: 63 DEGREES
CALCULATED T AXIS, ECG11: 32 DEGREES
CALCULATED T AXIS, ECG11: 52 DEGREES
DIAGNOSIS, 93000: NORMAL
DIAGNOSIS, 93000: NORMAL
P-R INTERVAL, ECG05: 222 MS
P-R INTERVAL, ECG05: 238 MS
Q-T INTERVAL, ECG07: 380 MS
Q-T INTERVAL, ECG07: 386 MS
QRS DURATION, ECG06: 88 MS
QRS DURATION, ECG06: 88 MS
QTC CALCULATION (BEZET), ECG08: 418 MS
QTC CALCULATION (BEZET), ECG08: 422 MS
VENTRICULAR RATE, ECG03: 72 BPM
VENTRICULAR RATE, ECG03: 73 BPM

## 2022-07-22 LAB
BACTERIA SPEC CULT: ABNORMAL
CC UR VC: ABNORMAL
SERVICE CMNT-IMP: ABNORMAL

## 2022-09-12 ENCOUNTER — APPOINTMENT (OUTPATIENT)
Dept: CT IMAGING | Age: 87
DRG: 086 | End: 2022-09-12
Attending: STUDENT IN AN ORGANIZED HEALTH CARE EDUCATION/TRAINING PROGRAM
Payer: MEDICARE

## 2022-09-12 ENCOUNTER — HOSPITAL ENCOUNTER (INPATIENT)
Age: 87
LOS: 4 days | Discharge: NURSING, REHAB OR LONG-TERM CARE | DRG: 086 | End: 2022-09-16
Attending: STUDENT IN AN ORGANIZED HEALTH CARE EDUCATION/TRAINING PROGRAM | Admitting: INTERNAL MEDICINE
Payer: MEDICARE

## 2022-09-12 ENCOUNTER — APPOINTMENT (OUTPATIENT)
Dept: GENERAL RADIOLOGY | Age: 87
DRG: 086 | End: 2022-09-12
Attending: STUDENT IN AN ORGANIZED HEALTH CARE EDUCATION/TRAINING PROGRAM
Payer: MEDICARE

## 2022-09-12 DIAGNOSIS — S06.5X9A TRAUMATIC SUBDURAL HEMATOMA WITH LOSS OF CONSCIOUSNESS, INITIAL ENCOUNTER (HCC): Primary | ICD-10-CM
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
## 2022-09-12 DIAGNOSIS — S06.6X9A SUBARACHNOID HEMORRHAGE FOLLOWING INJURY, WITH LOSS OF CONSCIOUSNESS, INITIAL ENCOUNTER (HCC): ICD-10-CM

## 2022-09-12 DIAGNOSIS — S01.01XA LACERATION OF SCALP, INITIAL ENCOUNTER: ICD-10-CM

## 2022-09-12 DIAGNOSIS — J18.9 PNEUMONIA OF LEFT LOWER LOBE DUE TO INFECTIOUS ORGANISM: ICD-10-CM

## 2022-09-12 PROBLEM — S06.5XAA SDH (SUBDURAL HEMATOMA): Status: ACTIVE | Noted: 2022-09-12

## 2022-09-12 PROBLEM — S06.5XAA SDH (SUBDURAL HEMATOMA) (HCC): Status: ACTIVE | Noted: 2022-09-12

## 2022-09-12 LAB
ALBUMIN SERPL-MCNC: 3.2 G/DL (ref 3.5–5)
ALBUMIN/GLOB SERPL: 0.8 {RATIO} (ref 1.1–2.2)
ALP SERPL-CCNC: 104 U/L (ref 45–117)
ALT SERPL-CCNC: 20 U/L (ref 12–78)
ANION GAP SERPL CALC-SCNC: ABNORMAL MMOL/L (ref 5–15)
APPEARANCE UR: ABNORMAL
AST SERPL-CCNC: 11 U/L (ref 15–37)
ATRIAL RATE: 86 BPM
BACTERIA URNS QL MICRO: ABNORMAL /HPF
BASOPHILS # BLD: 0.1 K/UL (ref 0–0.1)
BASOPHILS NFR BLD: 1 % (ref 0–1)
BILIRUB SERPL-MCNC: 0.2 MG/DL (ref 0.2–1)
BILIRUB UR QL: NEGATIVE
BUN SERPL-MCNC: 27 MG/DL (ref 6–20)
BUN/CREAT SERPL: 36 (ref 12–20)
CALCIUM SERPL-MCNC: 9.3 MG/DL (ref 8.5–10.1)
CALCULATED P AXIS, ECG09: 73 DEGREES
CALCULATED R AXIS, ECG10: 77 DEGREES
CALCULATED T AXIS, ECG11: 81 DEGREES
CHLORIDE SERPL-SCNC: 108 MMOL/L (ref 97–108)
CHOLEST SERPL-MCNC: 203 MG/DL
CO2 SERPL-SCNC: 37 MMOL/L (ref 21–32)
COLOR UR: ABNORMAL
COMMENT, HOLDF: NORMAL
CREAT SERPL-MCNC: 0.75 MG/DL (ref 0.55–1.02)
DIAGNOSIS, 93000: NORMAL
DIFFERENTIAL METHOD BLD: ABNORMAL
EOSINOPHIL # BLD: 0.3 K/UL (ref 0–0.4)
EOSINOPHIL NFR BLD: 3 % (ref 0–7)
EPITH CASTS URNS QL MICRO: ABNORMAL /LPF
ERYTHROCYTE [DISTWIDTH] IN BLOOD BY AUTOMATED COUNT: 13.4 % (ref 11.5–14.5)
GLOBULIN SER CALC-MCNC: 3.8 G/DL (ref 2–4)
GLUCOSE SERPL-MCNC: 109 MG/DL (ref 65–100)
GLUCOSE UR STRIP.AUTO-MCNC: NEGATIVE MG/DL
HCT VFR BLD AUTO: 40.3 % (ref 35–47)
HDLC SERPL-MCNC: 87 MG/DL
HDLC SERPL: 2.3 {RATIO} (ref 0–5)
HGB BLD-MCNC: 12.5 G/DL (ref 11.5–16)
HGB UR QL STRIP: ABNORMAL
HYALINE CASTS URNS QL MICRO: ABNORMAL /LPF (ref 0–5)
IMM GRANULOCYTES # BLD AUTO: 0.1 K/UL (ref 0–0.04)
IMM GRANULOCYTES NFR BLD AUTO: 1 % (ref 0–0.5)
KETONES UR QL STRIP.AUTO: NEGATIVE MG/DL
LDLC SERPL CALC-MCNC: 100.6 MG/DL (ref 0–100)
LEUKOCYTE ESTERASE UR QL STRIP.AUTO: ABNORMAL
LYMPHOCYTES # BLD: 1.3 K/UL (ref 0.8–3.5)
LYMPHOCYTES NFR BLD: 14 % (ref 12–49)
MCH RBC QN AUTO: 33 PG (ref 26–34)
MCHC RBC AUTO-ENTMCNC: 31 G/DL (ref 30–36.5)
MCV RBC AUTO: 106.3 FL (ref 80–99)
MONOCYTES # BLD: 0.6 K/UL (ref 0–1)
MONOCYTES NFR BLD: 6 % (ref 5–13)
NEUTS SEG # BLD: 6.8 K/UL (ref 1.8–8)
NEUTS SEG NFR BLD: 75 % (ref 32–75)
NITRITE UR QL STRIP.AUTO: POSITIVE
NRBC # BLD: 0 K/UL (ref 0–0.01)
NRBC BLD-RTO: 0 PER 100 WBC
P-R INTERVAL, ECG05: 226 MS
PH UR STRIP: 6 [PH] (ref 5–8)
PLATELET # BLD AUTO: 251 K/UL (ref 150–400)
PMV BLD AUTO: 9.9 FL (ref 8.9–12.9)
POTASSIUM SERPL-SCNC: 4.1 MMOL/L (ref 3.5–5.1)
PROCALCITONIN SERPL-MCNC: <0.05 NG/ML
PROT SERPL-MCNC: 7 G/DL (ref 6.4–8.2)
PROT UR STRIP-MCNC: ABNORMAL MG/DL
Q-T INTERVAL, ECG07: 356 MS
QRS DURATION, ECG06: 82 MS
QTC CALCULATION (BEZET), ECG08: 426 MS
RBC # BLD AUTO: 3.79 M/UL (ref 3.8–5.2)
RBC #/AREA URNS HPF: ABNORMAL /HPF (ref 0–5)
SAMPLES BEING HELD,HOLD: NORMAL
SODIUM SERPL-SCNC: 144 MMOL/L (ref 136–145)
SP GR UR REFRACTOMETRY: 1.02 (ref 1–1.03)
TRIGL SERPL-MCNC: 77 MG/DL (ref ?–150)
TROPONIN-HIGH SENSITIVITY: 4 NG/L (ref 0–51)
TSH SERPL DL<=0.05 MIU/L-ACNC: 0.71 UIU/ML (ref 0.36–3.74)
UA: UC IF INDICATED,UAUC: ABNORMAL
UROBILINOGEN UR QL STRIP.AUTO: 0.2 EU/DL (ref 0.2–1)
VENTRICULAR RATE, ECG03: 86 BPM
VLDLC SERPL CALC-MCNC: 15.4 MG/DL
WBC # BLD AUTO: 9.1 K/UL (ref 3.6–11)
WBC URNS QL MICRO: >100 /HPF (ref 0–4)

## 2022-09-12 PROCEDURE — 94664 DEMO&/EVAL PT USE INHALER: CPT

## 2022-09-12 PROCEDURE — 74011000250 HC RX REV CODE- 250: Performed by: HOSPITALIST

## 2022-09-12 PROCEDURE — 74011250636 HC RX REV CODE- 250/636: Performed by: STUDENT IN AN ORGANIZED HEALTH CARE EDUCATION/TRAINING PROGRAM

## 2022-09-12 PROCEDURE — 65270000046 HC RM TELEMETRY

## 2022-09-12 PROCEDURE — 90471 IMMUNIZATION ADMIN: CPT

## 2022-09-12 PROCEDURE — 70450 CT HEAD/BRAIN W/O DYE: CPT

## 2022-09-12 PROCEDURE — 90714 TD VACC NO PRESV 7 YRS+ IM: CPT | Performed by: STUDENT IN AN ORGANIZED HEALTH CARE EDUCATION/TRAINING PROGRAM

## 2022-09-12 PROCEDURE — 93005 ELECTROCARDIOGRAM TRACING: CPT

## 2022-09-12 PROCEDURE — 51798 US URINE CAPACITY MEASURE: CPT

## 2022-09-12 PROCEDURE — 80053 COMPREHEN METABOLIC PANEL: CPT

## 2022-09-12 PROCEDURE — 84145 PROCALCITONIN (PCT): CPT

## 2022-09-12 PROCEDURE — 74011250637 HC RX REV CODE- 250/637: Performed by: EMERGENCY MEDICINE

## 2022-09-12 PROCEDURE — 97535 SELF CARE MNGMENT TRAINING: CPT

## 2022-09-12 PROCEDURE — 74011250636 HC RX REV CODE- 250/636: Performed by: EMERGENCY MEDICINE

## 2022-09-12 PROCEDURE — 94640 AIRWAY INHALATION TREATMENT: CPT

## 2022-09-12 PROCEDURE — 74011000250 HC RX REV CODE- 250: Performed by: NURSE PRACTITIONER

## 2022-09-12 PROCEDURE — 77010033678 HC OXYGEN DAILY

## 2022-09-12 PROCEDURE — 72125 CT NECK SPINE W/O DYE: CPT

## 2022-09-12 PROCEDURE — 80061 LIPID PANEL: CPT

## 2022-09-12 PROCEDURE — 94760 N-INVAS EAR/PLS OXIMETRY 1: CPT

## 2022-09-12 PROCEDURE — 71045 X-RAY EXAM CHEST 1 VIEW: CPT

## 2022-09-12 PROCEDURE — 84443 ASSAY THYROID STIM HORMONE: CPT

## 2022-09-12 PROCEDURE — 85025 COMPLETE CBC W/AUTO DIFF WBC: CPT

## 2022-09-12 PROCEDURE — 2709999900 HC NON-CHARGEABLE SUPPLY

## 2022-09-12 PROCEDURE — 99285 EMERGENCY DEPT VISIT HI MDM: CPT

## 2022-09-12 PROCEDURE — 87086 URINE CULTURE/COLONY COUNT: CPT

## 2022-09-12 PROCEDURE — 94761 N-INVAS EAR/PLS OXIMETRY MLT: CPT

## 2022-09-12 PROCEDURE — 75810000293 HC SIMP/SUPERF WND  RPR

## 2022-09-12 PROCEDURE — 81001 URINALYSIS AUTO W/SCOPE: CPT

## 2022-09-12 PROCEDURE — 97161 PT EVAL LOW COMPLEX 20 MIN: CPT

## 2022-09-12 PROCEDURE — 97165 OT EVAL LOW COMPLEX 30 MIN: CPT

## 2022-09-12 PROCEDURE — 36415 COLL VENOUS BLD VENIPUNCTURE: CPT

## 2022-09-12 PROCEDURE — 74011250637 HC RX REV CODE- 250/637: Performed by: STUDENT IN AN ORGANIZED HEALTH CARE EDUCATION/TRAINING PROGRAM

## 2022-09-12 PROCEDURE — 74011000250 HC RX REV CODE- 250: Performed by: EMERGENCY MEDICINE

## 2022-09-12 PROCEDURE — 74011000250 HC RX REV CODE- 250: Performed by: STUDENT IN AN ORGANIZED HEALTH CARE EDUCATION/TRAINING PROGRAM

## 2022-09-12 PROCEDURE — 84484 ASSAY OF TROPONIN QUANT: CPT

## 2022-09-12 PROCEDURE — 97530 THERAPEUTIC ACTIVITIES: CPT

## 2022-09-12 PROCEDURE — 74011250637 HC RX REV CODE- 250/637: Performed by: NURSE PRACTITIONER

## 2022-09-12 PROCEDURE — 74011250637 HC RX REV CODE- 250/637: Performed by: HOSPITALIST

## 2022-09-12 RX ORDER — LAMOTRIGINE 150 MG/1
150 TABLET ORAL 2 TIMES DAILY
COMMUNITY

## 2022-09-12 RX ORDER — CARBAMAZEPINE 100 MG/1
100 TABLET, EXTENDED RELEASE ORAL EVERY EVENING
COMMUNITY

## 2022-09-12 RX ORDER — IPRATROPIUM BROMIDE AND ALBUTEROL SULFATE 2.5; .5 MG/3ML; MG/3ML
3 SOLUTION RESPIRATORY (INHALATION)
Status: COMPLETED | OUTPATIENT
Start: 2022-09-12 | End: 2022-09-12

## 2022-09-12 RX ORDER — LABETALOL HYDROCHLORIDE 5 MG/ML
10 INJECTION, SOLUTION INTRAVENOUS
Status: DISCONTINUED | OUTPATIENT
Start: 2022-09-12 | End: 2022-09-16 | Stop reason: HOSPADM

## 2022-09-12 RX ORDER — LEVOTHYROXINE SODIUM 100 UG/1
100 TABLET ORAL
Status: DISCONTINUED | OUTPATIENT
Start: 2022-09-12 | End: 2022-09-16 | Stop reason: HOSPADM

## 2022-09-12 RX ORDER — CARBAMAZEPINE 100 MG/1
100 TABLET, EXTENDED RELEASE ORAL
Status: DISCONTINUED | OUTPATIENT
Start: 2022-09-12 | End: 2022-09-16 | Stop reason: HOSPADM

## 2022-09-12 RX ORDER — IPRATROPIUM BROMIDE AND ALBUTEROL SULFATE 2.5; .5 MG/3ML; MG/3ML
3 SOLUTION RESPIRATORY (INHALATION)
COMMUNITY

## 2022-09-12 RX ORDER — METHENAMINE HIPPURATE 1000 MG/1
1 TABLET ORAL 2 TIMES DAILY WITH MEALS
COMMUNITY

## 2022-09-12 RX ORDER — ACETAMINOPHEN 325 MG/1
650 TABLET ORAL
Status: DISCONTINUED | OUTPATIENT
Start: 2022-09-12 | End: 2022-09-16 | Stop reason: HOSPADM

## 2022-09-12 RX ORDER — ONDANSETRON 4 MG/1
4 TABLET, ORALLY DISINTEGRATING ORAL
Status: DISCONTINUED | OUTPATIENT
Start: 2022-09-12 | End: 2022-09-16 | Stop reason: HOSPADM

## 2022-09-12 RX ORDER — IPRATROPIUM BROMIDE AND ALBUTEROL SULFATE 2.5; .5 MG/3ML; MG/3ML
3 SOLUTION RESPIRATORY (INHALATION)
Status: DISCONTINUED | OUTPATIENT
Start: 2022-09-12 | End: 2022-09-12

## 2022-09-12 RX ORDER — ALBUTEROL SULFATE 0.83 MG/ML
2.5 SOLUTION RESPIRATORY (INHALATION)
COMMUNITY
End: 2022-09-16

## 2022-09-12 RX ORDER — LEVETIRACETAM 500 MG/5ML
500 INJECTION, SOLUTION, CONCENTRATE INTRAVENOUS EVERY 12 HOURS
Status: DISCONTINUED | OUTPATIENT
Start: 2022-09-12 | End: 2022-09-12

## 2022-09-12 RX ORDER — MONTELUKAST SODIUM 10 MG/1
10 TABLET ORAL DAILY
Status: CANCELLED | OUTPATIENT
Start: 2022-09-12

## 2022-09-12 RX ORDER — AZITHROMYCIN 250 MG/1
500 TABLET, FILM COATED ORAL
Status: COMPLETED | OUTPATIENT
Start: 2022-09-12 | End: 2022-09-12

## 2022-09-12 RX ORDER — HYDRALAZINE HYDROCHLORIDE 20 MG/ML
10 INJECTION INTRAMUSCULAR; INTRAVENOUS
Status: DISCONTINUED | OUTPATIENT
Start: 2022-09-12 | End: 2022-09-16 | Stop reason: HOSPADM

## 2022-09-12 RX ORDER — LAMOTRIGINE 100 MG/1
100 TABLET ORAL 2 TIMES DAILY
Status: DISCONTINUED | OUTPATIENT
Start: 2022-09-12 | End: 2022-09-12

## 2022-09-12 RX ORDER — SODIUM CHLORIDE 0.9 % (FLUSH) 0.9 %
5-40 SYRINGE (ML) INJECTION EVERY 8 HOURS
Status: DISCONTINUED | OUTPATIENT
Start: 2022-09-12 | End: 2022-09-16 | Stop reason: HOSPADM

## 2022-09-12 RX ORDER — LEVETIRACETAM 500 MG/1
500 TABLET ORAL EVERY 12 HOURS
Status: DISCONTINUED | OUTPATIENT
Start: 2022-09-12 | End: 2022-09-12

## 2022-09-12 RX ORDER — LANOLIN ALCOHOL/MO/W.PET/CERES
3 CREAM (GRAM) TOPICAL
Status: DISCONTINUED | OUTPATIENT
Start: 2022-09-12 | End: 2022-09-16 | Stop reason: HOSPADM

## 2022-09-12 RX ORDER — HYDROXYZINE HYDROCHLORIDE 10 MG/1
25 TABLET, FILM COATED ORAL
Status: COMPLETED | OUTPATIENT
Start: 2022-09-12 | End: 2022-09-12

## 2022-09-12 RX ORDER — ACETAMINOPHEN 650 MG/1
650 SUPPOSITORY RECTAL
Status: DISCONTINUED | OUTPATIENT
Start: 2022-09-12 | End: 2022-09-16 | Stop reason: HOSPADM

## 2022-09-12 RX ORDER — SODIUM CHLORIDE 9 MG/ML
50 INJECTION, SOLUTION INTRAVENOUS CONTINUOUS
Status: DISCONTINUED | OUTPATIENT
Start: 2022-09-12 | End: 2022-09-16 | Stop reason: HOSPADM

## 2022-09-12 RX ORDER — POLYETHYLENE GLYCOL 3350 17 G/17G
17 POWDER, FOR SOLUTION ORAL DAILY PRN
Status: DISCONTINUED | OUTPATIENT
Start: 2022-09-12 | End: 2022-09-16 | Stop reason: HOSPADM

## 2022-09-12 RX ORDER — LABETALOL HYDROCHLORIDE 5 MG/ML
10 INJECTION, SOLUTION INTRAVENOUS
Status: DISCONTINUED | OUTPATIENT
Start: 2022-09-12 | End: 2022-09-12

## 2022-09-12 RX ORDER — SODIUM CHLORIDE 0.9 % (FLUSH) 0.9 %
5-40 SYRINGE (ML) INJECTION AS NEEDED
Status: DISCONTINUED | OUTPATIENT
Start: 2022-09-12 | End: 2022-09-16 | Stop reason: HOSPADM

## 2022-09-12 RX ORDER — ONDANSETRON 2 MG/ML
4 INJECTION INTRAMUSCULAR; INTRAVENOUS
Status: DISCONTINUED | OUTPATIENT
Start: 2022-09-12 | End: 2022-09-16 | Stop reason: HOSPADM

## 2022-09-12 RX ORDER — LANOLIN ALCOHOL/MO/W.PET/CERES
2000 CREAM (GRAM) TOPICAL DAILY
COMMUNITY

## 2022-09-12 RX ORDER — LIDOCAINE HYDROCHLORIDE AND EPINEPHRINE 10; 10 MG/ML; UG/ML
10 INJECTION, SOLUTION INFILTRATION; PERINEURAL
Status: COMPLETED | OUTPATIENT
Start: 2022-09-12 | End: 2022-09-12

## 2022-09-12 RX ORDER — KETOCONAZOLE 20 MG/ML
SHAMPOO TOPICAL
COMMUNITY

## 2022-09-12 RX ORDER — CARBAMAZEPINE 200 MG/1
400 TABLET, EXTENDED RELEASE ORAL EVERY 12 HOURS
Status: DISCONTINUED | OUTPATIENT
Start: 2022-09-12 | End: 2022-09-16 | Stop reason: HOSPADM

## 2022-09-12 RX ORDER — IPRATROPIUM BROMIDE AND ALBUTEROL SULFATE 2.5; .5 MG/3ML; MG/3ML
3 SOLUTION RESPIRATORY (INHALATION)
Status: DISCONTINUED | OUTPATIENT
Start: 2022-09-12 | End: 2022-09-16

## 2022-09-12 RX ORDER — IPRATROPIUM BROMIDE AND ALBUTEROL SULFATE 2.5; .5 MG/3ML; MG/3ML
3 SOLUTION RESPIRATORY (INHALATION) EVERY 8 HOURS
Status: DISCONTINUED | OUTPATIENT
Start: 2022-09-12 | End: 2022-09-12

## 2022-09-12 RX ORDER — HYDRALAZINE HYDROCHLORIDE 20 MG/ML
10 INJECTION INTRAMUSCULAR; INTRAVENOUS
Status: DISCONTINUED | OUTPATIENT
Start: 2022-09-12 | End: 2022-09-12

## 2022-09-12 RX ADMIN — LAMOTRIGINE 150 MG: 100 TABLET ORAL at 20:09

## 2022-09-12 RX ADMIN — CARBAMAZEPINE 400 MG: 200 TABLET, EXTENDED RELEASE ORAL at 11:10

## 2022-09-12 RX ADMIN — LAMOTRIGINE 150 MG: 100 TABLET ORAL at 11:08

## 2022-09-12 RX ADMIN — CEFTRIAXONE SODIUM 1 G: 1 INJECTION, POWDER, FOR SOLUTION INTRAMUSCULAR; INTRAVENOUS at 07:34

## 2022-09-12 RX ADMIN — Medication 3 MG: at 20:09

## 2022-09-12 RX ADMIN — AZITHROMYCIN MONOHYDRATE 500 MG: 250 TABLET ORAL at 07:37

## 2022-09-12 RX ADMIN — CARBAMAZEPINE 100 MG: 100 TABLET, EXTENDED RELEASE ORAL at 20:09

## 2022-09-12 RX ADMIN — TETANUS AND DIPHTHERIA TOXOIDS ADSORBED 0.5 ML: 2; 2 INJECTION INTRAMUSCULAR at 05:53

## 2022-09-12 RX ADMIN — IPRATROPIUM BROMIDE AND ALBUTEROL SULFATE 3 ML: .5; 3 SOLUTION RESPIRATORY (INHALATION) at 21:15

## 2022-09-12 RX ADMIN — HYDROXYZINE HYDROCHLORIDE 25 MG: 10 TABLET ORAL at 20:09

## 2022-09-12 RX ADMIN — LIDOCAINE HYDROCHLORIDE,EPINEPHRINE BITARTRATE 100 MG: 10; .01 INJECTION, SOLUTION INFILTRATION; PERINEURAL at 05:55

## 2022-09-12 RX ADMIN — LEVOTHYROXINE SODIUM 100 MCG: 0.1 TABLET ORAL at 11:08

## 2022-09-12 RX ADMIN — IPRATROPIUM BROMIDE AND ALBUTEROL SULFATE 3 ML: .5; 3 SOLUTION RESPIRATORY (INHALATION) at 09:05

## 2022-09-12 RX ADMIN — CARBAMAZEPINE 400 MG: 200 TABLET, EXTENDED RELEASE ORAL at 20:09

## 2022-09-12 RX ADMIN — IPRATROPIUM BROMIDE AND ALBUTEROL SULFATE 3 ML: .5; 3 SOLUTION RESPIRATORY (INHALATION) at 16:33

## 2022-09-12 RX ADMIN — SODIUM CHLORIDE, PRESERVATIVE FREE 10 ML: 5 INJECTION INTRAVENOUS at 22:05

## 2022-09-12 RX ADMIN — ACETAMINOPHEN 650 MG: 325 TABLET, FILM COATED ORAL at 19:44

## 2022-09-12 RX ADMIN — SODIUM CHLORIDE 50 ML/HR: 9 INJECTION, SOLUTION INTRAVENOUS at 10:56

## 2022-09-12 NOTE — CONSULTS
[address]    Name:  Liam Calles  MR#:  [id number]  :  1935  ACCOUNT #:  [de-identified]  DATE OF SERVICE:  2022    NEUROSURGERY CONSULTATION    REASON FOR CONSULTATION:  Intracranial hemorrhage. HISTORY OF PRESENT ILLNESS:  This is an 28-year-old woman, who has a known history of seizures. She is in a Memory Care Unit. She presented to the emergency room after a ground level fall with a laceration on the back of her head. There was no loss of consciousness. She does not take any anticoagulation. No report of seizure activity. Her last seizure that was known was on 2022. She apparently has been at her baseline where she is awake and slightly disoriented, moving all extremities. She is in ICU. Head CT upon admission showed a 3-mm right subdural hematoma. Neurologically, she has been stable overnight. Blood pressure has been stable. PAST MEDICAL HISTORY:  1.  Seizures. 2.  Dementia. 3.  COPD. 4.  Hypothyroidism. PAST SURGICAL HISTORY:  Hip replacement. CURRENT MEDICATIONS:  1.  DuoNeb inhaler. 2.  Tegretol  mg q.12 hours. 3.  Ceftriaxone 1 g every 24 hours. 4.  Lamictal 150 mg every 12 hours. 5.  Synthroid 100 mcg q. day. ALLERGIES:  ARICEPT, ATIVAN, BENADRYL, CIPROFLOXACIN, CLINDAMYCIN, CODEINE, MACROBID, PENICILLIN, AND SULFA. REVIEW OF SYSTEMS:  She denies any headache, vision changes, hearing difficulty, chest pain, shortness of breath, abdominal pain, urinary dysfunction, numbness, muscle spasm, or skin eruption. PHYSICAL EXAMINATION:  GENERAL:  A well-developed elderly woman who is examined seated in the chair. VITAL SIGNS:  Height 5 feet 2 inches, weight 170 pounds, BMI is 19.72. Temperature 97.6. Blood pressure 113/58, pulse 82. NEUROLOGIC:  She is alert. She knows her name.   She knows that she is in the hospital.  She is somewhat amnestic for recent events, but she does tell me some specifics regarding her living situation. She is unclear of the year. She has conjugate gaze and symmetric face. She has a laceration that has been stapled on the posterior aspect of her head. There is significant bruising on her head. She does have bruising particularly on her left arm. No swelling. Fluent speech. She has a bruise on the tip of her tongue. She is moving all extremities. IMAGING STUDIES:  She has a head CT that shows a right convexity, 2 mm subdural hematoma without any significant mass effect. Repeat head CT is pending. ASSESSMENT AND PLAN:  This is an 56-year-old woman who had a fall and she does have a history of seizures, but it does not sound like this was associated with the seizure. Head laceration has been stable. She has a small area of tongue biting, which may suggest a seizure. She is on her current medications for seizures overnight. If repeat head CT is stable, she can be transferred out of the ICU. She will need a repeat head CT in two weeks to rule out any delayed hematoma. Thank you for this consultation.       MD DONTE [email or web address]  D:  09/12/2022 12:10  T:  09/12/2022 13:50  JOB #:  [id number]

## 2022-09-12 NOTE — PROGRESS NOTES
1030  Problem: Mobility Impaired (Adult and Pediatric)  Goal: *Acute Goals and Plan of Care (Insert Text)  Description: FUNCTIONAL STATUS PRIOR TO ADMISSION: Patient was using a rollator for functional mobility requiring supervision to intermittent assistance from staff. Patient was requiring assistance with ADLs and IADLs, mostly seated. Daughter reporting significant fall history, no prior injuries reports; however, inconsistent communication from staff from memory care when falls have taken place. HOME SUPPORT: Patient lived at [address] [address] in the memory care unit. Physical Therapy Goals  Initiated 9/12/2022  1. Patient will move from supine to sit and sit to supine  in bed with supervision/set-up within 7 day(s). 2.  Patient will transfer from bed to chair and chair to bed with supervision/set-up using the least restrictive device within 7 day(s). 3.  Patient will perform sit to stand with supervision/set-up within 7 day(s). 4.  Patient will ambulate with supervision/set-up for 150 feet with the least restrictive device within 7 day(s). Outcome: Not Met     PHYSICAL THERAPY EVALUATION  Patient: Amee High [de-identified]82 y.o. female)  Date: 9/12/2022  Primary Diagnosis: SDH (subdural hematoma) [S06.5X9A]       Precautions:   Fall (SBP <140)    ASSESSMENT  Based on the objective data described below, the patient presents with impaired balance and coordination and decreased strength, ROM, and activity tolerance s/p admission with SDH. Patient has dementia, from memory care, reported to have just completed physical therapy. At baseline, pt is supervision with her mobility (transfers, ambulation) though with h/o falls (increased since 2/2022). Currently she is overall Min A for functional mobility. She demonstrates poor transfer and walker technique putting her at risk for additional falls. She corrects with cues provided though does retain correction between training trials.   VSS while on RA (received pt on 3L NCO2 w/ SpO2 100%. Removed O2 during therapy activity with SpO2 90's when pleth was good and pt voicing no SOB). Patient remained sitting up in the chair with her daughter and RN in the room, chair alarm activated. Therapy will follow. Will have PT tech bring a rajiv rolling walker for next session(s) to replace the std walker which is tall for pt's height. Current Level of Function Impacting Discharge (mobility/balance): CGA supine to sit; Min a sit<->stand and bed<->chair; Up to Min A ambulating with RW. Functional Outcome Measure: The patient scored 15/28 on the Tinetti outcome measure which is indicative of high fall risk. Other factors to consider for discharge: from [place] [address] memory care     Patient will benefit from skilled therapy intervention to address the above noted impairments. PLAN :  Recommendations and Planned Interventions: bed mobility training, transfer training, gait training, therapeutic exercises, neuromuscular re-education, patient and family training/education, and therapeutic activities      Frequency/Duration: Patient will be followed by physical therapy:  4 times a week to address goals.     Recommendation for discharge: (in order for the patient to meet his/her long term goals)  Therapy up to 5 days/week in SNF setting vs return to memory care with resumption of therapy    This discharge recommendation:  Has not yet been discussed the attending provider and/or case management    IF patient discharges home will need the following DME: to be determined (TBD) - anticipate none (pt owns rollator)         SUBJECTIVE:       OBJECTIVE DATA SUMMARY:   HISTORY:    Past Medical History:   Diagnosis Date    COPD (chronic obstructive pulmonary disease) ([facility]     [facility] 3/29/2017    Hypothyroid     Seizure ([facility]     Seiz[facility] disorder ([facility]      Past Surgical History:   Procedure Laterality Date    HX HIP REPLACEMENT         Personal factors and/or comorbidities impacting plan of care: PMH    Home Situation  Home Environment: Skilled nursing facility (memory care)  [facility] Name: Quan Vu  Living Alone: No  Current DME Used/Available at Home: Jenaro Lento, rollator, Grab bars, Shower chair  Tub or Shower Type: Shower (WIS)    EXAMINATION/PRESENTATION/DECISION MAKING:   Critical Behavior:  Neurologic State: Alert  Orientation Level: Oriented to person, Oriented to place, Oriented to situation, Disoriented to time  Cognition: Decreased attention/concentration, Follows commands, Impaired decision making, Impulsive, Memory loss, Poor safety awareness  Safety/Judgement: Decreased awareness of environment, Decreased awareness of need for assistance, Decreased awareness of need for safety, Decreased insight into deficits  Hearing: Auditory  Auditory Impairment: None    Range Of Motion:  AROM: Generally decreased, functional           PROM: Within functional limits           Strength:    Strength: Generally decreased, functional   BLE's equal              Tone & Sensation:   Tone: Normal              Sensation: Intact               Coordination:  Coordination: Generally decreased, functional (RUE slightly more impaired)  Vision:   Tracking: Unable to test secondary due to decreased visual attention (cognition limiting)  Diplopia: No  Acuity: Impaired near vision; Impaired far vision (Wears readers and unable to tell time on clock ~10ft away despite max cues)  Corrective Lenses: Reading glasses  Functional Mobility:  Bed Mobility:     Supine to Sit: Contact guard assistance  Sit to Supine:  (Left in recliner)  Scooting: Contact guard assistance  Transfers:  Sit to Stand: Minimum assistance;Assist x1 (RW)  Stand to Sit: Minimum assistance;Assist x1 (RW)        Bed to Chair: Minimum assistance;Assist x1 (RW)  Tx: VC for hand placement. Corrects with cues, does not carry over between training trials. Balance:   Sitting: Intact; Without support  Standing: Impaired; With support (RW)  Standing - Static: Fair;Constant support  Standing - Dynamic : Fair;Constant support  Ambulation/Gait Training:  Distance (ft): 10 Feet (ft)  Assistive Device: Gait belt;Walker, rolling  Ambulation - Level of Assistance: Contact guard assistance;Minimal assistance;Assist x1;Additional time; Adaptive equipment (Min A at times for walker management to correct placement/ improve body/walker position)     Gait Description (WDL): Exceptions to WDL  Gait Abnormalities: Decreased step clearance; Other (forward flexed trunk w/ body posterior to and outside walker frame)        Base of Support: Widened     Speed/Jenn: Slow;Pace decreased (<100 feet/min)  Step Length: Right shortened;Left shortened  Tx: VC for hand placement, posture correction, body position w/in walker frame. Assist for walker management. Functional Measure:  Tinetti test:    Sitting Balance: 1  Arises: 0  Attempts to Rise: 1  Immediate Standing Balance: 1  Standing Balance: 1  Nudged: 0  Eyes Closed: 0  Turn 360 Degrees - Continuous/Discontinuous: 1 (w/ walker)  Turn 360 Degrees - Steady/Unsteady: 1 (w/ walker)  Sitting Down: 0  Balance Score: 6 Balance total score  Indication of Gait: 1  R Step Length/Height: 1  L Step Length/Height: 1  R Foot Clearance: 1  L Foot Clearance: 1  Step Symmetry: 1  Step Continuity: 1  Path: 1  Trunk: 0  Walking Time: 1  Gait Score: 9 Gait total score  Total Score: 15/28 Overall total score         Tinetti Tool Score Risk of Falls  <19 = High Fall Risk  19-24 = Moderate Fall Risk  25-28 = Low Fall Risk  Tinetti ME. Performance-Oriented Assessment of Mobility Problems in Elderly Patients. Ren 66; [id number].  (Scoring Description: PT Bulletin Feb. 10, 1993)    Older adults: Hanna Napoles et al, 2009; n = 1000 [facility] elderly evaluated with ABC, AZUL, ADL, and IADL)  · Mean AZUL score for males aged 69-68 years = 26.21(3.40)  · Mean AZUL score for females age 69-68 years = 25.16(4.30)  · Mean AZUL score for males over 80 years = 23.29(6.02)  · Mean AZUL score for females over 80 years = 17.20(8.32)            Physical Therapy Evaluation Charge Determination   History Examination Presentation Decision-Making   MEDIUM  Complexity : 1-2 comorbidities / personal factors will impact the outcome/ POC  MEDIUM Complexity : 3 Standardized tests and measures addressing body structure, function, activity limitation and / or participation in recreation  LOW Complexity : Stable, uncomplicated  Other outcome measures Tinetti 15/28  HIGH       Based on the above components, the patient evaluation is determined to be of the following complexity level: LOW     Pain Rating:  None indicated    Activity Tolerance:   Fair    After treatment patient left in no apparent distress:   Sitting in chair, Call bell within reach, Bed / chair alarm activated, Caregiver / family present, and RN aware    COMMUNICATION/EDUCATION:   The patients plan of care was discussed with: Occupational therapist and Registered nurse. Fall prevention education was provided and the patient/caregiver indicated understanding., Patient/family have participated as able in goal setting and plan of care. , and Patient/family agree to work toward stated goals and plan of care.     Thank you for this referral.  Sydni Carias, PT   Time Calculation: 20 mins

## 2022-09-12 NOTE — PROGRESS NOTES
[facility] Adult  Hospitalist Group                                                                                          Hospitalist Progress Note  Darion Myers MD  Answering service: [phone] from in house phone        Date of Service:  2022  NAME:  Erlinda Brady  :  1935  MRN:  [id number]      Admission Summary:   Erlinda Brady is a 83 yo female with pmhx of seizures, dementia, COPD, hypothyroidism, who presented to ED after a GLF.  the patient was kept in ICU for overnight, repeat head CT was stable, and she was given to hospitalist service for evaluation on downgrading the patient       Interval history / Subjective: Follow up SDH  Comfortably laying in bed  Family in the room     Assessment & Plan:     SDH  -repeat Head CT unremarkable  -Appreciate Neurosurgery    UTI  -follow cultures  -on ceftriaxone, continue    Mild left basilar opacity  -likely atelectasis  -Wean off supp oxygen as able  -repeat CXR in 4 weeks    COPD not in exacerbation, wears oxygen prn  Hypothyroidism: on synthroid  History of seizure disorder: continue home meds  Dementia: supportive care     regular diet    PT/OT SNF vs back to memory care unit       Code status: DNR  Prophylaxis: scd  PTA: [facility]    Plan:Monitor overnight    Care Plan discussed with: patient/family  Anticipated Disposition: ?tomorrow     Hospital Problems  Date Reviewed: 2021            Codes Class Noted POA    SDH [facility]) ([facility] ICD-10-CM: Y22.1D8O  ICD-9-CM: 432.1  2022 Unknown             Review of Systems:   A comprehensive review of systems was negative except for that written in the HPI. Vital Signs:    Last 24hrs VS reviewed since prior progress note.  Most recent are:  Visit Vitals  /72 (BP 1 Location: Right upper arm, BP Patient Position: At rest)   Pulse 82   Temp 98 °F (36.7 °C)   Resp 22   Wt 48.9 kg (107 lb 12.9 oz)   SpO2 99%   BMI 19.72 kg/m² Intake/Output Summary (Last 24 hours) at 9/12/2022 1659  Last data filed at 9/12/2022 1600  Gross per 24 hour   Intake 263.33 ml   Output 100 ml   Net 163.33 ml        Physical Examination:     I had a face to face encounter with this patient and independently examined them on 9/12/2022 as outlined below:          Constitutional:  No acute distress   ENT:  Oral mucosa moist, oropharynx benign. Resp:  CTA bilaterally. No wheezing/rhonchi/rales. No accessory muscle use. CV:  Regular rhythm, normal rate, no murmurs, gallops, rubs    GI:  Soft, non distended, non tender. normoactive bowel sounds, no hepatosplenomegaly     Musculoskeletal:  No edema, warm, 2+ pulses throughout    Neurologic:  Moves all extremities. AAOx3, CN II-XII reviewed            Data Review:    Review and/or order of clinical lab test      Labs:     Recent Labs     09/12/22 0312   WBC 9.1   HGB 12.5   HCT 40.3        Recent Labs     09/12/22 0312      K 4.1      CO2 37*   BUN 27*   CREA 0.75   *   CA 9.3     Recent Labs     09/12/22 0312   ALT 20      TBILI 0.2   TP 7.0   ALB 3.2*   GLOB 3.8     No results for input(s): INR, PTP, APTT, INREXT in the last 72 hours. No results for input(s): FE, TIBC, PSAT, FERR in the last 72 hours. Lab Results   Component Value Date/Time    Folate 17.4 04/05/2021 12:30 AM      No results for input(s): PH, PCO2, PO2 in the last 72 hours. No results for input(s): CPK, CKNDX, TROIQ in the last 72 hours.     No lab exists for component: CPKMB  Lab Results   Component Value Date/Time    Cholesterol, total 203 (H) 09/12/2022 10:33 AM    HDL Cholesterol 87 09/12/2022 10:33 AM    LDL, calculated 100.6 (H) 09/12/2022 10:33 AM    Triglyceride 77 09/12/2022 10:33 AM    CHOL/HDL Ratio 2.3 09/12/2022 10:33 AM     Lab Results   Component Value Date/Time    Glucose (POC) 117 (H) 03/10/2017 07:36 PM     Lab Results   Component Value Date/Time    Color YELLOW/STRAW 09/12/2022 10:32 AM    Appearance TURBID (A) 09/12/2022 10:32 AM    Specific gravity 1.019 09/12/2022 10:32 AM    Specific gravity 1.020 07/20/2022 06:34 PM    pH (UA) 6.0 09/12/2022 10:32 AM    Protein TRACE (A) 09/12/2022 10:32 AM    Glucose Negative 09/12/2022 10:32 AM    Ketone Negative 09/12/2022 10:32 AM    Bilirubin Negative 09/12/2022 10:32 AM    Urobilinogen 0.2 09/12/2022 10:32 AM    Nitrites Positive (A) 09/12/2022 10:32 AM    Leukocyte Esterase LARGE (A) 09/12/2022 10:32 AM    Epithelial cells MANY (A) 09/12/2022 10:32 AM    Bacteria 3+ (A) 09/12/2022 10:32 AM    WBC >100 (H) 09/12/2022 10:32 AM    RBC 5-10 09/12/2022 10:32 AM         Medications Reviewed:     Current Facility-Administered Medications   Medication Dose Route Frequency    sodium chloride (NS) flush 5-40 mL  5-40 mL IntraVENous Q8H    sodium chloride (NS) flush 5-40 mL  5-40 mL IntraVENous PRN    acetaminophen (TYLENOL) tablet 650 mg  650 mg Oral Q6H PRN    Or    acetaminophen (TYLENOL) suppository 650 mg  650 mg Rectal Q6H PRN    polyethylene glycol (MIRALAX) packet 17 g  17 g Oral DAILY PRN    ondansetron (ZOFRAN ODT) tablet 4 mg  4 mg Oral Q8H PRN    Or    ondansetron (ZOFRAN) injection 4 mg  4 mg IntraVENous Q6H PRN    cefTRIAXone (ROCEPHIN) 1 g in 0.9% sodium chloride 10 mL IV syringe  1 g IntraVENous Q24H    levothyroxine (SYNTHROID) tablet 100 mcg  100 mcg Oral ACB    0.9% sodium chloride infusion  50 mL/hr IntraVENous CONTINUOUS    labetaloL (NORMODYNE;TRANDATE) injection 10 mg  10 mg IntraVENous Q1H PRN    hydrALAZINE (APRESOLINE) 20 mg/mL injection 10 mg  10 mg IntraVENous Q4H PRN    albuterol-ipratropium (DUO-NEB) 2.5 MG-0.5 MG/3 ML  3 mL Nebulization Q8H RT    lamoTRIgine (LaMICtal) tablet 150 mg  150 mg Oral Q12H    carBAMazepine XR (TEGretol XR) tablet 400 mg  400 mg Oral Q12H    carBAMazepine XR (TEGretol XR) tablet 100 mg  100 mg Oral QHS     ______________________________________________________________________  EXPECTED LENGTH OF STAY: 2d 0h  ACTUAL LENGTH OF STAY:          Simone Dinero MD

## 2022-09-12 NOTE — PROGRESS NOTES
Full consult dictated  Repeat CT stable  Okay to transfer out of ICU to stepdown  Okay to change neuro checks to every 4 hours  Will need repeat CT in 2 weeks  Will be available as needed

## 2022-09-12 NOTE — PROGRESS NOTES
Spiritual Care Assessment/Progress Note  [facility]      NAME: Rajiv Villegas      MRN: [id number]  AGE: 82 y.o. SEX: female  Anabaptist Affiliation: Orthodox   Language: English     9/12/2022     Total Time (in minutes): 5     Spiritual Assessment begun in [address] [facility] through conversation with:         [x]Patient        [x] Family    [] Friend(s)        Reason for Consult: [facility]     Spiritual beliefs: (Please include comment if needed)     [x] Identifies with a lu tradition: Orthodox        [x] Supported by a lu community:            [] Claims no spiritual orientation:           [] Seeking spiritual identity:                [] Adheres to an individual form of spirituality:           [] Not able to assess:                           Identified resources for coping:      [x] Prayer                               [x] Music                  [] Guided Imagery     [x] Family/friends                 [] Pet visits     [] Devotional reading                         [] Unknown     [] Other:                                               Interventions offered during this visit: (See comments for more details)    Patient Interventions: Affirmation of lu, Communion (Orthodox), Initial/Spiritual assessment, Critical care, Prayer (actual), Prayer (assurance of)     Family/Friend(s):  Affirmation of lu, Communion (Orthodox), Prayer (actual), Prayer (assurance of)     Plan of Care:     [x] Support spiritual and/or cultural needs    [] Support AMD and/or advance care planning process      [] Support grieving process   [] Coordinate Rites and/or Rituals    [x] Coordination with community clergy   [] No spiritual needs identified at this time   [] Detailed Plan of Care below (See Comments)  [] Make referral to Music Therapy  [] Make referral to Pet Therapy     [] Make referral to Addiction services  [] Make referral to [facility]  [] Make referral to Spiritual Care Partner  [] No future visits requested        [] Contact Spiritual Care for further referrals     Comments: Mrs. Pia Fermin was sitting up in her chair. Her daughter was with her. Mrs. Pia Fermin lives in an assisted living facility and reports they have a chapel there for Mass. She does not care for Mass on TV. Wants to attend in person. Prayer and communion offered to Mrs. Pia Fermin. Her daughter joined in prayer and declined communion today. Mrs. Pia Fermin is alert and has some difficulty hearing at times.     RADHA Harry, RN, ACSW, LCSW   Page:  [phone]

## 2022-09-12 NOTE — PROGRESS NOTES
0825: TRANSFER - IN REPORT:    Verbal report received from Keyanna Cordero RN (name) on Dustin Pascal  being received from ED (unit) for routine progression of care      Report consisted of patients Situation, Background, Assessment and   Recommendations(SBAR). Information from the following report(s) SBAR, Kardex, ED Summary, Intake/Output, MAR, Recent Results, Cardiac Rhythm NSR, and Quality Measures was reviewed with the receiving nurse. Opportunity for questions and clarification was provided. Assessment completed upon patients arrival to unit and care assumed.      Primary Nurse Jessica Crowe RN and LEELA Boyle performed a dual skin assessment on this patient No impairment noted  George score is 16

## 2022-09-12 NOTE — ED NOTES
TRANSFER - OUT REPORT:    Verbal report given to Bentley Lewis RN (name) on Gustavo Damon  being transferred to ICU (unit) for routine progression of care       Report consisted of patients Situation, Background, Assessment and   Recommendations(SBAR). Information from the following report(s) SBAR, Kardex, ED Summary, MAR, and Recent Results was reviewed with the receiving nurse. Lines:   Peripheral IV 09/12/22 Left Antecubital (Active)   Site Assessment Clean, dry, & intact 09/12/22 0317   Phlebitis Assessment 0 09/12/22 0317   Infiltration Assessment 0 09/12/22 0317   Dressing Status Clean, dry, & intact 09/12/22 0317   Dressing Type Transparent 09/12/22 0317   Hub Color/Line Status Pink 09/12/22 0317   Action Taken Blood drawn 09/12/22 0317   Alcohol Cap Used Yes 09/12/22 0317        Opportunity for questions and clarification was provided.       Patient transported with:   Monitor  O2 @ 3 liters  Registered Nurse

## 2022-09-12 NOTE — CONSULTS
83yo with dementia with GLF and head laceration. Head CT shows right 3mm SDH without mass effect. Neurologically she is at her baseline. Agree with plan for admission to ICU for hourly neurologic checks, BP monitoring with goal SBP<140 and repeat Head CT. Will be available as needed.

## 2022-09-12 NOTE — H&P
[address] [facility] Adult  Hospitalist Group  History and Physical    Date of Service:  9/12/2022  Primary Care Provider: Augusta Martínez MD  Source of information: The patient, Chart review, and Spouse/family member    Chief Complaint: Fall      History of Presenting Illness:   Dominick Junior is a 82 y.o. female who presents with fall. Dominick Junior is a 83 yo female with pmhx of seizures, dementia, COPD, hypothyroidism, who presented to ED after a GLF.  the patient was kept in ICU for overnight, repeat head CT was stable, and she was given to hospitalist service for evaluation on downgrading the patient. Currently the patient is having her dinner and does not offer any complaints. The patient denies any headache, blurry vision, sore throat, trouble swallowing, trouble with speech, chest pain, SOB, cough, fever, chills, N/V/D, abd pain, urinary symptoms, constipation, recent travels, sick contacts, focal or generalized neurological symptoms, falls, injuries, rashes, contact with COVID-19 diagnosed patients, hematemesis, melena, hemoptysis, hematuria, rashes, denies starting any new medications and denies any other concerns or problems besides as mentioned above. REVIEW OF SYSTEMS:  A comprehensive review of systems was negative except for that written in the History of Present Illness. Past Medical History:   Diagnosis Date    COPD (chronic obstructive pulmonary disease) ([facility] [address]     Dementia ([facility] [address] 3/29/2017    Hypothyroid     Seizure ([facility] [address]     Seizure disorder ([facility] [address]       Past Surgical History:   Procedure Laterality Date    HX HIP REPLACEMENT       Prior to Admission medications    Medication Sig Start Date End Date Taking? Authorizing Provider   carBAMazepine XR (TEGretol XR) 100 mg SR tablet Take 100 mg by mouth every evening.  Patient takes 400 mg in the morning and 500 mg in the evening (400 mg + 100 mg)   Yes Provider, Historical   ketoconazole (NIZORAL) 2 % shampoo Apply  to affected area two (2) times a week on Wednesday and Saturday. Use on shower days (Wednesday and Saturday) evenings   Yes Provider, Historical   lamoTRIgine (LaMICtal) 150 mg tablet Take 150 mg by mouth two (2) times a day. Yes Provider, Historical   methenamine hippurate (HIPREX) 1 gram tablet Take 1 g by mouth two (2) times daily (with meals). Indications: urinary tract infection prevention   Yes Provider, Historical   cyanocobalamin (Vitamin B-12) 1,000 mcg tablet Take 2,000 mcg by mouth daily. Yes Provider, Historical   albuterol (PROVENTIL VENTOLIN) 2.5 mg /3 mL (0.083 %) nebu 2.5 mg by Nebulization route every four (4) hours as needed for Wheezing. Yes Provider, Historical   albuterol-ipratropium (DUO-NEB) 2.5 mg-0.5 mg/3 ml nebu 3 mL by Nebulization route every four (4) hours as needed for Wheezing or Shortness of Breath. Yes Provider, Historical   acetaminophen (TYLENOL) 500 mg tablet Take 500 mg by mouth daily. Yes Provider, Historical   carBAMazepine XR (TEGretol XR) 400 mg SR tablet Take 400 mg by mouth two (2) times a day. Patient takes 400 mg in the morning and 500 mg in the evening (400 mg + 100 mg)   Yes Provider, Historical   cranberry extract 450 mg tab tablet Take 450 mg by mouth daily. Yes Provider, Historical   diclofenac (VOLTAREN) 1 % gel Apply 4 g to affected area two (2) times a day. (right back and hip joints)   Yes Provider, Historical   cholecalciferol (VITAMIN D3) (2,000 UNITS /50 MCG) cap capsule Take 2,000 Units by mouth daily. Yes Provider, Historical   acetaminophen (TYLENOL) 500 mg tablet Take 500 mg by mouth every six (6) hours as needed for Pain. Yes    calcium carbonate (TUMS) 200 mg calcium (500 mg) chew Take 2 Tabs by mouth every four (4) hours as needed for Other (Indigestion). Yes Shelby, MD Shayne   montelukast (SINGULAIR) 10 mg tablet Take 10 mg by mouth daily.    Yes Provider, Historical   carboxymethylcellulose sodium (CELLUVISC) 0.5 % drop ophthalmic solution Administer 2 Drops to both eyes daily. Yes Provider, Historical   hydrocortisone (CORTAID) 1 % topical cream Apply  to affected area three (3) times daily as needed for Itching. use thin layer   Yes Provider, Historical   loperamide (IMMODIUM) 2 mg tablet Take 2 mg by mouth every eight (8) hours as needed for Diarrhea. Yes Provider, Historical   albuterol (PROVENTIL HFA, VENTOLIN HFA, PROAIR HFA) 90 mcg/actuation inhaler Take 1 Puff by inhalation every four (4) hours as needed for Shortness of Breath or Other (COPD). Yes Provider, Historical   levothyroxine (SYNTHROID) 100 mcg tablet Take 100 mcg by mouth Daily (before breakfast). Indications: hypothyroidism   Yes Provider, Historical     Allergies   Allergen Reactions    Aricept [Donepezil] Unknown (comments)    Ativan [Lorazepam] Unable to Obtain    Benadryl [Diphenhydramine Hcl] Unknown (comments)    Ciprofloxacin Unknown (comments)    Clindamycin Unknown (comments)     Listed on [facility]    Codeine Unknown (comments)    Macrobid [Nitrofurantoin Monohyd/M-Cryst] Unknown (comments)    Pcn [Penicillins] Other (comments)    Sulfa (Sulfonamide Antibiotics) Other (comments)      No family history on file. Social History:  reports that she has never smoked. She has never used smokeless tobacco. She reports current alcohol use. She reports that she does not use drugs. Family and social history were personally reviewed, all pertinent and relevant details are outlined as above.     Objective:   Visit Vitals  /77   Pulse 96   Temp 98 °F (36.7 °C)   Resp (!) 32   Wt 48.9 kg (107 lb 12.9 oz)   SpO2 100%   BMI 19.72 kg/m²    O2 Flow Rate (L/min): 2 l/min O2 Device: Nasal cannula    PHYSICAL EXAM:   General: Alert x oriented x 3, awake, no acute distress, having her dinner  HEENT: PEERL, EOMI, moist mucus membranes  Neck: Supple, no JVD, no meningeal signs  Chest: Clear to auscultation bilaterally   CVS: RRR, S1 S2 heard, no murmurs/rubs/gallops  Abd: Soft, non-tender, non-distended, +bowel sounds   Ext: No clubbing, no cyanosis, no edema  Neuro/Psych: Pleasant mood and affect, CN 2-12 grossly intact, sensory grossly within normal limit, Strength 5/5 in all extremities, DTR 1+ x 4  Cap refill: Brisk, less than 3 seconds  Pulses: 2+, symmetric in all extremities  Skin: Warm, dry, without rashes or lesions    Data Review: All diagnostic labs and studies have been reviewed. Abnormal Labs Reviewed   CBC WITH AUTOMATED DIFF - Abnormal; Notable for the following components:       Result Value    RBC 3.79 (*)     .3 (*)     IMMATURE GRANULOCYTES 1 (*)     ABS. IMM.  GRANS. 0.1 (*)     All other components within normal limits   METABOLIC PANEL, COMPREHENSIVE - Abnormal; Notable for the following components:    CO2 37 (*)     Glucose 109 (*)     BUN 27 (*)     BUN/Creatinine ratio 36 (*)     AST (SGOT) 11 (*)     Albumin 3.2 (*)     A-G Ratio 0.8 (*)     All other components within normal limits   LIPID PANEL - Abnormal; Notable for the following components:    Cholesterol, total 203 (*)     LDL, calculated 100.6 (*)     All other components within normal limits   URINALYSIS W/ REFLEX CULTURE - Abnormal; Notable for the following components:    Appearance TURBID (*)     Protein TRACE (*)     Blood TRACE (*)     Nitrites Positive (*)     Leukocyte Esterase LARGE (*)     UA:UC IF INDICATED URINE CULTURE ORDERED (*)     WBC >100 (*)     Epithelial cells MANY (*)     Bacteria 3+ (*)     All other components within normal limits       All Micro Results       Procedure Component Value Units Date/Time    CULTURE, URINE [[id number]] Collected: 09/12/22 1032    Order Status: No result Updated: 09/12/22 1309    CULTURE, RESPIRATORY/SPUTUM/BRONCH [place] STAIN [[id number]]     Order Status: Sent Specimen: Sputum     CULTURE, URINE [[id number]]     Order Status: Sent Specimen: Urine from Clean catch             IMAGING:   CT HEAD WO CONT   Final Result      No significant change in trace acute extra-axial hemorrhage as above. XR CHEST PORT   Final Result      Mild left basilar opacity may represent atelectasis, edema, or infection. CT HEAD WO CONT   Final Result   1. Tiny amount of acute subarachnoid hemorrhage in the left temporal lobe. 2. Acute right subdural hemorrhage measuring 3 mm in thickness. No midline shift   or mass effect. The findings were discussed with the emergency department on 9/12/2022 at 0500   hours by Dr. Mirian Henao. [id number]          CT SPINE CERV WO CONT   Final Result   No acute abnormality. Stable multilevel degenerative changes most prominent at   C5-6. ECG/ECHO:    Results for orders placed or performed during the hospital encounter of 09/12/22   EKG, 12 LEAD, INITIAL   Result Value Ref Range    Ventricular Rate 86 BPM    Atrial Rate 86 BPM    P-R Interval 226 ms    QRS Duration 82 ms    Q-T Interval 356 ms    QTC Calculation (Bezet) 426 ms    Calculated P Axis 73 degrees    Calculated R Axis 77 degrees    Calculated T Axis 81 degrees    Diagnosis       Sinus rhythm with 1st degree AV block  Septal infarct (cited on or before 12-SEP-2022)  Abnormal ECG  When compared with ECG of 20-JUL-2022 16:23,  T wave amplitude has increased in Inferior leads  Confirmed by Semaj Parsons MD ([id number]) on 9/12/2022 9:59:17 AM          Assessment:   Given the patient's current clinical presentation, there is a high level of concern for decompensation if discharged from the emergency department. Complex decision making was performed, which includes reviewing the patient's available past medical records, laboratory results, and imaging studies.     Active Problems:    SDH (subdural hematoma) (HCC) (9/12/2022)      Plan:     SDH  -repeat Head CT unremarkable  -Appreciate Neurosurgery    Probable UTI  -follow cultures  -on ceftriaxone, continue    Mild left basilar opacity  -don't think the patient has pneumonia  -likely atelectasis  -Wean off supp oxygen as able  -repeat CXR in 4 weeks    COPD not in exacerbation, wears oxygen prn  Hypothyroidism: on synthroid  History of seizure disorder: continue home meds  Dementia: supportive care     regular diet    PT/OT SNF vs back to memory care unit       Code status: DNR  Prophylaxis: scd  PTA: [facility]    Plan:Monitor overnight    Care Plan discussed with: patient/family  Anticipated Disposition: ?tomorrow      DIET: ADULT DIET Regular  ADULT ORAL NUTRITION SUPPLEMENT Dinner; Standard 4 oz  ADULT ORAL NUTRITION SUPPLEMENT Dinner; Frozen Supplement   ISOLATION PRECAUTIONS: There are currently no Active Isolations  CODE STATUS: DNR   DVT PROPHYLAXIS: SCDs  FUNCTIONAL STATUS PRIOR TO HOSPITALIZATION: Capable of only limited self-care; confined to bed or chair likely more than 50% of waking hours. Ambulatory status/function: By self   EARLY MOBILITY ASSESSMENT: Recommend an assessment from physical therapy and/or occupational therapy  ANTICIPATED DISCHARGE: less than 24 hours. ANTICIPATED DISPOSITION: SNF vs memory care unit    CRITICAL CARE WAS PERFORMED FOR THIS ENCOUNTER: YES. I had a face to face encounter with the patient, reviewed and interpreted patient data including clinical events, labs, images, vital signs, I/O's, and examined patient. I have discussed the case and the plan and management of the patient's care with the consulting services, the bedside nurses and necessary ancillary providers. This patient has a high probability of imminent, clinically significant deterioration, which requires the highest level of preparedness to intervene urgently. I participated in the decision-making and personally managed or directed the management of the following life and organ supporting interventions that required my frequent assessment to treat or prevent imminent deterioration. I personally spent 37 minutes of critical care time.   This is time spent at this critically ill patient's bedside actively involved in patient care as well as the coordination of care and discussions with the patient's family. This does not include any procedural time which has been billed separately. Signed By: Zenon Melgoza MD     September 12, 2022         Please note that this dictation may have been completed with Dragon, the computer voice recognition software. Quite often unanticipated grammatical, syntax, homophones, and other interpretive errors are inadvertently transcribed by the computer software. Please disregard these errors. Please excuse any errors that have escaped final proofreading.

## 2022-09-12 NOTE — H&P
CRITICAL CARE ADMISSION NOTE      Name: Gi Multani   : 1935   MRN: [id number]   Date: 2022      Reason for ICU Admission: SDH    ICU PROBLEM LIST   SDH  Hx of seizure disorder  Hx of dementia  COPD  Hypothyroidism    HISTORY OF PRESENT ILLNESS:   Gi Multani is a 83 yo female with pmhx of seizures, dementia, COPD, hypothyroidism, who presented to ED this morning after a GLF. HCT showed SDH and NSGY was consulted. Plan for admission to ICU for further monitoring and management. Neurologically she is at her baseline. Plan    At risk of severe neurologic deterioration-serial neurochecks, keep head of bed elevated, resume home AEDs, keep systolics less than 994, possible pneumonia-on ceftriaxone/azithromycin, check a UA as well, resume home Synthroid, continue other management    Review of Systems:     Unable to properly assess at this time    Past Medical History:      has a past medical history of COPD (chronic obstructive pulmonary disease) ([facility] [address] Dementia ([facility] [address] (3/29/2017), Hypothyroid, Seizure ([facility] [address] and Seizure disorder ([facility] [address] Past Surgical History:      has a past surgical history that includes hx hip replacement. Home Medications:     Prior to Admission medications    Medication Sig Start Date End Date Taking? Authorizing Provider   cefpodoxime (VANTIN) 100 mg tablet Take 1 Tablet by mouth two (2) times a day. 22   Jeremías Fuller MD   lamoTRIgine (LaMICtal) 100 mg tablet Take 1.5 Tablets by mouth two (2) times a day. 21   Mayi Lazo MD   acetaminophen (TYLENOL) 500 mg tablet Take 500 mg by mouth daily. Provider, Historical   carBAMazepine XR (TEGretol XR) 400 mg SR tablet Take 400 mg by mouth daily.  (400 mg in the morning; 500 mg in the evening)    Provider, Historical   carBAMazepine XR (TEGretol XR) 400 mg SR tablet Take 500 mg by mouth every evening. (400 mg in the morning; 500 mg in the evening)(dose = 400 mg + 100 mg tablets)    Provider, Historical   cephALEXin (KEFLEX) 250 mg capsule Take 250 mg by mouth daily. Provider, Historical   cranberry extract 450 mg tab tablet Take 450 mg by mouth daily. Provider, Historical   diclofenac (VOLTAREN) 1 % gel Apply 4 g to affected area two (2) times a day. (right back and hip joints)    Provider, Historical   ipratropium-albuteroL (Combivent Respimat)  mcg/actuation inhaler Take 1 Puff by inhalation three (3) times daily. Provider, Historical   ergocalciferol (ERGOCALCIFEROL) 1,250 mcg (50,000 unit) capsule Take 50,000 Units by mouth every seven (7) days. Provider, Historical   cholecalciferol (VITAMIN D3) (2,000 UNITS /50 MCG) cap capsule Take 2,000 Units by mouth daily. Provider, Historical   albuterol-ipratropium (DUO-NEB) 2.5 mg-0.5 mg/3 ml nebu 3 mL by Nebulization route every four (4) hours as needed for Wheezing. Provider, Historical   acetaminophen (TYLENOL) 500 mg tablet Take 500 mg by mouth every six (6) hours as needed for Pain. calcium carbonate (David-Gest Antacid) 200 mg calcium (500 mg) chew Take 2 Tabs by mouth every four (4) hours as needed for Other (Indigestion). Other, MD Shayne   montelukast (Singulair) 10 mg tablet Take 10 mg by mouth daily. Provider, Historical   carboxymethylcellulose sodium (Refresh Tears) 0.5 % drop ophthalmic solution Administer 2 Drops to both eyes daily. Provider, Historical   fluticasone-umeclidinium-vilanterol (Trelegy Ellipta) 100-62.5-25 mcg inhaler Take 1 Puff by inhalation daily. Provider, Historical   hydrocortisone (CORTAID) 1 % topical cream Apply  to affected area three (3) times daily as needed for Itching. use thin layer    Provider, Historical   loperamide (IMMODIUM) 2 mg tablet Take 2 mg by mouth three (3) times daily as needed for Diarrhea. Provider, Historical   albuterol (ProAir HFA) 90 mcg/actuation inhaler Take 1 Puff by inhalation every four (4) hours as needed for Shortness of Breath or Other (COPD). Provider, Historical   levothyroxine (SYNTHROID) 100 mcg tablet Take 100 mcg by mouth Daily (before breakfast). Indications: hypothyroidism    Provider, Historical       Allergies/Social/Family History: Allergies   Allergen Reactions    Aricept [Donepezil] Unknown (comments)    Ativan [Lorazepam] Unable to Obtain    Benadryl [Diphenhydramine Hcl] Unknown (comments)    Ciprofloxacin Unknown (comments)    Clindamycin Unknown (comments)     Listed on [facility]    Codeine Unknown (comments)    Macrobid [Nitrofurantoin Monohyd/M-Cryst] Unknown (comments)    Pcn [Penicillins] Other (comments)    Sulfa (Sulfonamide Antibiotics) Other (comments)      Social History     Tobacco Use    Smoking status: Never    Smokeless tobacco: Never   Substance Use Topics    Alcohol use: Yes      No family history on file. OBJECTIVE:     Labs and Data: Reviewed 22  Medications: Reviewed 22  Imaging: Reviewed 22    Physical exam  General-NAD  Neuro-She is alert. She knows her name. She knows that she is in the hospital.  She is somewhat amnestic for recent events, but she does tell me some specifics regarding her living situation. She is unclear of the year. She has conjugate gaze and symmetric face. She has a laceration that has been stable on the posterior aspect of her head. There is significant bruising on her head. She does have bruising particularly on her left arm. No swelling. Fluent speech. She has a bruise on the tip of her tongue. She is moving all extremities.   Cardiac-RRR  Lungs-clear  Abdomen-soft, nontender, nondistended  Extremities-warm    Visit Vitals  /70 (BP 1 Location: Left upper arm)   Pulse 93   Temp 97.7 °F (36.5 °C)   Resp 24   SpO2 98%    O2 Flow Rate (L/min): 2 l/min O2 Device: Nasal cannula Temp (24hrs), Av.7 °F (36.5 °C), Min:97.7 °F (36.5 °C), Max:97.7 °F (36.5 °C)           Intake/Output:   No intake or output data in the 24 hours ending 22 [id number]    Imaging             CRITICAL CARE DOCUMENTATION  I had a face to face encounter with the patient, reviewed and interpreted patient data including clinical events, labs, images, vital signs, I/O's, and examined patient. I have discussed the case and the plan and management of the patient's care with the consulting services, the bedside nurses and the respiratory therapist.      NOTE OF PERSONAL INVOLVEMENT IN CARE   This patient has a high probability of imminent, clinically significant deterioration, which requires the highest level of preparedness to intervene urgently. I participated in the decision-making and personally managed or directed the management of the following life and organ supporting interventions that required my frequent assessment to treat or prevent imminent deterioration. I personally spent 40 minutes of critical care time. This is time spent at this critically ill patient's bedside actively involved in patient care as well as the coordination of care. This does not include any procedural time which has been billed separately.

## 2022-09-12 NOTE — ED TRIAGE NOTES
Patient arrived EMS from [address] w/ c/o of GLF. Laceration to the back of head. No anticoagulants. Pedro [address].

## 2022-09-12 NOTE — PROGRESS NOTES
Reason for Admission:  SDH s/p GLF                     RUR Score: 14%                    Plan for utilizing home health: TBD       PCP: First and Last name:  Christal Escamilla MD     Name of Practice: [facility]   Are you a current patient: Yes/No: Yes   Approximate date of last visit:    Can you participate in a virtual visit with your PCP:                     Current Advanced Directive/Advance Care Plan: DNR      Healthcare Decision Maker:   Click here to complete [address] including selection of the Healthcare Decision Maker Relationship (ie \"Primary\")             Primary Decision MakerDomi Velasco - Daughter - [phone]                  Transition of Care Plan:        Care manager met with patient and her daughter Daniel Murphy to introduce self and explain role. Patient lives in the [facility] unit at [facility]. She uses a walker and wears oxygen prn. Per daughter patient needs assistance with bathing, but is able to feed herself. CM confirmed demographic information. Daughter has requested a list of SNF's for rehab. CM provided her a list, will follow up for choice. Priscilla Berger RN,Care Management       Medicare pt has received, reviewed, and signed 2nd IM letter informing them of their right to appeal the discharge. Signed copy has been placed on pt bedside chart.

## 2022-09-12 NOTE — ED PROVIDER NOTES
HPI, PMH, ROS, PE are limited 2/2 patient's dementia    HISTORY OF PRESENT ILLNESS    Fall  59-year-old female with history of seizure disorder, controlled on Lamictal, dementia, brought in by ambulance from memory care unit for fall; was found down. Patient herself does not recall the entire events. She otherwise feels well    MEDICAL HISTORY  Past Medical History:   Diagnosis Date    COPD (chronic obstructive pulmonary disease) ([facility] [address]     Dementia ([facility] [address] 3/29/2017    Hypothyroid     Seizure ([facility] [address]     Seizure disorder (HCC)      Past Surgical History:   Procedure Laterality Date    HX HIP REPLACEMENT       No family history on file.   Social History     Socioeconomic History    Marital status:      Spouse name: Not on file    Number of children: Not on file    Years of education: Not on file    Highest education level: Not on file   Occupational History    Not on file   Tobacco Use    Smoking status: Never    Smokeless tobacco: Never   Substance and Sexual Activity    Alcohol use: Yes    Drug use: No    Sexual activity: Not on file   Other Topics Concern    Not on file   Social History Narrative    Not on file     Social Determinants of Health     Financial Resource Strain: Not on file   Food Insecurity: Not on file   Transportation Needs: Not on file   Physical Activity: Not on file   Stress: Not on file   Social Connections: Not on file   Intimate Partner Violence: Not on file   Housing Stability: Not on file     ALLERGIES: Aricept [donepezil], Ativan [lorazepam], Benadryl [diphenhydramine hcl], Ciprofloxacin, Clindamycin, Codeine, Macrobid [nitrofurantoin monohyd/m-cryst], Pcn [penicillins], and Sulfa (sulfonamide antibiotics)    REVIEW OF SYSTEMS  Review of Systems  A 10-POINT SYSTEMS HAS BEEN REVIEWED, AND ALL SYSTEMS ARE NEGATIVE UNLESS OTHERWISE STATED IN THE HPI ARE OTHERWISE NEGATIVE    PHYSICAL EXAM  Vitals:    09/12/22 0310 09/12/22 0318   BP: (!) 146/75    Pulse: 91    Resp: 15    Temp: 97.7 °F (36.5 °C)    SpO2: 90% 96%     Physical Exam  Vitals and nursing note reviewed. Constitutional:       General: She is not in acute distress. Appearance: Normal appearance. She is not ill-appearing. HENT:      Head: Normocephalic. Comments: 5.5cm laceration scalp posterior bleeding overall controlled  -scalp or facial tenderness, crepitus, stepoff  -otorrhea, rhinorrhea  -ramos sign or racoon eyes     Right Ear: External ear normal.      Left Ear: External ear normal.      Nose: No rhinorrhea. Eyes:      Extraocular Movements: Extraocular movements intact. Pupils: Pupils are equal, round, and reactive to light. Comments: -hyphema   Neck:      Comments: -midline c-spine ttp, crepitus, stepoff  Cardiovascular:      Rate and Rhythm: Normal rate and regular rhythm. Pulses: Normal pulses. Heart sounds: No murmur heard. Pulmonary:      Effort: Pulmonary effort is normal. No respiratory distress. Breath sounds: Normal breath sounds. Chest:      Chest wall: No tenderness. Abdominal:      General: Abdomen is flat. There is no distension. Tenderness: There is no abdominal tenderness. Musculoskeletal:         General: No swelling, tenderness or deformity. Normal range of motion. Cervical back: No rigidity or tenderness. Right lower leg: No edema. Left lower leg: No edema. Comments: -tls spinal tenderness, crepitus, or stepoff  Pelvis stable   Skin:     General: Skin is warm and dry. Capillary Refill: Capillary refill takes less than 2 seconds. Findings: No rash. Neurological:      General: No focal deficit present. Mental Status: She is alert. Cranial Nerves: No cranial nerve deficit. Gait: Gait normal.      Comments: Oriented to person, time and place, actually situation, not to year.   Patient otherwise is able to lift both legs without any difficulty, with both arms without difficulty, follows all instructions, normal distal sensation       INITIAL ASSESSMENT AND PLAN  [facility]    Faye Aranda is a 82 y.o. female who presents with fall and head injury  Differential diagnosis includes but is not limited to [address], vertebral fracture, cord compression etiology, also no mechanical cause of fall/injury including anemia, electrolyte malady, ACS/MI, preexcitation dysrhythmia    Given age, certainly would benefit from evaluation work-up and CT scan. She otherwise seems to be at her baseline is quite pleasant, completely neurologically intact  Ultimately, diagnostics showed normal CBC, mildly elevated BUN on chemistry, normal LFTs; however, patient did actually ultimately require some amount of oxygen actually through her ED course. Chest x-ray was obtained and did show possible pneumonia. Additionally, patient may have a UTI which otherwise should be covered by antibiotics    Additionally, CT scan showed ICH. Patient's case was discussed with neurosurgery who will consult; repeat CT head at 6 hours was ordered    Patient will be admitted to the ICU for observation/monitoring    0330--EKG NSR 86bpm, 1st degree AVB, no ST segment or T wave abnormalities to suggest acute ischemia    ED Course as of 09/19/22 0125   Mon Sep 12, 2022   0535 Discussed w/ patients daughter over ythe phone Maggie Camarillo who adivsed patient is DNR but is NOT DNI (okay for intubatuin)    She states that surgery would depend on the specific instance of surgery    Discussed possibility of additional keppra on top of lamotrigine given her predisposition for seizures however she DECLINED keppra expressing understanding that this is prophylactic in setting of trauma [AL]   0604 Discussed pts case at length with Dr. Oriana Tavera on call, who graciously agrees to consult on pts case following admission.   Agrees with plan thus far otherwise [AL]      ED Course User Index  [AL] Peri Vilchis, DO       DIAGNOSTIC STUDIES  Recent Results (from the past 24 hour(s))   CBC WITH AUTOMATED DIFF    Collection Time: 09/12/22  3:12 AM   Result Value Ref Range    WBC 9.1 3.6 - 11.0 K/uL    RBC 3.79 (L) 3.80 - 5.20 M/uL    HGB 12.5 11.5 - 16.0 g/dL    HCT 40.3 35.0 - 47.0 %    .3 (H) 80.0 - 99.0 FL    MCH 33.0 26.0 - 34.0 PG    MCHC 31.0 30.0 - 36.5 g/dL    RDW 13.4 11.5 - 14.5 %    PLATELET 750 664 - 386 K/uL    MPV 9.9 8.9 - 12.9 FL    NRBC 0.0 0  WBC    ABSOLUTE NRBC 0.00 0.00 - 0.01 K/uL    NEUTROPHILS 75 32 - 75 %    LYMPHOCYTES 14 12 - 49 %    MONOCYTES 6 5 - 13 %    EOSINOPHILS 3 0 - 7 %    BASOPHILS 1 0 - 1 %    IMMATURE GRANULOCYTES 1 (H) 0.0 - 0.5 %    ABS. NEUTROPHILS 6.8 1.8 - 8.0 K/UL    ABS. LYMPHOCYTES 1.3 0.8 - 3.5 K/UL    ABS. MONOCYTES 0.6 0.0 - 1.0 K/UL    ABS. EOSINOPHILS 0.3 0.0 - 0.4 K/UL    ABS. BASOPHILS 0.1 0.0 - 0.1 K/UL    ABS. IMM. GRANS. 0.1 (H) 0.00 - 0.04 K/UL    DF AUTOMATED     SAMPLES BEING HELD    Collection Time: 09/12/22  3:12 AM   Result Value Ref Range    SAMPLES BEING HELD 1RED,1BLUE     COMMENT        Add-on orders for these samples will be processed based on acceptable specimen integrity and analyte stability, which may vary by analyte. No orders to display       FINAL ASSESSMENT      ED DIAGNOSIS  1. Traumatic subdural hematoma with loss of consciousness, initial encounter ([facility] [address]    2. Subarachnoid hemorrhage following injury, with loss of consciousness, initial encounter ([facility] [address]    3. Pneumonia of left lower lobe due to infectious organism    4. Fall, initial encounter    5.  Laceration of scalp, initial encounter        DISPOSITION  admit      MEDICATIONS ADMINISTERED IN THE EMERGENCY DEPARTMENT  Medications   tetanus-diphtheria toxoids-Td (Td) 2-2 Lf unit/0.5 mL injection 0.5 mL (0.5 mL IntraMUSCular Given 9/12/22 0553)   lidocaine-EPINEPHrine (XYLOCAINE) 1 %-1:100,000 injection 100 mg (100 mg IntraDERMal Given 9/12/22 0555)   azithromycin (ZITHROMAX) tablet 500 mg (500 mg Oral Given 9/12/22 0737)   hydrOXYzine HCL (ATARAX) tablet 25 mg (25 mg Oral Given 9/12/22 2009)   albuterol-ipratropium (DUO-NEB) 2.5 MG-0.5 MG/3 ML (3 mL Nebulization Given 9/12/22 2115)       PROCEDURES  WOUND REPAIR    Date/Time: 9/19/2022 1:26 AM  Performed by: attendingLocation details: scalp  Wound length:2.6 - 7.5 cm  Anesthesia: local infiltration    Anesthesia:  Local Anesthetic: lidocaine 1% with epinephrine  Anesthetic total: 8 mL  Foreign bodies: no foreign bodies  Irrigation solution: saline  Irrigation method: jet lavage  Debridement: none  Skin closure: staples  Number of sutures: 6  Technique: simple  Approximation: close  Patient tolerance: patient tolerated the procedure well with no immediate complications    Critical Care    Date/Time: 9/19/2022 1:28 AM  Performed by: Jeyson Ojeda DO  Authorized by: Jeyson Ojeda DO     Critical care provider statement:     Critical care time (minutes):  53    Critical care time was exclusive of:  Separately billable procedures and treating other patients and teaching time    Critical care was time spent personally by me on the following activities:  Obtaining history from patient or surrogate, evaluation of patient's response to treatment, examination of patient, development of treatment plan with patient or surrogate, ordering and performing treatments and interventions, ordering and review of laboratory studies, ordering and review of radiographic studies, pulse oximetry, re-evaluation of patient's condition and review of old charts

## 2022-09-13 LAB
ANION GAP SERPL CALC-SCNC: 4 MMOL/L (ref 5–15)
BACTERIA SPEC CULT: NORMAL
BUN SERPL-MCNC: 21 MG/DL (ref 6–20)
BUN/CREAT SERPL: 36 (ref 12–20)
CALCIUM SERPL-MCNC: 8.9 MG/DL (ref 8.5–10.1)
CHLORIDE SERPL-SCNC: 108 MMOL/L (ref 97–108)
CO2 SERPL-SCNC: 30 MMOL/L (ref 21–32)
CREAT SERPL-MCNC: 0.59 MG/DL (ref 0.55–1.02)
ERYTHROCYTE [DISTWIDTH] IN BLOOD BY AUTOMATED COUNT: 13.5 % (ref 11.5–14.5)
GLUCOSE SERPL-MCNC: 101 MG/DL (ref 65–100)
HCT VFR BLD AUTO: 33.9 % (ref 35–47)
HGB BLD-MCNC: 10.5 G/DL (ref 11.5–16)
MAGNESIUM SERPL-MCNC: 2.3 MG/DL (ref 1.6–2.4)
MCH RBC QN AUTO: 33.8 PG (ref 26–34)
MCHC RBC AUTO-ENTMCNC: 31 G/DL (ref 30–36.5)
MCV RBC AUTO: 109 FL (ref 80–99)
NRBC # BLD: 0 K/UL (ref 0–0.01)
NRBC BLD-RTO: 0 PER 100 WBC
PLATELET # BLD AUTO: 196 K/UL (ref 150–400)
PMV BLD AUTO: 10.2 FL (ref 8.9–12.9)
POTASSIUM SERPL-SCNC: 4 MMOL/L (ref 3.5–5.1)
RBC # BLD AUTO: 3.11 M/UL (ref 3.8–5.2)
SERVICE CMNT-IMP: NORMAL
SODIUM SERPL-SCNC: 142 MMOL/L (ref 136–145)
WBC # BLD AUTO: 6.6 K/UL (ref 3.6–11)

## 2022-09-13 PROCEDURE — 97530 THERAPEUTIC ACTIVITIES: CPT

## 2022-09-13 PROCEDURE — 85027 COMPLETE CBC AUTOMATED: CPT

## 2022-09-13 PROCEDURE — 65270000046 HC RM TELEMETRY

## 2022-09-13 PROCEDURE — 74011000250 HC RX REV CODE- 250: Performed by: EMERGENCY MEDICINE

## 2022-09-13 PROCEDURE — 94640 AIRWAY INHALATION TREATMENT: CPT

## 2022-09-13 PROCEDURE — 80048 BASIC METABOLIC PNL TOTAL CA: CPT

## 2022-09-13 PROCEDURE — 51798 US URINE CAPACITY MEASURE: CPT

## 2022-09-13 PROCEDURE — 77010033678 HC OXYGEN DAILY

## 2022-09-13 PROCEDURE — 83735 ASSAY OF MAGNESIUM: CPT

## 2022-09-13 PROCEDURE — 74011250637 HC RX REV CODE- 250/637: Performed by: NURSE PRACTITIONER

## 2022-09-13 PROCEDURE — 74011250637 HC RX REV CODE- 250/637: Performed by: EMERGENCY MEDICINE

## 2022-09-13 PROCEDURE — 97535 SELF CARE MNGMENT TRAINING: CPT

## 2022-09-13 PROCEDURE — 74011000250 HC RX REV CODE- 250: Performed by: STUDENT IN AN ORGANIZED HEALTH CARE EDUCATION/TRAINING PROGRAM

## 2022-09-13 PROCEDURE — 74011000250 HC RX REV CODE- 250: Performed by: NURSE PRACTITIONER

## 2022-09-13 PROCEDURE — 36415 COLL VENOUS BLD VENIPUNCTURE: CPT

## 2022-09-13 PROCEDURE — 74011250636 HC RX REV CODE- 250/636: Performed by: STUDENT IN AN ORGANIZED HEALTH CARE EDUCATION/TRAINING PROGRAM

## 2022-09-13 RX ADMIN — LEVOTHYROXINE SODIUM 100 MCG: 0.1 TABLET ORAL at 06:38

## 2022-09-13 RX ADMIN — ACETAMINOPHEN 650 MG: 325 TABLET, FILM COATED ORAL at 06:46

## 2022-09-13 RX ADMIN — LAMOTRIGINE 150 MG: 100 TABLET ORAL at 08:23

## 2022-09-13 RX ADMIN — SODIUM CHLORIDE, PRESERVATIVE FREE 10 ML: 5 INJECTION INTRAVENOUS at 06:38

## 2022-09-13 RX ADMIN — IPRATROPIUM BROMIDE AND ALBUTEROL SULFATE 3 ML: .5; 3 SOLUTION RESPIRATORY (INHALATION) at 09:26

## 2022-09-13 RX ADMIN — ACETAMINOPHEN 650 MG: 325 TABLET, FILM COATED ORAL at 12:50

## 2022-09-13 RX ADMIN — IPRATROPIUM BROMIDE AND ALBUTEROL SULFATE 3 ML: .5; 3 SOLUTION RESPIRATORY (INHALATION) at 00:04

## 2022-09-13 RX ADMIN — SODIUM CHLORIDE, PRESERVATIVE FREE 10 ML: 5 INJECTION INTRAVENOUS at 22:00

## 2022-09-13 RX ADMIN — CARBAMAZEPINE 400 MG: 200 TABLET, EXTENDED RELEASE ORAL at 08:23

## 2022-09-13 RX ADMIN — CEFTRIAXONE SODIUM 1 G: 1 INJECTION, POWDER, FOR SOLUTION INTRAMUSCULAR; INTRAVENOUS at 06:38

## 2022-09-13 NOTE — PROGRESS NOTES
Transition of Care Plan: SNF-referrals pending  *From [facility] memory care unit, family does not want their healthcare unit    Referrals sent to:  [facility]-denied  [facility] [address] [facility]  [facility]-pending    RUR: 14% low    PCP F/U: Juan Nuñez MD    Disposition: SNF    Transportation: S    Main ContactKwame Villeda - Daughter - [phone]    1400: Met with daughter at the bedside. [facility] sent to the [facility] as well-pending. Daughter states she will review list and give CM more choices tomorrow morning. 1459: Daughter provided more choices. Referrals sent. César Root RN, CRM    Transition of Care Plan:     The Plan for Transition of Care is related to the following treatment goals: SNF-[facility]     The Patient and/or patient representative was provided with a choice of provider and agrees  with the discharge plan. Yes [x] No []    A Freedom of choice list was provided with basic dialogue that supports the patient's individualized plan of care/goals and shares the quality data associated with the providers.        Yes [x] No []

## 2022-09-13 NOTE — PROGRESS NOTES
Transition of Care Plan  RUR- Low    DISPOSITION: SNF for Short term rehab  F/U with PCP/Specialist    Transport: [facility]/family   Care manager met with patient's daughter to discuss transitions of care. She would like referrals sent to [facility] and Rehab, [facility]. She also wanted [facility] , CM explained difference between SNF and Inpatient rehab, she voiced understanding that patient would not meet criteria for IPR. CM received a call from Gumaro Sanchez with [facility] and advised her of the discharge plan.    Susanna Radford RN,Care Management

## 2022-09-13 NOTE — PROGRESS NOTES
[facility] Adult  Hospitalist Group                                                                                          Hospitalist Progress Note  Vincent Valentine MD  Answering service: [phone] from in house phone        Date of Service:  2022  NAME:  Antonina Cabello  :  1935  MRN:  [id number]      Admission Summary:   Antonina Cabello is a 82 y.o. female who presents with fall. Antonina Cabello is a 83 yo female with pmhx of seizures, dementia, COPD, hypothyroidism, who presented to ED after a GLF.  the patient was kept in ICU for overnight, repeat head CT was stable, and she was given to hospitalist service for evaluation on downgrading the patient. Interval history / Subjective: Follow SDH   Comfortably laying in bed  Now off home oxygen  Having headache at the back of head  Assessment & Plan:     SDH  -repeat Head CT unremarkable  -Appreciate Neurosurgery, repeat CT head in 2 weeks     Probable UTI  -follow cultures, no growth so far  -on ceftriaxone, continue     Mild left basilar opacity  -don't think the patient has pneumonia  -likely atelectasis  -Wean off supp oxygen as able  -repeat CXR in 4 weeks    Urinary retention: straight cath done, monitor.  Ambulation and bowel care as possible     COPD not in exacerbation, wears oxygen prn  Hypothyroidism: on synthroid  History of seizure disorder: continue home meds  Dementia: supportive care      regular diet     PT/OT SNF vs back to memory care unit        Code status: DNR  Prophylaxis: scd  PTA: [facility]     Plan: medically stable, awaiting placement  Care Plan discussed with: Patient, family  Anticipated Disposition: ?today or tomorrow     Hospital Problems  Date Reviewed: 2021            Codes Class Noted POA    SDH (subdural hematoma) ([facility] [address] ICD-10-CM: I33.5B7I  ICD-9-CM: 432.1  2022 Unknown             Review of Systems:   A comprehensive review of systems was negative except for that written in the HPI. Vital Signs:    Last 24hrs VS reviewed since prior progress note. Most recent are:  Visit Vitals  /60 (BP 1 Location: Right upper arm, BP Patient Position: At rest)   Pulse 66   Temp 97.9 °F (36.6 °C)   Resp 16   Wt 48.9 kg (107 lb 12.9 oz)   SpO2 99%   BMI 19.72 kg/m²         Intake/Output Summary (Last 24 hours) at 9/13/2022 0819  Last data filed at 9/13/2022 0700  Gross per 24 hour   Intake 873.33 ml   Output 525 ml   Net 348.33 ml        Physical Examination:     I had a face to face encounter with this patient and independently examined them on 9/13/2022 as outlined below:          Constitutional:  No acute distress   ENT:  Oral mucosa moist, oropharynx benign. Resp:  CTA bilaterally. No wheezing/rhonchi/rales. No accessory muscle use. CV:  Regular rhythm, normal rate, no murmurs, gallops, rubs    GI:  Soft, non distended, non tender. normoactive bowel sounds, no hepatosplenomegaly     Musculoskeletal:  No edema, warm, 2+ pulses throughout    Neurologic:  Moves all extremities. AAOx3, CN II-XII reviewed            Data Review:    Review and/or order of clinical lab test      Labs:     Recent Labs     09/13/22  0248 09/12/22 0312   WBC 6.6 9.1   HGB 10.5* 12.5   HCT 33.9* 40.3    251     Recent Labs     09/13/22  0248 09/12/22 0312    144   K 4.0 4.1    108   CO2 30 37*   BUN 21* 27*   CREA 0.59 0.75   * 109*   CA 8.9 9.3   MG 2.3  --      Recent Labs     09/12/22 0312   ALT 20      TBILI 0.2   TP 7.0   ALB 3.2*   GLOB 3.8     No results for input(s): INR, PTP, APTT, INREXT in the last 72 hours. No results for input(s): FE, TIBC, PSAT, FERR in the last 72 hours. Lab Results   Component Value Date/Time    Folate 17.4 04/05/2021 12:30 AM      No results for input(s): PH, PCO2, PO2 in the last 72 hours. No results for input(s): CPK, CKNDX, TROIQ in the last 72 hours.     No lab exists for component: CPKMB  Lab Results   Component Value Date/Time    Cholesterol, total 203 (H) 09/12/2022 10:33 AM    HDL Cholesterol 87 09/12/2022 10:33 AM    LDL, calculated 100.6 (H) 09/12/2022 10:33 AM    Triglyceride 77 09/12/2022 10:33 AM    CHOL/HDL Ratio 2.3 09/12/2022 10:33 AM     Lab Results   Component Value Date/Time    Glucose (POC) 117 (H) 03/10/2017 07:36 PM     Lab Results   Component Value Date/Time    Color YELLOW/STRAW 09/12/2022 10:32 AM    Appearance TURBID (A) 09/12/2022 10:32 AM    Specific gravity 1.019 09/12/2022 10:32 AM    Specific gravity 1.020 07/20/2022 06:34 PM    pH (UA) 6.0 09/12/2022 10:32 AM    Protein TRACE (A) 09/12/2022 10:32 AM    Glucose Negative 09/12/2022 10:32 AM    Ketone Negative 09/12/2022 10:32 AM    Bilirubin Negative 09/12/2022 10:32 AM    Urobilinogen 0.2 09/12/2022 10:32 AM    Nitrites Positive (A) 09/12/2022 10:32 AM    Leukocyte Esterase LARGE (A) 09/12/2022 10:32 AM    Epithelial cells MANY (A) 09/12/2022 10:32 AM    Bacteria 3+ (A) 09/12/2022 10:32 AM    WBC >100 (H) 09/12/2022 10:32 AM    RBC 5-10 09/12/2022 10:32 AM         Medications Reviewed:     Current Facility-Administered Medications   Medication Dose Route Frequency    sodium chloride (NS) flush 5-40 mL  5-40 mL IntraVENous Q8H    sodium chloride (NS) flush 5-40 mL  5-40 mL IntraVENous PRN    acetaminophen (TYLENOL) tablet 650 mg  650 mg Oral Q6H PRN    Or    acetaminophen (TYLENOL) suppository 650 mg  650 mg Rectal Q6H PRN    polyethylene glycol (MIRALAX) packet 17 g  17 g Oral DAILY PRN    ondansetron (ZOFRAN ODT) tablet 4 mg  4 mg Oral Q8H PRN    Or    ondansetron (ZOFRAN) injection 4 mg  4 mg IntraVENous Q6H PRN    cefTRIAXone (ROCEPHIN) 1 g in 0.9% sodium chloride 10 mL IV syringe  1 g IntraVENous Q24H    levothyroxine (SYNTHROID) tablet 100 mcg  100 mcg Oral ACB    0.9% sodium chloride infusion  50 mL/hr IntraVENous CONTINUOUS    labetaloL (NORMODYNE;TRANDATE) injection 10 mg  10 mg IntraVENous Q1H PRN    hydrALAZINE (APRESOLINE) 20 mg/mL injection 10 mg  10 mg IntraVENous Q4H PRN    albuterol-ipratropium (DUO-NEB) 2.5 MG-0.5 MG/3 ML  3 mL Nebulization Q8H RT    lamoTRIgine (LaMICtal) tablet 150 mg  150 mg Oral Q12H    carBAMazepine XR (TEGretol XR) tablet 400 mg  400 mg Oral Q12H    carBAMazepine XR (TEGretol XR) tablet 100 mg  100 mg Oral QHS    melatonin tablet 3 mg  3 mg Oral QHS PRN     ______________________________________________________________________  EXPECTED LENGTH OF STAY: 2d 0h  ACTUAL LENGTH OF STAY:          1                 Tereso Jacobson MD

## 2022-09-13 NOTE — PROGRESS NOTES
0500: Pt noted to be retaining urine. She had not felt the urge to go until this time. She repeatedly said \"she is peeing\" without any output. Patient bladder scanned with 486 in bladder. As RN was ready to straight cath, she began to urinate with minimal output. Bladder scanned a second time with 446. Patient straight cathed with 375 out.

## 2022-09-13 NOTE — PROGRESS NOTES
Problem: Self Care Deficits Care Plan (Adult)  Goal: *Acute Goals and Plan of Care (Insert Text)  Description: FUNCTIONAL STATUS PRIOR TO ADMISSION: Patient was using a rollator for functional mobility requiring supervision to intermittent assistance from staff. Patient was requiring assistance with ADLs and IADLs, mostly seated. Daughter reporting significant fall history, no prior injuries reports; however, inconsistent communication from staff from [facility] care when falls have taken place. HOME SUPPORT: Patient lived at [address] [address] in the memory care unit. Occupational Therapy Goals  Initiated 9/12/2022   1. Patient will perform grooming at the sink with supervision/set-up within 7 day(s). 2.  Patient will perform bathing with supervision/set-up within 7 day(s). 3.  Patient will perform lower body dressing with supervision/set-up within 7 day(s). 4.  Patient will perform toilet transfers with supervision/set-up within 7 day(s). 5.  Patient will perform all aspects of toileting with supervision/set-up within 7 day(s). 6.  Patient will participate in upper extremity therapeutic exercise/activities with supervision/set-up within 7 day(s). 7.  Patient will utilize energy conservation techniques during functional activities with verbal, visual, and tactile cues within 7 day(s). Outcome: Progressing Towards Goal    OCCUPATIONAL THERAPY TREATMENT  Patient: Prem Pride [de-identified]82 y.o. female)  Date: 9/13/2022  Diagnosis: SDH (subdural hematoma) [S06.5X9A] <principal problem not specified>      Precautions: Fall, Seizure, Skin (SBP<140)  Chart, occupational therapy assessment, plan of care, and goals were reviewed.     ASSESSMENT  Patient continues with skilled OT services and is progressing towards goals; however, remains limited by decreased standing balance/tolerance, coordination, activity tolerance, ROM, strength, cognition (insight, memory, orientation, initiation, termination, sequencing, safety, judgment, impulsivity, and complex command following), and visual/perceptual skills with standing ADLs and functional transfers completed this session. Improvement noted with patient's ability to complete grooming (washing face and brushing teeth) standing at the sink with CGA and Min-Mod multimodal cues to stand closer to sink for increased stability - fair carryover. Patient continuing to require Mod-Max multimodal cues to attend to targets and destinations in R/L VFs - although stating she sees them without breaking forward posture and lifting head to attend to them. Patient initially CGA for functional mobility and transfers; however, progressed to Mod A with ultimate facilitation of RW d/t fatigue. Continued to require Max multimodal cues for proper RW use with fair-poor carryover. Given her PLOF, CLOF, and cognition, continue to recommend SNF as this seems like the more appropriate setting; however, family at bedside requiring IPR at d/c. Current Level of Function Impacting Discharge (ADLs): CGA-Min A for ADLs and CGA-Mod A x1 for OOB mobility with RW. Other factors to consider for discharge: High fall risk, PMH, PLOF, & baseline dementia. PLAN :  Patient continues to benefit from skilled intervention to address the above impairments. Continue treatment per established plan of care to address goals. Recommend with staff: Recommend with nursing, ADLs with assist, OOB to chair 3x/day via rolling walker and toileting via functional mobility to and from bathroom with assist. Thank you for completing as able in order to maintain patient strength, endurance and independence. Recommendation for discharge: (in order for the patient to meet his/her long term goals)  Therapy up to 5 days/week in SNF setting    This discharge recommendation:  Has been made in collaboration with the attending provider and/or case management    IF patient discharges home will need the following DME:  To be determined (TBD). SUBJECTIVE:   Patient stated I can walk.  Re: Being transferred from ICU down to Neuro Unit after activity requiring increased assist.    OBJECTIVE DATA SUMMARY:   Cognitive/Behavioral Status:  Neurologic State: Alert  Orientation Level: Oriented to person;Disoriented to place; Disoriented to time;Disoriented to situation  Cognition: Decreased attention/concentration; Follows commands; Impaired decision making; Impulsive;Memory loss;Poor safety awareness  Perception: Cues to attend left visual field;Cues to attend right visual field; Tactile;Verbal;Visual (Forward trunk and cervical posture - cues to lift head and look to R and L VFs for targets and destinations.)  Perseveration: No perseveration noted  Safety/Judgement: Decreased awareness of environment;Decreased awareness of need for assistance;Decreased awareness of need for safety;Decreased insight into deficits    Functional Mobility and Transfers for ADLs:  Bed Mobility:  Supine to Sit:  (Recieved in recliner)  Sit to Supine: Contact guard assistance (mod cues for hand placement, good carryover)  Scooting: Stand-by assistance    Transfers:  Sit to Stand: Minimum assistance;Assist x1 (RW)  Functional Transfers  Bathroom Mobility: Minimum assistance (RW)  Toilet Transfer : Minimum assistance;Assist x1 (RW)  Cues: Tactile cues provided;Verbal cues provided;Visual cues provided;Visual/perceptual training/retraining (For proper hand placement on RW and grab bar)  Adaptive Equipment: Grab bars (RW)  Bed to Chair: Minimum assistance; Moderate assistance;Assist x1 (Largely Min A x1 with RW from recliner to sink to recliner, progressed to Mod A x1 with intermittent assist for RW safety d/t fatigue from recliner to bed.)    Balance:  Sitting: Intact; Without support  Standing: Impaired; With support (RW)  Standing - Static: Fair;Constant support  Standing - Dynamic : Fair;Constant support    ADL Intervention:  Grooming  Grooming Assistance: Contact guard assistance  Position Performed: Standing (At sink)  Washing Face: Contact guard assistance  Brushing Teeth: Contact guard assistance  Cues: Physical assistance; Tactile cues provided;Verbal cues provided;Visual cues provided;Visual/perceptual training/retraining    Lower Body Dressing Assistance  Dressing Assistance: Contact guard assistance  Protective Undergarmet: Contact guard assistance  Leg Crossed Method Used: No  Position Performed: Standing (At RW)  Cues: Physical assistance; Tactile cues provided;Verbal cues provided;Visual cues provided;Visual/perceptual training/retraining    Toileting  Toileting Assistance: Minimum assistance (attemped to void, unsuccesful requiring redirecton to new task)  Clothing Management: Minimum assistance (Min A for getting on/off of toilet.)  Cues: Tactile cues provided;Verbal cues provided;Visual cues provided;Visual/perceptual training/retraining (For proper hand placement on RW and grab bar)  Adaptive Equipment: Grab bars; Walker    Cognitive Retraining  Orientation Retraining: Reorienting  Problem Solving: Awareness of environment  Executive Functions: Executing cognitive plans  Organizing/Sequencing: Breaking task down  Attention to Task: Single task  Following Commands: Follows one step commands/directions  Safety/Judgement: Decreased awareness of environment;Decreased awareness of need for assistance;Decreased awareness of need for safety;Decreased insight into deficits  Cues: Tactile cues provided;Verbal cues provided;Visual cues provided    Pain:  None. Activity Tolerance:   Good    After treatment patient left in no apparent distress:   Supine in bed, Caregiver / family present, and RN in room preparing patient for transport to Neuro Unit. COMMUNICATION/COLLABORATION:   The patients plan of care was discussed with: Physical therapist and Registered nurse. Patient was educated regarding Her deficit(s) above as this relates to Her diagnosis of SDH and SAH. She demonstrated Fair understanding as evidenced by verbal discussion and activity. Hany Pastor, OTS  Time Calculation: 41 mins    Regarding student involvement in patient care:  A student participated in this treatment session. Per CMS Medicare statements and AOTA guidelines I certify that the following was true:  1. I was present and directly observed the entire session. 2. I made all skilled judgments and clinical decisions regarding care. 3. I am the practitioner responsible for assessment, treatment, and documentation.

## 2022-09-13 NOTE — PROGRESS NOTES
Problem: Pressure Injury - Risk of  Goal: *Prevention of pressure injury  Description: Document George Scale and appropriate interventions in the flowsheet. Outcome: Progressing Towards Goal  Note: Pressure Injury Interventions:  Sensory Interventions: Assess changes in LOC, Check visual cues for pain, Float heels, Keep linens dry and wrinkle-free, Minimize linen layers, Monitor skin under medical devices, Pressure redistribution bed/mattress (bed type), Turn and reposition approx. every two hours (pillows and wedges if needed)    Moisture Interventions: Absorbent underpads, Check for incontinence Q2 hours and as needed, Maintain skin hydration (lotion/cream), Minimize layers    Activity Interventions: Increase time out of bed    Mobility Interventions: HOB 30 degrees or less, Float heels, Pressure redistribution bed/mattress (bed type)    Nutrition Interventions: Document food/fluid/supplement intake    Friction and Shear Interventions: Apply protective barrier, creams and emollients, HOB 30 degrees or less, Minimize layers                Problem: Patient Education: Go to Patient Education Activity  Goal: Patient/Family Education  Outcome: Progressing Towards Goal     Problem: Patient Education: Go to Patient Education Activity  Goal: Patient/Family Education  Outcome: Progressing Towards Goal     Problem: Patient Education: Go to Patient Education Activity  Goal: Patient/Family Education  Outcome: Progressing Towards Goal     Problem: Falls - Risk of  Goal: *Absence of Falls  Description: Document Forrest Fall Risk and appropriate interventions in the flowsheet.   Outcome: Progressing Towards Goal  Note: Fall Risk Interventions:  Mobility Interventions: Bed/chair exit alarm, Patient to call before getting OOB, Strengthening exercises (ROM-active/passive), Utilize walker, cane, or other assistive device, Communicate number of staff needed for ambulation/transfer    Mentation Interventions: Adequate sleep, hydration, pain control, Bed/chair exit alarm, Door open when patient unattended, Evaluate medications/consider consulting pharmacy, Reorient patient, More frequent rounding, Toileting rounds, Room close to nurse's station, Update white board    Medication Interventions: Evaluate medications/consider consulting pharmacy, Patient to call before getting OOB, Teach patient to arise slowly    Elimination Interventions:  Toileting schedule/hourly rounds, Patient to call for help with toileting needs, Call light in reach, Bed/chair exit alarm    History of Falls Interventions: Bed/chair exit alarm, Door open when patient unattended, Room close to nurse's station, Evaluate medications/consider consulting pharmacy         Problem: Patient Education: Go to Patient Education Activity  Goal: Patient/Family Education  Outcome: Progressing Towards Goal     Problem: Patient Education: Go to Patient Education Activity  Goal: Patient/Family Education  Outcome: Progressing Towards Goal     Problem: Hemorrhagic Stroke:  3-24 hours  Goal: Off Pathway (Use only if patient is Off Pathway)  Outcome: Progressing Towards Goal  Goal: Activity/Safety  Outcome: Progressing Towards Goal  Goal: Consults, if ordered  Outcome: Progressing Towards Goal  Goal: Diagnostic Test/Procedures  Outcome: Progressing Towards Goal  Goal: Nutrition/Diet  Outcome: Progressing Towards Goal  Goal: Discharge Planning  Outcome: Progressing Towards Goal  Goal: Medications  Outcome: Progressing Towards Goal  Goal: Respiratory  Outcome: Progressing Towards Goal  Goal: Treatments/Interventions/Procedures  Outcome: Progressing Towards Goal  Goal: Psychosocial  Outcome: Progressing Towards Goal  Goal: *Hemodynamically stable  Outcome: Progressing Towards Goal  Goal: *Verbalizes anxiety and depression are reduced or absent  Outcome: Progressing Towards Goal  Goal: *Absence of aspiration  Outcome: Progressing Towards Goal  Goal: *Absence of signs and symptoms of DVT  Outcome: Progressing Towards Goal  Goal: *Optimal pain control at patient's stated goal  Outcome: Progressing Towards Goal  Goal: *Tolerating diet  Outcome: Progressing Towards Goal  Goal: *Progressive mobility and function (eg: ADL's)  Outcome: Progressing Towards Goal  Goal: *Rehabilitation readiness  Outcome: Progressing Towards Goal     Problem: Hemorrhagic Stroke: Day 2  Goal: Off Pathway (Use only if patient is Off Pathway)  Outcome: Progressing Towards Goal  Goal: Activity/Safety  Outcome: Progressing Towards Goal  Goal: Consults, if ordered  Outcome: Progressing Towards Goal  Goal: Diagnostic Test/Procedures  Outcome: Progressing Towards Goal  Goal: Nutrition/Diet  Outcome: Progressing Towards Goal  Goal: Medications  Outcome: Progressing Towards Goal  Goal: Respiratory  Outcome: Progressing Towards Goal  Goal: Treatments/Interventions/Procedures  Outcome: Progressing Towards Goal  Goal: Psychosocial  Outcome: Progressing Towards Goal  Goal: *Hemodynamically stable  Outcome: Progressing Towards Goal  Goal: *Verbalizes anxiety and depression are reduced or absent  Outcome: Progressing Towards Goal  Goal: *Absence of aspiration  Outcome: Progressing Towards Goal  Goal: *Absence of signs and symptoms of DVT  Outcome: Progressing Towards Goal  Goal: *Optimal pain control at patient's stated goal  Outcome: Progressing Towards Goal  Goal: *Tolerating diet  Outcome: Progressing Towards Goal  Goal: *Progressive mobility and function  Outcome: Progressing Towards Goal  Goal: *Rehabilitation readiness  Outcome: Progressing Towards Goal     Problem: Hemorrhagic Stroke: Day 3  Goal: Off Pathway (Use only if patient is Off Pathway)  Outcome: Progressing Towards Goal  Goal: Activity/Safety  Outcome: Progressing Towards Goal  Goal: Consults, if ordered  Outcome: Progressing Towards Goal  Goal: Diagnostic Test/Procedures  Outcome: Progressing Towards Goal  Goal: Nutrition/Diet  Outcome: Progressing Towards Goal  Goal: Medications  Outcome: Progressing Towards Goal  Goal: Respiratory  Outcome: Progressing Towards Goal  Goal: Treatments/Interventions/Procedures  Outcome: Progressing Towards Goal  Goal: Psychosocial  Outcome: Progressing Towards Goal  Goal: *Hemodynamically stable  Outcome: Progressing Towards Goal  Goal: *Verbalizes anxiety and depression are reduced or absent  Outcome: Progressing Towards Goal  Goal: *Absence of aspiration  Outcome: Progressing Towards Goal  Goal: *Absence of signs and symptoms of DVT  Outcome: Progressing Towards Goal  Goal: *Optimal pain control at patient's stated goal  Outcome: Progressing Towards Goal  Goal: *Tolerating diet  Outcome: Progressing Towards Goal  Goal: *Progressive mobility and function  Outcome: Progressing Towards Goal  Goal: *Rehabilitation readiness  Outcome: Progressing Towards Goal     Problem: Hemorrhagic Stroke: Day 4  Goal: Off Pathway (Use only if patient is Off Pathway)  Outcome: Progressing Towards Goal  Goal: Activity/Safety  Outcome: Progressing Towards Goal  Goal: Consults, if ordered  Outcome: Progressing Towards Goal  Goal: Diagnostic Test/Procedures  Outcome: Progressing Towards Goal  Goal: Nutrition/Diet  Outcome: Progressing Towards Goal  Goal: Medications  Outcome: Progressing Towards Goal  Goal: Respiratory  Outcome: Progressing Towards Goal  Goal: Treatments/Interventions/Procedures  Outcome: Progressing Towards Goal  Goal: Psychosocial  Outcome: Progressing Towards Goal  Goal: *Hemodynamically stable  Outcome: Progressing Towards Goal  Goal: *Verbalizes anxiety and depression are reduced or absent  Outcome: Progressing Towards Goal  Goal: *Absence of aspiration  Outcome: Progressing Towards Goal  Goal: *Absence of signs and symptoms of DVT  Outcome: Progressing Towards Goal  Goal: *Optimal pain control at patient's stated goal  Outcome: Progressing Towards Goal  Goal: *Tolerating diet  Outcome: Progressing Towards Goal  Goal: *Progressive mobility and function  Outcome: Progressing Towards Goal  Goal: *Rehabilitation readiness  Outcome: Progressing Towards Goal

## 2022-09-13 NOTE — PROGRESS NOTES
0730: Bedside, Verbal, and Written shift change report given to Rob Sears RN (oncoming nurse) by Carrillo Ogden RN (offgoing nurse). Report included the following information SBAR, Kardex, ED Summary, Procedure Summary, Intake/Output, MAR, Recent Results, Cardiac Rhythm NSR, Quality Measures, and Dual Neuro Assessment. TRANSFER - OUT REPORT:    Verbal report given to You Segura RN (name) on Sai Jang  being transferred to NSTU(unit) for routine progression of care       Report consisted of patients Situation, Background, Assessment and   Recommendations(SBAR). Information from the following report(s) SBAR, Kardex, ED Summary, Procedure Summary, Intake/Output, MAR, Recent Results, Cardiac Rhythm NSR, Alarm Parameters , and Quality Measures was reviewed with the receiving nurse. Lines:   Peripheral IV 09/13/22 Right Forearm (Active)   Site Assessment Clean, dry, & intact 09/13/22 0830   Phlebitis Assessment 0 09/13/22 0830   Infiltration Assessment 0 09/13/22 0830   Dressing Status New 09/13/22 0830   Dressing Type Transparent 09/13/22 0830   Hub Color/Line Status Pink; Infusing;Flushed;Patent 09/13/22 0830   Action Taken Open ports on tubing capped 09/13/22 0830   Alcohol Cap Used Yes 09/13/22 0830        Opportunity for questions and clarification was provided.       Patient transported with:   Monitor  O2 @ 2 liters  Registered Nurse  [facility]

## 2022-09-14 LAB
ERYTHROCYTE [DISTWIDTH] IN BLOOD BY AUTOMATED COUNT: 13.5 % (ref 11.5–14.5)
HCT VFR BLD AUTO: 36.1 % (ref 35–47)
HGB BLD-MCNC: 11.4 G/DL (ref 11.5–16)
MCH RBC QN AUTO: 33.8 PG (ref 26–34)
MCHC RBC AUTO-ENTMCNC: 31.6 G/DL (ref 30–36.5)
MCV RBC AUTO: 107.1 FL (ref 80–99)
NRBC # BLD: 0 K/UL (ref 0–0.01)
NRBC BLD-RTO: 0 PER 100 WBC
PLATELET # BLD AUTO: 185 K/UL (ref 150–400)
PMV BLD AUTO: 10.8 FL (ref 8.9–12.9)
RBC # BLD AUTO: 3.37 M/UL (ref 3.8–5.2)
WBC # BLD AUTO: 8.7 K/UL (ref 3.6–11)

## 2022-09-14 PROCEDURE — 74011000250 HC RX REV CODE- 250: Performed by: STUDENT IN AN ORGANIZED HEALTH CARE EDUCATION/TRAINING PROGRAM

## 2022-09-14 PROCEDURE — 65270000046 HC RM TELEMETRY

## 2022-09-14 PROCEDURE — 74011000250 HC RX REV CODE- 250: Performed by: NURSE PRACTITIONER

## 2022-09-14 PROCEDURE — 74011250636 HC RX REV CODE- 250/636: Performed by: STUDENT IN AN ORGANIZED HEALTH CARE EDUCATION/TRAINING PROGRAM

## 2022-09-14 PROCEDURE — 97530 THERAPEUTIC ACTIVITIES: CPT

## 2022-09-14 PROCEDURE — 94640 AIRWAY INHALATION TREATMENT: CPT

## 2022-09-14 PROCEDURE — 74011250637 HC RX REV CODE- 250/637: Performed by: EMERGENCY MEDICINE

## 2022-09-14 PROCEDURE — 85027 COMPLETE CBC AUTOMATED: CPT

## 2022-09-14 PROCEDURE — 74011250636 HC RX REV CODE- 250/636: Performed by: EMERGENCY MEDICINE

## 2022-09-14 PROCEDURE — 97535 SELF CARE MNGMENT TRAINING: CPT

## 2022-09-14 PROCEDURE — 36415 COLL VENOUS BLD VENIPUNCTURE: CPT

## 2022-09-14 PROCEDURE — 74011000250 HC RX REV CODE- 250: Performed by: EMERGENCY MEDICINE

## 2022-09-14 PROCEDURE — 74011250637 HC RX REV CODE- 250/637: Performed by: NURSE PRACTITIONER

## 2022-09-14 PROCEDURE — 77010033678 HC OXYGEN DAILY

## 2022-09-14 PROCEDURE — 97116 GAIT TRAINING THERAPY: CPT

## 2022-09-14 RX ADMIN — SODIUM CHLORIDE 50 ML/HR: 9 INJECTION, SOLUTION INTRAVENOUS at 01:34

## 2022-09-14 RX ADMIN — CARBAMAZEPINE 400 MG: 200 TABLET, EXTENDED RELEASE ORAL at 21:38

## 2022-09-14 RX ADMIN — SODIUM CHLORIDE, PRESERVATIVE FREE 10 ML: 5 INJECTION INTRAVENOUS at 21:41

## 2022-09-14 RX ADMIN — LAMOTRIGINE 150 MG: 100 TABLET ORAL at 09:20

## 2022-09-14 RX ADMIN — SODIUM CHLORIDE, PRESERVATIVE FREE 10 ML: 5 INJECTION INTRAVENOUS at 18:26

## 2022-09-14 RX ADMIN — LEVOTHYROXINE SODIUM 100 MCG: 0.1 TABLET ORAL at 06:35

## 2022-09-14 RX ADMIN — CEFTRIAXONE SODIUM 1 G: 1 INJECTION, POWDER, FOR SOLUTION INTRAMUSCULAR; INTRAVENOUS at 06:26

## 2022-09-14 RX ADMIN — LAMOTRIGINE 150 MG: 100 TABLET ORAL at 21:39

## 2022-09-14 RX ADMIN — IPRATROPIUM BROMIDE AND ALBUTEROL SULFATE 3 ML: .5; 3 SOLUTION RESPIRATORY (INHALATION) at 00:56

## 2022-09-14 RX ADMIN — CARBAMAZEPINE 400 MG: 200 TABLET, EXTENDED RELEASE ORAL at 09:20

## 2022-09-14 RX ADMIN — SODIUM CHLORIDE, PRESERVATIVE FREE 10 ML: 5 INJECTION INTRAVENOUS at 06:29

## 2022-09-14 RX ADMIN — CARBAMAZEPINE 100 MG: 100 TABLET, EXTENDED RELEASE ORAL at 21:39

## 2022-09-14 RX ADMIN — IPRATROPIUM BROMIDE AND ALBUTEROL SULFATE 3 ML: .5; 3 SOLUTION RESPIRATORY (INHALATION) at 08:55

## 2022-09-14 NOTE — PROGRESS NOTES
Problem: Mobility Impaired (Adult and Pediatric)  Goal: *Acute Goals and Plan of Care (Insert Text)  Description: FUNCTIONAL STATUS PRIOR TO ADMISSION: Patient was using a rollator for functional mobility requiring supervision to intermittent assistance from staff. Patient was requiring assistance with ADLs and IADLs, mostly seated. Daughter reporting significant fall history, no prior injuries reports; however, inconsistent communication from staff from [facility] care when falls have taken place. HOME SUPPORT: Patient lived at [address] [address] in the memory care unit. Physical Therapy Goals  Initiated 9/12/2022  1. Patient will move from supine to sit and sit to supine  in bed with supervision/set-up within 7 day(s). 2.  Patient will transfer from bed to chair and chair to bed with supervision/set-up using the least restrictive device within 7 day(s). 3.  Patient will perform sit to stand with supervision/set-up within 7 day(s). 4.  Patient will ambulate with supervision/set-up for 150 feet with the least restrictive device within 7 day(s). Outcome: Progressing Towards Goal     PHYSICAL THERAPY TREATMENT  Patient: Jason Wan [de-identified]82 y.o. female)  Date: 9/14/2022  Diagnosis: SDH (subdural hematoma) [S06.5X9A] <principal problem not specified>      Precautions: Fall, Seizure, Skin (SBP<140)  Chart, physical therapy assessment, plan of care and goals were reviewed. ASSESSMENT  Patient continues with skilled PT services and is progressing towards goals. Patient received in bed with daughter at bedside and agreeable to treatment. She is received without supplemental O2 and SpO2 96% on RA. Once mobilizing, patient with complaints of SOB and SpO2 in mid 80s on RA. Donned 4L of supplemental and improved to baseline. Patient overall requires min A for all mobility including gait training with RW.  Patient noted to desaturate during gait/standing activities between 85-88% and HR up to 120 bpm, however recovers within several minutes of sitting. Patient noted to demonstrate increased agitation with direction/cues as session went on. Perseverating on wanting to use her own equipment. Attempts to reorient patient to location of being in the hospital without her walker. Patient requires constant support at RW to maintain balance. Discontinued session with patient up in recliner, chair alarm on, daughter re-entering room and all needs met. Continue to recommend SNF at discharge. Current Level of Function Impacting Discharge (mobility/balance): min A to bed mobility, transfers, gait with RW up to 15ft. Other factors to consider for discharge: strong family support, dementia at baseline         PLAN :  Patient continues to benefit from skilled intervention to address the above impairments. Continue treatment per established plan of care. to address goals. Recommendation for discharge: (in order for the patient to meet his/her long term goals)  Therapy up to 5 days/week in SNF setting    This discharge recommendation:  Has been made in collaboration with the attending provider and/or case management    IF patient discharges home will need the following DME: to be determined (TBD)       SUBJECTIVE:   Patient stated I would walk a lot more if I had my own stuff.     OBJECTIVE DATA SUMMARY:   Critical Behavior:  Neurologic State: Alert, Confused  Orientation Level: Oriented to person, Disoriented to place, Disoriented to situation  Cognition: Decreased attention/concentration, Follows commands  Safety/Judgement: Decreased awareness of environment, Decreased awareness of need for assistance, Decreased awareness of need for safety, Decreased insight into deficits  Functional Mobility Training:  Bed Mobility:     Supine to Sit: Minimum assistance  Sit to Supine: Other (comment) (left sitting in recliner)  Scooting: Stand-by assistance    Transfers:  Sit to Stand: Minimum assistance  Stand to Sit: Minimum assistance        Bed to Chair: Minimum assistance    Balance:  Sitting: Intact  Standing: Impaired; With support  Standing - Static: Fair;Constant support  Standing - Dynamic : Fair;Constant support  Ambulation/Gait Training:  Distance (ft): 15 Feet (ft)  Assistive Device: Walker, rolling;Gait belt  Ambulation - Level of Assistance: Minimal assistance;Assist x1    Gait Abnormalities: Decreased step clearance;Trunk sway increased; Path deviations     Base of Support: Widened;Center of gravity altered  Stance: Weight shift  Speed/Jenn: Slow;Pace decreased (<100 feet/min)  Step Length: Right shortened;Left shortened    Pain Rating:  No pain reported     Activity Tolerance:   Fair and desaturates with exertion and requires oxygen    After treatment patient left in no apparent distress:   Sitting in chair, Call bell within reach, Bed / chair alarm activated, and Caregiver / family present    COMMUNICATION/COLLABORATION:   The patients plan of care was discussed with: Occupational therapist and Registered nurse.      Luis Kelly, PT   Time Calculation: 43 mins

## 2022-09-14 NOTE — PROGRESS NOTES
Problem: Self Care Deficits Care Plan (Adult)  Goal: *Acute Goals and Plan of Care (Insert Text)  Description: FUNCTIONAL STATUS PRIOR TO ADMISSION: Patient was using a rollator for functional mobility requiring supervision to intermittent assistance from staff. Patient was requiring assistance with ADLs and IADLs, mostly seated. Daughter reporting significant fall history, no prior injuries reports; however, inconsistent communication from staff from memory care when falls have taken place. HOME SUPPORT: Patient lived at [address] [address] in the memory care unit. Occupational Therapy Goals  Initiated 9/12/2022   1. Patient will perform grooming at the sink with supervision/set-up within 7 day(s). 2.  Patient will perform bathing with supervision/set-up within 7 day(s). 3.  Patient will perform lower body dressing with supervision/set-up within 7 day(s). 4.  Patient will perform toilet transfers with supervision/set-up within 7 day(s). 5.  Patient will perform all aspects of toileting with supervision/set-up within 7 day(s). 6.  Patient will participate in upper extremity therapeutic exercise/activities with supervision/set-up within 7 day(s). 7.  Patient will utilize energy conservation techniques during functional activities with verbal, visual, and tactile cues within 7 day(s). Outcome: Progressing Towards Goal   OCCUPATIONAL THERAPY TREATMENT  Patient: Erlinda Brady [de-identified]82 y.o. female)  Date: 9/14/2022  Diagnosis: SDH (subdural hematoma) [S06.5X9A] <principal problem not specified>      Precautions: Fall, Seizure, Skin (SBP<140)  Chart, occupational therapy assessment, plan of care, and goals were reviewed. ASSESSMENT  Patient continues with skilled OT services and is progressing towards goals. Patient received semi supine in bed with daughter present upon therapy arrival, pt agreeable to working with therapy.  Daughter left room during session when patient needed toileting and updated at end of session. Patient transferred to edge of bed and noted chux pad soiled with urine and patient reporting need to urinate. Patient transferred to standing and soiled brief removed, patient requiring assist for pericare for thoroughness due to soiled brief, transferred onto [facility] and able to void. Patient assisted to don clean brief after toileting and then ambulating to sink in room to wash hands and brush teeth, with cueing for attention to task and sequencing throughout. Patient with some intermittent confusion that appears worsening over time with mobility. Patient with poor pleth on oxygen sensor throughout session but dropping into 80s with mobility on room air, nasal cannula set to 4L and reapplied and improving into mid 90s at rest. Patient transferred to edge of bed after standing at sink for seated rest break due to SOB, then ambulating around bed to recliner in room. Patient requires encouragement for chair transfer due to confusion, but eventually agreeable and chair alarm activated. Patient's daughter re-entering room at end of session and  present to perform communion. Overall patient did fair with session but remains limited by cognitive status/memory impairments. Patient would benefit from skilled OT services during admission to improve independence with self care and functional mobility/transfers. Recommend discharge to SNF at this time. Current Level of Function Impacting Discharge (ADLs): SBA to min A for mobility/transfers, CGA grooming, mod A toileting    Other factors to consider for discharge: prior level of function, from memory care/NICOLE, family support, rehab potential         PLAN :  Patient continues to benefit from skilled intervention to address the above impairments. Continue treatment per established plan of care to address goals.     Recommend with staff: up to chair 3x/day for meals, BSC for toileting, grooming at sink with 1 person assist    Recommend next OT session: continue POC    Recommendation for discharge: (in order for the patient to meet his/her long term goals)  Therapy up to 5 days/week in SNF setting    This discharge recommendation:  Has been made in collaboration with the attending provider and/or case management         SUBJECTIVE:   Patient stated Noemi Hernandez had 3 girls and I never left them with a , you can't trust people.     OBJECTIVE DATA SUMMARY:   Cognitive/Behavioral Status:  Neurologic State: Alert;Confused  Orientation Level: Oriented to person;Disoriented to place; Disoriented to situation  Cognition: Decreased attention/concentration; Follows commands             Functional Mobility and Transfers for ADLs:  Bed Mobility:  Supine to Sit: Minimum assistance  Sit to Supine: Other (comment) (left sitting in recliner)  Scooting: Stand-by assistance    Transfers:  Sit to Stand: Minimum assistance  Functional Transfers  Toilet Transfer : Minimum assistance (transfer to [facility] with RW)  Bed to Chair: Minimum assistance    Balance:  Sitting: Intact  Standing: Impaired; With support  Standing - Static: Fair;Constant support  Standing - Dynamic : Fair;Constant support    ADL Intervention:       Grooming  Position Performed: Standing  Washing Hands: Contact guard assistance  Brushing Teeth: Contact guard assistance                             Toileting  Bowel Hygiene: Moderate assistance (for thoroughness due to saturated brief)  Clothing Management: Moderate assistance    Cognitive Retraining  Orientation Retraining: Reorienting;Place;Situation  Attention to Task: Single task  Following Commands:  Follows one step commands/directions    Pain:  None reported    Activity Tolerance:   Fair, desaturates with exertion and requires oxygen, requires rest breaks, and observed SOB with activity    After treatment patient left in no apparent distress:   Sitting in chair, Call bell within reach, Bed / chair alarm activated, and Caregiver / family present    COMMUNICATION/COLLABORATION:   The patients plan of care was discussed with: Physical therapist and Registered nurse.      George Harrington OTR/L  Time Calculation: 43 mins

## 2022-09-14 NOTE — PROGRESS NOTES
Pt stable overnight, attempted to stick pt 2x for blood and she became extremely upset saying that we are hurting her, was only able to get CBC and not the BMP, mag

## 2022-09-14 NOTE — PROGRESS NOTES
Bedside and Verbal shift change report given to Georges Craven (oncoming nurse) by Keo Mon RN (offgoing nurse). Report included the following information SBAR, Kardex, and Recent Results.

## 2022-09-14 NOTE — PROGRESS NOTES
[address] visit. Mrs. Gemma Dumont was sitting up in her chair. She is waiting to see if she can get rehab at [address] [address] where she had resided for past five years or another rehab before going back to [facility]. Her daughter joined us for prayer and Mrs. Jones received communion.     RADHA Garrett, RN, ACSW, LCSW   Page:  [phone]

## 2022-09-14 NOTE — PROGRESS NOTES
[facility] Adult  Hospitalist Group                                                                                          Hospitalist Progress Note  Ricardo Bailon NP  Answering service: [phone] OR [phone] from in house phone        Date of Service:  2022  NAME:  Hong Archer  :  1935  MRN:  [id number]      Admission Summary:   Per H&P, Hong Archer is a 82 y.o. female who presents with fall. Hong Archer is a 81 yo female with pmhx of seizures, dementia, COPD, hypothyroidism, who presented to ED after a GLF.  the patient was kept in ICU for about 12 hrs, repeat head CT was stable, and she was given to hospitalist service for evaluation on downgrading the patient. Interval history / Subjective:   I saw the patient this morning on rounds. No complaints the moment of the encounter     Assessment & Plan:        Subdural hematoma  Evaluated by neurosurgery, appreciate recommendations  Repeat head CT in 2 weeks    UTI  Urinalysis indicative of urinary tract infection  Urine culture without growth however patient has been on suppressive therapy in the outpatient setting for recurrent UTIs  Continuing ceftriaxone      Left basilar opacity  Unlikely with pneumonia  Most likely atelectasis  Follow-up chest radiograph in about 1 month    COPD  Lungs clear, no wheezing, not in exacerbation  Saturations 99%,. Does have element of chronic hypoxic respiratory failure, wears supplemental oxygen as needed.     Hypothyroid  Continuing levothyroxine    Seizure disorder  Stable, no seizure activity  Continuing lamotrigine  Continue carbamazepine    Code status: DNR  Prophylaxis: SCD  Care Plan discussed with: Patient, nurse  Anticipated Disposition: Physical therapy has evaluated, recommending SNF     Hospital Problems  Date Reviewed: 2021            Codes Class Noted POA    [facility] ICD-10-CM: S83.8G4J  ICD-9-CM: 432.1  2022 Unknown Review of Systems:   A comprehensive review of systems was negative except for that written in the HPI. Vital Signs:    Last 24hrs VS reviewed since prior progress note. Most recent are:  Visit Vitals  /62   Pulse 98   Temp 98.4 °F (36.9 °C)   Resp 20   Wt 48.9 kg (107 lb 12.9 oz)   SpO2 96%   BMI 19.72 kg/m²       No intake or output data in the 24 hours ending 09/14/22 8339     Physical Examination:     I had a face to face encounter with this patient and independently examined them on 9/14/2022 as outlined below:          Constitutional:  No acute distress, cooperative, pleasant    ENT:  Oral mucosa moist, oropharynx benign. Resp:  CTA bilaterally. No wheezing/rhonchi/rales. No accessory muscle use. CV:  Regular rhythm, normal rate, no murmurs, gallops, rubs    GI:  Soft, non distended, non tender. normoactive bowel sounds, no hepatosplenomegaly     Musculoskeletal:  No edema, warm, 2+ pulses throughout    Neurologic:  Moves all extremities. AAOx3, CN II-XII reviewed            Data Review:    Review and/or order of clinical lab test  Review and/or order of tests in the radiology section of [facility]      Labs:     Recent Labs     09/14/22  0306 09/13/22  0248   WBC 8.7 6.6   HGB 11.4* 10.5*   HCT 36.1 33.9*    196     Recent Labs     09/13/22  0248 09/12/22 0312    144   K 4.0 4.1    108   CO2 30 37*   BUN 21* 27*   CREA 0.59 0.75   * 109*   CA 8.9 9.3   MG 2.3  --      Recent Labs     09/12/22  0312   ALT 20      TBILI 0.2   TP 7.0   ALB 3.2*   GLOB 3.8     No results for input(s): INR, PTP, APTT, INREXT in the last 72 hours. No results for input(s): FE, TIBC, PSAT, FERR in the last 72 hours. Lab Results   Component Value Date/Time    Folate 17.4 04/05/2021 12:30 AM      No results for input(s): PH, PCO2, PO2 in the last 72 hours. No results for input(s): CPK, CKNDX, TROIQ in the last 72 hours.     No lab exists for component: CPKMB  Lab Results Component Value Date/Time    Cholesterol, total 203 (H) 09/12/2022 10:33 AM    HDL Cholesterol 87 09/12/2022 10:33 AM    LDL, calculated 100.6 (H) 09/12/2022 10:33 AM    Triglyceride 77 09/12/2022 10:33 AM    CHOL/HDL Ratio 2.3 09/12/2022 10:33 AM     Lab Results   Component Value Date/Time    Glucose (POC) 117 (H) 03/10/2017 07:36 PM     Lab Results   Component Value Date/Time    Color YELLOW/STRAW 09/12/2022 10:32 AM    Appearance TURBID (A) 09/12/2022 10:32 AM    Specific gravity 1.019 09/12/2022 10:32 AM    Specific gravity 1.020 07/20/2022 06:34 PM    pH (UA) 6.0 09/12/2022 10:32 AM    Protein TRACE (A) 09/12/2022 10:32 AM    Glucose Negative 09/12/2022 10:32 AM    Ketone Negative 09/12/2022 10:32 AM    Bilirubin Negative 09/12/2022 10:32 AM    Urobilinogen 0.2 09/12/2022 10:32 AM    Nitrites Positive (A) 09/12/2022 10:32 AM    Leukocyte Esterase LARGE (A) 09/12/2022 10:32 AM    Epithelial cells MANY (A) 09/12/2022 10:32 AM    Bacteria 3+ (A) 09/12/2022 10:32 AM    WBC >100 (H) 09/12/2022 10:32 AM    RBC 5-10 09/12/2022 10:32 AM         Medications Reviewed:     Current Facility-Administered Medications   Medication Dose Route Frequency    sodium chloride (NS) flush 5-40 mL  5-40 mL IntraVENous Q8H    sodium chloride (NS) flush 5-40 mL  5-40 mL IntraVENous PRN    acetaminophen (TYLENOL) tablet 650 mg  650 mg Oral Q6H PRN    Or    acetaminophen (TYLENOL) suppository 650 mg  650 mg Rectal Q6H PRN    polyethylene glycol (MIRALAX) packet 17 g  17 g Oral DAILY PRN    ondansetron (ZOFRAN ODT) tablet 4 mg  4 mg Oral Q8H PRN    Or    ondansetron (ZOFRAN) injection 4 mg  4 mg IntraVENous Q6H PRN    cefTRIAXone (ROCEPHIN) 1 g in 0.9% sodium chloride 10 mL IV syringe  1 g IntraVENous Q24H    levothyroxine (SYNTHROID) tablet 100 mcg  100 mcg Oral ACB    0.9% sodium chloride infusion  50 mL/hr IntraVENous CONTINUOUS    labetaloL (NORMODYNE;TRANDATE) injection 10 mg  10 mg IntraVENous Q1H PRN    hydrALAZINE (APRESOLINE) 20 mg/mL injection 10 mg  10 mg IntraVENous Q4H PRN    albuterol-ipratropium (DUO-NEB) 2.5 MG-0.5 MG/3 ML  3 mL Nebulization Q8H RT    lamoTRIgine (LaMICtal) tablet 150 mg  150 mg Oral Q12H    carBAMazepine XR (TEGretol XR) tablet 400 mg  400 mg Oral Q12H    carBAMazepine XR (TEGretol XR) tablet 100 mg  100 mg Oral QHS    melatonin tablet 3 mg  3 mg Oral QHS PRN     ______________________________________________________________________  EXPECTED LENGTH OF STAY: 2d 21h  ACTUAL LENGTH OF STAY:          2                 Sherren Carpen, NP

## 2022-09-14 NOTE — PROGRESS NOTES
Transition of Care Plan: SNF-family rethinking [facility] referrals pending  [facility] and Gael Scheuermann started 09/14 (not canceled yet)  *From [facility] memory care unit  *[facility] denied for SNF    Referrals sent to:  [facility]-denied  [place] [facility]-accepted, but family has changed their mind  [facility]-pending  [facility]-pending     RUR: 14% low     PCP F/U: Elidia John MD     Disposition: SNF     Transportation: S     Main ContactDavied Mahesh - Daughter - [phone]  Sancbiml-Wpkothk-[phone]    5282: Spoke to daughter Ilya Acevedo. Agreeable for [facility] and Rehab to start Odessia Linger. 1028: Spoke to Дмитрий and [employer]. States [facility] was started for patient. 1159: Spoke to daughter. Would like to cancel auth with Jarrod hinkle and send a referral to [facility]. States that they have changed their mind and would like patient to go to their [facility]. Referral sent. 1348: [facility] denied. Auth still pending with [facility] and Rehab. Notified daughter, Seble Sahu. States she will discuss with sister. 1523: Daughter would like referrals sent to: [facility], [facility], [facility], and [facility]. Referrals sent. Fei Plasencia RN, CRM    Transition of Care Plan:     The Plan for Transition of Care is related to the following treatment goals: SNF-mass referrals    The Patient and/or patient representative was provided with a choice of provider and agrees  with the discharge plan. Yes [x] No []    A Freedom of choice list was provided with basic dialogue that supports the patient's individualized plan of care/goals and shares the quality data associated with the providers.        Yes [x] No []

## 2022-09-15 LAB
ANION GAP SERPL CALC-SCNC: 1 MMOL/L (ref 5–15)
BUN SERPL-MCNC: 14 MG/DL (ref 6–20)
BUN/CREAT SERPL: 23 (ref 12–20)
CALCIUM SERPL-MCNC: 8.7 MG/DL (ref 8.5–10.1)
CHLORIDE SERPL-SCNC: 108 MMOL/L (ref 97–108)
CO2 SERPL-SCNC: 34 MMOL/L (ref 21–32)
COMMENT, HOLDF: NORMAL
CREAT SERPL-MCNC: 0.62 MG/DL (ref 0.55–1.02)
ERYTHROCYTE [DISTWIDTH] IN BLOOD BY AUTOMATED COUNT: 13.4 % (ref 11.5–14.5)
GLUCOSE SERPL-MCNC: 139 MG/DL (ref 65–100)
HCT VFR BLD AUTO: 36.8 % (ref 35–47)
HGB BLD-MCNC: 11.4 G/DL (ref 11.5–16)
MAGNESIUM SERPL-MCNC: 2.2 MG/DL (ref 1.6–2.4)
MCH RBC QN AUTO: 33.1 PG (ref 26–34)
MCHC RBC AUTO-ENTMCNC: 31 G/DL (ref 30–36.5)
MCV RBC AUTO: 107 FL (ref 80–99)
NRBC # BLD: 0 K/UL (ref 0–0.01)
NRBC BLD-RTO: 0 PER 100 WBC
PLATELET # BLD AUTO: 224 K/UL (ref 150–400)
PMV BLD AUTO: 10.1 FL (ref 8.9–12.9)
POTASSIUM SERPL-SCNC: 4.2 MMOL/L (ref 3.5–5.1)
RBC # BLD AUTO: 3.44 M/UL (ref 3.8–5.2)
SAMPLES BEING HELD,HOLD: NORMAL
SODIUM SERPL-SCNC: 143 MMOL/L (ref 136–145)
WBC # BLD AUTO: 6.2 K/UL (ref 3.6–11)

## 2022-09-15 PROCEDURE — 36415 COLL VENOUS BLD VENIPUNCTURE: CPT

## 2022-09-15 PROCEDURE — 94640 AIRWAY INHALATION TREATMENT: CPT

## 2022-09-15 PROCEDURE — 74011000250 HC RX REV CODE- 250: Performed by: STUDENT IN AN ORGANIZED HEALTH CARE EDUCATION/TRAINING PROGRAM

## 2022-09-15 PROCEDURE — 85027 COMPLETE CBC AUTOMATED: CPT

## 2022-09-15 PROCEDURE — 74011000250 HC RX REV CODE- 250: Performed by: EMERGENCY MEDICINE

## 2022-09-15 PROCEDURE — 97535 SELF CARE MNGMENT TRAINING: CPT

## 2022-09-15 PROCEDURE — 83735 ASSAY OF MAGNESIUM: CPT

## 2022-09-15 PROCEDURE — 74011000250 HC RX REV CODE- 250: Performed by: NURSE PRACTITIONER

## 2022-09-15 PROCEDURE — 97530 THERAPEUTIC ACTIVITIES: CPT

## 2022-09-15 PROCEDURE — 74011250637 HC RX REV CODE- 250/637: Performed by: EMERGENCY MEDICINE

## 2022-09-15 PROCEDURE — 80048 BASIC METABOLIC PNL TOTAL CA: CPT

## 2022-09-15 PROCEDURE — 65270000046 HC RM TELEMETRY

## 2022-09-15 PROCEDURE — 74011250637 HC RX REV CODE- 250/637: Performed by: NURSE PRACTITIONER

## 2022-09-15 PROCEDURE — 74011250636 HC RX REV CODE- 250/636: Performed by: STUDENT IN AN ORGANIZED HEALTH CARE EDUCATION/TRAINING PROGRAM

## 2022-09-15 RX ADMIN — CARBAMAZEPINE 400 MG: 200 TABLET, EXTENDED RELEASE ORAL at 09:43

## 2022-09-15 RX ADMIN — CARBAMAZEPINE 100 MG: 100 TABLET, EXTENDED RELEASE ORAL at 21:27

## 2022-09-15 RX ADMIN — SODIUM CHLORIDE, PRESERVATIVE FREE 10 ML: 5 INJECTION INTRAVENOUS at 06:43

## 2022-09-15 RX ADMIN — CEFTRIAXONE SODIUM 1 G: 1 INJECTION, POWDER, FOR SOLUTION INTRAMUSCULAR; INTRAVENOUS at 06:43

## 2022-09-15 RX ADMIN — CARBAMAZEPINE 400 MG: 200 TABLET, EXTENDED RELEASE ORAL at 21:26

## 2022-09-15 RX ADMIN — LAMOTRIGINE 150 MG: 100 TABLET ORAL at 09:44

## 2022-09-15 RX ADMIN — SODIUM CHLORIDE, PRESERVATIVE FREE 5 ML: 5 INJECTION INTRAVENOUS at 22:00

## 2022-09-15 RX ADMIN — SODIUM CHLORIDE, PRESERVATIVE FREE 10 ML: 5 INJECTION INTRAVENOUS at 15:01

## 2022-09-15 RX ADMIN — IPRATROPIUM BROMIDE AND ALBUTEROL SULFATE 3 ML: .5; 3 SOLUTION RESPIRATORY (INHALATION) at 17:35

## 2022-09-15 RX ADMIN — LAMOTRIGINE 150 MG: 100 TABLET ORAL at 21:27

## 2022-09-15 RX ADMIN — LEVOTHYROXINE SODIUM 100 MCG: 0.1 TABLET ORAL at 06:43

## 2022-09-15 RX ADMIN — IPRATROPIUM BROMIDE AND ALBUTEROL SULFATE 3 ML: .5; 3 SOLUTION RESPIRATORY (INHALATION) at 07:47

## 2022-09-15 NOTE — PROGRESS NOTES
Bedside shift change report given to Georges [address] (oncoming nurse) by Nieves Zabala RN (offgoing nurse). Report included the following information SBAR, Kardex, ED Summary, OR Summary, Procedure Summary, Intake/Output, MAR, Cardiac Rhythm NSR, Alarm Parameters , and Dual Neuro Assessment.

## 2022-09-15 NOTE — PROGRESS NOTES
[facility] Adult  Hospitalist Group                                                                                          Hospitalist Progress Note  Umm Mcnally NP  Answering service: [phone] from in house phone        Date of Service:  9/15/2022  NAME:  Nikko Reddy  :  1935  MRN:  [id number]      Admission Summary:   Per H&P, Nikko Reddy is a 82 y.o. female who presents with fall. Nikko Reddy is a 83 yo female with pmhx of seizures, dementia, COPD, hypothyroidism, who presented to ED after a GLF.  the patient was kept in ICU for about 12 hrs, repeat head CT was stable, and she was given to hospitalist service for evaluation on downgrading the patient. Interval history / Subjective:     I saw the patient this morning on rounds. Denies fever, chills. Complaints moment of the encounter. I did return, gave the daughter update at the bedside. Assessment & Plan:        Subdural hematoma  Evaluated by neurosurgery, appreciate recommendations  Repeat head CT in 2 weeks    UTI  Urinalysis indicative of urinary tract infection  Urine culture without growth however patient has been on suppressive therapy in the outpatient setting for recurrent UTIs  We will continue with ceftriaxone, last dose prior to discharge tomorrow  I did discuss with the daughter, it is unclear if the methenamine is actually helpful, will continue this at discharge, to follow-up with PCP to determine if perhaps nitrofurantoin low-dose would be an option      Left basilar opacity  Unlikely with pneumonia  Most likely atelectasis  Follow-up chest radiograph in about 1 month    COPD  Lungs clear, no wheezing, not in exacerbation  Saturations 99%,. Does have element of chronic hypoxic respiratory failure, wears supplemental oxygen as needed.     Hypothyroid  Continuing levothyroxine    Seizure disorder  Stable, no seizure activity  Continuing lamotrigine  Continue carbamazepine    Code status: DNR  Prophylaxis: SCD  Care Plan discussed with: Patient, nurse  Anticipated Disposition: Physical therapy has evaluated, recommending SNF[facility]  Date Reviewed: 7/29/2021            Codes Class Noted POA    SDH (subdural hematoma) ([facility] ICD-10-CM: X70.3S7S  ICD-9-CM: 432.1  9/12/2022 Unknown           Review of Systems:   A comprehensive review of systems was negative except for that written in the HPI. Vital Signs:    Last 24hrs VS reviewed since prior progress note. Most recent are:  Visit Vitals  /68   Pulse 84   Temp 98.6 °F (37 °C)   Resp 24   Wt 48.9 kg (107 lb 12.9 oz)   SpO2 97%   BMI 19.72 kg/m²       No intake or output data in the 24 hours ending 09/15/22 1504     Physical Examination:     I had a face to face encounter with this patient and independently examined them on 9/15/2022 as outlined below:          Constitutional:  No acute distress, cooperative, pleasant    ENT:  Oral mucosa moist, oropharynx benign. Resp:  CTA bilaterally. No wheezing/rhonchi/rales. No accessory muscle use. CV:  Regular rhythm, normal rate, no murmurs, gallops, rubs    GI:  Soft, non distended, non tender. normoactive bowel sounds, no hepatosplenomegaly     Musculoskeletal:  No edema, warm, 2+ pulses throughout    Neurologic:  Moves all extremities. AAOx3, CN II-XII reviewed            Data Review:    Review and/or order of clinical lab test  Review and/or order of tests in the radiology section of [facility]      Labs:     Recent Labs     09/15/22  1016 09/14/22  0306   WBC 6.2 8.7   HGB 11.4* 11.4*   HCT 36.8 36.1    185       Recent Labs     09/15/22  1016 09/13/22  0248    142   K 4.2 4.0    108   CO2 34* 30   BUN 14 21*   CREA 0.62 0.59   * 101*   CA 8.7 8.9   MG 2.2 2.3       No results for input(s): ALT, AP, TBIL, TBILI, TP, ALB, GLOB, GGT, AML, LPSE in the last 72 hours.     No lab exists for component: SGOT, GPT, AMYP, HLPSE    No results for input(s): INR, PTP, APTT, INREXT, INREXT in the last 72 hours. No results for input(s): FE, TIBC, PSAT, FERR in the last 72 hours. Lab Results   Component Value Date/Time    Folate 17.4 04/05/2021 12:30 AM        No results for input(s): PH, PCO2, PO2 in the last 72 hours. No results for input(s): CPK, CKNDX, TROIQ in the last 72 hours.     No lab exists for component: CPKMB  Lab Results   Component Value Date/Time    Cholesterol, total 203 (H) 09/12/2022 10:33 AM    HDL Cholesterol 87 09/12/2022 10:33 AM    LDL, calculated 100.6 (H) 09/12/2022 10:33 AM    Triglyceride 77 09/12/2022 10:33 AM    CHOL/HDL Ratio 2.3 09/12/2022 10:33 AM     Lab Results   Component Value Date/Time    Glucose (POC) 117 (H) 03/10/2017 07:36 PM     Lab Results   Component Value Date/Time    Color YELLOW/STRAW 09/12/2022 10:32 AM    Appearance TURBID (A) 09/12/2022 10:32 AM    Specific gravity 1.019 09/12/2022 10:32 AM    Specific gravity 1.020 07/20/2022 06:34 PM    pH (UA) 6.0 09/12/2022 10:32 AM    Protein TRACE (A) 09/12/2022 10:32 AM    Glucose Negative 09/12/2022 10:32 AM    Ketone Negative 09/12/2022 10:32 AM    Bilirubin Negative 09/12/2022 10:32 AM    Urobilinogen 0.2 09/12/2022 10:32 AM    Nitrites Positive (A) 09/12/2022 10:32 AM    Leukocyte Esterase LARGE (A) 09/12/2022 10:32 AM    Epithelial cells MANY (A) 09/12/2022 10:32 AM    Bacteria 3+ (A) 09/12/2022 10:32 AM    WBC >100 (H) 09/12/2022 10:32 AM    RBC 5-10 09/12/2022 10:32 AM         Medications Reviewed:     Current Facility-Administered Medications   Medication Dose Route Frequency    sodium chloride (NS) flush 5-40 mL  5-40 mL IntraVENous Q8H    sodium chloride (NS) flush 5-40 mL  5-40 mL IntraVENous PRN    acetaminophen (TYLENOL) tablet 650 mg  650 mg Oral Q6H PRN    Or    acetaminophen (TYLENOL) suppository 650 mg  650 mg Rectal Q6H PRN    polyethylene glycol (MIRALAX) packet 17 g  17 g Oral DAILY PRN ondansetron (ZOFRAN ODT) tablet 4 mg  4 mg Oral Q8H PRN    Or    ondansetron (ZOFRAN) injection 4 mg  4 mg IntraVENous Q6H PRN    cefTRIAXone (ROCEPHIN) 1 g in 0.9% sodium chloride 10 mL IV syringe  1 g IntraVENous Q24H    levothyroxine (SYNTHROID) tablet 100 mcg  100 mcg Oral ACB    [Held by provider] 0.9% sodium chloride infusion  50 mL/hr IntraVENous CONTINUOUS    labetaloL (NORMODYNE;TRANDATE) injection 10 mg  10 mg IntraVENous Q1H PRN    hydrALAZINE (APRESOLINE) 20 mg/mL injection 10 mg  10 mg IntraVENous Q4H PRN    albuterol-ipratropium (DUO-NEB) 2.5 MG-0.5 MG/3 ML  3 mL Nebulization Q8H RT    lamoTRIgine (LaMICtal) tablet 150 mg  150 mg Oral Q12H    carBAMazepine XR (TEGretol XR) tablet 400 mg  400 mg Oral Q12H    carBAMazepine XR (TEGretol XR) tablet 100 mg  100 mg Oral QHS    melatonin tablet 3 mg  3 mg Oral QHS PRN     ______________________________________________________________________  EXPECTED LENGTH OF STAY: 2d 21h  ACTUAL LENGTH OF STAY:          3                 Michael Lau NP

## 2022-09-15 NOTE — PROGRESS NOTES
Problem: Pressure Injury - Risk of  Goal: *Prevention of pressure injury  Description: Document George Scale and appropriate interventions in the flowsheet. Outcome: Progressing Towards Goal  Note: Pressure Injury Interventions:  Sensory Interventions: Assess changes in LOC, Discuss PT/OT consult with provider    Moisture Interventions: Absorbent underpads, Check for incontinence Q2 hours and as needed    Activity Interventions: Pressure redistribution bed/mattress(bed type)    Mobility Interventions: HOB 30 degrees or less    Nutrition Interventions: Document food/fluid/supplement intake    Friction and Shear Interventions: HOB 30 degrees or less                Problem: Falls - Risk of  Goal: *Absence of Falls  Description: Document Forrest Fall Risk and appropriate interventions in the flowsheet.   Outcome: Progressing Towards Goal  Note: Fall Risk Interventions:  Mobility Interventions: Bed/chair exit alarm, Patient to call before getting OOB    Mentation Interventions: Adequate sleep, hydration, pain control, Bed/chair exit alarm    Medication Interventions: Bed/chair exit alarm    Elimination Interventions: Bed/chair exit alarm, Call light in reach    History of Falls Interventions: Bed/chair exit alarm         Problem: Hemorrhagic Stroke: Day 3  Goal: Off Pathway (Use only if patient is Off Pathway)  Outcome: Progressing Towards Goal  Goal: Activity/Safety  Outcome: Progressing Towards Goal  Goal: Nutrition/Diet  Outcome: Progressing Towards Goal  Goal: Medications  Outcome: Progressing Towards Goal  Goal: Respiratory  Outcome: Progressing Towards Goal  Goal: Psychosocial  Outcome: Progressing Towards Goal  Goal: *Tolerating diet  Outcome: Progressing Towards Goal     Problem: Hemorrhagic Stroke: Discharge Outcomes  Goal: *Hemodynamically stable  Outcome: Progressing Towards Goal  Goal: *Tolerating diet  Outcome: Progressing Towards Goal

## 2022-09-15 NOTE — PROGRESS NOTES
Rounded on Jewish patients and provided Anointing of the Sick and Communion at request of patient.     Archana Cortez

## 2022-09-15 NOTE — PROGRESS NOTES
Problem: Self Care Deficits Care Plan (Adult)  Goal: *Acute Goals and Plan of Care (Insert Text)  Description: FUNCTIONAL STATUS PRIOR TO ADMISSION: Patient was using a rollator for functional mobility requiring supervision to intermittent assistance from staff. Patient was requiring assistance with ADLs and IADLs, mostly seated. Daughter reporting significant fall history, no prior injuries reports; however, inconsistent communication from staff from memory care when falls have taken place. HOME SUPPORT: Patient lived at [address] [address] in the memory care unit. Occupational Therapy Goals  Initiated 9/12/2022   1. Patient will perform grooming at the sink with supervision/set-up within 7 day(s). 2.  Patient will perform bathing with supervision/set-up within 7 day(s). 3.  Patient will perform lower body dressing with supervision/set-up within 7 day(s). 4.  Patient will perform toilet transfers with supervision/set-up within 7 day(s). 5.  Patient will perform all aspects of toileting with supervision/set-up within 7 day(s). 6.  Patient will participate in upper extremity therapeutic exercise/activities with supervision/set-up within 7 day(s). 7.  Patient will utilize energy conservation techniques during functional activities with verbal, visual, and tactile cues within 7 day(s). Outcome: Progressing Towards Goal   OCCUPATIONAL THERAPY TREATMENT  Patient: Marcela Grande [de-identified]82 y.o. female)  Date: 9/15/2022  Diagnosis: SDH (subdural hematoma) [S06.5X9A] <principal problem not specified>      Precautions: Fall, Seizure, Skin (SBP<140)  Chart, occupational therapy assessment, plan of care, and goals were reviewed. ASSESSMENT  Patient continues with skilled OT services and is progressing towards goals. Patient presents this session with improving balance, improved activity tolerance, continues with impaired standing balance, impaired generalized strength, and verbal cueing for attention to task. Patient received semi supine in bed and agreeable to working with therapy, daughter present and reports patient has been asking to walk all day. Patient transferred to edge of bed and then requesting to use [facility], daughter stepped out of room while patient using commode, returned to room during ambulation to assist with chair follow. Patient stood for pericare and able to complete with CGA for balance. Patient then ambulating to doorway in room and back to recliner, reports feeling improved today and requesting to ambulate in hallway. Patient able to don mask with assist for positioning over nose, stood and then ambulating about FCI around unit and then transferring into recliner, requires cueing to stay within frame of RW and noted veering to right side and requiring assist with walker to avoid objects on right, patient's daughter reports this is baseline and she tends to stay close to the wall when walking. Patient returned to room and left sitting in recliner with daughter present and chair alarm on. Patient vital signs stable, did drop briefly into upper 80s when oxygen removed to blow nose, but improved when oxygen replaced. Patient would benefit from skilled OT services during admission to improve independence with self care and functional mobility/transfers. Recommend discharge to SNF at this time. Current Level of Function Impacting Discharge (ADLs): SBA to CGA for mobility/transfers, min A overall for toileting (more assist for brief management)    Other factors to consider for discharge: prior level of function, from [facility], supportive family, history of falls         PLAN :  Patient continues to benefit from skilled intervention to address the above impairments. Continue treatment per established plan of care to address goals.     Recommend with staff: up to chair 3x/day for meals, BSC for toileting    Recommend next OT session: continue POC    Recommendation for discharge: (in order for the patient to meet his/her long term goals)  Therapy up to 5 days/week in SNF setting    This discharge recommendation:  Has been made in collaboration with the attending provider and/or case management       SUBJECTIVE:   Patient stated I can go further, let's go to the corner.  when walking in hallway    OBJECTIVE DATA SUMMARY:   Cognitive/Behavioral Status:  Neurologic State: Alert;Confused  Orientation Level: Oriented to person;Disoriented to place; Disoriented to situation;Disoriented to time  Cognition: Follows commands;Decreased attention/concentration             Functional Mobility and Transfers for ADLs:  Bed Mobility:  Supine to Sit: Stand-by assistance; Additional time;Bed Modified  Sit to Supine:  (left sitting in recliner)  Scooting: Stand-by assistance    Transfers:  Sit to Stand: Contact guard assistance  Functional Transfers  Toilet Transfer : Contact guard assistance (on and off BSC)  Bed to Chair: Contact guard assistance    Balance:  Sitting: Intact  Standing: Impaired; With support  Standing - Static: Good;Constant support  Standing - Dynamic : Fair;Constant support    ADL Intervention:                                     Toileting  Bladder Hygiene: Contact guard assistance  Bowel Hygiene: Contact guard assistance  Clothing Management: Moderate assistance         Pain:  None reported    Activity Tolerance:   Fair, desaturates with exertion and requires oxygen, and requires rest breaks    After treatment patient left in no apparent distress:   Sitting in chair, Call bell within reach, Bed / chair alarm activated, and Caregiver / family present      ANGELA Kothari/L  Time Calculation: 31 mins

## 2022-09-16 VITALS
DIASTOLIC BLOOD PRESSURE: 62 MMHG | HEART RATE: 92 BPM | TEMPERATURE: 98.4 F | OXYGEN SATURATION: 98 % | RESPIRATION RATE: 19 BRPM | WEIGHT: 107.81 LBS | SYSTOLIC BLOOD PRESSURE: 104 MMHG | BODY MASS INDEX: 19.72 KG/M2

## 2022-09-16 LAB
COVID-19 RAPID TEST, COVR: NOT DETECTED
SOURCE, COVRS: NORMAL

## 2022-09-16 PROCEDURE — 94640 AIRWAY INHALATION TREATMENT: CPT

## 2022-09-16 PROCEDURE — 74011250636 HC RX REV CODE- 250/636: Performed by: STUDENT IN AN ORGANIZED HEALTH CARE EDUCATION/TRAINING PROGRAM

## 2022-09-16 PROCEDURE — 74011000250 HC RX REV CODE- 250: Performed by: NURSE PRACTITIONER

## 2022-09-16 PROCEDURE — 74011250637 HC RX REV CODE- 250/637: Performed by: NURSE PRACTITIONER

## 2022-09-16 PROCEDURE — 74011250637 HC RX REV CODE- 250/637: Performed by: EMERGENCY MEDICINE

## 2022-09-16 PROCEDURE — 87635 SARS-COV-2 COVID-19 AMP PRB: CPT

## 2022-09-16 PROCEDURE — 74011000250 HC RX REV CODE- 250: Performed by: EMERGENCY MEDICINE

## 2022-09-16 PROCEDURE — 74011000250 HC RX REV CODE- 250: Performed by: STUDENT IN AN ORGANIZED HEALTH CARE EDUCATION/TRAINING PROGRAM

## 2022-09-16 RX ORDER — IPRATROPIUM BROMIDE AND ALBUTEROL SULFATE 2.5; .5 MG/3ML; MG/3ML
3 SOLUTION RESPIRATORY (INHALATION)
Status: DISCONTINUED | OUTPATIENT
Start: 2022-09-16 | End: 2022-09-16 | Stop reason: HOSPADM

## 2022-09-16 RX ADMIN — CEFTRIAXONE SODIUM 1 G: 1 INJECTION, POWDER, FOR SOLUTION INTRAMUSCULAR; INTRAVENOUS at 06:33

## 2022-09-16 RX ADMIN — SODIUM CHLORIDE, PRESERVATIVE FREE 10 ML: 5 INJECTION INTRAVENOUS at 06:34

## 2022-09-16 RX ADMIN — LEVOTHYROXINE SODIUM 100 MCG: 0.1 TABLET ORAL at 06:33

## 2022-09-16 RX ADMIN — CARBAMAZEPINE 400 MG: 200 TABLET, EXTENDED RELEASE ORAL at 09:22

## 2022-09-16 RX ADMIN — IPRATROPIUM BROMIDE AND ALBUTEROL SULFATE 3 ML: .5; 3 SOLUTION RESPIRATORY (INHALATION) at 08:14

## 2022-09-16 RX ADMIN — LAMOTRIGINE 150 MG: 100 TABLET ORAL at 09:22

## 2022-09-16 NOTE — DISCHARGE SUMMARY
Discharge Summary       PATIENT ID: Aman German  MRN: [id number]   YOB: 1935    DATE OF ADMISSION: 9/12/2022  3:05 AM    DATE OF DISCHARGE: 9/16/2022   PRIMARY CARE PROVIDER: Jaymie Chance MD     ATTENDING PHYSICIAN: Edgard Jenkins MD  DISCHARGING PROVIDER: Allean Cogan, PA-C    To contact this individual call [phone] and ask the  to page. If unavailable ask to be transferred the Adult Hospitalist Department. CONSULTATIONS: IP CONSULT TO NEUROSURGERY    PROCEDURES/SURGERIES: * No surgery found *    DISCHARGE DIAGNOSES:   -- Right convexity SDH    ADMISSION SUMMARY AND HOSPITAL COURSE:   Aman German is a 81 yo female with pmhx of seizures, dementia, COPD, hypothyroidism, who presented to ED after a GLF and found to have acute R convexity SDH. The patient was admitted to the ICU and with stable head CT was transferred out of the ICU. On the floor she worked with PT/OT and ultimately discharged to [address] [place] [address] on 9/16/22. She will need follow-up with Neurosurgery in 2 weeks with repeat imaging. Her POA daughters were aware of discharge plan and location as discussed with them by Care Coordinator and in agreement. The daughter, Melinda Talavera, was at bedside and I updated her and discussed her hospital course, care plans, and follow-up plans. She is in agreement with plan. Care plans reviewed with Attending MD, Edgard Jenkins, who is in agreement with plans. A Rapid COVID-19 test was performed prior to discharge. Result pending    DISCHARGE DIAGNOSES / PLAN:      Right convexity subdural hematoma  -- Followed by Neurosurgeon, Dr. Reva Lizama, during hospitalization. Her head CT showed a right convexity, 2 mm subdural hematoma without any significant mass effect. Her repeat Head CT was stable. She was transferred out of the ICU to the floor where she worked with PT/OT.  She ultimately discharged to [address] [address] on 9/16/22 with follow-up to be scheduled with neurosurgery in 2 weeks with repeat Head CT. Pyuria  Bacteruria   UA on admission with Nitrities/WBC's and bacteria, but with many epithelial cells. Her urine culture ultimately finalized as negative. She received IV CTX from 9/12-9/16 and not continued on antibiotics on discharge. The team had discussed with the daughter, it is unclear if the methenamine is actually helpful, will continue this at discharge, to follow-up with PCP to determine if perhaps nitrofurantoin low-dose would be an option    Left basilar opacity  Unlikely with pneumonia  Most likely atelectasis  Recommend follow-up chest radiograph in about 1 month     COPD  Lungs clear, no wheezing, not in exacerbation  Does have element of chronic hypoxic respiratory failure, wears supplemental oxygen as needed. Hypothyroid  Continuing levothyroxine     Seizure disorder  Stable, no seizure activity during hospitalization  Continued home Lamotrigine and Carbamazepine during admission and discharge     Code status: DNR (has durable DNR)  Prophylaxis: SCD. No pharmacologic PPx due to SDH  Care Plan discussed with: Patient, nurse  Anticipated Disposition: [address] on 9/16/22    PENDING TEST RESULTS:   At the time of discharge the following test results are still pending: None     ADDITIONAL CARE RECOMMENDATIONS:   -- No Aspirin or anticoagulation use due to SDH    NOTIFY [address] FOLLOWING:   Fever over 101 degrees for 24 hours. Chest pain, shortness of breath, fever, chills, nausea, vomiting, diarrhea, change in mentation, falling, weakness, bleeding. Severe pain or pain not relieved by medications, as well as any other signs or symptoms that you may have questions about. FOLLOW UP APPOINTMENTS:    Follow-up Information       Follow up With Specialties Details Why Contact Info    Carmencita Morrissey MD Neurosurgery Schedule an appointment as soon as possible for a visit in 2 week(s) CT scan review. Patient to call the office to schedule follow-up appointment. Hospital will call the patient to schedule head CT prior to the office appointment. [address]  [address] [facility]      Rhys Badillo MD Internal Medicine Physician, Internal Medicine Physician   [address] [facility] [id number]                 DIET: Regular Diet    ACTIVITY: Activity as tolerated with assistance    EQUIPMENT needed: None    DISCHARGE MEDICATIONS:  Current Discharge Medication List        CONTINUE these medications which have NOT CHANGED    Details   !! carBAMazepine XR (TEGretol XR) 100 mg SR tablet Take 100 mg by mouth every evening. Patient takes 400 mg in the morning and 500 mg in the evening (400 mg + 100 mg)      ketoconazole (NIZORAL) 2 % shampoo Apply  to affected area two (2) times a week on Wednesday and Saturday. Use on shower days (Wednesday and Saturday) evenings      lamoTRIgine (LaMICtal) 150 mg tablet Take 150 mg by mouth two (2) times a day. methenamine hippurate (HIPREX) 1 gram tablet Take 1 g by mouth two (2) times daily (with meals). Indications: urinary tract infection prevention      cyanocobalamin (Vitamin B-12) 1,000 mcg tablet Take 2,000 mcg by mouth daily. albuterol-ipratropium (DUO-NEB) 2.5 mg-0.5 mg/3 ml nebu 3 mL by Nebulization route every four (4) hours as needed for Wheezing or Shortness of Breath. !! carBAMazepine XR (TEGretol XR) 400 mg SR tablet Take 400 mg by mouth two (2) times a day. Patient takes 400 mg in the morning and 500 mg in the evening (400 mg + 100 mg)      cranberry extract 450 mg tab tablet Take 450 mg by mouth daily. diclofenac (VOLTAREN) 1 % gel Apply 4 g to affected area two (2) times a day. (right back and hip joints)      cholecalciferol (VITAMIN D3) (2,000 UNITS /50 MCG) cap capsule Take 2,000 Units by mouth daily. acetaminophen (TYLENOL) 500 mg tablet Take 500 mg by mouth every six (6) hours as needed for Pain. calcium carbonate (TUMS) 200 mg calcium (500 mg) chew Take 2 Tabs by mouth every four (4) hours as needed for Other (Indigestion). montelukast (SINGULAIR) 10 mg tablet Take 10 mg by mouth daily. carboxymethylcellulose sodium (CELLUVISC) 0.5 % drop ophthalmic solution Administer 2 Drops to both eyes daily. hydrocortisone (CORTAID) 1 % topical cream Apply  to affected area three (3) times daily as needed for Itching. use thin layer      loperamide (IMMODIUM) 2 mg tablet Take 2 mg by mouth every eight (8) hours as needed for Diarrhea. albuterol (PROVENTIL HFA, VENTOLIN HFA, PROAIR HFA) 90 mcg/actuation inhaler Take 1 Puff by inhalation every four (4) hours as needed for Shortness of Breath or Other (COPD). levothyroxine (SYNTHROID) 100 mcg tablet Take 100 mcg by mouth Daily (before breakfast). Indications: hypothyroidism       !! - Potential duplicate medications found. Please discuss with provider. STOP taking these medications       albuterol (PROVENTIL VENTOLIN) 2.5 mg /3 mL (0.083 %) nebu Comments:   Reason for Stopping:               DISPOSITION:    Home With:   OT  PT  HH  RN       Long term SNF/Inpatient Rehab    Independent/assisted living    Hospice    Other:       PATIENT CONDITION AT DISCHARGE:     Functional status    Poor     Deconditioned     Independent      Cognition     Lucid     Forgetful     Dementia      Catheters/lines (plus indication)    Almaraz     PICC     PEG     None      Code status     Full code     DNR      PHYSICAL EXAMINATION AT DISCHARGE:  General:          Alert, cooperative, no distress, appears stated age. She is frail appearing   HEENT:           Atraumatic, anicteric sclerae, pink conjunctivae                          No oral ulcers, mucosa moist, throat clear  Neck:               Supple, symmetrical  Lungs:             Clear to auscultation bilaterally. No Wheezing or Rhonchi. No rales.  2L NC in place  Chest wall:      No Accessory muscle use.  Heart:              Regular  rhythm, no murmur  Abdomen:        Soft, non-tender. Not distended. Bowel sounds normal  Extremities:     No cyanosis  Skin:                Not pale. No rashes   Psych:             Not anxious or agitated. Neurologic:      Alert, moves all extremities with 5/5 HG/B/T and DF/PF/EHL bilaterally. No drift. Oriented to name and location, and when asked year says \"I pay not attention to that. \" She is reading and briskly interactive.  She is forgetful and asks repeat questions    CHRONIC MEDICAL DIAGNOSES:  Problem List as of 9/16/2022 Date Reviewed: 7/29/2021            Codes Class Noted - Resolved    SDH (subdural hematoma) ([facility] ICD-10-CM: T07.6A6O  ICD-9-CM: 432.1  9/12/2022 - Present        Acute cystitis ICD-10-CM: N30.00  ICD-9-CM: 595.0  8/28/2017 - Present        Dementia ([facility] (Chronic) ICD-10-CM: F03.90  ICD-9-CM: 294.20  3/29/2017 - Present        UTI (urinary tract infection) ICD-10-CM: N39.0  ICD-9-CM: 599.0  3/29/2017 - Present        Acquired hypothyroidism (Chronic) ICD-10-CM: E03.9  ICD-9-CM: 244.9  3/28/2017 - Present        RESOLVED: Acute delirium ICD-10-CM: R41.0  ICD-9-CM: 780.09  4/4/2021 - 4/7/2021        RESOLVED: COPD with acute exacerbation ([facility] ICD-10-CM: J44.1  ICD-9-CM: 491.21  10/26/2019 - 10/27/2019        RESOLVED: Hypoxia ICD-10-CM: R09.02  ICD-9-CM: 799.02  10/25/2019 - 10/27/2019        RESOLVED: Seizure ([facility] (Chronic) ICD-10-CM: R56.9  ICD-9-CM: 780.39  3/28/2017 - 7/30/2021        RESOLVED: COPD (chronic obstructive pulmonary disease) ([facility] (Chronic) ICD-10-CM: J44.9  ICD-9-CM: 437  3/28/2017 - 10/27/2019           Greater than 30 minutes were spent with the patient on counseling and coordination of care    Signed:   John Nicole PA-C  9/16/2022  10:00 AM

## 2022-09-16 NOTE — PROGRESS NOTES
Transition of Care Plan: [facility]  RN to call report to: [phone] ext [phone]     RUR: 14% low     PCP F/U: Unknown MD Oc     Disposition: SNF-[facility] : room is 226     Transportation: [facility]-12pm     Main ContactEstefania French - Ana - [phone]  Ikjriyrm-Grfiewy-[phone]    4242: Facility can accept today. PA and RN notified. RN to obtain COVID rapid. [facility] set for 12pm.     Latoya Vera RN, CRM    Transition of Care Plan to SNF/Rehab    SNF/Rehab Transition:  Patient has been accepted to [address] [place] [address] and meets criteria for admission. Patient will transported by [facility] and expected to leave at 12pm.    Communication to Patient/Family:  Met with patient and daughter and they are agreeable to the transition plan. Communication to SNF/Rehab:  Bedside RN, Tucker Palacios, has been notified to update the transition plan to the facility and call report (phone number [phone] ext [phone])  Discharge information has been updated on the AVS.     Discharge instructions to be fax'd to facility at [facility] # [phone] ). Nursing Please include all hard scripts for controlled substances, med rec and dc summary, and AVS in packet.      Reviewed and confirmed with facility, [address] [place] [address], can manage the patient care needs for the following:     [address] with (X) only those applicable:    Medication:  [x]  Medications will be available at the facility  []  IV Antibiotics   []  Controlled Substance - hard copy to be sent with patient   [x]  Weekly Labs   Documents:  [] Hard RX  [] MAR  [x] Kardex  [] AVS  []Transfer Summary  [x]Discharge   Equipment:  []  CPAP/BiPAP  []  Wound Vacuum  []  Almaraz or Urinary Device  []  PICC/Central Line  []  Nebulizer  []  Ventilator   Treatment:  []Isolation (for MRSA, VRE, etc.)  []Surgical Drain Management  []Tracheostomy Care  []Dressing Changes  []Dialysis with transportation and chair time  []PEG Care  [x]Oxygen  []Daily Weights for Heart Failure   Dietary:  []Any diet limitations  []Tube Feedings   []Total Parenteral Management (TPN)   Eligible for Medicaid Long Term Services and Supports  Yes:  [] Eligible for medical assistance or will become eligible within 180 days and UAI completed. [] Provider/Patient and/or support system has requested screening. [] UAI copy provided to patient or responsible party  [x] UAI unavailable at discharge will send once processed to SNF provider. [] UAI unavailable at discharged mailed to patient  No:   [] Private pay and is not financially eligible for Medicaid within the next 180 days. [] Reside out-of-state.   [] A residents of a state owned/operated facility that is licensed  by [address] and [email or web address] [facility] or [facility]  [] Enrollment in [facility] hospice services  [] [address] [place] [address] [address]  [] Patient /Family declines to have screening completed or provide financial information for screening     Financial Resources:  Medicaid    [] Initiated and application pending   [x] Full coverage     Advanced Care Plan:  []Surrogate Decision Maker of Care  []POA  [x]Communicated Code [employer]   Other

## 2022-09-16 NOTE — PROGRESS NOTES
I have reviewed discharge instructions with the patient and caregiver. The caregiver verbalized understanding. Patient dc to our  RN called Report to Sagar Soriano RN patient transport with [facility].

## 2022-09-16 NOTE — DISCHARGE INSTRUCTIONS
Discharge SNF/Rehab Instructions/LTAC       PATIENT ID: Erlinda Brady  MRN: [id number]   YOB: 1935    DATE OF ADMISSION: 9/12/2022  DATE OF DISCHARGE: 9/16/2022    PRIMARY CARE PROVIDER: Dr. Aleman Lighter: Reji Allen MD  DISCHARGING PROVIDER: Governor Александр PA-C     To contact this individual call [phone] and ask the  to page. If unavailable ask to be transferred the Adult Hospitalist Department. CONSULTATIONS: Neurosurgery    PROCEDURES/SURGERIES: * No surgery found *    ADMITTING [address] [place] [address] COURSE:   -- Non-operative SDH s/p fall    Erlinda Brady is a 81 yo female with pmhx of seizures, dementia, COPD, hypothyroidism, who presented to ED after a GLF and found to have acute R convexity SDH. The patient was admitted to the ICU and with stable head CT was transferred out of the ICU. On the floor she worked with PT/OT and ultimately discharged to [address] [place] [address] on 9/16/22. She will need follow-up with Neurosurgery in 2 weeks with repeat imaging. DISCHARGE DIAGNOSES / PLAN:      Right convexity subdural hematoma  -- Followed by Neurosurgeon, Dr. Samantha Gerard, during hospitalization. Her head CT showed a right convexity, 2 mm subdural hematoma without any significant mass effect. Her repeat Head CT was stable. She was transferred out of the ICU to the floor where she worked with PT/OT. She ultimately discharged to [address] [address] on 9/16/22 with follow-up to be scheduled with neurosurgery in 2 weeks with repeat Head CT. Pyuria  Bacteruria   UA on admission with Nitrities/WBC's and bacteria, but with many epithelial cells. Her urine culture ultimately finalized as negative. She received IV CTX from 9/12-9/16 and not continued on antibiotics on discharge.   The team had discussed with the daughter, it is unclear if the methenamine is actually helpful, will continue this at discharge, to follow-up with PCP to determine if perhaps nitrofurantoin low-dose would be an option    Left basilar opacity  Unlikely with pneumonia  Most likely atelectasis  Recommend follow-up chest radiograph in about 1 month     COPD  Lungs clear, no wheezing, not in exacerbation  Does have element of chronic hypoxic respiratory failure, wears supplemental oxygen as needed. Hypothyroid  Continuing levothyroxine     Seizure disorder  Stable, no seizure activity during hospitalization  Continued home Lamotrigine and Carbamazepine during admission and discharge     Code status: DNR (has durable DNR)  Prophylaxis: SCD. No pharmacologic PPx due to SDH  Care Plan discussed with: Patient, nurse  Anticipated Disposition: [facility] on 9/16/22       PENDING TEST RESULTS:   At the time of discharge the following test results are still pending: None    FOLLOW UP APPOINTMENTS:    -- Please follow-up with Neurosurgery in 2 weeks with repeat Head CT. Please call for an appointment. ADDITIONAL CARE RECOMMENDATIONS: Please do not use any anticoagulation or Aspirin due to the bleed in the brain    DIET: Regular Diet    TUBE FEEDING INSTRUCTIONS: N/A    OXYGEN / BiPAP SETTINGS: Oxygen as needed    ACTIVITY: Activity as tolerated  PT assessment: Current Level of Function Impacting Discharge (mobility/balance): min A to bed mobility, transfers, gait with RW up to 15ft. WOUND CARE: None    EQUIPMENT needed: None    DISCHARGE MEDICATIONS:   See Medication Reconciliation Form      NOTIFY YOUR PHYSICIAN FOR ANY OF THE FOLLOWING:   Fever over 101 degrees for 24 hours. Chest pain, shortness of breath, fever, chills, nausea, vomiting, diarrhea, change in mentation, falling, weakness, bleeding. Severe pain or pain not relieved by medications. Or, any other signs or symptoms that you may have questions about.     DISPOSITION:    Home With:   OT  PT  HH  RN       SNF/Inpatient Rehab/LTAC    Independent/assisted living    Hospice    Other:       PATIENT CONDITION AT DISCHARGE: Stable and improved    Functional status    Poor     Deconditioned     Independent      Cognition     Lucid     Forgetful     Dementia      Catheters/lines (plus indication)    Almaraz     PICC     PEG     None      Code status     Full code     DNR (has durable DNR)     PHYSICAL EXAMINATION AT DISCHARGE:   Refer to Progress Note      CHRONIC MEDICAL DIAGNOSES:  -- Seizure disorde    PROBLEM LIST Updated:  Yes          Signed:   John Nicole PA-C  9/16/2022  9:50 AM

## 2022-09-16 NOTE — PROGRESS NOTES
Problem: Pressure Injury - Risk of  Goal: *Prevention of pressure injury  Description: Document George Scale and appropriate interventions in the flowsheet.   Outcome: Progressing Towards Goal  Note: Pressure Injury Interventions:  Sensory Interventions: Assess changes in LOC, Discuss PT/OT consult with provider    Moisture Interventions: Absorbent underpads, Check for incontinence Q2 hours and as needed    Activity Interventions: Pressure redistribution bed/mattress(bed type), PT/OT evaluation    Mobility Interventions: HOB 30 degrees or less, Pressure redistribution bed/mattress (bed type), PT/OT evaluation    Nutrition Interventions: Document food/fluid/supplement intake    Friction and Shear Interventions: HOB 30 degrees or less                Problem: Patient Education: Go to Patient Education Activity  Goal: Patient/Family Education  Outcome: Progressing Towards Goal

## 2022-09-16 NOTE — PROGRESS NOTES
I prayed with Jonelle and celebrated with her the Anointing of the [address] [facility].   Father Neftali Love

## 2022-09-20 ENCOUNTER — TRANSCRIBE ORDER (OUTPATIENT)
Dept: SCHEDULING | Age: 87
End: 2022-09-20

## 2022-09-20 DIAGNOSIS — S06.5XAA SDH (SUBDURAL HEMATOMA): Primary | ICD-10-CM

## 2022-09-26 ENCOUNTER — HOSPITAL ENCOUNTER (OUTPATIENT)
Dept: CT IMAGING | Age: 87
Discharge: HOME | End: 2022-09-26
Attending: NEUROLOGICAL SURGERY
Payer: MEDICARE

## 2022-09-26 DIAGNOSIS — S06.5XAA SDH (SUBDURAL HEMATOMA): ICD-10-CM

## 2022-09-26 PROCEDURE — 70450 CT HEAD/BRAIN W/O DYE: CPT

## 2022-10-27 ENCOUNTER — OFFICE VISIT (OUTPATIENT)
Dept: NEUROLOGY | Age: 87
End: 2022-10-27
Payer: MEDICARE

## 2022-10-27 VITALS
WEIGHT: 100.3 LBS | BODY MASS INDEX: 18.46 KG/M2 | SYSTOLIC BLOOD PRESSURE: 126 MMHG | HEIGHT: 62 IN | OXYGEN SATURATION: 91 % | RESPIRATION RATE: 20 BRPM | DIASTOLIC BLOOD PRESSURE: 70 MMHG | HEART RATE: 79 BPM

## 2022-10-27 DIAGNOSIS — G40.909 SEIZURE DISORDER (HCC): ICD-10-CM

## 2022-10-27 DIAGNOSIS — R63.4 WEIGHT LOSS: Primary | ICD-10-CM

## 2022-10-27 DIAGNOSIS — R41.3 MEMORY LOSS: ICD-10-CM

## 2022-10-27 PROCEDURE — 99215 OFFICE O/P EST HI 40 MIN: CPT | Performed by: NURSE PRACTITIONER

## 2022-10-27 PROCEDURE — G8418 CALC BMI BLW LOW PARAM F/U: HCPCS | Performed by: NURSE PRACTITIONER

## 2022-10-27 PROCEDURE — G8427 DOCREV CUR MEDS BY ELIG CLIN: HCPCS | Performed by: NURSE PRACTITIONER

## 2022-10-27 PROCEDURE — G8400 PT W/DXA NO RESULTS DOC: HCPCS | Performed by: NURSE PRACTITIONER

## 2022-10-27 PROCEDURE — G8536 NO DOC ELDER MAL SCRN: HCPCS | Performed by: NURSE PRACTITIONER

## 2022-10-27 PROCEDURE — 1123F ACP DISCUSS/DSCN MKR DOCD: CPT | Performed by: NURSE PRACTITIONER

## 2022-10-27 PROCEDURE — G8432 DEP SCR NOT DOC, RNG: HCPCS | Performed by: NURSE PRACTITIONER

## 2022-10-27 PROCEDURE — 1101F PT FALLS ASSESS-DOCD LE1/YR: CPT | Performed by: NURSE PRACTITIONER

## 2022-10-27 PROCEDURE — 1090F PRES/ABSN URINE INCON ASSESS: CPT | Performed by: NURSE PRACTITIONER

## 2022-10-27 RX ORDER — DRONABINOL 5 MG/1
5 CAPSULE ORAL 2 TIMES DAILY
Qty: 60 CAPSULE | Refills: 5 | Status: SHIPPED | OUTPATIENT
Start: 2022-10-27

## 2022-10-27 NOTE — PROGRESS NOTES
No chief complaint on file.       Visit Vitals  Pulse 79   Resp 20   Ht 5' 2\" (1.575 m)   Wt 100 lb 4.8 oz (45.5 kg)   SpO2 91%   BMI 18.35 kg/m²

## 2022-10-28 NOTE — PROGRESS NOTES
Anel Morin is a 82 y.o. female who presents with the following  No chief complaint on file. HPI    Follow-up with daughter for seizures and hospital stay  She did see Dr. Landon Valenzuela in 2020 in the hospital for breakthrough seizures  She had been following with [facility] Dr. Salo Beth but he has moved on    They come in today for seizure evaluation and memory evaluation   She is maintaining well on her seizure medications.    She is taking as they will bring them to her  She has been managing well on Tegretol and Lamictal  No side effects to these  Last seizure was in April 2022  They do all for games and exercising and such and she does get out some of these  She is not eating very well  She is lost significant amount of weight and weighs 100 pounds today  She does state that she eats but she does not as per family  She will take 1 bite of salmon she is not very fond of this she does reiterate she likes apples and peanut butter  She does repeat herself a lot within this exam  Her Mini-Mental status is around 24  She has never had formal neuropsychiatry testing but does have some confusion, attention problems and processing and expression of issues    She has been battling with her memory loss  [facility] thought some sort of frontotemporal dementia but she was never tested or fully evaluated for this  She does notice forgetful  She has an allergy to Aricept    She did have a brain bleed and had a recent CT which looked good  She was following with neurosurgery Dr. Cydney Dailey but has not had to go back since then    Allergies   Allergen Reactions    Aricept [Donepezil] Unknown (comments)    Ativan [Lorazepam] Unable to Obtain    Benadryl [Diphenhydramine Hcl] Unknown (comments)    Ciprofloxacin Unknown (comments)    Clindamycin Unknown (comments)     Listed on [facility]    Codeine Unknown (comments)    Macrobid [Nitrofurantoin Monohyd/M-Cryst] Unknown (comments)    Pcn [Penicillins] Other (comments)    Sulfa (Sulfonamide Antibiotics) Other (comments)       Current Outpatient Medications   Medication Sig    dronabinoL (MARINOL) 5 mg capsule Take 1 Capsule by mouth two (2) times a day. Max Daily Amount: 10 mg.    carBAMazepine XR (TEGretol XR) 100 mg SR tablet Take 100 mg by mouth every evening. Patient takes 400 mg in the morning and 500 mg in the evening (400 mg + 100 mg)    ketoconazole (NIZORAL) 2 % shampoo Apply  to affected area two (2) times a week on Wednesday and Saturday. Use on shower days (Wednesday and Saturday) evenings    lamoTRIgine (LaMICtal) 150 mg tablet Take 150 mg by mouth two (2) times a day. methenamine hippurate (HIPREX) 1 gram tablet Take 1 g by mouth two (2) times daily (with meals). Indications: urinary tract infection prevention    cyanocobalamin 1,000 mcg tablet Take 2,000 mcg by mouth daily. albuterol-ipratropium (DUO-NEB) 2.5 mg-0.5 mg/3 ml nebu 3 mL by Nebulization route every four (4) hours as needed for Wheezing or Shortness of Breath. carBAMazepine XR (TEGretol XR) 400 mg SR tablet Take 400 mg by mouth two (2) times a day. Patient takes 400 mg in the morning and 500 mg in the evening (400 mg + 100 mg)    cranberry extract 450 mg tab tablet Take 450 mg by mouth daily. diclofenac (VOLTAREN) 1 % gel Apply 4 g to affected area two (2) times a day. (right back and hip joints)    cholecalciferol (VITAMIN D3) (2,000 UNITS /50 MCG) cap capsule Take 2,000 Units by mouth daily. acetaminophen (TYLENOL) 500 mg tablet Take 500 mg by mouth every six (6) hours as needed for Pain. calcium carbonate (TUMS) 200 mg calcium (500 mg) chew Take 2 Tabs by mouth every four (4) hours as needed for Other (Indigestion). montelukast (SINGULAIR) 10 mg tablet Take 10 mg by mouth daily. carboxymethylcellulose sodium (CELLUVISC) 0.5 % drop ophthalmic solution Administer 2 Drops to both eyes daily.     hydrocortisone (CORTAID) 1 % topical cream Apply  to affected area three (3) times daily as needed for Itching. use thin layer    loperamide (IMMODIUM) 2 mg tablet Take 2 mg by mouth every eight (8) hours as needed for Diarrhea. albuterol (PROVENTIL HFA, VENTOLIN HFA, PROAIR HFA) 90 mcg/actuation inhaler Take 1 Puff by inhalation every four (4) hours as needed for Shortness of Breath or Other (COPD). levothyroxine (SYNTHROID) 100 mcg tablet Take 100 mcg by mouth Daily (before breakfast). Indications: hypothyroidism     No current facility-administered medications for this visit. Social History     Tobacco Use   Smoking Status Never   Smokeless Tobacco Never       Past Medical History:   Diagnosis Date    COPD (chronic obstructive pulmonary disease) ([facility]     Dementia ([facility] 3/29/2017    Hypothyroid     Seizure ([facility]     Seizure disorder ([facility]        Past Surgical History:   Procedure Laterality Date    HX HIP REPLACEMENT         No family history on file. Social History     Socioeconomic History    Marital status:    Tobacco Use    Smoking status: Never    Smokeless tobacco: Never   Substance and Sexual Activity    Alcohol use: Yes    Drug use: No       Review of Systems   Constitutional:  Positive for malaise/fatigue and weight loss. Eyes:  Negative for blurred vision, double vision and photophobia. Respiratory:  Negative for shortness of breath and wheezing. Cardiovascular:  Negative for chest pain and palpitations. Musculoskeletal:  Positive for falls. Neurological:  Negative for dizziness, seizures, loss of consciousness, weakness and headaches. Psychiatric/Behavioral:  Positive for memory loss. Negative for depression, hallucinations, substance abuse and suicidal ideas. The patient is not nervous/anxious and does not have insomnia. Remainder of comprehensive review is negative.      Physical Exam :    Visit Vitals  /70   Pulse 79   Resp 20   Ht 5' 2\" (1.575 m)   Wt 45.5 kg (100 lb 4.8 oz)   SpO2 91%   BMI 18.35 kg/m²       General: Well defined, nourished, and groomed individual in no acute distress. Musculoskeletal: Extremities revealed no edema and had full range of motion of joints. Psych: Good mood and bright affect    NEUROLOGICAL EXAMINATION:    Mental Status: Alert and oriented to person, place, and time    Cranial Nerves:    II, III, IV, VI: Visual acuity grossly intact. Visual fields are normal.    Pupils are equal, round, and reactive to light and accommodation. Extra-ocular movements are full and fluid. Fundoscopic exam was benign, no ptosis or nystagmus. V-XII: Hearing is grossly intact. Facial features are symmetric, with normal sensation and strength. The palate rises symmetrically and the tongue protrudes midline. Sternocleidomastoids 5/5. Motor Examination: Normal tone, bulk, and strength, 3/5 muscle strength throughout. Coordination: Finger to nose was normal. No resting or intention tremor    Gait and Station: Slow, stooped with rollator     Reflexes: DTRs 1+ throughout. Kelsey Goss Results for orders placed or performed during the hospital encounter of 09/12/22   CULTURE, URINE    Specimen: Urine   Result Value Ref Range    Special Requests: NO SPECIAL REQUESTS  Reflexed from [id number]        Culture result: No significant growth, <10,000 CFU/mL     COVID-19 RAPID TEST   Result Value Ref Range    Specimen source Nasopharyngeal      COVID-19 rapid test Not detected NOTD     CBC WITH AUTOMATED DIFF   Result Value Ref Range    WBC 9.1 3.6 - 11.0 K/uL    RBC 3.79 (L) 3.80 - 5.20 M/uL    HGB 12.5 11.5 - 16.0 g/dL    HCT 40.3 35.0 - 47.0 %    .3 (H) 80.0 - 99.0 FL    MCH 33.0 26.0 - 34.0 PG    MCHC 31.0 30.0 - 36.5 g/dL    RDW 13.4 11.5 - 14.5 %    PLATELET 862 245 - 303 K/uL    MPV 9.9 8.9 - 12.9 FL    NRBC 0.0 0  WBC    ABSOLUTE NRBC 0.00 0.00 - 0.01 K/uL    NEUTROPHILS 75 32 - 75 %    LYMPHOCYTES 14 12 - 49 %    MONOCYTES 6 5 - 13 %    EOSINOPHILS 3 0 - 7 %    BASOPHILS 1 0 - 1 %    IMMATURE GRANULOCYTES 1 (H) 0.0 - 0.5 %    ABS. NEUTROPHILS 6.8 1.8 - 8.0 K/UL    ABS. LYMPHOCYTES 1.3 0.8 - 3.5 K/UL    ABS. MONOCYTES 0.6 0.0 - 1.0 K/UL    ABS. EOSINOPHILS 0.3 0.0 - 0.4 K/UL    ABS. BASOPHILS 0.1 0.0 - 0.1 K/UL    ABS. IMM. GRANS. 0.1 (H) 0.00 - 0.04 K/UL    DF AUTOMATED     METABOLIC PANEL, COMPREHENSIVE   Result Value Ref Range    Sodium 144 136 - 145 mmol/L    Potassium 4.1 3.5 - 5.1 mmol/L    Chloride 108 97 - 108 mmol/L    CO2 37 (H) 21 - 32 mmol/L    Anion gap NEG 1 5 - 15 mmol/L    Glucose 109 (H) 65 - 100 mg/dL    BUN 27 (H) 6 - 20 MG/DL    Creatinine 0.75 0.55 - 1.02 MG/DL    BUN/Creatinine ratio 36 (H) 12 - 20      GFR est AA >60 >60 ml/min/1.73m2    GFR est non-AA >60 >60 ml/min/1.73m2    Calcium 9.3 8.5 - 10.1 MG/DL    Bilirubin, total 0.2 0.2 - 1.0 MG/DL    ALT (SGPT) 20 12 - 78 U/L    AST (SGOT) 11 (L) 15 - 37 U/L    Alk. phosphatase 104 45 - 117 U/L    Protein, total 7.0 6.4 - 8.2 g/dL    Albumin 3.2 (L) 3.5 - 5.0 g/dL    Globulin 3.8 2.0 - 4.0 g/dL    A-G Ratio 0.8 (L) 1.1 - 2.2     SAMPLES BEING HELD   Result Value Ref Range    SAMPLES BEING HELD 1RED,1BLUE     COMMENT        Add-on orders for these samples will be processed based on acceptable specimen integrity and analyte stability, which may vary by analyte.    TROPONIN-HIGH SENSITIVITY   Result Value Ref Range    Troponin-High Sensitivity 4 0 - 51 ng/L   LIPID PANEL   Result Value Ref Range    Cholesterol, total 203 (H) <200 MG/DL    Triglyceride 77 <150 MG/DL    HDL Cholesterol 87 MG/DL    LDL, calculated 100.6 (H) 0 - 100 MG/DL    VLDL, calculated 15.4 MG/DL    CHOL/HDL Ratio 2.3 0.0 - 5.0     PROCALCITONIN   Result Value Ref Range    Procalcitonin <0.05 ng/mL   URINALYSIS W/ REFLEX CULTURE    Specimen: Urine   Result Value Ref Range    Color YELLOW/STRAW      Appearance TURBID (A) CLEAR      Specific gravity 1.019 1.003 - 1.030      pH (UA) 6.0 5.0 - 8.0      Protein TRACE (A) NEG mg/dL    Glucose Negative NEG mg/dL    Ketone Negative NEG mg/dL    Bilirubin Negative NEG Blood TRACE (A) NEG      Urobilinogen 0.2 0.2 - 1.0 EU/dL    Nitrites Positive (A) NEG      Leukocyte Esterase LARGE (A) NEG      UA:UC IF INDICATED URINE CULTURE ORDERED (A) CNI      WBC >100 (H) 0 - 4 /hpf    RBC 5-10 0 - 5 /hpf    Epithelial cells MANY (A) FEW /lpf    Bacteria 3+ (A) NEG /hpf    Hyaline cast 2-5 0 - 5 /lpf   TSH 3RD GENERATION   Result Value Ref Range    TSH 0.71 0.36 - 9.28 uIU/mL   METABOLIC PANEL, BASIC   Result Value Ref Range    Sodium 142 136 - 145 mmol/L    Potassium 4.0 3.5 - 5.1 mmol/L    Chloride 108 97 - 108 mmol/L    CO2 30 21 - 32 mmol/L    Anion gap 4 (L) 5 - 15 mmol/L    Glucose 101 (H) 65 - 100 mg/dL    BUN 21 (H) 6 - 20 MG/DL    Creatinine 0.59 0.55 - 1.02 MG/DL    BUN/Creatinine ratio 36 (H) 12 - 20      GFR est AA >60 >60 ml/min/1.73m2    GFR est non-AA >60 >60 ml/min/1.73m2    Calcium 8.9 8.5 - 10.1 MG/DL   CBC W/O DIFF   Result Value Ref Range    WBC 6.6 3.6 - 11.0 K/uL    RBC 3.11 (L) 3.80 - 5.20 M/uL    HGB 10.5 (L) 11.5 - 16.0 g/dL    HCT 33.9 (L) 35.0 - 47.0 %    .0 (H) 80.0 - 99.0 FL    MCH 33.8 26.0 - 34.0 PG    MCHC 31.0 30.0 - 36.5 g/dL    RDW 13.5 11.5 - 14.5 %    PLATELET 787 998 - 634 K/uL    MPV 10.2 8.9 - 12.9 FL    NRBC 0.0 0  WBC    ABSOLUTE NRBC 0.00 0.00 - 0.01 K/uL   MAGNESIUM   Result Value Ref Range    Magnesium 2.3 1.6 - 2.4 mg/dL   CBC W/O DIFF   Result Value Ref Range    WBC 8.7 3.6 - 11.0 K/uL    RBC 3.37 (L) 3.80 - 5.20 M/uL    HGB 11.4 (L) 11.5 - 16.0 g/dL    HCT 36.1 35.0 - 47.0 %    .1 (H) 80.0 - 99.0 FL    MCH 33.8 26.0 - 34.0 PG    MCHC 31.6 30.0 - 36.5 g/dL    RDW 13.5 11.5 - 14.5 %    PLATELET 595 468 - 747 K/uL    MPV 10.8 8.9 - 12.9 FL    NRBC 0.0 0  WBC    ABSOLUTE NRBC 0.00 0.00 - 0.41 K/uL   METABOLIC PANEL, BASIC   Result Value Ref Range    Sodium 143 136 - 145 mmol/L    Potassium 4.2 3.5 - 5.1 mmol/L    Chloride 108 97 - 108 mmol/L    CO2 34 (H) 21 - 32 mmol/L    Anion gap 1 (L) 5 - 15 mmol/L    Glucose 139 (H) 65 - 100 mg/dL    BUN 14 6 - 20 MG/DL    Creatinine 0.62 0.55 - 1.02 MG/DL    BUN/Creatinine ratio 23 (H) 12 - 20      GFR est AA >60 >60 ml/min/1.73m2    GFR est non-AA >60 >60 ml/min/1.73m2    Calcium 8.7 8.5 - 10.1 MG/DL   CBC W/O DIFF   Result Value Ref Range    WBC 6.2 3.6 - 11.0 K/uL    RBC 3.44 (L) 3.80 - 5.20 M/uL    HGB 11.4 (L) 11.5 - 16.0 g/dL    HCT 36.8 35.0 - 47.0 %    .0 (H) 80.0 - 99.0 FL    MCH 33.1 26.0 - 34.0 PG    MCHC 31.0 30.0 - 36.5 g/dL    RDW 13.4 11.5 - 14.5 %    PLATELET 594 927 - 861 K/uL    MPV 10.1 8.9 - 12.9 FL    NRBC 0.0 0  WBC    ABSOLUTE NRBC 0.00 0.00 - 0.01 K/uL   MAGNESIUM   Result Value Ref Range    Magnesium 2.2 1.6 - 2.4 mg/dL   SAMPLES BEING HELD   Result Value Ref Range    SAMPLES BEING HELD 1sst     COMMENT        Add-on orders for these samples will be processed based on acceptable specimen integrity and analyte stability, which may vary by analyte. EKG, 12 LEAD, INITIAL   Result Value Ref Range    Ventricular Rate 86 BPM    Atrial Rate 86 BPM    P-R Interval 226 ms    QRS Duration 82 ms    Q-T Interval 356 ms    QTC Calculation (Bezet) 426 ms    Calculated P Axis 73 degrees    Calculated R Axis 77 degrees    Calculated T Axis 81 degrees    Diagnosis       Sinus rhythm with 1st degree AV block  Septal infarct (cited on or before 12-SEP-2022)  Abnormal ECG  When compared with ECG of 20-JUL-2022 16:23,  T wave amplitude has increased in Inferior leads  Confirmed by Sherry Joseph MD [id number]) on 9/12/2022 9:59:17 AM         Orders Placed This Encounter    [place] [facility] EMPL     Referral Priority:   Routine     Referral Type:   Consultation     Referral Reason:   Specialty Services Required     Referred to Provider:   Ranjan Barker, PHD     Number of Visits Requested:   1    dronabinoL (MARINOL) 5 mg capsule     Sig: Take 1 Capsule by mouth two (2) times a day. Max Daily Amount: 10 mg.      Dispense:  60 Capsule     Refill: 5       1. Weight loss    2. Memory loss    3.  Seizure disorder [facility])      Follow-up for seizure disorder, and hospital stay at [facility] for head bleed  She has had a recent CT which was normal    She was following with [facility] and they never did neuropsychiatric testing to evaluate dementia so we will do this  She is in a memory care home and doing well  Try Marinol for decreased appetite and weight loss that she is 100 pounds and possibly get a dietitian involved      Her seizures are stable last seizure was in April 2022        This note will not be viewable in [address]

## 2023-01-07 ENCOUNTER — TELEPHONE (OUTPATIENT)
Dept: NEUROLOGY | Age: 88
End: 2023-01-07

## 2023-05-23 RX ORDER — IPRATROPIUM BROMIDE AND ALBUTEROL SULFATE 2.5; .5 MG/3ML; MG/3ML
3 SOLUTION RESPIRATORY (INHALATION) EVERY 4 HOURS PRN
COMMUNITY

## 2023-05-23 RX ORDER — CARBAMAZEPINE 100 MG/1
100 TABLET, EXTENDED RELEASE ORAL EVERY EVENING
COMMUNITY

## 2023-05-23 RX ORDER — MONTELUKAST SODIUM 10 MG/1
10 TABLET ORAL DAILY
COMMUNITY

## 2023-05-23 RX ORDER — UREA 10 %
2 LOTION (ML) TOPICAL EVERY 4 HOURS PRN
COMMUNITY

## 2023-05-23 RX ORDER — ALBUTEROL SULFATE 90 UG/1
1 AEROSOL, METERED RESPIRATORY (INHALATION) EVERY 4 HOURS PRN
COMMUNITY

## 2023-05-23 RX ORDER — DIAPER,BRIEF,INFANT-TODD,DISP
EACH MISCELLANEOUS 3 TIMES DAILY PRN
COMMUNITY

## 2023-05-23 RX ORDER — LOPERAMIDE HYDROCHLORIDE 2 MG/1
2 TABLET ORAL EVERY 8 HOURS PRN
COMMUNITY

## 2023-05-23 RX ORDER — ACETAMINOPHEN 500 MG
500 TABLET ORAL EVERY 6 HOURS PRN
COMMUNITY

## 2023-05-23 RX ORDER — CARBOXYMETHYLCELLULOSE SODIUM 5 MG/ML
2 SOLUTION/ DROPS OPHTHALMIC DAILY
COMMUNITY

## 2023-05-23 RX ORDER — LEVOTHYROXINE SODIUM 0.1 MG/1
100 TABLET ORAL
COMMUNITY

## 2023-05-23 RX ORDER — LAMOTRIGINE 150 MG/1
150 TABLET ORAL 2 TIMES DAILY
COMMUNITY

## 2023-05-23 RX ORDER — CARBAMAZEPINE 400 MG/1
400 TABLET, EXTENDED RELEASE ORAL 2 TIMES DAILY
COMMUNITY

## 2023-05-23 RX ORDER — DRONABINOL 5 MG/1
5 CAPSULE ORAL 2 TIMES DAILY
COMMUNITY
Start: 2022-10-27

## 2023-05-23 RX ORDER — KETOCONAZOLE 20 MG/ML
SHAMPOO TOPICAL
COMMUNITY

## 2023-05-23 RX ORDER — METHENAMINE HIPPURATE 1000 MG/1
1 TABLET ORAL 2 TIMES DAILY WITH MEALS
COMMUNITY

## 2024-12-13 NOTE — PROGRESS NOTES
Transition of Care Plan: SNF-[facility]-pending auth     RUR: 14% low     PCP F/U: Sherly Wei MD     Disposition: SNF     Transportation: S     Main ContactJonelle Trevino - Daughter - [phone]  [id number]-[id number]-[phone]    [phone]: Discussed with family. Would like to try to see if patient can return to their memory care unit or [facility] SNF. Left message for [facility] to return call. States that they would prefer [employer] [facility] [employer] instead of [facility] because of proximity to daughter. Spoke with [place] and Zach Romeros has been canceled. Will start auth with [facility] if cannot be accepted to [facility].     [id number]: Spoke to [facility] at [facility]. States that they can accept and will call the insurance company to see if they can get auth switched to their facility.      Judie Flor RN,CRM Quality 226: Preventive Care And Screening: Tobacco Use: Screening And Cessation Intervention: Patient screened for tobacco use and is an ex/non-smoker Quality 130: Documentation Of Current Medications In The Medical Record: Current Medications Documented Detail Level: Detailed Quality 431: Preventive Care And Screening: Unhealthy Alcohol Use - Screening: Patient not identified as an unhealthy alcohol user when screened for unhealthy alcohol use using a systematic screening method